# Patient Record
Sex: FEMALE | Race: WHITE | Employment: OTHER | URBAN - METROPOLITAN AREA
[De-identification: names, ages, dates, MRNs, and addresses within clinical notes are randomized per-mention and may not be internally consistent; named-entity substitution may affect disease eponyms.]

---

## 2017-01-13 ENCOUNTER — TELEPHONE (OUTPATIENT)
Dept: FAMILY MEDICINE CLINIC | Age: 82
End: 2017-01-13

## 2017-01-13 NOTE — TELEPHONE ENCOUNTER
Patient called with two requests. The first thing she wants is her test results from her bone density scan. She also wants to have a repeat CT of her abdomen at the same time she has her chest CT done in March (ordered by pulmonary). She said the last abdominal CT was abnormal and wants to know what's going on. Will forward message to Dr Juan Antonio Jones.

## 2017-03-15 ENCOUNTER — HOSPITAL ENCOUNTER (OUTPATIENT)
Dept: CT IMAGING | Age: 82
Discharge: HOME | End: 2017-03-15
Attending: INTERNAL MEDICINE
Payer: MEDICARE

## 2017-03-15 DIAGNOSIS — R93.89 ABNORMAL RADIOLOGICAL FINDINGS IN SKIN AND SUBCUTANEOUS TISSUE: ICD-10-CM

## 2017-03-15 PROCEDURE — 71250 CT THORAX DX C-: CPT

## 2017-03-16 ENCOUNTER — TELEPHONE (OUTPATIENT)
Dept: FAMILY MEDICINE CLINIC | Age: 82
End: 2017-03-16

## 2017-03-16 NOTE — TELEPHONE ENCOUNTER
Called. Reviewed abd CT from last year with the pt.  continue follow up of chest CT as per pulmonary.

## 2017-03-27 RX ORDER — GABAPENTIN 300 MG/1
CAPSULE ORAL
Qty: 360 CAP | Refills: 0 | Status: SHIPPED | OUTPATIENT
Start: 2017-03-27 | End: 2017-04-12 | Stop reason: SDUPTHER

## 2017-04-12 ENCOUNTER — OFFICE VISIT (OUTPATIENT)
Dept: FAMILY MEDICINE CLINIC | Age: 82
End: 2017-04-12

## 2017-04-12 ENCOUNTER — HOSPITAL ENCOUNTER (OUTPATIENT)
Dept: LAB | Age: 82
Discharge: HOME | End: 2017-04-12
Payer: MEDICARE

## 2017-04-12 VITALS
DIASTOLIC BLOOD PRESSURE: 59 MMHG | RESPIRATION RATE: 16 BRPM | BODY MASS INDEX: 32.79 KG/M2 | TEMPERATURE: 98.1 F | WEIGHT: 178.2 LBS | HEART RATE: 74 BPM | OXYGEN SATURATION: 96 % | HEIGHT: 62 IN | SYSTOLIC BLOOD PRESSURE: 123 MMHG

## 2017-04-12 DIAGNOSIS — E03.4 HYPOTHYROIDISM DUE TO ACQUIRED ATROPHY OF THYROID: ICD-10-CM

## 2017-04-12 DIAGNOSIS — R06.02 SOB (SHORTNESS OF BREATH) ON EXERTION: ICD-10-CM

## 2017-04-12 DIAGNOSIS — R68.89 GENERAL SYMPTOM: ICD-10-CM

## 2017-04-12 DIAGNOSIS — I10 ESSENTIAL HYPERTENSION: ICD-10-CM

## 2017-04-12 DIAGNOSIS — R53.83 FATIGUE, UNSPECIFIED TYPE: ICD-10-CM

## 2017-04-12 DIAGNOSIS — R19.5 HEME POSITIVE STOOL: ICD-10-CM

## 2017-04-12 DIAGNOSIS — G47.00 INSOMNIA, UNSPECIFIED TYPE: ICD-10-CM

## 2017-04-12 DIAGNOSIS — M79.7 FIBROMYALGIA: ICD-10-CM

## 2017-04-12 DIAGNOSIS — Z51.81 ENCOUNTER FOR MEDICATION MONITORING: ICD-10-CM

## 2017-04-12 DIAGNOSIS — K21.9 GASTROESOPHAGEAL REFLUX DISEASE WITHOUT ESOPHAGITIS: ICD-10-CM

## 2017-04-12 DIAGNOSIS — Z00.00 MEDICARE ANNUAL WELLNESS VISIT, SUBSEQUENT: Primary | ICD-10-CM

## 2017-04-12 DIAGNOSIS — D64.9 ANEMIA, UNSPECIFIED TYPE: ICD-10-CM

## 2017-04-12 LAB
BILIRUB UR QL STRIP: NORMAL
GLUCOSE UR-MCNC: NEGATIVE MG/DL
KETONES P FAST UR STRIP-MCNC: NORMAL MG/DL
PH UR STRIP: 5.5 [PH] (ref 4.6–8)
PROT UR QL STRIP: NORMAL MG/DL
SP GR UR STRIP: 1.02 (ref 1–1.03)
UA UROBILINOGEN AMB POC: NORMAL (ref 0.2–1)
URINALYSIS CLARITY POC: CLEAR
URINALYSIS COLOR POC: NORMAL
URINE BLOOD POC: NEGATIVE
URINE LEUKOCYTES POC: NORMAL
URINE NITRITES POC: NEGATIVE

## 2017-04-12 PROCEDURE — 83550 IRON BINDING TEST: CPT

## 2017-04-12 PROCEDURE — 80061 LIPID PANEL: CPT

## 2017-04-12 PROCEDURE — 85025 COMPLETE CBC W/AUTO DIFF WBC: CPT

## 2017-04-12 PROCEDURE — 82607 VITAMIN B-12: CPT

## 2017-04-12 PROCEDURE — 82728 ASSAY OF FERRITIN: CPT

## 2017-04-12 PROCEDURE — 80053 COMPREHEN METABOLIC PANEL: CPT

## 2017-04-12 PROCEDURE — 84443 ASSAY THYROID STIM HORMONE: CPT

## 2017-04-12 PROCEDURE — 36415 COLL VENOUS BLD VENIPUNCTURE: CPT

## 2017-04-12 RX ORDER — ALPRAZOLAM 0.5 MG/1
TABLET ORAL
Qty: 180 TAB | Refills: 0 | Status: SHIPPED | OUTPATIENT
Start: 2017-04-12 | End: 2017-12-01 | Stop reason: ALTCHOICE

## 2017-04-12 RX ORDER — GABAPENTIN 300 MG/1
CAPSULE ORAL
Qty: 360 CAP | Refills: 3 | Status: SHIPPED | OUTPATIENT
Start: 2017-04-12 | End: 2017-08-18

## 2017-04-12 RX ORDER — LEVOTHYROXINE SODIUM 50 UG/1
TABLET ORAL
Qty: 90 TAB | Refills: 3 | Status: SHIPPED | OUTPATIENT
Start: 2017-04-12 | End: 2018-04-13 | Stop reason: SDUPTHER

## 2017-04-12 RX ORDER — OSELTAMIVIR PHOSPHATE 75 MG/1
CAPSULE ORAL
Refills: 0 | COMMUNITY
Start: 2017-04-01 | End: 2017-04-12 | Stop reason: ALTCHOICE

## 2017-04-12 RX ORDER — HYDROCORTISONE 2.5% 25 MG/G
CREAM TOPICAL
Refills: 0 | COMMUNITY
Start: 2017-04-01 | End: 2017-07-12 | Stop reason: CLARIF

## 2017-04-12 NOTE — PROGRESS NOTES
Appointment at [facility] Cardiology on Wednesday, April 19, 2017 at 9:00 am for a stress echo.

## 2017-04-12 NOTE — PROGRESS NOTES
Chief Complaint   Patient presents with   Rochelle Arellano Annual Wellness Visit     Medicare       BMP 10/11/2016    TSH/T4  10/11/2016    Lipid 10/1/2016    Mammogram 10/31/2016    Colonoscopy 2/15/2015 by Dr. Marta Palacios. Pt states Dr. Marta Palacios stated she did not need anymore colonoscopies.     Verbal order received per Dr. Shah- obtain UA without micro- VORB

## 2017-04-12 NOTE — PROGRESS NOTES
This is a Subsequent Medicare Annual Wellness Visit providing Personalized Prevention Plan Services (PPPS) (Performed 12 months after initial AWV and PPPS )    I have reviewed the patient's medical history in detail and updated the computerized patient record. Cardiovascular Review:  The patient has hypertension, hyperlipidemia, and atrial tachycardia. Diet and Lifestyle: generally follows a low fat low cholesterol diet, generally follows a low sodium diet, exercises sporadically  Home BP Monitoring: is not measured at home. Pertinent ROS: taking medications as instructed, no medication side effects noted, no TIA's, no swelling of ankles, no palpitations. C/o SOB with exertion over the past several months but thinks that it is getting worse. Denies chest pain. No pressure or chest tightness. Last seen by cardiology on this past January but did not discussed at that time. She has been extremely more fatigue also. Thyroid Review:  Patient is seen for followup of hypothyroidism. Followed by endo. Thyroid ROS: denies weight changes, heat/cold intolerance, bowel/skin changes or CVS symptoms. Osteoarthritis and Chronic Pain:  Patient has osteoarthritis and fibromyalgia. Rheumatological ROS: Has burning and pain in the legs at feet that is worse at night. Aches all the time. Has not been exercising. Controlled by PRN meds. Response to treatment plan: stable.      History     Past Medical History:   Diagnosis Date    Fibromyalgia     GERD (gastroesophageal reflux disease)     Headache     migraine    Heart failure (HCC)     HTN (hypertension) 4/2/2010    Hypothyroidism 4/2/2010    OA (osteoarthritis) 4/2/2010    Tachycardia 2013    Thyroid disease       Past Surgical History:   Procedure Laterality Date    COLONOSCOPY,REMGISSELLE Vanegas  2/5/2015         ENDOSCOPY, COLON, DIAGNOSTIC  12/2010    Dr. Janessa Bauer- repeat 5 yrs    HX BREAST BIOPSY Right 1999    negative    HX CARPAL TUNNEL RELEASE  9/28/2012    HX CATARACT REMOVAL  9/2014    bilateral     HX HYSTERECTOMY  1977    HX ORTHOPAEDIC       Current Outpatient Prescriptions   Medication Sig Dispense Refill    PROCTOZONE-HC 2.5 % rectal cream Apply once or twice daily as needed  0    ALPRAZolam (XANAX) 0.5 mg tablet TAKE 1 TABLET BY MOUTH TWICE DAILY AS NEEDED 180 Tab 0    levothyroxine (SYNTHROID) 50 mcg tablet Take 1 pill every Monday through Thursday. 90 Tab 3    gabapentin (NEURONTIN) 300 mg capsule TAKE 1 CAPSULE BY MOUTH TWICE DAILY THEN TAKE 2 CAPSULES BY MOUTH EVERY NIGHT 360 Cap 3    temazepam (RESTORIL) 15 mg capsule TAKE 1 TO 2 CAPSULES BY MOUTH EVERY NIGHT AT BEDTIME 60 Cap 3    butalbital-acetaminophen-caffeine (FIORICET, ESGIC) -40 mg per tablet TAKE 1 TABLET BY MOUTH EVERY 6 HOURS AS NEEDED FOR HEADACHE. MAX DAILY AMT: 4 TABLETS 30 Tab 1    cyclobenzaprine (FLEXERIL) 10 mg tablet Take 1 Tab by mouth three (3) times daily as needed for Muscle Spasm(s). 30 Tab 1    ondansetron hcl (ZOFRAN, AS HYDROCHLORIDE,) 8 mg tablet Take 1 Tab by mouth every eight (8) hours as needed for Nausea. 15 Tab 0    calcium carbonate (TUMS) 200 mg calcium (500 mg) chew Take 1 Tab by mouth as needed.  loratadine (CLARITIN) 10 mg tablet Take 1 tab at 1am and take 1 tab at 1pm      nebivolol (BYSTOLIC) 5 mg tablet Take 1 Tab by mouth two (2) times a day.  180 Tab 3    furosemide (LASIX) 20 mg tablet TAKE 1 TABLET BY MOUTH EVERY DAY AS NEEDED FOR SWEELING 90 Tab 0     Allergies   Allergen Reactions    Epinephrine Other (comments)     BP drops and pass out    Iodinated Contrast Media - Oral And Iv Dye Shortness of Breath    Novocain [Procaine] Palpitations     BP drops,passes out    Codeine Itching     Pt does not recall    Cymbalta [Duloxetine] Other (comments)     Head Pain and nausea    Daypro [Oxaprozin] Unknown (comments)     Pt does not recall    Prednisone Shortness of Breath     agitation    Sulfa (Sulfonamide Antibiotics) Unknown (comments)     Pt does not recall    Zithromax [Azithromycin] Nausea Only     Diarrhea, headaches     Family History   Problem Relation Age of Onset    Heart Disease Paternal Aunt     Heart Disease Paternal Uncle     Colon Cancer Maternal Grandmother     No Known Problems Mother     No Known Problems Father     Other Daughter      fibromyalgia    Heart Disease Maternal Grandfather     Hypertension Sister      Social History   Substance Use Topics    Smoking status: Never Smoker    Smokeless tobacco: Never Used    Alcohol use No     Patient Active Problem List   Diagnosis Code    Hypothyroidism E03.9    HTN (hypertension) I10    OA (osteoarthritis) M19.90    GERD (gastroesophageal reflux disease) K21.9    Fibromyalgia M79.7    Insomnia G47.00    Headache R51    Iron deficiency anemia, unspecified D50.9    Heart palpitations R00.2    Paroxysmal Inappropriate sinus tachycardia vs possible atrial tachycardia R00.0    Edema leg R60.0    Reactive airway disease with wheezing J45.909    Incidental lung nodule, greater than or equal to 8mm R91.1    Encounter for medication monitoring Z51.81       Depression Risk Factor Screening:     PHQ 2 / 9, over the last two weeks 4/12/2017   Little interest or pleasure in doing things Not at all   Feeling down, depressed or hopeless Not at all   Total Score PHQ 2 0     Alcohol Risk Factor Screening: On any occasion during the past 3 months, have you had more than 3 drinks containing alcohol? No    Do you average more than 7 drinks per week? No      Functional Ability and Level of Safety:     Hearing Loss   none    Activities of Daily Living   Self-care. Requires assistance with: no ADLs    Fall Risk     Fall Risk Assessment, last 12 mths 4/12/2017   Able to walk? Yes   Fall in past 12 months? No   Functional Ability:   Does the patient exhibit a steady gait?   yes    How long did it take the patient to get up and walk from a sitting position? seconds    Is the patient self reliant? (ie can do own laundry, meals, household chores)  yes   Does the patient handle his/her own medications? yes   Does the patient handle his/her own money? yes   Is the patients home safe (ie good lighting, handrails on stairs and bath, etc.)? yes   Did you notice or did patient express any hearing difficulties? no   Did you notice or did patient express any vision difficulties?  no   Were distance and reading eye charts used? yes     Advance Care Planning:   Patient was offered the opportunity to discuss advance care planning:  yes    Does patient have an Advance Directive:  yes    If no, did you provide information on Caring Connections? yes          Abuse Screen   Patient is not abused    Review of Systems   Eyes: negative for irritation and redness  Ears, nose, mouth, throat, and face: negative for earaches, nasal congestion and hoarseness  Respiratory: negative for cough, sputum or dyspnea on exertion  Cardiovascular: negative for chest pain, chest pressure/discomfort, dyspnea, palpitations, irregular heart beats, fatigue, lower extremity edema  Gastrointestinal: negative for dysphagia, dyspepsia, reflux symptoms, nausea, vomiting, change in bowel habits, melena, constipation and abdominal pain. Has hemorrhoid but denies bleeding or pain.     Genitourinary:negative for frequency, dysuria, nocturia, urinary incontinence  Integument/breast: negative for rash and dryness  Hematologic/lymphatic: negative for easy bruising and lymphadenopathy  Neurological: negative for headaches, dizziness, tremor and weakness  Endocrine: negative for diabetic symptoms including polyuria and polydipsia    Physical Examination     Evaluation of Cognitive Function:  Mood/affect:  neutral  Appearance: age appropriate  Family member/caregiver input: NA    Visit Vitals    /59 (BP 1 Location: Right arm, BP Patient Position: Sitting)    Pulse 74    Temp 98.1 °F (36.7 °C) (Oral)    Resp 16    Ht 5' 1.5\" (1.562 m)    Wt 178 lb 3.2 oz (80.8 kg)    SpO2 96%    BMI 33.13 kg/m2     General:  Alert, cooperative, no distress, appears stated age. Head:  Normocephalic, without obvious abnormality, atraumatic. Eyes:  Conjunctivae/corneas clear. PERRL, EOMs intact. Fundi benign. Ears:  Normal TMs and external ear canals both ears. Nose: Nares normal. Septum midline. Mucosa normal. No drainage or sinus tenderness. Throat: Lips, mucosa, and tongue normal. Teeth and gums normal.   Neck: Supple, symmetrical, trachea midline, no adenopathy, thyroid: no enlargement/tenderness/nodules, no carotid bruit and no JVD. Back:   Symmetric, no curvature. ROM normal. No CVA tenderness. Lungs:   Clear to auscultation bilaterally. Chest wall:  No tenderness or deformity. Heart:  Regular rate and rhythm, S1, S2 normal, no murmur, click, rub or gallop. Breast Exam:  No tenderness, masses, or nipple abnormality. Abdomen:   Soft, non-tender. Bowel sounds normal. No masses,  No organomegaly. Rectal:  Normal tone,  no masses or tenderness. Has non tender external hemorrhoid. Guaiac positive stool. Extremities: Extremities normal, atraumatic, no cyanosis or edema. Pulses: 2+ and symmetric all extremities. Skin: Skin color, texture, turgor normal. No rashes or lesions. Lymph nodes: Cervical, supraclavicular, and axillary nodes normal.   Neurologic: CNII-XII intact. Normal strength, sensation and reflexes throughout. Patient Care Team:  Nydia George MD as PCP - [facility] Tesfaye Street, MD as Physician (Cardiology)  Nicole Zamora RN as Nurse Navigator    Advice/Referrals/Counseling   Education and counseling provided:  Are appropriate based on today's review and evaluation  End-of-Life planning (with patient's consent)      Assessment/Plan   Ishan Hernández was seen today for annual wellness visit.     Diagnoses and all orders for this visit:    Medicare annual wellness visit, subsequent  -     AMB POC URINALYSIS DIP STICK AUTO W/O MICRO    SOB (shortness of breath) on exertion  -     AMB POC EKG 24HR MONITORING  -     REFERRAL TO CARDIOLOGY  -     STRESS ECHO ICLUD PERFORM ECG TIP WITH     Fatigue, unspecified type  -     AMB POC COMPLETE CBC, AUTOMATED  -     CBC WITH AUTOMATED DIFF    Heme positive stool//  Anemia, unspecified type  Will defer referral to GI until after the results of the PET scan set up bu pulmonary for pulmonary nodule     -     IRON PROFILE  -     FERRITIN    Essential hypertension  -     LIPID PANEL    Hypothyroidism due to acquired atrophy of thyroid  -     levothyroxine (SYNTHROID) 50 mcg tablet; Take 1 pill every Monday through Thursday.  -     TSH 3RD GENERATION    Insomnia, unspecified type  -     ALPRAZolam (XANAX) 0.5 mg tablet; TAKE 1 TABLET BY MOUTH TWICE DAILY AS NEEDED    Fibromyalgia  -     gabapentin (NEURONTIN) 300 mg capsule; TAKE 1 CAPSULE BY MOUTH TWICE DAILY THEN TAKE 2 CAPSULES BY MOUTH EVERY NIGHT  Stable     Gastroesophageal reflux disease without esophagitis  Stable     General symptom  -     VITAMIN B12 & FOLATE    Encounter for medication monitoring  -     METABOLIC PANEL, COMPREHENSIVE    Follow-up Disposition:  Return in about 6 weeks (around 5/24/2017). reviewed diet, exercise and weight control  cardiovascular risk and specific lipid/LDL goals reviewed  reviewed medications and side effects in detail. I have discussed diagnosis listed in this note with pt and/or family. I have discussed treatment plans and options and the risk/benefit analysis of those options, including safe use of medications and possible medication side effects. Through the use of shared decision making we have agreed to the above plan. The patient has received an after-visit summary and questions were answered concerning future plans and follow up. Advise pt of any urgent changes then to proceed to the ER.

## 2017-04-12 NOTE — MR AVS SNAPSHOT
Visit Information Date & Time Provider Department Dept. Phone Encounter #  
 4/12/2017  2:15 PM Kraig Dash MD [facility] [phone] [id number] Follow-up Instructions Return in about 6 weeks (around 5/24/2017). Upcoming Health Maintenance Date Due  
 MEDICARE YEARLY EXAM 5/12/2017 GLAUCOMA SCREENING Q2Y 3/24/2018 COLONOSCOPY 5/11/2021 DTaP/Tdap/Td series (2 - Td) 4/15/2025 Allergies as of 4/12/2017  Review Complete On: 4/12/2017 By: Rabia Fay LPN Severity Noted Reaction Type Reactions Epinephrine High 04/02/2010    Other (comments) BP drops and pass out Iodinated Contrast Media - Oral And Iv Dye High 05/21/2010    Shortness of Breath Novocain [Procaine] High 04/02/2010    Palpitations BP drops,passes out Codeine  04/02/2010    Itching Pt does not recall Cymbalta [Duloxetine]  12/15/2014    Other (comments) Head Pain and nausea Daypro [Oxaprozin]  04/02/2010    Unknown (comments) Pt does not recall Prednisone  06/28/2013    Shortness of Breath  
 agitation Sulfa (Sulfonamide Antibiotics)  04/02/2010    Unknown (comments) Pt does not recall Zithromax [Azithromycin]  08/10/2015    Nausea Only Diarrhea, headaches Current Immunizations  Reviewed on 10/11/2016 Name Date Influenza High Dose Vaccine PF 10/11/2016, 9/28/2015, 10/27/2014 Influenza Vaccine 10/23/2013 Influenza Vaccine Split 10/9/2012, 10/12/2011, 10/11/2010 Pneumococcal Conjugate (PCV-13) 4/15/2015 Pneumococcal Vaccine (Pcv) 10/23/2007 Tdap 4/15/2015 Not reviewed this visit You Were Diagnosed With   
  
 Codes Comments Medicare annual wellness visit, subsequent    -  Primary ICD-10-CM: Z00.00 ICD-9-CM: V70.0 Insomnia, unspecified type     ICD-10-CM: G47.00 ICD-9-CM: 780.52 Hypothyroidism due to acquired atrophy of thyroid     ICD-10-CM: E03.4 ICD-9-CM: 244.8, 246.8 Essential hypertension     ICD-10-CM: I10 
ICD-9-CM: 401.9 Fibromyalgia     ICD-10-CM: M79.7 ICD-9-CM: 729.1 Gastroesophageal reflux disease without esophagitis     ICD-10-CM: K21.9 ICD-9-CM: 530.81 Encounter for medication monitoring     ICD-10-CM: Z51.81 
ICD-9-CM: V58.83   
 SOB (shortness of breath) on exertion     ICD-10-CM: R06.02 
ICD-9-CM: 786.05 Fatigue, unspecified type     ICD-10-CM: R53.83 ICD-9-CM: 780.79 Vitals BP Pulse Temp Resp Height(growth percentile) Weight(growth percentile) 123/59 (BP 1 Location: Right arm, BP Patient Position: Sitting) 74 98.1 °F (36.7 °C) (Oral) 16 5' 1.5\" (1.562 m) 178 lb 3.2 oz (80.8 kg) SpO2 BMI OB Status Smoking Status 96% 33.13 kg/m2 Hysterectomy Never Smoker Vitals History BMI and BSA Data Body Mass Index Body Surface Area  
 33.13 kg/m 2 1.87 m 2 Preferred Pharmacy Pharmacy Name Phone [facility] [address] [phone] Your Updated Medication List  
  
   
This list is accurate as of: 4/12/17  3:58 PM.  Always use your most recent med list.  
  
  
  
  
 ALPRAZolam 0.5 mg tablet Commonly known as:  XANAX  
TAKE 1 TABLET BY MOUTH TWICE DAILY AS NEEDED  
  
 butalbital-acetaminophen-caffeine -40 mg per tablet Commonly known as:  FIORICET, ESGIC  
TAKE 1 TABLET BY MOUTH EVERY 6 HOURS AS NEEDED FOR HEADACHE. MAX DAILY AMT: 4 TABLETS  
  
 calcium carbonate 200 mg calcium (500 mg) Chew Commonly known as:  TUMS Take 1 Tab by mouth as needed. cyclobenzaprine 10 mg tablet Commonly known as:  FLEXERIL Take 1 Tab by mouth three (3) times daily as needed for Muscle Spasm(s). furosemide 20 mg tablet Commonly known as:  LASIX TAKE 1 TABLET BY MOUTH EVERY DAY AS NEEDED FOR SWEELING  
  
 gabapentin 300 mg capsule Commonly known as:  NEURONTIN  
 TAKE 1 CAPSULE BY MOUTH TWICE DAILY THEN TAKE 2 CAPSULES BY MOUTH EVERY NIGHT  
  
 levothyroxine 50 mcg tablet Commonly known as:  synthroid Take 1 pill every Monday through Thursday. loratadine 10 mg tablet Commonly known as:  Sheng Igor Take 1 tab at 1am and take 1 tab at 1pm  
  
 nebivolol 5 mg tablet Commonly known as:  BYSTOLIC Take 1 Tab by mouth two (2) times a day. ondansetron hcl 8 mg tablet Commonly known as:  ZOFRAN (AS HYDROCHLORIDE) Take 1 Tab by mouth every eight (8) hours as needed for Nausea. PROCTOZONE-HC 2.5 % rectal cream  
Generic drug:  hydrocortisone Apply once or twice daily as needed  
  
 temazepam 15 mg capsule Commonly known as:  RESTORIL  
TAKE 1 TO 2 CAPSULES BY MOUTH EVERY NIGHT AT BEDTIME Prescriptions Printed Refills ALPRAZolam (XANAX) 0.5 mg tablet 0 Sig: TAKE 1 TABLET BY MOUTH TWICE DAILY AS NEEDED Class: Print Prescriptions Sent to Pharmacy Refills  
 levothyroxine (SYNTHROID) 50 mcg tablet 3 Sig: Take 1 pill every Monday through Thursday. Class: Normal  
 Pharmacy: [facility] [address] [place] [address] Ph #: [phone]  
 gabapentin (NEURONTIN) 300 mg capsule 3 Sig: TAKE 1 CAPSULE BY MOUTH TWICE DAILY THEN TAKE 2 CAPSULES BY MOUTH EVERY NIGHT Class: Normal  
 Pharmacy: [facility] [address], [address] [place] [address] Ph #: [phone] We Performed the Following AMB POC COMPLETE CBC, AUTOMATED [44248 CPT(R)] AMB POC EKG 24HR MONITORING [08530 CPT(R)] AMB POC URINALYSIS DIP STICK AUTO W/O MICRO [70584 CPT(R)] LIPID PANEL [40265 CPT(R)] METABOLIC PANEL, COMPREHENSIVE [01289 CPT(R)] REFERRAL TO CARDIOLOGY [HWQ30 Custom] Comments:  
 Stress echo TSH 3RD GENERATION [35942 CPT(R)] Follow-up Instructions Return in about 6 weeks (around 5/24/2017). To-Do List   
 04/14/2017  2:00 PM  
  Appointment with [facility] ER PET 1 at [facility] RAD PET ([phone]) 1. Patient should arrive 30 minutes early to register. Patient's entire visit to the hospital will last about 2 Hours. 2.  Eat a low carb/high protein diet the evening before your procedure. Stay away from food and drink that contains sugars the night before and ESPECIALLY the day of the test. 3.  Do Not eat 4 hours prior to your procedure. The patient may drink all the water they want, up until the time of their appointment. Encourage patient to be well hydrated. Coffee is ok, as long as an artificial sweetener is used instead of sugar. 4. Take meds with water or saltine crackers if food required. 5.  Do not exercise the morning of your procedure. 6.  If you take insulin, it must be taken at least 4 hours before your procedure. 7.  You should bring medications for pain, anxiety, or claustrophobia if you need them. If you take medications for anxiety or claustrophobia, a ride needs to come with you. 8.  Materials for this procedure are ordered in advance and are time-sensitive. If you need to cancel or reschedule, you must call [phone] at least 48 hours prior to your appointment time. 9.  Bring recent CT scan results from other facilities with you. Please have [facility]: ER Registration SFM: [facility], Radiation/Oncology Department (left of the fireplace) MRM: OP Registration MOB 1. Referral Information Referral ID Referred By Referred To  
  
 [id number] Savage Hilliard MD   
   [address] [place] [address], [address] Phone: [phone] Fax: [phone] Visits Status Start Date End Date 1 New Request 4/12/17 4/12/18 If your referral has a status of pending review or denied, additional information will be sent to support the outcome of this decision. Introducing [facility]! Dear Camilo Mcgarry: 
Thank you for requesting a [employer] account. Our records indicate that you already have an active [employer] account. You can access your account anytime at [email or web address] Did you know that you can access your hospital and ER discharge instructions at any time in [employer]? You can also review all of your test results from your hospital stay or ER visit. Additional Information If you have questions, please visit the Frequently Asked Questions section of the [employer] website at [email or web address] Remember, [employer] is NOT to be used for urgent needs. For medical emergencies, dial 911. Now available from your iPhone and Android! Please provide this summary of care documentation to your next provider. Your primary care clinician is listed as Gurvinder Zelaya. If you have any questions after today's visit, please call [phone].

## 2017-04-13 LAB
ALBUMIN SERPL-MCNC: 4.5 G/DL (ref 3.5–4.8)
ALBUMIN/GLOB SERPL: 1.7 {RATIO} (ref 1.2–2.2)
ALP SERPL-CCNC: 103 IU/L (ref 39–117)
ALT SERPL-CCNC: 17 IU/L (ref 0–32)
AST SERPL-CCNC: 24 IU/L (ref 0–40)
BASOPHILS # BLD AUTO: 0 X10E3/UL (ref 0–0.2)
BASOPHILS NFR BLD AUTO: 0 %
BILIRUB SERPL-MCNC: 0.3 MG/DL (ref 0–1.2)
BUN SERPL-MCNC: 16 MG/DL (ref 8–27)
BUN/CREAT SERPL: 18 (ref 12–28)
CALCIUM SERPL-MCNC: 10 MG/DL (ref 8.7–10.3)
CHLORIDE SERPL-SCNC: 95 MMOL/L (ref 96–106)
CHOLEST SERPL-MCNC: 203 MG/DL (ref 100–199)
CO2 SERPL-SCNC: 23 MMOL/L (ref 18–29)
CREAT SERPL-MCNC: 0.91 MG/DL (ref 0.57–1)
EOSINOPHIL # BLD AUTO: 0.1 X10E3/UL (ref 0–0.4)
EOSINOPHIL NFR BLD AUTO: 2 %
ERYTHROCYTE [DISTWIDTH] IN BLOOD BY AUTOMATED COUNT: 16.1 % (ref 12.3–15.4)
FERRITIN SERPL-MCNC: 9 NG/ML (ref 15–150)
FOLATE SERPL-MCNC: 8.3 NG/ML
GLOBULIN SER CALC-MCNC: 2.6 G/DL (ref 1.5–4.5)
GLUCOSE SERPL-MCNC: 99 MG/DL (ref 65–99)
HCT VFR BLD AUTO: 31.5 % (ref 34–46.6)
HDLC SERPL-MCNC: 60 MG/DL
HGB BLD-MCNC: 9.3 G/DL (ref 11.1–15.9)
IMM GRANULOCYTES # BLD: 0 X10E3/UL (ref 0–0.1)
IMM GRANULOCYTES NFR BLD: 0 %
INTERPRETATION, 910389: NORMAL
IRON SATN MFR SERPL: 5 % (ref 15–55)
IRON SERPL-MCNC: 23 UG/DL (ref 27–139)
LDLC SERPL CALC-MCNC: 101 MG/DL (ref 0–99)
LYMPHOCYTES # BLD AUTO: 1.4 X10E3/UL (ref 0.7–3.1)
LYMPHOCYTES NFR BLD AUTO: 21 %
MCH RBC QN AUTO: 20.4 PG (ref 26.6–33)
MCHC RBC AUTO-ENTMCNC: 29.5 G/DL (ref 31.5–35.7)
MCV RBC AUTO: 69 FL (ref 79–97)
MONOCYTES # BLD AUTO: 0.7 X10E3/UL (ref 0.1–0.9)
MONOCYTES NFR BLD AUTO: 10 %
NEUTROPHILS # BLD AUTO: 4.5 X10E3/UL (ref 1.4–7)
NEUTROPHILS NFR BLD AUTO: 67 %
PLATELET # BLD AUTO: 199 X10E3/UL (ref 150–379)
POTASSIUM SERPL-SCNC: 4.3 MMOL/L (ref 3.5–5.2)
PROT SERPL-MCNC: 7.1 G/DL (ref 6–8.5)
RBC # BLD AUTO: 4.57 X10E6/UL (ref 3.77–5.28)
SODIUM SERPL-SCNC: 136 MMOL/L (ref 134–144)
TIBC SERPL-MCNC: 506 UG/DL (ref 250–450)
TRIGL SERPL-MCNC: 212 MG/DL (ref 0–149)
TSH SERPL DL<=0.005 MIU/L-ACNC: 0.46 UIU/ML (ref 0.45–4.5)
UIBC SERPL-MCNC: 483 UG/DL (ref 118–369)
VIT B12 SERPL-MCNC: 531 PG/ML (ref 211–946)
VLDLC SERPL CALC-MCNC: 42 MG/DL (ref 5–40)
WBC # BLD AUTO: 6.8 X10E3/UL (ref 3.4–10.8)

## 2017-04-14 ENCOUNTER — HOSPITAL ENCOUNTER (OUTPATIENT)
Dept: PET IMAGING | Age: 82
Discharge: HOME | End: 2017-04-14
Attending: INTERNAL MEDICINE
Payer: MEDICARE

## 2017-04-14 VITALS — HEIGHT: 61 IN | BODY MASS INDEX: 33.61 KG/M2 | WEIGHT: 178 LBS

## 2017-04-14 DIAGNOSIS — R91.1 SOLITARY PULMONARY NODULE: ICD-10-CM

## 2017-04-14 PROCEDURE — A9552 F18 FDG: HCPCS

## 2017-04-14 RX ORDER — SODIUM CHLORIDE 0.9 % (FLUSH) 0.9 %
10 SYRINGE (ML) INJECTION
Status: COMPLETED | OUTPATIENT
Start: 2017-04-14 | End: 2017-04-14

## 2017-04-14 RX ADMIN — Medication 10 ML: at 13:52

## 2017-04-24 RX ORDER — NEBIVOLOL 5 MG/1
5 TABLET ORAL 2 TIMES DAILY
Qty: 60 TAB | Refills: 0 | Status: SHIPPED | OUTPATIENT
Start: 2017-04-24 | End: 2017-05-22 | Stop reason: SDUPTHER

## 2017-04-27 ENCOUNTER — HOSPITAL ENCOUNTER (OUTPATIENT)
Dept: CT IMAGING | Age: 82
Discharge: HOME | End: 2017-04-27
Attending: INTERNAL MEDICINE
Payer: MEDICARE

## 2017-04-27 VITALS
HEART RATE: 86 BPM | TEMPERATURE: 99.1 F | OXYGEN SATURATION: 96 % | RESPIRATION RATE: 16 BRPM | WEIGHT: 178 LBS | HEIGHT: 62 IN | DIASTOLIC BLOOD PRESSURE: 51 MMHG | BODY MASS INDEX: 32.76 KG/M2 | SYSTOLIC BLOOD PRESSURE: 123 MMHG

## 2017-04-27 DIAGNOSIS — R91.8 ABNORMAL CT SCAN OF LUNG: ICD-10-CM

## 2017-04-27 PROCEDURE — 77030003497 HC NDL BIOP TISS BARD -B

## 2017-04-27 PROCEDURE — 88341 IMHCHEM/IMCYTCHM EA ADD ANTB: CPT | Performed by: INTERNAL MEDICINE

## 2017-04-27 PROCEDURE — 88184 FLOWCYTOMETRY/ TC 1 MARKER: CPT | Performed by: INTERNAL MEDICINE

## 2017-04-27 PROCEDURE — 88360 TUMOR IMMUNOHISTOCHEM/MANUAL: CPT | Performed by: INTERNAL MEDICINE

## 2017-04-27 PROCEDURE — 88312 SPECIAL STAINS GROUP 1: CPT | Performed by: INTERNAL MEDICINE

## 2017-04-27 PROCEDURE — 74011250636 HC RX REV CODE- 250/636: Performed by: RADIOLOGY

## 2017-04-27 PROCEDURE — 81261 IGH GENE REARRANGE AMP METH: CPT | Performed by: INTERNAL MEDICINE

## 2017-04-27 PROCEDURE — 88173 CYTOPATH EVAL FNA REPORT: CPT | Performed by: INTERNAL MEDICINE

## 2017-04-27 PROCEDURE — 38505 NEEDLE BIOPSY LYMPH NODES: CPT

## 2017-04-27 PROCEDURE — 88342 IMHCHEM/IMCYTCHM 1ST ANTB: CPT | Performed by: INTERNAL MEDICINE

## 2017-04-27 PROCEDURE — 88185 FLOWCYTOMETRY/TC ADD-ON: CPT | Performed by: INTERNAL MEDICINE

## 2017-04-27 PROCEDURE — 77030018781

## 2017-04-27 PROCEDURE — 88307 TISSUE EXAM BY PATHOLOGIST: CPT | Performed by: INTERNAL MEDICINE

## 2017-04-27 PROCEDURE — 81264 IGK REARRANGEABN CLONAL POP: CPT | Performed by: INTERNAL MEDICINE

## 2017-04-27 RX ORDER — SODIUM CHLORIDE 9 MG/ML
25 INJECTION, SOLUTION INTRAVENOUS CONTINUOUS
Status: DISCONTINUED | OUTPATIENT
Start: 2017-04-27 | End: 2017-05-01 | Stop reason: HOSPADM

## 2017-04-27 RX ORDER — MIDAZOLAM HYDROCHLORIDE 1 MG/ML
5 INJECTION, SOLUTION INTRAMUSCULAR; INTRAVENOUS
Status: DISCONTINUED | OUTPATIENT
Start: 2017-04-27 | End: 2017-05-01 | Stop reason: HOSPADM

## 2017-04-27 RX ORDER — FENTANYL CITRATE 50 UG/ML
100 INJECTION, SOLUTION INTRAMUSCULAR; INTRAVENOUS
Status: DISCONTINUED | OUTPATIENT
Start: 2017-04-27 | End: 2017-05-01 | Stop reason: HOSPADM

## 2017-04-27 RX ADMIN — SODIUM CHLORIDE 25 ML/HR: 900 INJECTION, SOLUTION INTRAVENOUS at 11:00

## 2017-04-27 RX ADMIN — FENTANYL CITRATE 25 MCG: 50 INJECTION, SOLUTION INTRAMUSCULAR; INTRAVENOUS at 11:55

## 2017-04-27 RX ADMIN — FENTANYL CITRATE 25 MCG: 50 INJECTION, SOLUTION INTRAMUSCULAR; INTRAVENOUS at 11:42

## 2017-04-27 RX ADMIN — MIDAZOLAM HYDROCHLORIDE 1 MG: 1 INJECTION INTRAMUSCULAR; INTRAVENOUS at 11:55

## 2017-04-27 RX ADMIN — FENTANYL CITRATE 50 MCG: 50 INJECTION, SOLUTION INTRAMUSCULAR; INTRAVENOUS at 11:48

## 2017-04-27 RX ADMIN — MIDAZOLAM HYDROCHLORIDE 2 MG: 1 INJECTION INTRAMUSCULAR; INTRAVENOUS at 11:44

## 2017-04-27 RX ADMIN — MIDAZOLAM HYDROCHLORIDE 1 MG: 1 INJECTION INTRAMUSCULAR; INTRAVENOUS at 11:42

## 2017-04-27 NOTE — IP AVS SNAPSHOT
Summary of Care Report The Summary of Care report has been created to help improve care coordination. Users with access to [employer] or [employer] [facility] [facility] (Web-based application) may access additional patient information including the Discharge Summary. If you are not currently a [facility] user and need more information, please call the number listed below in the [place] [address] section and ask to be connected with Medical Records. Facility Information Name Address Phone [address] [address] [phone] Patient Information Patient Name Sex NABEEL Gonzalez ([id number]) Female 1935 Discharge Information Admitting Provider Service Area Unit  
 (none) [facility] / [phone] Discharge Provider Discharge Date/Time Discharge Disposition Destination (none) (none) (none) (none) Patient Language Language ENGLISH [13] Hospital Problems as of 2017  Reviewed: 2017  5:41 PM by Zeinab Matta MD  
 None Non-Hospital Problems as of 2017  Reviewed: 2017  5:41 PM by Zeinab Matta MD  
  
  
  
 Class Noted - Resolved Last Modified Active Problems Hypothyroidism  2010 - Present 2010 by Ta Awad Entered by Ta Awad HTN (hypertension)  2010 - Present 2010 by Ta Awad Entered by Ta Awad  
  OA (osteoarthritis)  2010 - Present 2010 by Ta Awad Entered by Ta Awad   GERD (gastroesophageal reflux disease)  Unknown - Present 2010 by Zeinab Matta MD  
  Entered by Zeinab Matta MD  
  Fibromyalgia  Unknown - Present 2010 by Zeinab Matta MD  
  Entered by Zeinab Matta MD  
 Insomnia  6/1/2010 - Present 6/1/2010 by Aleksandra Quinonez MD  
  Entered by Aleksandra Quinonez MD  
  Headache  Unknown - Present 10/12/2011 by Vipul Juárez Entered by Vipul Juárez Overview Signed 10/12/2011  4:08 PM by Vipul Juárez  
   migraine Iron deficiency anemia, unspecified  6/20/2013 - Present 6/22/2013 Entered by Francisco Bustamante Heart palpitations  7/25/2013 - Present 7/25/2013 by Haven Simmonds, MD  
  Entered by Haven Simmonds, MD  
  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  
  Entered by Haven Simmonds, MD  
  Edema leg  6/19/2015 - Present 6/19/2015 by Haven Simmonds, MD  
  Entered by Haven Simmonds, MD  
  Reactive airway disease with wheezing  9/1/2015 - Present 9/1/2015 by James Maxwell MD  
  Entered by James Maxwell MD  
  Incidental lung nodule, greater than or equal to 8mm  5/11/2016 - Present 5/11/2016 by Aleksandra Quinonez MD  
  Entered by Aleksandra Quinonez MD  
  Encounter for medication monitoring  10/11/2016 - Present 10/11/2016 by Aleksandra Quionnez MD  
  Entered by Aleksandra Quinonez MD  
  
You are allergic to the following Allergen Reactions Epinephrine Other (comments) BP drops and pass out Iodinated Contrast Media - Oral And Iv Dye Shortness of Breath Novocain (Procaine) Palpitations BP drops,passes out Codeine Itching Pt does not recall Cymbalta (Duloxetine) Other (comments) Head Pain and nausea Daypro (Oxaprozin) Unknown (comments) Pt does not recall Prednisone Shortness of Breath  
 agitation Sulfa (Sulfonamide Antibiotics) Unknown (comments) Pt does not recall Zithromax (Azithromycin) Nausea Only Diarrhea, headaches Current Discharge Medication List  
  
ASK your doctor about these medications Dose & Instructions Dispensing Information Comments ALPRAZolam 0.5 mg tablet Commonly known as:  XANAX  
 TAKE 1 TABLET BY MOUTH TWICE DAILY AS NEEDED Quantity:  180 Tab Refills:  0  
   
 butalbital-acetaminophen-caffeine -40 mg per tablet Commonly known as:  FIORICET, ESGIC  
 TAKE 1 TABLET BY MOUTH EVERY 6 HOURS AS NEEDED FOR HEADACHE. MAX DAILY AMT: 4 TABLETS Quantity:  30 Tab Refills:  1  
   
 calcium carbonate 200 mg calcium (500 mg) Chew Commonly known as:  TUMS Dose:  1 Tab Take 1 Tab by mouth as needed. Refills:  0  
   
 cyclobenzaprine 10 mg tablet Commonly known as:  FLEXERIL Dose:  10 mg Take 1 Tab by mouth three (3) times daily as needed for Muscle Spasm(s). Quantity:  30 Tab Refills:  1  
   
 furosemide 20 mg tablet Commonly known as:  LASIX TAKE 1 TABLET BY MOUTH EVERY DAY AS NEEDED FOR SWEELING Quantity:  90 Tab Refills:  0  
   
 gabapentin 300 mg capsule Commonly known as:  NEURONTIN  
 TAKE 1 CAPSULE BY MOUTH TWICE DAILY THEN TAKE 2 CAPSULES BY MOUTH EVERY NIGHT Quantity:  360 Cap Refills:  3  
   
 levothyroxine 50 mcg tablet Commonly known as:  synthroid Take 1 pill every Monday through Thursday. Quantity:  90 Tab Refills:  3  
   
 loratadine 10 mg tablet Commonly known as:  Mac Kaylee Take 1 tab at 1am and take 1 tab at 1pm  
 Refills:  0  
   
 nebivolol 5 mg tablet Commonly known as:  BYSTOLIC Dose:  5 mg Take 1 Tab by mouth two (2) times a day. Quantity:  60 Tab Refills:  0 Hold dose if BP < 100, or pulse < 50. Overdue for appt, must be seen prior to further refills. ondansetron hcl 8 mg tablet Commonly known as:  ZOFRAN (AS HYDROCHLORIDE) Dose:  8 mg Take 1 Tab by mouth every eight (8) hours as needed for Nausea. Quantity:  15 Tab Refills:  0 PROCTOZONE-HC 2.5 % rectal cream  
Generic drug:  hydrocortisone Apply once or twice daily as needed Refills:  0 temazepam 15 mg capsule Commonly known as:  RESTORIL  
 TAKE 1 TO 2 CAPSULES BY MOUTH EVERY NIGHT AT BEDTIME Quantity:  60 Cap Refills:  3 Current Immunizations Name Date Influenza High Dose Vaccine PF 10/11/2016, 9/28/2015, 10/27/2014 Influenza Vaccine 10/23/2013 Influenza Vaccine Split 10/9/2012, 10/12/2011, 10/11/2010 Pneumococcal Conjugate (PCV-13) 4/15/2015 Pneumococcal Vaccine (Pcv) 10/23/2007 Tdap 4/15/2015 Follow-up Information None Discharge Instructions Jessica Whitaker [facility] Special Procedures/Radiology Department Radiologist:    Jennifer Myles Date:    April 27, 2017 Biopsy Discharge Instructions You may have an aching pain in the biopsy site tonight. You may Tylenol, as directed on the label, for pain or discomfort. Avoid ibuprofen (Advil, Motrin) and aspirin for the next 48 hours as these drugs may cause you to bleed. Watch for bleeding from the biopsy site. Hold pressure to the area for at least 15 minutes if bleeding does occur. If you have severe pain or swelling at the site, go directly to the nearest Emergency Room. Watch for signs of infection:  redness, pain, pus, drainage, fever or chills. If this occurs, call your doctor. Resume your previous diet and follow the medication reconciliation form. Rest the remainder of today. Do not lift anything heavier than a small grocery bag (10 pounds) for the next 5 days. It may take up to 3 days for your test results to become available to your physician. Call your physician if you have not heard anything after 3 business day. If you have any questions or concerns, please call [phone] and ask to speak to the nurse on-call. Chart Review Routing History Recipient Method Report Sent By Gracy Jeff MD  
Phone: [phone] In Basket IP Auto Routed Notes MD Raven Oliveira 9/2/2015  1:03 AM 09/02/2015 Ivy Hummel MD  
Phone: [phone] In Basket IP Auto Routed Notes Francisco Bustamante [[id number]] 9/3/2015  9:29 AM 09/03/2015

## 2017-04-27 NOTE — DISCHARGE INSTRUCTIONS
[address]  Special Procedures/Radiology Department    Radiologist:    Marwaldo Bonilla    Date:    April 27, 2017    Biopsy Discharge Instructions    You may have an aching pain in the biopsy site tonight. You may Tylenol, as directed on the label, for pain or discomfort. Avoid ibuprofen (Advil, Motrin) and aspirin for the next 48 hours as these drugs may cause you to bleed. Watch for bleeding from the biopsy site. Hold pressure to the area for at least 15 minutes if bleeding does occur. If you have severe pain or swelling at the site, go directly to the nearest Emergency Room. Watch for signs of infection:  redness, pain, pus, drainage, fever or chills. If this occurs, call your doctor. Resume your previous diet and follow the medication reconciliation form. Rest the remainder of today. Do not lift anything heavier than a small grocery bag (10 pounds) for the next 5 days. It may take up to 3 days for your test results to become available to your physician. Call your physician if you have not heard anything after 3 business day. If you have any questions or concerns, please call [phone] and ask to speak to the nurse on-call.

## 2017-05-11 ENCOUNTER — OFFICE VISIT (OUTPATIENT)
Dept: SURGERY | Age: 82
End: 2017-05-11

## 2017-05-11 VITALS
HEIGHT: 62 IN | DIASTOLIC BLOOD PRESSURE: 76 MMHG | RESPIRATION RATE: 20 BRPM | BODY MASS INDEX: 32.76 KG/M2 | HEART RATE: 74 BPM | OXYGEN SATURATION: 99 % | SYSTOLIC BLOOD PRESSURE: 121 MMHG | WEIGHT: 178 LBS

## 2017-05-11 DIAGNOSIS — R59.0 PELVIC LYMPHADENOPATHY: Primary | ICD-10-CM

## 2017-05-11 NOTE — MR AVS SNAPSHOT
Visit Information Date & Time Provider Department Dept. Phone Encounter #  
 5/11/2017 11:40 AM Vanesa Peck MD [facility] [id number] Your Appointments 5/23/2017 11:00 AM  
ROUTINE CARE with Shireen Jones MD  
[facility] [address]) Appt Note: ok per [address] [place] [address],[address] [facility] [id number] [phone] [address] [facility] [address] Upcoming Health Maintenance Date Due INFLUENZA AGE 9 TO ADULT 8/1/2017 GLAUCOMA SCREENING Q2Y 3/24/2018 MEDICARE YEARLY EXAM 4/13/2018 COLONOSCOPY 5/11/2021 DTaP/Tdap/Td series (2 - Td) 4/15/2025 Allergies as of 5/11/2017  Review Complete On: 5/11/2017 By: Serg Leary Severity Noted Reaction Type Reactions Epinephrine High 04/02/2010    Other (comments) BP drops and pass out Iodinated Contrast Media - Oral And Iv Dye High 05/21/2010    Shortness of Breath Novocain [Procaine] High 04/02/2010    Palpitations BP drops,passes out Codeine  04/02/2010    Itching Pt does not recall Cymbalta [Duloxetine]  12/15/2014    Other (comments) Head Pain and nausea Daypro [Oxaprozin]  04/02/2010    Unknown (comments) Pt does not recall Prednisone  06/28/2013    Shortness of Breath  
 agitation Sulfa (Sulfonamide Antibiotics)  04/02/2010    Unknown (comments) Pt does not recall Zithromax [Azithromycin]  08/10/2015    Nausea Only Diarrhea, headaches Current Immunizations  Reviewed on 4/12/2017 Name Date Influenza High Dose Vaccine PF 10/11/2016, 9/28/2015, 10/27/2014 Influenza Vaccine 10/23/2013 Influenza Vaccine Split 10/9/2012, 10/12/2011, 10/11/2010 Pneumococcal Conjugate (PCV-13) 4/15/2015 Pneumococcal Vaccine (Pcv) 10/23/2007 Tdap 4/15/2015 Not reviewed this visit Vitals BP Pulse Resp Height(growth percentile) Weight(growth percentile) SpO2  
 121/76 74 20 5' 2\" (1.575 m) 178 lb (80.7 kg) 99% BMI OB Status Smoking Status 32.56 kg/m2 Hysterectomy Never Smoker BMI and BSA Data Body Mass Index Body Surface Area 32.56 kg/m 2 1.88 m 2 Preferred Pharmacy Pharmacy Name Phone [facility] [address] [phone] Your Updated Medication List  
  
   
This list is accurate as of: 5/11/17  4:50 PM.  Always use your most recent med list.  
  
  
  
  
 ALPRAZolam 0.5 mg tablet Commonly known as:  XANAX  
TAKE 1 TABLET BY MOUTH TWICE DAILY AS NEEDED  
  
 butalbital-acetaminophen-caffeine -40 mg per tablet Commonly known as:  FIORICET, ESGIC  
TAKE 1 TABLET BY MOUTH EVERY 6 HOURS AS NEEDED FOR HEADACHE. MAX DAILY AMT: 4 TABLETS  
  
 calcium carbonate 200 mg calcium (500 mg) Chew Commonly known as:  TUMS Take 1 Tab by mouth as needed. cyclobenzaprine 10 mg tablet Commonly known as:  FLEXERIL Take 1 Tab by mouth three (3) times daily as needed for Muscle Spasm(s). furosemide 20 mg tablet Commonly known as:  LASIX TAKE 1 TABLET BY MOUTH EVERY DAY AS NEEDED FOR SWEELING  
  
 gabapentin 300 mg capsule Commonly known as:  NEURONTIN  
TAKE 1 CAPSULE BY MOUTH TWICE DAILY THEN TAKE 2 CAPSULES BY MOUTH EVERY NIGHT  
  
 levothyroxine 50 mcg tablet Commonly known as:  synthroid Take 1 pill every Monday through Thursday. loratadine 10 mg tablet Commonly known as:  Ashleigh Livers Take 1 tab at 1am and take 1 tab at 1pm  
  
 nebivolol 5 mg tablet Commonly known as:  BYSTOLIC Take 1 Tab by mouth two (2) times a day. ondansetron hcl 8 mg tablet Commonly known as:  ZOFRAN (AS HYDROCHLORIDE) Take 1 Tab by mouth every eight (8) hours as needed for Nausea.   
  
 PROCTOZONE-HC 2.5 % rectal cream  
 Generic drug:  hydrocortisone Apply once or twice daily as needed  
  
 temazepam 15 mg capsule Commonly known as:  RESTORIL  
TAKE 1 TO 2 CAPSULES BY MOUTH EVERY NIGHT AT BEDTIME Introducing [facility]! Dear Duncan Parsons: 
Thank you for requesting a [employer] account. Our records indicate that you already have an active [employer] account. You can access your account anytime at [email or web address] Did you know that you can access your hospital and ER discharge instructions at any time in [employer]? You can also review all of your test results from your hospital stay or ER visit. Additional Information If you have questions, please visit the Frequently Asked Questions section of the [employer] website at [email or web address] Remember, [employer] is NOT to be used for urgent needs. For medical emergencies, dial 911. Now available from your iPhone and Android! Please provide this summary of care documentation to your next provider. Your primary care clinician is listed as Louise Werner. If you have any questions after today's visit, please call [phone].

## 2017-05-11 NOTE — PROGRESS NOTES
To: Dr. Vincente Phoenix  CC: Nydia George MD    From: Stefanie Loyd MD    Thank you for sending Emmy Abernathy to see us. Encounter Date: 5/11/2017  History and Physical    Assessment:   Right pelvic lymphadenopathy. Body mass index is 32.56 kg/(m^2). Plan:   I recommended diagnostic laparoscopy with excisional biopsy of the nodes. Given the proximity to the right ureter, may stent immediately prior. Risks including bleeding and infection were explained to the patient. The patient expressed understanding of the risks, and all questions were answered to the patient's satisfaction. HPI:   Nayan Lamar is a 80 y.o. female who is seen in consultation at the request of Perry Sutton for evaluation of pelvic lymphadenopathy. She had had pulmonary nodule and underwent PET. The nodule had resolved but she does have PET avid LAD in right pelvis. 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 She says she feels good. Chronic fatigue, but denies night sweats, anorexia, easy bleeding/bruising. Has hiatal hernia on PET CT but denies dysphagia and regurgitation. Used to have reflux, but no longer. PET also noted focal activity near the anus. She says she had a hemorrhoid that has since resolved. She had colonoscopy by Dr. Concha Wood 2-3 years ago. Remote h/o total hysterectomy with appendectomy -- 1977.       Past Medical History:   Diagnosis Date    Fibromyalgia     GERD (gastroesophageal reflux disease)     Headache     migraine    Heart failure (HCC)     HTN (hypertension) 4/2/2010    Hypothyroidism 4/2/2010    OA (osteoarthritis) 4/2/2010    Tachycardia 2013    Thyroid disease      Past Surgical History:   Procedure Laterality Date    COLONOSCOPY,REMV [place] Mars  2/5/2015         ENDOSCOPY, COLON, DIAGNOSTIC  12/2010    Dr. Fiorella Dubon- repeat 5 yrs    HX BREAST BIOPSY Right 1999    negative    HX CARPAL TUNNEL RELEASE  9/28/2012    HX CATARACT REMOVAL 9/2014    bilateral     HX HYSTERECTOMY  1977    HX ORTHOPAEDIC        Family History   Problem Relation Age of Onset    Heart Disease Paternal Aunt     Heart Disease Paternal Uncle     Colon Cancer Maternal Grandmother     No Known Problems Mother     No Known Problems Father     Other Daughter      fibromyalgia    Heart Disease Maternal Grandfather     Hypertension Sister      Social History   Substance Use Topics    Smoking status: Never Smoker    Smokeless tobacco: Never Used    Alcohol use No      Current Outpatient Prescriptions   Medication Sig    nebivolol (BYSTOLIC) 5 mg tablet Take 1 Tab by mouth two (2) times a day.  PROCTOZONE-HC 2.5 % rectal cream Apply once or twice daily as needed    ALPRAZolam (XANAX) 0.5 mg tablet TAKE 1 TABLET BY MOUTH TWICE DAILY AS NEEDED    levothyroxine (SYNTHROID) 50 mcg tablet Take 1 pill every Monday through Thursday.  gabapentin (NEURONTIN) 300 mg capsule TAKE 1 CAPSULE BY MOUTH TWICE DAILY THEN TAKE 2 CAPSULES BY MOUTH EVERY NIGHT    temazepam (RESTORIL) 15 mg capsule TAKE 1 TO 2 CAPSULES BY MOUTH EVERY NIGHT AT BEDTIME    butalbital-acetaminophen-caffeine (FIORICET, ESGIC) -40 mg per tablet TAKE 1 TABLET BY MOUTH EVERY 6 HOURS AS NEEDED FOR HEADACHE. MAX DAILY AMT: 4 TABLETS    cyclobenzaprine (FLEXERIL) 10 mg tablet Take 1 Tab by mouth three (3) times daily as needed for Muscle Spasm(s).  ondansetron hcl (ZOFRAN, AS HYDROCHLORIDE,) 8 mg tablet Take 1 Tab by mouth every eight (8) hours as needed for Nausea.  calcium carbonate (TUMS) 200 mg calcium (500 mg) chew Take 1 Tab by mouth as needed.  loratadine (CLARITIN) 10 mg tablet Take 1 tab at 1am and take 1 tab at 1pm    furosemide (LASIX) 20 mg tablet TAKE 1 TABLET BY MOUTH EVERY DAY AS NEEDED FOR SWEELING     No current facility-administered medications for this visit. Allergies:   Allergies   Allergen Reactions    Epinephrine Other (comments)     BP drops and pass out    Iodinated Contrast Media - Oral And Iv Dye Shortness of Breath    Novocain [Procaine] Palpitations     BP drops,passes out    Codeine Itching     Pt does not recall    Cymbalta [Duloxetine] Other (comments)     Head Pain and nausea    Daypro [Oxaprozin] Unknown (comments)     Pt does not recall    Prednisone Shortness of Breath     agitation    Sulfa (Sulfonamide Antibiotics) Unknown (comments)     Pt does not recall    Zithromax [Azithromycin] Nausea Only     Diarrhea, headaches       Review of Systems:  10 systems reviewed. See scanned sheet in \"Media\" section. See HPI for pertinent positives and negatives. Objective:     Visit Vitals    /76    Pulse 74    Resp 20    Ht 5' 2\" (1.575 m)    Wt 80.7 kg (178 lb)    SpO2 99%    BMI 32.56 kg/m2       Physical Exam:  General appearance  Alert, cooperative, no distress, appears stated age   [de-identified] Anicteric           Lungs   Clear to auscultation bilaterally   Heart  Regular rate and rhythm. No murmur, rub or gallop   Abdomen   Obese, soft, non-tender. Bowel sounds normal.  SC lipomatous mass LLQ. Extremities no cyanosis or edema   Pulses 2+ right radial       Lymph nodes No palpable neck, supraclavicular, axillary or inguinal LAD.    Neurologic Without overt sensory or motor deficit         Signed By: Sebastien Oseguera MD     May 11, 2017

## 2017-05-22 ENCOUNTER — TELEPHONE (OUTPATIENT)
Dept: CARDIOLOGY CLINIC | Age: 82
End: 2017-05-22

## 2017-05-22 RX ORDER — NEBIVOLOL 5 MG/1
5 TABLET ORAL 2 TIMES DAILY
Qty: 60 TAB | Refills: 0 | Status: SHIPPED | OUTPATIENT
Start: 2017-05-22 | End: 2017-06-28 | Stop reason: SDUPTHER

## 2017-05-22 NOTE — TELEPHONE ENCOUNTER
Spoke with patient  Verified patient with 2 patient identifier    Informed received request to refill Bystolic. Patient is overdue for 6 month appointment and need to reschedule Stress echo test. Patient says will callback to schedule appointment but was informed at last visit need f/u in 1 year. Says had another procedure and testing to do and that's why stressecho cancelled.

## 2017-05-31 DIAGNOSIS — F51.01 PRIMARY INSOMNIA: ICD-10-CM

## 2017-05-31 RX ORDER — TEMAZEPAM 15 MG/1
CAPSULE ORAL
Qty: 60 CAP | Refills: 0 | OUTPATIENT
Start: 2017-05-31 | End: 2017-07-03 | Stop reason: SDUPTHER

## 2017-06-07 ENCOUNTER — OFFICE VISIT (OUTPATIENT)
Dept: CARDIOLOGY CLINIC | Age: 82
End: 2017-06-07

## 2017-06-07 VITALS
OXYGEN SATURATION: 98 % | SYSTOLIC BLOOD PRESSURE: 144 MMHG | WEIGHT: 178.4 LBS | DIASTOLIC BLOOD PRESSURE: 92 MMHG | BODY MASS INDEX: 32.83 KG/M2 | RESPIRATION RATE: 18 BRPM | HEIGHT: 62 IN | HEART RATE: 88 BPM

## 2017-06-07 DIAGNOSIS — R00.0 INAPPROPRIATE SINUS TACHYCARDIA: ICD-10-CM

## 2017-06-07 DIAGNOSIS — R00.2 HEART PALPITATIONS: ICD-10-CM

## 2017-06-07 DIAGNOSIS — J45.909 REACTIVE AIRWAY DISEASE WITHOUT COMPLICATION: ICD-10-CM

## 2017-06-07 DIAGNOSIS — I10 ESSENTIAL HYPERTENSION: ICD-10-CM

## 2017-06-07 DIAGNOSIS — R06.09 DOE (DYSPNEA ON EXERTION): ICD-10-CM

## 2017-06-07 DIAGNOSIS — Z01.810 PREOP CARDIOVASCULAR EXAM: Primary | ICD-10-CM

## 2017-06-07 DIAGNOSIS — K21.00 GASTROESOPHAGEAL REFLUX DISEASE WITH ESOPHAGITIS: ICD-10-CM

## 2017-06-07 DIAGNOSIS — R91.1 INCIDENTAL LUNG NODULE, GREATER THAN OR EQUAL TO 8MM: ICD-10-CM

## 2017-06-07 NOTE — PROGRESS NOTES
[address] [place] [address]        [phone]                             NEW PATIENT HPI/FOLLOW-UP    NAME:  Sydney Romero   :   1935   MRN:   [id number]   PCP:  Shandra Chaudhary MD           Subjective: The patient is a [de-identified]y.o. year old female  who returns for cardiac clearance prior to intra-abdominal procedure/surgery to further clarify suspicious incidental lymphadenopathy not thoroughly revealed by microscopic interventional radiologic procedure recently. Since the last visit, patient reports slightly more CARVAJAL which needs further clarification prior to surgery. Has had several stress tests scheduled over last 4 yrs but all cancelled by patient. . Denies chest pain, edema, medication intolerance, palpitations, PND/orthopnea wheezing, sputum, syncope, dizziness or light headedness. Review of Systems  General: Pt denies excessive weight gain or loss. Pt is able to conduct ADL's. Respiratory: +CARVAJAL, -wheezing or stridor.   Cardiovascular: Denies precordial pain, palpitations, edema or PND  Gastrointestinal: Denies poor appetite, +indigestion, -abdominal pain or blood in stool  Peripheral vascular: Denies claudication, leg cramps  Neuropsychiatric: Denies paresthesias,tingling,numbness,anxiety,depression,fatigue  Musculoskeletal: Denies pain,tenderness, soreness,swelling      Past Medical History:   Diagnosis Date    Fibromyalgia     GERD (gastroesophageal reflux disease)     Headache     migraine    Heart failure ([facility] [address]     HTN (hypertension) 2010    Hypothyroidism 2010    OA (osteoarthritis) 2010    Tachycardia     Thyroid disease      Patient Active Problem List    Diagnosis Date Noted    Encounter for medication monitoring 10/11/2016    Incidental lung nodule, greater than or equal to 8mm 2016    Reactive airway disease with wheezing 2015    Edema leg 2015    Heart palpitations 2013    Paroxysmal Inappropriate sinus tachycardia vs possible atrial tachycardia 07/25/2013    Iron deficiency anemia, unspecified 06/20/2013    Headache     Insomnia 06/01/2010    GERD (gastroesophageal reflux disease)     Fibromyalgia     Hypothyroidism 04/02/2010    HTN (hypertension) 04/02/2010    OA (osteoarthritis) 04/02/2010      Past Surgical History:   Procedure Laterality Date    COLONOSCOPY,REMGISSELLE Huffman  2/5/2015         ENDOSCOPY, COLON, DIAGNOSTIC  12/2010    Dr. Angela Ferguson- repeat 5 yrs    HX BREAST BIOPSY Right 1999    negative    HX CARPAL TUNNEL RELEASE  9/28/2012    HX CATARACT REMOVAL  9/2014    bilateral     HX HYSTERECTOMY  1977    HX ORTHOPAEDIC       Allergies   Allergen Reactions    Epinephrine Other (comments)     BP drops and pass out    Iodinated Contrast Media - Oral And Iv Dye Shortness of Breath    Novocain [Procaine] Palpitations     BP drops,passes out    Codeine Itching     Pt does not recall    Cymbalta [Duloxetine] Other (comments)     Head Pain and nausea    Daypro [Oxaprozin] Unknown (comments)     Pt does not recall    Prednisone Shortness of Breath     agitation    Sulfa (Sulfonamide Antibiotics) Unknown (comments)     Pt does not recall    Zithromax [Azithromycin] Nausea Only     Diarrhea, headaches      Family History   Problem Relation Age of Onset    Heart Disease Paternal Aunt     Heart Disease Paternal Uncle     Colon Cancer Maternal Grandmother     No Known Problems Mother     No Known Problems Father     Other Daughter      fibromyalgia    Heart Disease Maternal Grandfather     Hypertension Sister       Social History     Social History    Marital status: SINGLE     Spouse name: N/A    Number of children: N/A    Years of education: N/A     Occupational History    Not on file.      Social History Main Topics    Smoking status: Never Smoker    Smokeless tobacco: Never Used    Alcohol use No    Drug use: No    Sexual activity: Not Currently Other Topics Concern    Not on file     Social History Narrative      Current Outpatient Prescriptions   Medication Sig    temazepam (RESTORIL) 15 mg capsule TAKE 1 TO 2 CAPSULES BY MOUTH EVERY NIGHT AT BEDTIME    nebivolol (BYSTOLIC) 5 mg tablet Take 1 Tab by mouth two (2) times a day.  PROCTOZONE-HC 2.5 % rectal cream Apply once or twice daily as needed    ALPRAZolam (XANAX) 0.5 mg tablet TAKE 1 TABLET BY MOUTH TWICE DAILY AS NEEDED    levothyroxine (SYNTHROID) 50 mcg tablet Take 1 pill every Monday through Thursday.  gabapentin (NEURONTIN) 300 mg capsule TAKE 1 CAPSULE BY MOUTH TWICE DAILY THEN TAKE 2 CAPSULES BY MOUTH EVERY NIGHT    butalbital-acetaminophen-caffeine (FIORICET, ESGIC) -40 mg per tablet TAKE 1 TABLET BY MOUTH EVERY 6 HOURS AS NEEDED FOR HEADACHE. MAX DAILY AMT: 4 TABLETS    cyclobenzaprine (FLEXERIL) 10 mg tablet Take 1 Tab by mouth three (3) times daily as needed for Muscle Spasm(s).  ondansetron hcl (ZOFRAN, AS HYDROCHLORIDE,) 8 mg tablet Take 1 Tab by mouth every eight (8) hours as needed for Nausea.  calcium carbonate (TUMS) 200 mg calcium (500 mg) chew Take 1 Tab by mouth as needed.  loratadine (CLARITIN) 10 mg tablet Take 1 tab at 1am and take 1 tab at 1pm    furosemide (LASIX) 20 mg tablet TAKE 1 TABLET BY MOUTH EVERY DAY AS NEEDED FOR SWEELING     No current facility-administered medications for this visit. I have reviewed the nurses notes, vitals, problem list, allergy list, medical history, family medical, social history and medications. Objective:     Physical Exam:     Vitals:    06/07/17 1353 06/07/17 1403   BP: (!) 154/98 (!) 144/92   Pulse: 88    Resp: 18    SpO2: 98%    Weight: 178 lb 6.4 oz (80.9 kg)    Height: 5' 2\" (1.575 m)     Body mass index is 32.63 kg/(m^2). General: Well developed, in no acute distress. HEENT: No carotid bruits, no JVD, trach is midline. Heart:  Normal S1/S2 negative S3 or S4.  Regular, Gr 9-5/5 systolic murmur, no gallop or rub.   Respiratory: Clear bilaterally, no wheezing or rales  Abdomen:   Soft, non-tender, bowel sounds are active.   Extremities:  No edema, normal cap refill, no cyanosis. Neuro: A&Ox3, speech clear, gait stable. Skin: Skin color is normal. No rashes or lesions. No diaphoresis. Vascular: 2+ pulses symmetric in all extremities        Data Review:       Cardiographics:    EKG: NSR,1st degree AV HB,low limb lead voltage, clockwise rotation    Cardiology Labs:    Results for orders placed or performed during the hospital encounter of 08/29/15   EKG, 12 LEAD, INITIAL   Result Value Ref Range    Ventricular Rate 99 BPM    Atrial Rate 99 BPM    P-R Interval 188 ms    QRS Duration 86 ms    Q-T Interval 340 ms    QTC Calculation (Bezet) 436 ms    Calculated P Axis 35 degrees    Calculated R Axis -15 degrees    Calculated T Axis 30 degrees    Diagnosis       Normal sinus rhythm  Normal ECG  When compared with ECG of 08-JUL-2013 19:21,  No significant change was found  Confirmed by Gerardo Sevilla ([id number]) on 8/30/2015 1:55:05 PM     Results for orders placed or performed in visit on 07/16/13   HOLTER MONITOR, 24 HOURS    Narrative    ECG Monitor/24 hours, Complete    Reason for Holter Monitor   tachycardia    Heartbeat    Slowest 60  Average 83  Fastest  144      Results:   Underlying Rhythm: Normal sinus rhythm      Atrial Arrhythmias: paroxysmal supraventricular tachycardia             AV Conduction: normal    Ventricular Arrhythmias: premature ventricular contractions;  occasional     ST Segment Analysis:normal     Symptom Correlation:  None reported    Comment:   Sinus rhythm with paroxysmal atrial tachycardia upto 130 bpm  (asymptomatic).      Rajani Rojas MD, [facility] Results   Component Value Date/Time    Cholesterol, total 203 04/12/2017 03:59 PM    HDL Cholesterol 60 04/12/2017 03:59 PM    LDL, calculated 101 04/12/2017 03:59 PM    Triglyceride 212 04/12/2017 03:59 PM CHOL/HDL Ratio 3.7 01/27/2010 05:43 PM       Lab Results   Component Value Date/Time    Sodium 136 04/12/2017 03:59 PM    Potassium 4.3 04/12/2017 03:59 PM    Chloride 95 04/12/2017 03:59 PM    CO2 23 04/12/2017 03:59 PM    Anion gap 12 05/03/2016 01:05 PM    Glucose 99 04/12/2017 03:59 PM    BUN 16 04/12/2017 03:59 PM    Creatinine 0.91 04/12/2017 03:59 PM    BUN/Creatinine ratio 18 04/12/2017 03:59 PM    GFR est AA 69 04/12/2017 03:59 PM    GFR est non-AA 60 04/12/2017 03:59 PM    Calcium 10.0 04/12/2017 03:59 PM    Bilirubin, total 0.3 04/12/2017 03:59 PM    AST (SGOT) 24 04/12/2017 03:59 PM    Alk. phosphatase 103 04/12/2017 03:59 PM    Protein, total 7.1 04/12/2017 03:59 PM    Albumin 4.5 04/12/2017 03:59 PM    Globulin 3.2 05/03/2016 01:05 PM    A-G Ratio 1.7 04/12/2017 03:59 PM    ALT (SGPT) 17 04/12/2017 03:59 PM          Assessment:       ICD-10-CM ICD-9-CM    1. Preop cardiovascular exam Z01.810 V72.81 NUCLEAR STRESS TEST      NM CARDIAC SPECT W STRS/REST MULT      2D ECHO COMPLETE ADULT (TTE) W OR WO CONTR   2. CARVAJAL (dyspnea on exertion) R06.09 786.09 NUCLEAR STRESS TEST      NM CARDIAC SPECT W STRS/REST MULT      2D ECHO COMPLETE ADULT (TTE) W OR WO CONTR   3. Reactive airway disease without complication N15.843 549.43 2D ECHO COMPLETE ADULT (TTE) W OR WO CONTR   4. Heart palpitations R00.2 785.1 AMB POC EKG ROUTINE W/ 12 LEADS, INTER & REP      NUCLEAR STRESS TEST      NM CARDIAC SPECT W STRS/REST MULT      2D ECHO COMPLETE ADULT (TTE) W OR WO CONTR   5. Essential hypertension I10 401.9 AMB POC EKG ROUTINE W/ 12 LEADS, INTER & REP      NUCLEAR STRESS TEST      NM CARDIAC SPECT W STRS/REST MULT      2D ECHO COMPLETE ADULT (TTE) W OR WO CONTR   6. Paroxysmal Inappropriate sinus tachycardia vs possible atrial tachycardia R00.0 427.89 NUCLEAR STRESS TEST      NM CARDIAC SPECT W STRS/REST MULT      2D ECHO COMPLETE ADULT (TTE) W OR WO CONTR   7.  Gastroesophageal reflux disease with esophagitis K21.0 530.11 NUCLEAR STRESS TEST      NM CARDIAC SPECT W STRS/REST MULT      2D ECHO COMPLETE ADULT (TTE) W OR WO CONTR   8. Incidental lung nodule, greater than or equal to 8mm R91.1 793.11 2D ECHO COMPLETE ADULT (TTE) W OR WO CONTR         Discussion: Patient presents at this time for cardiac preop clearance prior to intra-abdominal surgery suspicious lymphadenopathy with Urology on standby. Given hx of progressive CARVAJAL, will exclude anginal equivalent with Lexiscan stress test and structural heart disease with echo--hx of heart murmurs as well. Hesitant but willing to proceed. Plan: 1. Continue same meds. Lipid profile and labs followed by PCP. 2.Encouraged to exercise to tolerance, lose weight and follow low fat, low cholesterol, low sodium predominantly Plant-based (consider Mediterranean) diet. Call with questions or concerns. Will follow up any test results by phone and/or f/u here in office if needed. Elvis Ron 3.Follow up: 6 MONTHS    I have discussed the diagnosis with the patient and the intended plan as seen in the above orders. The patient has received an after-visit summary and questions were answered concerning future plans. I have discussed any concerning medication side effects and warnings with the patient as well.     Esther Henry MD  6/7/2017

## 2017-06-13 ENCOUNTER — CLINICAL SUPPORT (OUTPATIENT)
Dept: CARDIOLOGY CLINIC | Age: 82
End: 2017-06-13

## 2017-06-13 DIAGNOSIS — R06.09 DOE (DYSPNEA ON EXERTION): Primary | ICD-10-CM

## 2017-06-14 ENCOUNTER — CLINICAL SUPPORT (OUTPATIENT)
Dept: CARDIOLOGY CLINIC | Age: 82
End: 2017-06-14

## 2017-06-14 DIAGNOSIS — R06.02 SHORTNESS OF BREATH: Primary | ICD-10-CM

## 2017-06-19 ENCOUNTER — TELEPHONE (OUTPATIENT)
Dept: CARDIOLOGY CLINIC | Age: 82
End: 2017-06-19

## 2017-06-19 NOTE — TELEPHONE ENCOUNTER
Spoke with patient. Verified patient with two patient identifiers. Advised Lexiscan EST normal.  Patient verbalized understanding.

## 2017-06-28 ENCOUNTER — TELEPHONE (OUTPATIENT)
Dept: SURGERY | Age: 82
End: 2017-06-28

## 2017-06-28 RX ORDER — NEBIVOLOL 5 MG/1
5 TABLET ORAL 2 TIMES DAILY
Qty: 60 TAB | Refills: 5 | Status: SHIPPED | OUTPATIENT
Start: 2017-06-28 | End: 2017-12-07 | Stop reason: SDUPTHER

## 2017-06-28 NOTE — TELEPHONE ENCOUNTER
Called spoke to Mrs. Jose Viera regarding surgery 7/14. Also spoke to [facility]. [facility]. Patient does not need pre-op. Dr. Nereyda Carlisle looked @ labs. Patient instructed to stop all blood thinners. IE advil. Etc.

## 2017-07-03 DIAGNOSIS — F51.01 PRIMARY INSOMNIA: ICD-10-CM

## 2017-07-05 RX ORDER — TEMAZEPAM 15 MG/1
CAPSULE ORAL
Qty: 60 CAP | Refills: 0 | OUTPATIENT
Start: 2017-07-05 | End: 2017-08-18 | Stop reason: SDUPTHER

## 2017-07-06 NOTE — TELEPHONE ENCOUNTER
----- Message from Leann Hoskins sent at 7/6/2017  9:32 AM EDT -----  Regarding: Dr. Maddie Barahonas with [facility] pharmacy is requesting a refill of Temazepam. Pt is completely out. Best contact number [phone].

## 2017-07-10 ENCOUNTER — TELEPHONE (OUTPATIENT)
Dept: SURGERY | Age: 82
End: 2017-07-10

## 2017-07-10 NOTE — TELEPHONE ENCOUNTER
Patients insurance needs code to the procedure so they can let her know what's covered. Try both phone numbers.

## 2017-07-11 NOTE — TELEPHONE ENCOUNTER
Spoke to patient. Reassured her that this is a covered service. She is feeling better. And she appreciates  Everything we have done for her.

## 2017-07-12 NOTE — PERIOP NOTES
[facility]  Preoperative Instructions        Surgery Date 7/14/17          Time of Arrival 0545    1. On the day of your surgery, please report to the Surgical Services Registration Desk and sign in at your designated time. The Surgery Center is located to the right of the Emergency Room. 2. You must have someone with you to drive you home. You should not drive a car for 24 hours following surgery. Please make arrangements for a friend or family member to stay with you for the first 24 hours after your surgery. 3. Do not have anything to eat or drink (including water, gum, mints, coffee, juice) after midnight 7/13/17.? Reji Mejia ? This may not apply to medications prescribed by your physician. ?(Please note below the special instructions with medications to take the morning of your procedure.)    4. We recommend you do not drink any alcoholic beverages for 24 hours before and after your surgery. 5. Stop all Aspirin, non-steroidal anti-inflammatory drugs (i.e. Advil, Aleve), vitamins, and supplements?as directed by your surgeon's office. ? **If you are currently taking Plavix, Coumadin, or other blood-thinning agents, contact your surgeon for instructions. **    6. Wear comfortable clothes. Wear glasses instead of contacts. Do not bring any money or jewelry. Please bring picture ID, insurance card, and any prearranged co-payment or hospital payment. Do not wear make-up, particularly mascara the morning of your surgery. Do not wear nail polish, particularly if you are having foot /hand surgery. Wear your hair loose or down, no ponytails, buns, jackie pins or clips. All body piercings must be removed. Please shower with antibacterial soap for three consecutive days before and on the morning of surgery, but do not apply any lotions, powders or deodorants after the shower on the day of surgery. Please use a fresh towels after each shower.  Please sleep in clean clothes and change bed linens the night before surgery. Please do not shave for 48 hours prior to surgery. Shaving of the face is acceptable. 7. You should understand that if you do not follow these instructions your surgery may be cancelled. If your physical condition changes (I.e. fever, cold or flu) please contact your surgeon as soon as possible. 8. It is important that you be on time. If a situation occurs where you may be late, please call [phone] (OR Holding Area). 9. If you have any questions and or problems, please call [phone] (Pre-admission Testing). 10. Your surgery time may be subject to change. You will receive a phone call the evening prior if your time changes. 11.  If having outpatient surgery, you must have someone to drive you here, stay with you during the duration of your stay, and to drive you home at time of discharge. Special Instructions:none    MEDICATIONS TO TAKE THE MORNING OF SURGERY WITH A SIP OF WATER:Bystolic,synthroid,gabapentin. Xanax, Fiorecet if needed. I understand a pre-operative phone call will be made to verify my surgery time. In the event that I am not available, I give permission for a message to be left on my answering service and/or with another person?   {yes @ [phone]         ___________________      __________   _________    (Signature of Patient)             (Witness)                (Date and Time)

## 2017-07-13 RX ORDER — CEFAZOLIN SODIUM 1 G/3ML
1 INJECTION, POWDER, FOR SOLUTION INTRAMUSCULAR; INTRAVENOUS ONCE
Status: CANCELLED | OUTPATIENT
Start: 2017-07-14 | End: 2017-07-14

## 2017-07-14 ENCOUNTER — HOSPITAL ENCOUNTER (OUTPATIENT)
Age: 82
Setting detail: OUTPATIENT SURGERY
Discharge: HOME | End: 2017-07-14
Attending: SURGERY | Admitting: SURGERY
Payer: MEDICARE

## 2017-07-14 ENCOUNTER — ANESTHESIA (OUTPATIENT)
Dept: SURGERY | Age: 82
End: 2017-07-14
Payer: MEDICARE

## 2017-07-14 ENCOUNTER — ANESTHESIA EVENT (OUTPATIENT)
Dept: SURGERY | Age: 82
End: 2017-07-14
Payer: MEDICARE

## 2017-07-14 VITALS
DIASTOLIC BLOOD PRESSURE: 54 MMHG | BODY MASS INDEX: 33.79 KG/M2 | RESPIRATION RATE: 16 BRPM | HEIGHT: 62 IN | HEART RATE: 71 BPM | WEIGHT: 183.64 LBS | OXYGEN SATURATION: 96 % | SYSTOLIC BLOOD PRESSURE: 123 MMHG | TEMPERATURE: 97.8 F

## 2017-07-14 PROBLEM — R59.0 PELVIC LYMPHADENOPATHY: Status: ACTIVE | Noted: 2017-07-14

## 2017-07-14 LAB
ANION GAP BLD CALC-SCNC: 18 MMOL/L (ref 5–15)
BUN BLD-MCNC: 16 MG/DL (ref 9–20)
CA-I BLD-MCNC: 1.28 MMOL/L (ref 1.12–1.32)
CHLORIDE BLD-SCNC: 99 MMOL/L (ref 98–107)
CO2 BLD-SCNC: 26 MMOL/L (ref 21–32)
CREAT BLD-MCNC: 0.9 MG/DL (ref 0.6–1.3)
GLUCOSE BLD-MCNC: 86 MG/DL (ref 65–100)
HCT VFR BLD CALC: 34 % (ref 35–47)
HGB BLD-MCNC: 11.6 GM/DL (ref 11.5–16)
POTASSIUM BLD-SCNC: 4 MMOL/L (ref 3.5–5.1)
SERVICE CMNT-IMP: ABNORMAL
SODIUM BLD-SCNC: 139 MMOL/L (ref 136–145)

## 2017-07-14 PROCEDURE — 77030020263 HC SOL INJ SOD CL0.9% LFCR 1000ML: Performed by: SURGERY

## 2017-07-14 PROCEDURE — 77030008756 HC TU IRR SUC STRY -B: Performed by: SURGERY

## 2017-07-14 PROCEDURE — 77030026438 HC STYL ET INTUB CARD -A: Performed by: ANESTHESIOLOGY

## 2017-07-14 PROCEDURE — C1769 GUIDE WIRE: HCPCS | Performed by: SURGERY

## 2017-07-14 PROCEDURE — 74011250636 HC RX REV CODE- 250/636

## 2017-07-14 PROCEDURE — 74011250636 HC RX REV CODE- 250/636: Performed by: ANESTHESIOLOGY

## 2017-07-14 PROCEDURE — 77030035277 HC OBTRTR BLDELSS DISP INTU -B: Performed by: SURGERY

## 2017-07-14 PROCEDURE — 88341 IMHCHEM/IMCYTCHM EA ADD ANTB: CPT | Performed by: SURGERY

## 2017-07-14 PROCEDURE — 77030012961 HC IRR KT CYSTO/TUR ICUM -A: Performed by: SURGERY

## 2017-07-14 PROCEDURE — 77030035051: Performed by: SURGERY

## 2017-07-14 PROCEDURE — 76060000036 HC ANESTHESIA 2.5 TO 3 HR: Performed by: SURGERY

## 2017-07-14 PROCEDURE — 77030010507 HC ADH SKN DERMBND J&J -B: Performed by: SURGERY

## 2017-07-14 PROCEDURE — 88185 FLOWCYTOMETRY/TC ADD-ON: CPT | Performed by: SURGERY

## 2017-07-14 PROCEDURE — 76010000877 HC OR TIME 2.5 TO 3HR INTENSV - TIER 2: Performed by: SURGERY

## 2017-07-14 PROCEDURE — 74011250637 HC RX REV CODE- 250/637: Performed by: SURGERY

## 2017-07-14 PROCEDURE — 77030018390 HC SPNG HEMSTAT2 J&J -B: Performed by: SURGERY

## 2017-07-14 PROCEDURE — 77030020782 HC GWN BAIR PAWS FLX 3M -B

## 2017-07-14 PROCEDURE — 77030002933 HC SUT MCRYL J&J -A: Performed by: SURGERY

## 2017-07-14 PROCEDURE — 77030011640 HC PAD GRND REM COVD -A: Performed by: SURGERY

## 2017-07-14 PROCEDURE — 74011000250 HC RX REV CODE- 250

## 2017-07-14 PROCEDURE — P9045 ALBUMIN (HUMAN), 5%, 250 ML: HCPCS

## 2017-07-14 PROCEDURE — C1758 CATHETER, URETERAL: HCPCS | Performed by: SURGERY

## 2017-07-14 PROCEDURE — 76210000021 HC REC RM PH II 0.5 TO 1 HR: Performed by: SURGERY

## 2017-07-14 PROCEDURE — 77030018846 HC SOL IRR STRL H20 ICUM -A: Performed by: SURGERY

## 2017-07-14 PROCEDURE — 88312 SPECIAL STAINS GROUP 1: CPT | Performed by: SURGERY

## 2017-07-14 PROCEDURE — 77030035048 HC TRCR ENDOSC OPTCL COVD -B: Performed by: SURGERY

## 2017-07-14 PROCEDURE — 77030007955 HC PCH ENDOSC SPEC J&J -B: Performed by: SURGERY

## 2017-07-14 PROCEDURE — 88307 TISSUE EXAM BY PATHOLOGIST: CPT | Performed by: SURGERY

## 2017-07-14 PROCEDURE — 77030008684 HC TU ET CUF COVD -B: Performed by: ANESTHESIOLOGY

## 2017-07-14 PROCEDURE — 88184 FLOWCYTOMETRY/ TC 1 MARKER: CPT | Performed by: SURGERY

## 2017-07-14 PROCEDURE — 77030018832 HC SOL IRR H20 ICUM -A: Performed by: SURGERY

## 2017-07-14 PROCEDURE — 88360 TUMOR IMMUNOHISTOCHEM/MANUAL: CPT | Performed by: SURGERY

## 2017-07-14 PROCEDURE — 77030008603 HC TRCR ENDOSC EPATH J&J -C: Performed by: SURGERY

## 2017-07-14 PROCEDURE — 76210000006 HC OR PH I REC 0.5 TO 1 HR: Performed by: SURGERY

## 2017-07-14 PROCEDURE — 74011250636 HC RX REV CODE- 250/636: Performed by: SURGERY

## 2017-07-14 PROCEDURE — 88342 IMHCHEM/IMCYTCHM 1ST ANTB: CPT | Performed by: SURGERY

## 2017-07-14 PROCEDURE — 88365 INSITU HYBRIDIZATION (FISH): CPT | Performed by: SURGERY

## 2017-07-14 PROCEDURE — 77030032490 HC SLV COMPR SCD KNE COVD -B: Performed by: SURGERY

## 2017-07-14 PROCEDURE — 74011250637 HC RX REV CODE- 250/637

## 2017-07-14 PROCEDURE — 80047 BASIC METABLC PNL IONIZED CA: CPT

## 2017-07-14 PROCEDURE — 77030020703 HC SEAL CANN DISP INTU -B: Performed by: SURGERY

## 2017-07-14 RX ORDER — LIDOCAINE HYDROCHLORIDE 10 MG/ML
0.1 INJECTION, SOLUTION EPIDURAL; INFILTRATION; INTRACAUDAL; PERINEURAL AS NEEDED
Status: DISCONTINUED | OUTPATIENT
Start: 2017-07-14 | End: 2017-07-14 | Stop reason: HOSPADM

## 2017-07-14 RX ORDER — MORPHINE SULFATE 10 MG/ML
2 INJECTION, SOLUTION INTRAMUSCULAR; INTRAVENOUS
Status: DISCONTINUED | OUTPATIENT
Start: 2017-07-14 | End: 2017-07-14 | Stop reason: HOSPADM

## 2017-07-14 RX ORDER — ROCURONIUM BROMIDE 10 MG/ML
INJECTION, SOLUTION INTRAVENOUS AS NEEDED
Status: DISCONTINUED | OUTPATIENT
Start: 2017-07-14 | End: 2017-07-14 | Stop reason: HOSPADM

## 2017-07-14 RX ORDER — SODIUM CHLORIDE, SODIUM LACTATE, POTASSIUM CHLORIDE, CALCIUM CHLORIDE 600; 310; 30; 20 MG/100ML; MG/100ML; MG/100ML; MG/100ML
25 INJECTION, SOLUTION INTRAVENOUS CONTINUOUS
Status: DISCONTINUED | OUTPATIENT
Start: 2017-07-14 | End: 2017-07-14 | Stop reason: HOSPADM

## 2017-07-14 RX ORDER — SODIUM CHLORIDE 0.9 % (FLUSH) 0.9 %
5-10 SYRINGE (ML) INJECTION EVERY 8 HOURS
Status: DISCONTINUED | OUTPATIENT
Start: 2017-07-14 | End: 2017-07-14 | Stop reason: HOSPADM

## 2017-07-14 RX ORDER — ACETAMINOPHEN 10 MG/ML
INJECTION, SOLUTION INTRAVENOUS AS NEEDED
Status: DISCONTINUED | OUTPATIENT
Start: 2017-07-14 | End: 2017-07-14 | Stop reason: HOSPADM

## 2017-07-14 RX ORDER — HYDROCODONE BITARTRATE AND ACETAMINOPHEN 5; 325 MG/1; MG/1
1-2 TABLET ORAL
Qty: 30 TAB | Refills: 0 | OUTPATIENT
Start: 2017-07-14 | End: 2017-07-14

## 2017-07-14 RX ORDER — HYDROMORPHONE HYDROCHLORIDE 1 MG/ML
.2-.5 INJECTION, SOLUTION INTRAMUSCULAR; INTRAVENOUS; SUBCUTANEOUS
Status: DISCONTINUED | OUTPATIENT
Start: 2017-07-14 | End: 2017-07-14 | Stop reason: HOSPADM

## 2017-07-14 RX ORDER — PROPOFOL 10 MG/ML
INJECTION, EMULSION INTRAVENOUS AS NEEDED
Status: DISCONTINUED | OUTPATIENT
Start: 2017-07-14 | End: 2017-07-14 | Stop reason: HOSPADM

## 2017-07-14 RX ORDER — HYDROCODONE BITARTRATE AND ACETAMINOPHEN 5; 325 MG/1; MG/1
1-2 TABLET ORAL
Qty: 30 TAB | Refills: 0 | Status: SHIPPED | OUTPATIENT
Start: 2017-07-14 | End: 2017-07-14

## 2017-07-14 RX ORDER — HYDROMORPHONE HYDROCHLORIDE 2 MG/1
2 TABLET ORAL
Status: COMPLETED | OUTPATIENT
Start: 2017-07-14 | End: 2017-07-14

## 2017-07-14 RX ORDER — SUCCINYLCHOLINE CHLORIDE 20 MG/ML
INJECTION INTRAMUSCULAR; INTRAVENOUS AS NEEDED
Status: DISCONTINUED | OUTPATIENT
Start: 2017-07-14 | End: 2017-07-14 | Stop reason: HOSPADM

## 2017-07-14 RX ORDER — FENTANYL CITRATE 50 UG/ML
INJECTION, SOLUTION INTRAMUSCULAR; INTRAVENOUS AS NEEDED
Status: DISCONTINUED | OUTPATIENT
Start: 2017-07-14 | End: 2017-07-14 | Stop reason: HOSPADM

## 2017-07-14 RX ORDER — CEFAZOLIN SODIUM 1 G/3ML
2 INJECTION, POWDER, FOR SOLUTION INTRAMUSCULAR; INTRAVENOUS ONCE
Status: DISCONTINUED | OUTPATIENT
Start: 2017-07-14 | End: 2017-07-14

## 2017-07-14 RX ORDER — DEXAMETHASONE SODIUM PHOSPHATE 4 MG/ML
INJECTION, SOLUTION INTRA-ARTICULAR; INTRALESIONAL; INTRAMUSCULAR; INTRAVENOUS; SOFT TISSUE AS NEEDED
Status: DISCONTINUED | OUTPATIENT
Start: 2017-07-14 | End: 2017-07-14 | Stop reason: HOSPADM

## 2017-07-14 RX ORDER — FENTANYL CITRATE 50 UG/ML
25 INJECTION, SOLUTION INTRAMUSCULAR; INTRAVENOUS
Status: DISCONTINUED | OUTPATIENT
Start: 2017-07-14 | End: 2017-07-14 | Stop reason: HOSPADM

## 2017-07-14 RX ORDER — CEFAZOLIN SODIUM IN 0.9 % NACL 2 G/100 ML
2 PLASTIC BAG, INJECTION (ML) INTRAVENOUS ONCE
Status: COMPLETED | OUTPATIENT
Start: 2017-07-14 | End: 2017-07-14

## 2017-07-14 RX ORDER — SODIUM CHLORIDE 0.9 % (FLUSH) 0.9 %
5-10 SYRINGE (ML) INJECTION AS NEEDED
Status: DISCONTINUED | OUTPATIENT
Start: 2017-07-14 | End: 2017-07-14 | Stop reason: HOSPADM

## 2017-07-14 RX ORDER — ONDANSETRON 2 MG/ML
INJECTION INTRAMUSCULAR; INTRAVENOUS AS NEEDED
Status: DISCONTINUED | OUTPATIENT
Start: 2017-07-14 | End: 2017-07-14 | Stop reason: HOSPADM

## 2017-07-14 RX ORDER — ALBUMIN HUMAN 50 G/1000ML
SOLUTION INTRAVENOUS AS NEEDED
Status: DISCONTINUED | OUTPATIENT
Start: 2017-07-14 | End: 2017-07-14 | Stop reason: HOSPADM

## 2017-07-14 RX ORDER — ALBUTEROL SULFATE 90 UG/1
AEROSOL, METERED RESPIRATORY (INHALATION) AS NEEDED
Status: DISCONTINUED | OUTPATIENT
Start: 2017-07-14 | End: 2017-07-14 | Stop reason: HOSPADM

## 2017-07-14 RX ORDER — DOCUSATE SODIUM 100 MG/1
100 CAPSULE, LIQUID FILLED ORAL 2 TIMES DAILY
Qty: 60 CAP | Refills: 0 | Status: SHIPPED | OUTPATIENT
Start: 2017-07-14 | End: 2017-08-18

## 2017-07-14 RX ORDER — DIPHENHYDRAMINE HYDROCHLORIDE 50 MG/ML
12.5 INJECTION, SOLUTION INTRAMUSCULAR; INTRAVENOUS
Status: DISCONTINUED | OUTPATIENT
Start: 2017-07-14 | End: 2017-07-14 | Stop reason: HOSPADM

## 2017-07-14 RX ORDER — HYDROMORPHONE HYDROCHLORIDE 2 MG/1
2 TABLET ORAL
Qty: 30 TAB | Refills: 0 | Status: SHIPPED | OUTPATIENT
Start: 2017-07-14 | End: 2017-08-18

## 2017-07-14 RX ORDER — LIDOCAINE HYDROCHLORIDE 20 MG/ML
INJECTION, SOLUTION EPIDURAL; INFILTRATION; INTRACAUDAL; PERINEURAL AS NEEDED
Status: DISCONTINUED | OUTPATIENT
Start: 2017-07-14 | End: 2017-07-14 | Stop reason: HOSPADM

## 2017-07-14 RX ADMIN — ACETAMINOPHEN 1000 MG: 10 INJECTION, SOLUTION INTRAVENOUS at 09:57

## 2017-07-14 RX ADMIN — ALBUTEROL SULFATE 4 PUFF: 90 AEROSOL, METERED RESPIRATORY (INHALATION) at 09:22

## 2017-07-14 RX ADMIN — FENTANYL CITRATE 25 MCG: 50 INJECTION, SOLUTION INTRAMUSCULAR; INTRAVENOUS at 08:32

## 2017-07-14 RX ADMIN — ROCURONIUM BROMIDE 30 MG: 10 INJECTION, SOLUTION INTRAVENOUS at 08:10

## 2017-07-14 RX ADMIN — LIDOCAINE HYDROCHLORIDE 100 MG: 20 INJECTION, SOLUTION EPIDURAL; INFILTRATION; INTRACAUDAL; PERINEURAL at 07:48

## 2017-07-14 RX ADMIN — DEXAMETHASONE SODIUM PHOSPHATE 8 MG: 4 INJECTION, SOLUTION INTRA-ARTICULAR; INTRALESIONAL; INTRAMUSCULAR; INTRAVENOUS; SOFT TISSUE at 08:56

## 2017-07-14 RX ADMIN — SODIUM CHLORIDE, SODIUM LACTATE, POTASSIUM CHLORIDE, AND CALCIUM CHLORIDE: 600; 310; 30; 20 INJECTION, SOLUTION INTRAVENOUS at 10:16

## 2017-07-14 RX ADMIN — CEFAZOLIN 2 G: 10 INJECTION, POWDER, FOR SOLUTION INTRAVENOUS; PARENTERAL at 07:55

## 2017-07-14 RX ADMIN — HYDROMORPHONE HYDROCHLORIDE 2 MG: 2 TABLET ORAL at 11:20

## 2017-07-14 RX ADMIN — PROPOFOL 20 MG: 10 INJECTION, EMULSION INTRAVENOUS at 08:10

## 2017-07-14 RX ADMIN — SUCCINYLCHOLINE CHLORIDE 120 MG: 20 INJECTION INTRAMUSCULAR; INTRAVENOUS at 07:48

## 2017-07-14 RX ADMIN — SODIUM CHLORIDE, SODIUM LACTATE, POTASSIUM CHLORIDE, AND CALCIUM CHLORIDE 25 ML/HR: 600; 310; 30; 20 INJECTION, SOLUTION INTRAVENOUS at 07:11

## 2017-07-14 RX ADMIN — FENTANYL CITRATE 50 MCG: 50 INJECTION, SOLUTION INTRAMUSCULAR; INTRAVENOUS at 07:48

## 2017-07-14 RX ADMIN — ROCURONIUM BROMIDE 20 MG: 10 INJECTION, SOLUTION INTRAVENOUS at 08:16

## 2017-07-14 RX ADMIN — FENTANYL CITRATE 25 MCG: 50 INJECTION, SOLUTION INTRAMUSCULAR; INTRAVENOUS at 09:18

## 2017-07-14 RX ADMIN — FENTANYL CITRATE 50 MCG: 50 INJECTION, SOLUTION INTRAMUSCULAR; INTRAVENOUS at 08:49

## 2017-07-14 RX ADMIN — ROCURONIUM BROMIDE 10 MG: 10 INJECTION, SOLUTION INTRAVENOUS at 09:18

## 2017-07-14 RX ADMIN — ONDANSETRON 4 MG: 2 INJECTION INTRAMUSCULAR; INTRAVENOUS at 09:58

## 2017-07-14 RX ADMIN — FENTANYL CITRATE 50 MCG: 50 INJECTION, SOLUTION INTRAMUSCULAR; INTRAVENOUS at 09:47

## 2017-07-14 RX ADMIN — ALBUMIN HUMAN 250 ML: 50 SOLUTION INTRAVENOUS at 08:35

## 2017-07-14 NOTE — IP AVS SNAPSHOT
[address] [facility] 
[phone] Patient: Arash Arcos MRN: [id number] BMT:2/9/5710 You are allergic to the following Allergen Reactions Epinephrine Other (comments) BP drops and pass out Iodinated Contrast- Oral And Iv Dye Shortness of Breath Novocain (Procaine) Palpitations BP drops,passes out Codeine Itching Pt does not recall Cymbalta (Duloxetine) Other (comments) Head Pain and nausea Daypro (Oxaprozin) Unknown (comments) Pt does not recall Prednisone Shortness of Breath  
 agitation Sulfa (Sulfonamide Antibiotics) Unknown (comments) Pt does not recall Zithromax (Azithromycin) Nausea Only Diarrhea, headaches Recent Documentation Height Weight BMI OB Status Smoking Status 1.575 m 83.3 kg 33.59 kg/m2 Hysterectomy Never Smoker Emergency Contacts Name Discharge Info Relation Home Work Mobile [address] CAREGIVER [3] Other Relative [6] [phone] Silvia Wray  Daughter [21] [phone] About your hospitalization You were admitted on:  July 14, 2017 You last received care in the:  [facility] PACU You were discharged on:  July 14, 2017 Unit phone number:  [phone] Why you were hospitalized Your primary diagnosis was:  Pelvic Lymphadenopathy Providers Seen During Your Hospitalizations Provider Role Specialty Primary office phone Mikel Pleitez MD Attending Provider General Surgery [phone] Your Primary Care Physician (PCP) Primary Care Physician Office Phone Office Fax Gomez Tucker [phone] Follow-up Information Follow up With Details Comments Contact Info Germaine Urbano MD   [facility] [address] [facility] [address] [address] [phone] Your Appointments Monday July 31, 2017  2:00 PM EDT ROUTINE CARE with Siri Pierre MD  
[facility] [address] [facility] [place] [phone] [id number] [phone] Current Discharge Medication List  
  
START taking these medications Dose & Instructions Dispensing Information Comments Morning Noon Evening Bedtime  
 docusate sodium 100 mg capsule Commonly known as:  Genice Flax Your last dose was: Your next dose is:    
   
   
 Dose:  100 mg Take 1 Cap by mouth two (2) times a day. May use OTC instead. Prevents constipation from narcotics. Quantity:  60 Cap Refills:  0 HYDROcodone-acetaminophen 5-325 mg per tablet Commonly known as:  Bharti Prabhakar Your last dose was: Your next dose is:    
   
   
 Dose:  1-2 Tab Take 1-2 Tabs by mouth every four (4) hours as needed for Pain. Max Daily Amount: 12 Tabs. Quantity:  30 Tab Refills:  0 CONTINUE these medications which have NOT CHANGED Dose & Instructions Dispensing Information Comments Morning Noon Evening Bedtime ALPRAZolam 0.5 mg tablet Commonly known as:  Phoebe Dereck Your last dose was: Your next dose is: TAKE 1 TABLET BY MOUTH TWICE DAILY AS NEEDED Quantity:  180 Tab Refills:  0  
     
   
   
   
  
 butalbital-acetaminophen-caffeine -40 mg per tablet Commonly known as:  Zeeher Ragland Your last dose was: Your next dose is: TAKE 1 TABLET BY MOUTH EVERY 6 HOURS AS NEEDED FOR HEADACHE. MAX DAILY AMT: 4 TABLETS Quantity:  30 Tab Refills:  1  
     
   
   
   
  
 calcium carbonate 200 mg calcium (500 mg) Chew Commonly known as:  TUMS Your last dose was: Your next dose is:    
   
   
 Dose:  1 Tab Take 1 Tab by mouth as needed. Refills:  0 furosemide 20 mg tablet Commonly known as:  LASIX Your last dose was: Your next dose is: TAKE 1 TABLET BY MOUTH EVERY DAY AS NEEDED FOR SWEELING Quantity:  90 Tab Refills:  0  
     
   
   
   
  
 gabapentin 300 mg capsule Commonly known as:  NEURONTIN Your last dose was: Your next dose is: TAKE 1 CAPSULE BY MOUTH TWICE DAILY THEN TAKE 2 CAPSULES BY MOUTH EVERY NIGHT Quantity:  360 Cap Refills:  3  
     
   
   
   
  
 levothyroxine 50 mcg tablet Commonly known as:  synthroid Your last dose was: Your next dose is: Take 1 pill every Monday through Thursday. Quantity:  90 Tab Refills:  3  
     
   
   
   
  
 loratadine 10 mg tablet Commonly known as:  Beather Genera Your last dose was: Your next dose is: Take 1 tab at 1am and take 1 tab at 1pm  
 Refills:  0  
     
   
   
   
  
 nebivolol 5 mg tablet Commonly known as:  BYSTOLIC Your last dose was: Your next dose is:    
   
   
 Dose:  5 mg Take 1 Tab by mouth two (2) times a day. Quantity:  60 Tab Refills:  5 Hold dose if Systolic BP < 616, or pulse < 50.  
    
   
   
   
  
 ondansetron hcl 8 mg tablet Commonly known as:  ZOFRAN (AS HYDROCHLORIDE) Your last dose was: Your next dose is:    
   
   
 Dose:  8 mg Take 1 Tab by mouth every eight (8) hours as needed for Nausea. Quantity:  15 Tab Refills:  0  
     
   
   
   
  
 temazepam 15 mg capsule Commonly known as:  RESTORIL Your last dose was: Your next dose is: TAKE 1 TO 2 CAPSULES BY MOUTH EVERY NIGHT AT BEDTIME Quantity:  60 Cap Refills:  0 Where to Get Your Medications These medications were sent to [facility], [address]  [address] [address] Hours:  24-hours Phone:  [phone]  
  docusate sodium 100 mg capsule Information on where to get these meds will be given to you by the nurse or doctor. ! Ask your nurse or doctor about these medications HYDROcodone-acetaminophen 5-325 mg per tablet Discharge Instructions Discharge Instructions:  Dr. Rajwinder Ramirez Call on next business day to arrange appointment for follow up in 3 week(s) -- [phone]. You may notice a little blood in your urine the next day or 2 due to the stent. Activity: 
Walk regularly starting immediately. No lifting more than 10 -15 pounds for 4 week(s). You may resume driving in 4-5 days unless still requiring narcotics for pain. Diet: 
You may resume normal diet. Wound Care: 
Dermabond glue dressing will fall off on its own. If you experience a lot of drainage, develop redness around the wound, or a fever over 101 F occurs please call the office. You may shower. No baths or swimming for 2 weeks. Medications: 
Resume home medications as indicated on the Medical Reconciliation form. Do not use blood thinners (such as Aspirin, Coumadin, or Plavix) until 3 days after surgery. PUT ICE ON YOUR INCISIONS 20 MINUTES EVERY HOUR WHILE THEY REMAIN SORE. PLACE A CLOTH BETWEEN YOUR SKIN THE THE ICE BAG. Colace or Miralax should be used twice daily to prevent constipation while on narcotics. If you are still having trouble having a BM after 1-2 days, try milk of magnesia. If this does not work within 24 hours, try a bottle of magnesium citrate. Narcotics and anesthesia sometimes cause nausea and vomiting. If persistent please call the office. Do not hesitate to call with questions or concerns. Harry Ring MD 
Tel [phone] Fax [phone] How to Care for Your Wound After Its Treated With DERMABOND* Topical Skin Adhesive DERMABOND* Topical Skin Adhesive (2-octyl cyanoacrylate) is a sterile, liquid skin adhesive 
that holds wound edges together. The film will usually remain in place for 5 to 10 days, then 
naturally fall off your skin. The following will answer some of your questions and provide instructions for proper care for your 
wound while it is healing: CHECK WOUND APPEARANCE 
 Some swelling, redness, and pain are common with all wounds and normally will go away as the 
wound heals. If swelling, redness, or pain increases or if the wound feels warm to the touch, 
contact a doctor. Also contact a doctor if the wound edges reopen or separate. REPLACE BANDAGES 
 If your wound is bandaged, keep the bandage dry.  Replace the dressing daily until the adhesive film has fallen off or if the 
bandage should become wet, unless otherwise instructed by your 
physician.  When changing the dressing, do not place tape directly over the DERMABOND adhesive film, because removing the tape later may also 
remove the film. AVOID TOPICAL MEDICATIONS  Do not apply liquid or ointment medications or any other product to your wound while the DERMABOND adhesive film is in place. These may loosen the film before your wound is healed. KEEP WOUND DRY AND PROTECTED  You may occasionally and briefly wet your wound in the shower or bath. Do not soak or scrub 
your wound, do not swim, and avoid periods of heavy perspiration until the DERMABOND 
adhesive has naturally fallen off. After showering or bathing, gently blot your wound dry with a 
soft towel. If a protective dressing is being used, apply a fresh, dry bandage, being sure to keep 
the tape off the DERMABOND adhesive film.  Apply a clean, dry bandage over the wound if necessary to protect it.  Protect your wound from injury until the skin has had sufficient time to heal. 
 Do not scratch, rub, or pick at the DERMABOND adhesive film. This may loosen the film before your wound is healed.  Protect the wound from prolonged exposure to sunlight or tanning lamps while the film is in 
place. If you have any questions or concerns about this product, please consult your doctor. *Trademark ©[employer], inc. Yoli Bernardo common side effect of anesthesia following surgery is nausea and/or vomiting. In order to decrease symptoms, it is wise to avoid foods that are high in fat, greasy foods, milk products, and spicy foods for the first 24 hours. Acceptable foods for the first 24 hours following surgery include but are not limited to: 
 
? soup 
? broth 
?  toast  
? crackers ? applesauce 
? bananas  
? mashed potatoes, 
? soft or scrambled eggs 
? oatmeal 
?  jello It is important to eat when taking your pain medication. This will help to prevent nausea. If possible, please try to time your meals with your medications. It is very important to stay hydrated following surgery. Sip fluids frequently while awake. Avoid acidic drinks such as citrus juices and soda for 24 hours. Carbonated beverages may cause bloating and gas. Acceptable fluids include: 
 
? water (flavor packets may add variety) ? coffee or tea (in moderation) ? Gatorade ? Robertha Coventry ? apple juice 
? cranberry juice You are encouraged to cough and deep breathe every hour when awake. This will help to prevent respiratory complications following anesthesia. You may want to hug a pillow when coughing and sneezing to add additional support to the surgical area and to decrease discomfort if you had abdominal or chest surgery. If you are discharged home with support stockings, you may remove them after 24 hours. Support stockings are used to help prevent blood clots in the legs following surgery. Please take time to review all of your Home Care Instructions and Medication Information sheets provided in your discharge packet. If you have any questions, please contact your surgeons office. Thank you. Narcotic-Analgesic/Acetaminophen (Percocet, Norco, Lorcet HD, Lortab 10/325) - (By mouth) Why this medicine is used:  
Relieves pain. Contact a nurse or doctor right away if you have: 
· Extreme weakness, shallow breathing, slow heartbeat · Severe confusion, lightheadedness, dizziness, fainting · Yellow skin or eyes, dark urine or pale stools · Severe constipation, severe stomach pain, nausea, vomiting, loss of appetite · Sweating or cold, clammy skin Common side effects: · Mild constipation, nausea, vomiting · Sleepiness, tiredness · Itching, rash © 2017 [address] Information is for End User's use only and may not be sold, redistributed or otherwise used for commercial purposes. DISCHARGE SUMMARY from Nurse The following personal items are in your possession at time of discharge: 
 
Dental Appliances: None Visual Aid: Glasses, At home Hearing Aids/Status: Does not own Home Medications: None Jewelry: None Clothing: Undergarments, With patient (home clothing) Other Valuables: None Personal Items Sent to Safe: No valuables to send to security PATIENT INSTRUCTIONS: 
 
 
F-face looks uneven A-arms unable to move or move unevenly S-speech slurred or non-existent T-time-call 911 as soon as signs and symptoms begin-DO NOT go Back to bed or wait to see if you get better-TIME IS BRAIN. Warning Signs of HEART ATTACK Call 911 if you have these symptoms: 
? Chest discomfort. Most heart attacks involve discomfort in the center of the chest that lasts more than a few minutes, or that goes away and comes back. It can feel like uncomfortable pressure, squeezing, fullness, or pain. ? Discomfort in other areas of the upper body. Symptoms can include pain or discomfort in one or both arms, the back, neck, jaw, or stomach. ? Shortness of breath with or without chest discomfort. ? Other signs may include breaking out in a cold sweat, nausea, or lightheadedness. Don't wait more than five minutes to call [address]! Fast action can save your life.  Calling 911 is almost always the fastest way to get lifesaving treatment. Emergency Medical Services staff can begin treatment when they arrive  up to an hour sooner than if someone gets to the hospital by car. The discharge information has been reviewed with the patient and caregiver. The patient and caregiver verbalized understanding. Discharge medications reviewed with the patient and caregiver and appropriate educational materials and side effects teaching were provided. Discharge Orders None ACO Transitions of Care Introducing [employer] [address] offers a voluntary care coordination program to provide high quality service and care to [facility] fee-for-service beneficiaries. Yony Russell was designed to help you enhance your health and well-being through the following services: ? Transitions of Care  support for individuals who are transitioning from one care setting to another (example: Hospital to home). ? Chronic and Complex Care Coordination  support for individuals and caregivers of those with serious or chronic illnesses or with more than one chronic (ongoing) condition and those who take a number of different medications. If you meet specific medical criteria, a [address] [place] [address] Rd may call you directly to coordinate your care with your primary care physician and your other care providers. For questions about the [facility] programs, please, contact your physicians office. For general questions or additional information about Accountable Care Organizations: 
Please visit www.medicare.gov/acos. html or call 1-800-MEDICARE (8-713.773.5586) TTY users should call 6-710.344.5416. Introducing [facility] & HEALTH SERVICES! Dear Christiano Marquez: 
Thank you for requesting a Ifbyphone account. Our records indicate that you already have an active Ifbyphone account. You can access your account anytime at [email or web address] The[employer]st Did you know that you can access your hospital and ER discharge instructions at any time in [email or web address]? You can also review all of your test results from your hospital stay or ER visit. Additional Information If you have questions, please visit the Frequently Asked Questions section of the [email or web address] website at [email or web address]/. Remember, Altobridge[employer] is NOT to be used for urgent needs. For medical emergencies, dial 911. Now available from your iPhone and Android! General Information Please provide this summary of care documentation to your next provider. Patient Signature:  ____________________________________________________________ Date:  ____________________________________________________________  
  
Tom Damon Provider Signature:  ____________________________________________________________ Date:  ____________________________________________________________

## 2017-07-14 NOTE — PERIOP NOTES
Handoff Report from Operating Room to PACU    Report received from [address]  and Patricia 1 regarding Omi Thurman. Surgeon(s):  MD Luisa Jones MD  And Procedure(s) (LRB):  DAVINCI RIGHT PELVIC LYMPHNODE BIOPSIES WITH FIRE FLY, (Right)  CYSTOSCOPY INSERTION URETERAL CATHETERS (Right)  confirmed   with allergies and dressings discussed. Anesthesia type, drugs, patient history, complications, estimated blood loss, vital signs, intake and output, and reversal medications were reviewed.

## 2017-07-14 NOTE — OP NOTES
UROLOGY OPERATIVE NOTE    Patient: Hadley Nunez MRN: [id number]  SSN: [id number]    YOB: 1935  Age: [de-identified] y.o. Sex: female          Pre-operative Diagnosis: LYMPHADENOPATHY  Post-operative Diagnosis: LYMPHADENOPATHY  Procedure: Cystoscopy and Right ureteral stent insertion  Surgeon: Hosea Infante MD  Anesthesia:  General    Procedure Details: The patient was seen in the pre-operative area. Informed consent was then obtained. Upon arrival to the operative suite, the patient, procedure, and side were confirmed via a pre-operative \"time-out\". All were in agreement. The patient was carefully positioned and anethesia was undertaken. Sterile prep and drape was accomplished. Cystoscopic placement of a right 5 Gabonese open ended catheter performed in the usual fashion without difficulty. The bladder was normal. The open ended catheter was advanced up to 24 cm's and left indwelling. It was secured to a 14 Gab[address]. Case turned over to Dr. Mere Najera.      Findings: as above  Estimated Blood Loss:  None      Drains: Right ureteral stent  Specimens: none  Implants: * No implants in log *           Hosea Infante MD

## 2017-07-14 NOTE — H&P
H&P    Assessment:    LYMPHADENOPATHY    Body mass index is 33.59 kg/(m^2). Plan:   DAVINCI RIGHT PELVIC LYMPHNODE BIOPSIES WITH FIRE FLY, CYSTOSCOPY RIGHT URETERAL CATHETER PLACEMENT (Right )  CYSTOSCOPY INSERTION URETERAL CATHETERS Discussed the risk of surgery including bleeding, infection, injury to adjacent structures, and the risks of general anesthetic. The patient understands the risks; any and all questions were answered to the patient's satisfaction. Subjective:      Sonia Mandel is a [de-identified] y.o. female who presents for above procedure. See prior consult or office visit for details. Objective:   Blood pressure 157/74, pulse 85, temperature 97.8 °F (36.6 °C), resp. rate 18, height 5' 2\" (1.575 m), weight 83.3 kg (183 lb 10.3 oz), SpO2 100 %.   Temp (24hrs), Av.8 °F (36.6 °C), Min:97.8 °F (36.6 °C), Max:97.8 °F (36.6 °C)      Physical Exam:  PHYSICAL EXAM:    Chest:   [x]   CTA bialterally, no wheezing/rhonchi/rales/crackles    []   wheezing     []   rhonchi     []   crackles     []   use of accessory muscles    Heart:  [x]   regular rate and rhythm     [x]   No murmurs/rubs/gallops    []   irregular rhythm     []   Murmur     []   Rubs     []   Gallops         Past Medical History:   Diagnosis Date    Arrhythmia     tachycardia; Dr. Juan Carlos Patel Autoimmune disease ([facility] [address]     fibromyalgia    Fibromyalgia     GERD (gastroesophageal reflux disease)     Headache     migraine    Heart failure ([address]     HTN (hypertension) 2010    Hypothyroidism 2010    OA (osteoarthritis) 2010    Seizures ([address]     56 years ago with childbirth    Tachycardia     Thromboembolus ([address]     blood clot on brain 56 years ago w childbirth    Thyroid disease      Past Surgical History:   Procedure Laterality Date    Emmy Pisano  2015         ENDOSCOPY, COLON, DIAGNOSTIC  2010    Dr. Sheeba Johnson- repeat 5 yrs    HX BREAST BIOPSY Right     negative    HX CARPAL TUNNEL RELEASE  9/28/2012    HX CATARACT REMOVAL  9/2014    bilateral     HX HYSTERECTOMY  1977      Family History   Problem Relation Age of Onset    Heart Disease Paternal Aunt     Heart Disease Paternal Uncle     Colon Cancer Maternal Grandmother     No Known Problems Mother     No Known Problems Father     Other Daughter      fibromyalgia    Heart Disease Maternal Grandfather     Hypertension Sister      Social History     Social History    Marital status:      Spouse name: N/A    Number of children: N/A    Years of education: N/A     Social History Main Topics    Smoking status: Never Smoker    Smokeless tobacco: Never Used    Alcohol use No    Drug use: No    Sexual activity: Not Currently     Other Topics Concern    None     Social History Narrative      Prior to Admission medications    Medication Sig Start Date End Date Taking? Authorizing Provider   temazepam (RESTORIL) 15 mg capsule TAKE 1 TO 2 CAPSULES BY MOUTH EVERY NIGHT AT BEDTIME 7/5/17  Yes Deon Dan MD   nebivolol (BYSTOLIC) 5 mg tablet Take 1 Tab by mouth two (2) times a day. 6/28/17  Yes Kimmy Valencia MD   ALPRAZolam [facility]) 0.5 mg tablet TAKE 1 TABLET BY MOUTH TWICE DAILY AS NEEDED 4/12/17  Yes Reny Lew MD   levothyroxine (SYNTHROID) 50 mcg tablet Take 1 pill every Monday through Thursday. 4/12/17  Yes Reny Lew MD   gabapentin (NEURONTIN) 300 mg capsule TAKE 1 CAPSULE BY MOUTH TWICE DAILY THEN TAKE 2 CAPSULES BY MOUTH EVERY NIGHT 4/12/17  Yes Reny Lew MD   butalbital-acetaminophen-caffeine (FIORICET, ESGIC) -40 mg per tablet TAKE 1 TABLET BY MOUTH EVERY 6 HOURS AS NEEDED FOR HEADACHE. MAX DAILY AMT: 4 TABLETS 10/11/16  Yes Reny Lew MD   calcium carbonate (TUMS) 200 mg calcium (500 mg) chew Take 1 Tab by mouth as needed.    Yes Historical Provider   loratadine (CLARITIN) 10 mg tablet Take 1 tab at 1am and take 1 tab at 1pm   Yes Historical Provider   ondansetron hcl (ZOFRAN, AS HYDROCHLORIDE,) 8 mg tablet Take 1 Tab by mouth every eight (8) hours as needed for Nausea. 5/3/16   Adriana Frias MD   furosemide (LASIX) 20 mg tablet TAKE 1 TABLET BY MOUTH EVERY DAY AS NEEDED FOR SWEELING 10/15/15   Gina Castro MD     ALLERGIES:    Allergies   Allergen Reactions    Epinephrine Other (comments)     BP drops and pass out    Iodinated Contrast- Oral And Iv Dye Shortness of Breath    Novocain [Procaine] Palpitations     BP drops,passes out    Codeine Itching     Pt does not recall    Cymbalta [Duloxetine] Other (comments)     Head Pain and nausea    Daypro [Oxaprozin] Unknown (comments)     Pt does not recall    Prednisone Shortness of Breath     agitation    Sulfa (Sulfonamide Antibiotics) Unknown (comments)     Pt does not recall    Zithromax [Azithromycin] Nausea Only     Diarrhea, headaches       Review of Systems:    See prior consult, office note or scanned list in media section. 10 systems negative except as specified.                 Signed By: Juanita Min MD     July 14, 2017

## 2017-07-14 NOTE — BRIEF OP NOTE
BRIEF OPERATIVE NOTE    Date of Procedure: 7/14/2017   Preoperative Diagnosis: LYMPHADENOPATHY  Postoperative Diagnosis: LYMPHADENOPATHY    Procedure(s):  DAVINCI RIGHT PELVIC LYMPHNODE BIOPSIES WITH FIRE FLY,  CYSTOSCOPY INSERTION URETERAL CATHETERS  Surgeon(s) and Role:  Panel 1:     * Jam Vázquez MD - Primary    Panel 2:     * María Platt MD - Primary         Assistant Staff:       Surgical Staff:  Circ-1: Mukesh Gottlieb RN  Scrub Tech-1: Storm Dove  Scrub Tech-2: Carlos Carey  Surg Asst-1: Adry Whitmore  Event Time In   Incision Start 1479   Incision Close 1013     Anesthesia: General   Estimated Blood Loss: min  Specimens:   ID Type Source Tests Collected by Time Destination   1 : Rightn Pelvic Lymph Node Fresh Lymph Node  Jam Vázquez MD 7/14/2017 1930 Pathology      Findings: right pelvic LAD   Complications: none imediate  Implants: * No implants in log *        [id number]

## 2017-07-14 NOTE — DISCHARGE INSTRUCTIONS
Discharge Instructions:  Dr. Martina Ye    Call on next business day to arrange appointment for follow up in 3 week(s) -- [phone]. You may notice a little blood in your urine the next day or 2 due to the stent. Activity:  Walk regularly starting immediately. No lifting more than 10 -15 pounds for 4 week(s). You may resume driving in 4-5 days unless still requiring narcotics for pain. Diet:  You may resume normal diet. Wound Care:  Dermabond glue dressing will fall off on its own. If you experience a lot of drainage, develop redness around the wound, or a fever over 101 F occurs please call the office. You may shower. No baths or swimming for 2 weeks. Medications:  Resume home medications as indicated on the Medical Reconciliation form. Do not use blood thinners (such as Aspirin, Coumadin, or Plavix) until 3 days after surgery. PUT ICE ON YOUR INCISIONS 20 MINUTES EVERY HOUR WHILE THEY REMAIN SORE. PLACE A CLOTH BETWEEN YOUR SKIN THE THE ICE BAG. Colace or Miralax should be used twice daily to prevent constipation while on narcotics. If you are still having trouble having a BM after 1-2 days, try milk of magnesia. If this does not work within 24 hours, try a bottle of magnesium citrate. Narcotics and anesthesia sometimes cause nausea and vomiting. If persistent please call the office. Do not hesitate to call with questions or concerns. Maira Reaves MD  Tel [phone]  Fax [phone]          How to Care for Your Wound After Its Treated With  DERMABOND* Topical Skin Adhesive  DERMABOND* Topical Skin Adhesive (2-octyl cyanoacrylate) is a sterile, liquid skin adhesive  that holds wound edges together. The film will usually remain in place for 5 to 10 days, then  naturally fall off your skin.   The following will answer some of your questions and provide instructions for proper care for your  wound while it is healing:    CHECK WOUND APPEARANCE   Some swelling, redness, and pain are common with all wounds and normally will go away as the  wound heals. If swelling, redness, or pain increases or if the wound feels warm to the touch,  contact a doctor. Also contact a doctor if the wound edges reopen or separate. REPLACE BANDAGES   If your wound is bandaged, keep the bandage dry.  Replace the dressing daily until the adhesive film has fallen off or if the  bandage should become wet, unless otherwise instructed by your  physician.  When changing the dressing, do not place tape directly over the  DERMABOND adhesive film, because removing the tape later may also  remove the film. AVOID TOPICAL MEDICATIONS   Do not apply liquid or ointment medications or any other product to your wound while the  DERMABOND adhesive film is in place. These may loosen the film before your wound is healed. KEEP WOUND DRY AND PROTECTED   You may occasionally and briefly wet your wound in the shower or bath. Do not soak or scrub  your wound, do not swim, and avoid periods of heavy perspiration until the DERMABOND  adhesive has naturally fallen off. After showering or bathing, gently blot your wound dry with a  soft towel. If a protective dressing is being used, apply a fresh, dry bandage, being sure to keep  the tape off the DERMABOND adhesive film.  Apply a clean, dry bandage over the wound if necessary to protect it.  Protect your wound from injury until the skin has had sufficient time to heal.   Do not scratch, rub, or pick at the DERMABOND adhesive film. This may loosen the film before  your wound is healed.  Protect the wound from prolonged exposure to sunlight or tanning lamps while the film is in  place. If you have any questions or concerns about this product, please consult your doctor. *Trademark ©[employer] Hammad Dear common side effect of anesthesia following surgery is nausea and/or vomiting.  In order to decrease symptoms, it is wise to avoid foods that are high in fat, greasy foods, milk products, and spicy foods for the first 24 hours. Acceptable foods for the first 24 hours following surgery include but are not limited to:     soup   broth    toast    crackers    applesauce    bananas    mashed potatoes,   soft or scrambled eggs   oatmeal    jello    It is important to eat when taking your pain medication. This will help to prevent nausea. If possible, please try to time your meals with your medications. It is very important to stay hydrated following surgery. Sip fluids frequently while awake. Avoid acidic drinks such as citrus juices and soda for 24 hours. Carbonated beverages may cause bloating and gas. Acceptable fluids include:    - water (flavor packets may add variety)  - coffee or tea (in moderation)  - Gatorade  - [place]-aid  - apple juice  - cranberry juice    You are encouraged to cough and deep breathe every hour when awake. This will help to prevent respiratory complications following anesthesia. You may want to hug a pillow when coughing and sneezing to add additional support to the surgical area and to decrease discomfort if you had abdominal or chest surgery. If you are discharged home with support stockings, you may remove them after 24 hours. Support stockings are used to help prevent blood clots in the legs following surgery. Please take time to review all of your Home Care Instructions and Medication Information sheets provided in your discharge packet. If you have any questions, please contact your surgeons office. Thank you. Narcotic-Analgesic/Acetaminophen (Percocet, Norco, Lorcet HD, Lortab 10/325) - (By mouth)   Why this medicine is used:   Relieves pain.   Contact a nurse or doctor right away if you have:  · Extreme weakness, shallow breathing, slow heartbeat  · Severe confusion, lightheadedness, dizziness, fainting  · Yellow skin or eyes, dark urine or pale stools  · Severe constipation, severe stomach pain, nausea, vomiting, loss of appetite  · Sweating or cold, clammy skin     Common side effects:  · Mild constipation, nausea, vomiting  · Sleepiness, tiredness  · Itching, rash  © 2017 [facility] Information is for End User's use only and may not be sold, redistributed or otherwise used for commercial purposes. DISCHARGE SUMMARY from Nurse    The following personal items are in your possession at time of discharge:    Dental Appliances: None  Visual Aid: Glasses, At home  Hearing Aids/Status: Does not own  Home Medications: None  Jewelry: None  Clothing: Undergarments, With patient (home clothing)  Other Valuables: None  Personal Items Sent to Safe: No valuables to send to security          PATIENT INSTRUCTIONS:    After general anesthesia or intravenous sedation, for 24 hours or while taking prescription Narcotics:  · Limit your activities  · Do not drive and operate hazardous machinery  · Do not make important personal or business decisions  · Do  not drink alcoholic beverages  · If you have not urinated within 8 hours after discharge, please contact your surgeon on call. Report the following to your surgeon:  · Excessive pain, swelling, redness or odor of or around the surgical area  · Temperature over 100.5  · Nausea and vomiting lasting longer than 4 hours or if unable to take medications  · Any signs of decreased circulation or nerve impairment to extremity: change in color, persistent  numbness, tingling, coldness or increase pain  · Any questions        What to do at Home:   Please carry medication information at all times in case of emergency situations. These are general instructions for a healthy lifestyle:    No smoking/ No tobacco products/ Avoid exposure to second hand smoke    Surgeon General's Warning:  Quitting smoking now greatly reduces serious risk to your health.     Obesity, smoking, and sedentary lifestyle greatly increases your risk for illness    A healthy diet, regular physical exercise & weight monitoring are important for maintaining a healthy lifestyle    You may be retaining fluid if you have a history of heart failure or if you experience any of the following symptoms:  Weight gain of 3 pounds or more overnight or 5 pounds in a week, increased swelling in our hands or feet or shortness of breath while lying flat in bed. Please call your doctor as soon as you notice any of these symptoms; do not wait until your next office visit. Recognize signs and symptoms of STROKE:    F-face looks uneven    A-arms unable to move or move unevenly    S-speech slurred or non-existent    T-time-call 911 as soon as signs and symptoms begin-DO NOT go       Back to bed or wait to see if you get better-TIME IS BRAIN. Warning Signs of HEART ATTACK     Call 911 if you have these symptoms:   Chest discomfort. Most heart attacks involve discomfort in the center of the chest that lasts more than a few minutes, or that goes away and comes back. It can feel like uncomfortable pressure, squeezing, fullness, or pain.  Discomfort in other areas of the upper body. Symptoms can include pain or discomfort in one or both arms, the back, neck, jaw, or stomach.  Shortness of breath with or without chest discomfort.  Other signs may include breaking out in a cold sweat, nausea, or lightheadedness. Don't wait more than five minutes to call 911 - MINUTES MATTER! Fast action can save your life. Calling 911 is almost always the fastest way to get lifesaving treatment. Emergency Medical Services staff can begin treatment when they arrive -- up to an hour sooner than if someone gets to the hospital by car. The discharge information has been reviewed with the patient and caregiver. The patient and caregiver verbalized understanding. Discharge medications reviewed with the patient and caregiver and appropriate educational materials and side effects teaching were provided.

## 2017-07-14 NOTE — ANESTHESIA PREPROCEDURE EVALUATION
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   GI/Hepatic/Renal     GERD           Endo/Other      Hypothyroidism: well controlled  Obesity and arthritis     Other Findings   Comments: Fibromyalgia  Thromboembolus         Physical Exam    Airway  Mallampati: III  TM Distance: 4 - 6 cm  Neck ROM: decreased range of motion        Cardiovascular    Rhythm: regular  Rate: normal         Dental    Dentition: Caps/crowns     Pulmonary  Breath sounds clear to auscultation               Abdominal  GI exam deferred       Other Findings            Anesthetic Plan    ASA: 3  Anesthesia type: general    Monitoring Plan: BIS      Induction: Intravenous  Anesthetic plan and risks discussed with: Patient

## 2017-07-14 NOTE — CONSULTS
Urology Consult    Patient: Taya Jacome MRN: [id number]  SSN: [id number]    YOB: 1935  Age: [de-identified] y.o. Sex: female          Date of Consultation:  July 14, 2017  Requesting Physician: Nain Hung MD  Reason for Consultation:    Pre-existing [address] [place] [address] Urology Patient:            Assessment/Plan:  Impression: Right sided lymphadenopathy for lymphadenectomy. Plan: Cysto and Right ureteral stent. Thank you,          Louis Lorenzo M.D.  [phone]     CC: \"Here for enlarged lymph nodes\"    History of Present Illness:  Patient is a [de-identified] y.o. female admitted 7/14/2017 to the hospital for Right sided pelvic LYMPHADENOPATHY for lymphadenectomy. Right stent placement requested. Pt. Denies  problems or hx of stones or infections. S/p hysterectomy. Past Medical History: Allergies   Allergen Reactions    Epinephrine Other (comments)     BP drops and pass out    Iodinated Contrast- Oral And Iv Dye Shortness of Breath    Novocain [Procaine] Palpitations     BP drops,passes out    Codeine Itching     Pt does not recall    Cymbalta [Duloxetine] Other (comments)     Head Pain and nausea    Daypro [Oxaprozin] Unknown (comments)     Pt does not recall    Prednisone Shortness of Breath     agitation    Sulfa (Sulfonamide Antibiotics) Unknown (comments)     Pt does not recall    Zithromax [Azithromycin] Nausea Only     Diarrhea, headaches      Prior to Admission medications    Medication Sig Start Date End Date Taking? Authorizing Provider   temazepam (RESTORIL) 15 mg capsule TAKE 1 TO 2 CAPSULES BY MOUTH EVERY NIGHT AT BEDTIME 7/5/17  Yes Sam Epps MD   nebivolol (BYSTOLIC) 5 mg tablet Take 1 Tab by mouth two (2) times a day.  6/28/17  Yes Mariana Nelson MD   ALPRAZolam Kim Corrales) 0.5 mg tablet TAKE 1 TABLET BY MOUTH TWICE DAILY AS NEEDED 4/12/17  Yes Paul Swanson MD   levothyroxine (SYNTHROID) 50 mcg tablet Take 1 pill every Monday through Thursday. 17  Yes Lolly Goldberg MD   gabapentin (NEURONTIN) 300 mg capsule TAKE 1 CAPSULE BY MOUTH TWICE DAILY THEN TAKE 2 CAPSULES BY MOUTH EVERY NIGHT 17  Yes Lolly Goldberg MD   butalbital-acetaminophen-caffeine (FIORICET, ESGIC) -40 mg per tablet TAKE 1 TABLET BY MOUTH EVERY 6 HOURS AS NEEDED FOR HEADACHE. MAX DAILY AMT: 4 TABLETS 10/11/16  Yes Lolly Goldberg MD   calcium carbonate (TUMS) 200 mg calcium (500 mg) chew Take 1 Tab by mouth as needed. Yes Historical Provider   loratadine (CLARITIN) 10 mg tablet Take 1 tab at 1am and take 1 tab at 1pm   Yes Historical Provider   ondansetron hcl (ZOFRAN, AS HYDROCHLORIDE,) 8 mg tablet Take 1 Tab by mouth every eight (8) hours as needed for Nausea. 5/3/16   Scotty Vasquez MD   furosemide (LASIX) 20 mg tablet TAKE 1 TABLET BY MOUTH EVERY DAY AS NEEDED FOR SWEELING 10/15/15   Lolly Goldberg MD      PMHx:  has a past medical history of Arrhythmia; Autoimmune disease ([address] Fibromyalgia; GERD (gastroesophageal reflux disease); Headache; Heart failure ([address] HTN (hypertension) (2010); Hypothyroidism (2010); OA (osteoarthritis) (2010); Seizures ([address] Tachycardia (); Thromboembolus ([address] and Thyroid disease. PSurgHx:  has a past surgical history that includes hysterectomy (); endoscopy, colon, diagnostic (2010); carpal tunnel release (2012); cataract removal (2014); colonoscopy,remv lesn,snare (2015); and breast biopsy (Right, ). PSocHx:  reports that she has never smoked. She has never used smokeless tobacco. She reports that she does not drink alcohol or use illicit drugs. ROS:  Admission ROS by Jayla Elizabeth MD from 2017 were reviewed with the patient and changes (other than per HPI) include: none.     Physical Exam:            Vitals[de-identified]    Temp (24hrs), Av.8 °F (36.6 °C), Min:97.8 °F (36.6 °C), Max:97.8 °F (36.6 °C)   Blood pressure 157/74, pulse 85, temperature 97.8 °F (36.6 °C), resp. rate 18, height 5' 2\" (1.575 m), weight 83.3 kg (183 lb 10.3 oz), SpO2 100 %.              I&O's:                  General:    appears nontoxic                     Skin:  no clubbing, cyanosis, edema                HEENT:  NCAT, O/P Clear        Throat/Neck:  supple, no LAD                 Chest[de-identified]  Clear      Heart[de-identified]  Regular rate and rhythm        Abdomen/Flank:  no CVAT, non-tender abdomen without masses             Bladder[de-identified]     Genitalia:    Lymph nodes:  no cervical, supraclavicular, or groin adenopathy     Lab Results   Component Value Date/Time    WBC 6.8 04/12/2017 03:59 PM    HCT 31.5 04/12/2017 03:59 PM    PLATELET 944 11/02/5195 03:59 PM    Sodium 136 04/12/2017 03:59 PM    Potassium 4.3 04/12/2017 03:59 PM    Chloride 95 04/12/2017 03:59 PM    CO2 23 04/12/2017 03:59 PM    BUN 16 04/12/2017 03:59 PM    Creatinine 0.91 04/12/2017 03:59 PM    Glucose 99 04/12/2017 03:59 PM    Calcium 10.0 04/12/2017 03:59 PM    Magnesium 1.8 07/08/2013 07:58 PM    INR 1.0 07/08/2013 06:55 PM       UA:   Lab Results   Component Value Date/Time    Color YELLOW/STRAW 05/03/2016 01:58 PM    Appearance CLEAR 05/03/2016 01:58 PM    Specific gravity 1.023 05/03/2016 01:58 PM    pH (UA) 6.0 05/03/2016 01:58 PM    Protein TRACE 05/03/2016 01:58 PM    Glucose NEGATIVE  05/03/2016 01:58 PM    Ketone NEGATIVE  05/03/2016 01:58 PM    Bilirubin NEGATIVE  05/03/2016 01:58 PM    Urobilinogen 0.2 05/03/2016 01:58 PM    Nitrites NEGATIVE  05/03/2016 01:58 PM    Leukocyte Esterase NEGATIVE  05/03/2016 01:58 PM    Epithelial cells FEW 05/03/2016 01:58 PM    Bacteria NEGATIVE  05/03/2016 01:58 PM    WBC 0-4 05/03/2016 01:58 PM    RBC 0-5 05/03/2016 01:58 PM       Cultures: Reviewed  Xrays: Reviewed    Signed By: Stanislaw Carrizales MD  - July 14, 2017

## 2017-07-14 NOTE — ANESTHESIA POSTPROCEDURE EVALUATION
Post-Anesthesia Evaluation and Assessment    Patient: Jm Dutta MRN: [id number]  SSN: [id number]    YOB: 1935  Age: [de-identified] y.o. Sex: female       Cardiovascular Function/Vital Signs  Visit Vitals    /66    Pulse 77    Temp 36.5 °C (97.7 °F)    Resp 14    Ht 5' 2\" (1.575 m)    Wt 83.3 kg (183 lb 10.3 oz)    SpO2 96%    BMI 33.59 kg/m2       Patient is status post general anesthesia for Procedure(s):  DAVINCI RIGHT PELVIC LYMPHNODE BIOPSIES WITH FIRE FLY,  CYSTOSCOPY INSERTION URETERAL CATHETERS. Nausea/Vomiting: None    Postoperative hydration reviewed and adequate. Pain:  Pain Scale 1: Numeric (0 - 10) (07/14/17 1052)  Pain Intensity 1: 3 (07/14/17 1052)   Managed    Neurological Status:   Neuro (WDL): Within Defined Limits (07/14/17 0714)  Neuro  Neurologic State: Alert (07/14/17 1110)  Orientation Level: Oriented to person;Oriented to place;Oriented to situation (07/14/17 1110)  Cognition: Follows commands (07/14/17 1110)  Speech: Clear (07/14/17 1110)  LUE Motor Response: Purposeful;Spontaneous  (07/14/17 1110)  LLE Motor Response: Purposeful;Spontaneous  (07/14/17 1110)  RUE Motor Response: Purposeful;Spontaneous  (07/14/17 1110)  RLE Motor Response: Spontaneous ; Purposeful (07/14/17 1110)   At baseline    Mental Status and Level of Consciousness: Arousable    Pulmonary Status:   O2 Device: Room air (07/14/17 1052)   Adequate oxygenation and airway patent    Complications related to anesthesia: None    Post-anesthesia assessment completed.  No concerns    Signed By: Mary Beth Grey MD     July 14, 2017

## 2017-07-27 NOTE — OP NOTES
Juan Par[facility]   [place], [address]   OP NOTE       Name:  Ana Daley   MR#:  [id number]   :  1935   Account #:  [de-identified]    Surgery Date:  2017   Date of Adm:  2017       PREOPERATIVE DIAGNOSIS: Pelvic lymphadenopathy. POSTOPERATIVE DIAGNOSIS: Pelvic lymphadenopathy. PROCEDURES PERFORMED: Robot-assisted laparoscopic   excisional biopsy of right iliac lymph nodes. ANESTHESIA: General endotracheal.    ESTIMATED BLOOD LOSS: Minimal.    SPECIMENS REMOVED: Right iliac lymph node. INDICATIONS: The patient is an [de-identified] female who was found on   abdominal CAT scan to have enlarged lymph nodes in her right pelvis. These had previously been percutaneously biopsied with a needle, but   no clear diagnosis was made. She was referred for excisional biopsy. FINDINGS: There were enlarged lymph nodes along the right pelvic   sidewall. DESCRIPTION OF PROCEDURE: After obtaining informed consent,   the patient was taken to the operating room and placed supine on the   operating table. An operative timeout was performed and general   endotracheal anesthesia was induced. Dr. Sharlene Councilman then performed cystoscopy with placement of a right   ureteral stent. Catheter with Firefly was attached to this. The abdomen   was then prepped and draped in the usual sterile fashion. The   abdomen was then entered in the left upper abdomen using an 8 mm   optical trocar. The abdomen was insufflated without incident and was   visually explored. Two additional ports were placed under direct vision   and the robot was docked. Using bernie and an atraumatic   grasper, the right pelvic sidewall was dissected. The ureter was   identified using Firefly and also anatomically. The enlarged lymph   nodes were directly adjacent to this. They were skeletonized using   sharp dissection and minimal electrocautery.  The lymph node was   excised and was removed using an Endo Catch bag. The abdomen   was inspected for hemostasis and excellent hemostasis was noted. The robot was then undocked and the ports were removed. The   incisions were closed with 4-0 Monocryl and dressed with Dermabond. The patient was recovered from general anesthesia, and the [place]o and   ureteral stent were removed. The patient was taken to the recovery room in satisfactory condition. All instrument, sponge, and needle counts were reported as correct.         Augustin Everett MD DD / Suyapa Leach   D:  07/26/2017   18:25   T:  07/27/2017   10:41   Job #:  [id number]

## 2017-07-28 ENCOUNTER — TELEPHONE (OUTPATIENT)
Dept: SURGERY | Age: 82
End: 2017-07-28

## 2017-07-28 NOTE — TELEPHONE ENCOUNTER
Called her to tell her the path on the pelvic lymph nodes showed no cancer. There are granulomas which are non-specific. She was relieved. She's doing well post-op.

## 2017-08-18 ENCOUNTER — OFFICE VISIT (OUTPATIENT)
Dept: FAMILY MEDICINE CLINIC | Age: 82
End: 2017-08-18

## 2017-08-18 VITALS
OXYGEN SATURATION: 95 % | BODY MASS INDEX: 33.9 KG/M2 | SYSTOLIC BLOOD PRESSURE: 140 MMHG | HEART RATE: 81 BPM | TEMPERATURE: 97 F | RESPIRATION RATE: 18 BRPM | WEIGHT: 184.2 LBS | HEIGHT: 62 IN | DIASTOLIC BLOOD PRESSURE: 73 MMHG

## 2017-08-18 DIAGNOSIS — Z12.31 ENCOUNTER FOR SCREENING MAMMOGRAM FOR BREAST CANCER: ICD-10-CM

## 2017-08-18 DIAGNOSIS — I10 ESSENTIAL HYPERTENSION: Primary | ICD-10-CM

## 2017-08-18 DIAGNOSIS — Z51.81 ENCOUNTER FOR MEDICATION MONITORING: ICD-10-CM

## 2017-08-18 DIAGNOSIS — M15.9 PRIMARY OSTEOARTHRITIS INVOLVING MULTIPLE JOINTS: ICD-10-CM

## 2017-08-18 DIAGNOSIS — Z86.69 HISTORY OF MIGRAINE: ICD-10-CM

## 2017-08-18 DIAGNOSIS — E03.4 HYPOTHYROIDISM DUE TO ACQUIRED ATROPHY OF THYROID: ICD-10-CM

## 2017-08-18 DIAGNOSIS — M79.7 FIBROMYALGIA: ICD-10-CM

## 2017-08-18 DIAGNOSIS — F51.01 PRIMARY INSOMNIA: ICD-10-CM

## 2017-08-18 RX ORDER — DESONIDE 0.5 MG/ML
LOTION TOPICAL
Qty: 59 ML | Refills: 0 | Status: SHIPPED | OUTPATIENT
Start: 2017-08-18 | End: 2017-10-17

## 2017-08-18 RX ORDER — TEMAZEPAM 15 MG/1
CAPSULE ORAL
Qty: 60 CAP | Refills: 0 | Status: SHIPPED | OUTPATIENT
Start: 2017-08-18 | End: 2017-10-02 | Stop reason: SDUPTHER

## 2017-08-18 RX ORDER — BUTALBITAL, ACETAMINOPHEN AND CAFFEINE 50; 325; 40 MG/1; MG/1; MG/1
TABLET ORAL
Qty: 30 TAB | Refills: 1 | Status: SHIPPED | OUTPATIENT
Start: 2017-08-18 | End: 2018-04-08 | Stop reason: SDUPTHER

## 2017-08-18 NOTE — PROGRESS NOTES
HISTORY OF PRESENT ILLNESS  Jm Dutta is a [de-identified] y.o. female. HPI   Follow up on chronic medical problems. Overall doing well. Cardiovascular Review:  The patient has hypertension, hyperlipidemia, and atrial tachycardia. Diet and Lifestyle: generally follows a low fat low cholesterol diet, generally follows a low sodium diet, exercises sporadically  Home BP Monitoring: is not measured at home. Pertinent ROS: taking medications as instructed, no medication side effects noted, no TIA's, no chest pain on exertion, no dyspnea on exertion, no swelling of ankles, no palpitations. Thyroid Review:  Patient is seen for followup of hypothyroidism. Followed by endo. Thyroid ROS: denies fatigue, weight changes, heat/cold intolerance, bowel/skin changes or CVS symptoms. Osteoarthritis and Chronic Pain:  Patient has osteoarthritis and fibromyalgia. Rheumatological ROS: Has burning and pain in the legs at feet that is worse at night. Aches all the time. Has not been exercising. Controlled by PRN meds. Response to treatment plan: stable.    Patient Active Problem List   Diagnosis Code    Hypothyroidism E03.9    HTN (hypertension) I10    OA (osteoarthritis) M19.90    GERD (gastroesophageal reflux disease) K21.9    Fibromyalgia M79.7    Insomnia G47.00    Headache R51    Iron deficiency anemia, unspecified D50.9    CARVAJAL (dyspnea on exertion) R06.09    Heart palpitations R00.2    Paroxysmal Inappropriate sinus tachycardia vs possible atrial tachycardia R00.0    Edema leg R60.0    Reactive airway disease without complication K21.143    Incidental lung nodule, greater than or equal to 8mm R91.1    Encounter for medication monitoring Z51.81    Preop cardiovascular exam Z01.810    Pelvic lymphadenopathy R59.0       Current Outpatient Prescriptions   Medication Sig Dispense Refill    butalbital-acetaminophen-caffeine (FIORICET, ESGIC) -40 mg per tablet TAKE 1 TABLET BY MOUTH EVERY 6 HOURS AS NEEDED FOR HEADACHE. MAX DAILY AMT: 4 TABLETS 30 Tab 1    temazepam (RESTORIL) 15 mg capsule TAKE 1 TO 2 CAPSULES BY MOUTH EVERY NIGHT AT BEDTIME 60 Cap 0    desonide (TRIDESILON) 0.05 % topical lotion Apply  to affected area two (2) times daily as needed for Skin Irritation. 59 mL 0    nebivolol (BYSTOLIC) 5 mg tablet Take 1 Tab by mouth two (2) times a day. 60 Tab 5    ALPRAZolam (XANAX) 0.5 mg tablet TAKE 1 TABLET BY MOUTH TWICE DAILY AS NEEDED 180 Tab 0    levothyroxine (SYNTHROID) 50 mcg tablet Take 1 pill every Monday through Thursday. 90 Tab 3    calcium carbonate (TUMS) 200 mg calcium (500 mg) chew Take 1 Tab by mouth as needed.       loratadine (CLARITIN) 10 mg tablet Take 1 tab at 1am and take 1 tab at 1pm      furosemide (LASIX) 20 mg tablet TAKE 1 TABLET BY MOUTH EVERY DAY AS NEEDED FOR SWEELING 90 Tab 0       Allergies   Allergen Reactions    Epinephrine Other (comments)     BP drops and pass out    Iodinated Contrast- Oral And Iv Dye Shortness of Breath    Novocain [Procaine] Palpitations     BP drops,passes out    Codeine Itching     Pt does not recall    Cymbalta [Duloxetine] Other (comments)     Head Pain and nausea    Daypro [Oxaprozin] Unknown (comments)     Pt does not recall    Prednisone Shortness of Breath     agitation    Sulfa (Sulfonamide Antibiotics) Unknown (comments)     Pt does not recall    Zithromax [Azithromycin] Nausea Only     Diarrhea, headaches         Past Medical History:   Diagnosis Date    Arrhythmia     tachycardia; Dr. Jeannine Dsouza Autoimmune disease ([facility] [address]     fibromyalgia    Fibromyalgia     GERD (gastroesophageal reflux disease)     Headache     migraine    Heart failure (HCC)     HTN (hypertension) 4/2/2010    Hypothyroidism 4/2/2010    OA (osteoarthritis) 4/2/2010    Seizures ([address]     56 years ago with childbirth    Tachycardia 2013    Thromboembolus ([address]     blood clot on brain 56 years ago w childbirth    Thyroid disease Past Surgical History:   Procedure Laterality Date    Loanne Gitelman  2/5/2015         ENDOSCOPY, COLON, DIAGNOSTIC  12/2010    Dr. Flores Laser- repeat 5 yrs    HX BREAST BIOPSY Right 1999    negative    HX CARPAL TUNNEL RELEASE  9/28/2012    HX CATARACT REMOVAL  9/2014    bilateral     HX HYSTERECTOMY  1977         Family History   Problem Relation Age of Onset    Heart Disease Paternal Aunt     Heart Disease Paternal Uncle     Colon Cancer Maternal Grandmother     No Known Problems Mother     No Known Problems Father     Other Daughter      fibromyalgia    Heart Disease Maternal Grandfather     Hypertension Sister        Social History   Substance Use Topics    Smoking status: Never Smoker    Smokeless tobacco: Never Used    Alcohol use No          Lab Results   Component Value Date/Time    WBC 6.8 04/12/2017 03:59 PM    Hemoglobin (POC) 11.6 07/14/2017 07:13 AM    HGB 9.3 04/12/2017 03:59 PM    Hematocrit (POC) 34 07/14/2017 07:13 AM    HCT 31.5 04/12/2017 03:59 PM    PLATELET 776 22/26/4621 03:59 PM    MCV 69 04/12/2017 03:59 PM       Lab Results   Component Value Date/Time    Cholesterol, total 203 04/12/2017 03:59 PM    HDL Cholesterol 60 04/12/2017 03:59 PM    LDL, calculated 101 04/12/2017 03:59 PM    Triglyceride 212 04/12/2017 03:59 PM    CHOL/HDL Ratio 3.7 01/27/2010 05:43 PM       Lab Results   Component Value Date/Time    TSH 0.459 04/12/2017 03:59 PM    T4, Free 1.18 10/11/2016 03:41 PM      Lab Results   Component Value Date/Time    Sodium 136 04/12/2017 03:59 PM    Potassium 4.3 04/12/2017 03:59 PM    Chloride 95 04/12/2017 03:59 PM    CO2 23 04/12/2017 03:59 PM    Anion gap 12 05/03/2016 01:05 PM    Glucose 99 04/12/2017 03:59 PM    BUN 16 04/12/2017 03:59 PM    Creatinine 0.91 04/12/2017 03:59 PM    BUN/Creatinine ratio 18 04/12/2017 03:59 PM    GFR est AA 69 04/12/2017 03:59 PM    GFR est non-AA 60 04/12/2017 03:59 PM    Calcium 10.0 04/12/2017 03:59 PM Bilirubin, total 0.3 04/12/2017 03:59 PM    ALT (SGPT) 17 04/12/2017 03:59 PM    AST (SGOT) 24 04/12/2017 03:59 PM    Alk. phosphatase 103 04/12/2017 03:59 PM    Protein, total 7.1 04/12/2017 03:59 PM    Albumin 4.5 04/12/2017 03:59 PM    Globulin 3.2 05/03/2016 01:05 PM    A-G Ratio 1.7 04/12/2017 03:59 PM      Lab Results   Component Value Date/Time    Hemoglobin A1c 5.9 08/28/2012 04:22 PM    Hemoglobin A1c (POC) 5.7 11/10/2015 03:16 PM    Hemoglobin A1c, External 11.1 07/22/2015         Review of Systems   Constitutional: Negative for malaise/fatigue. HENT: Negative for congestion. Eyes: Negative for blurred vision. Respiratory: Negative for cough and shortness of breath. Cardiovascular: Negative for chest pain, palpitations and leg swelling. Gastrointestinal: Negative for abdominal pain, constipation and heartburn. Genitourinary: Negative for dysuria, frequency and urgency. Musculoskeletal: Positive for back pain and joint pain. Neurological: Negative for dizziness, tingling and headaches. Endo/Heme/Allergies: Negative for environmental allergies. Psychiatric/Behavioral: Negative for depression. The patient does not have insomnia. Physical Exam   Constitutional: She appears well-developed and well-nourished. /73 (BP 1 Location: Right arm, BP Patient Position: Sitting)  Pulse 81  Temp 97 °F (36.1 °C) (Oral)   Resp 18  Ht 5' 2\" (1.575 m)  Wt 184 lb 3.2 oz (83.6 kg)  SpO2 95%  BMI 33.69 kg/m2     HENT:   Right Ear: Tympanic membrane and ear canal normal.   Left Ear: Tympanic membrane and ear canal normal.   Nose: No mucosal edema or rhinorrhea. Mouth/Throat: Oropharynx is clear and moist and mucous membranes are normal.   Neck: Normal range of motion. Neck supple. No thyromegaly present. Cardiovascular: Normal rate and regular rhythm. No murmur heard. Pulmonary/Chest: Effort normal and breath sounds normal.   Abdominal: Soft.  Bowel sounds are normal. There is no tenderness. Musculoskeletal: Normal range of motion. She exhibits no edema. Lymphadenopathy:     She has no cervical adenopathy. Neurological: She has normal strength. No sensory deficit. Coordination and gait normal.   Skin: Skin is warm and dry. Rash on the left side on the face    Psychiatric: She has a normal mood and affect. Nursing note and vitals reviewed. ASSESSMENT and PLAN  Diagnoses and all orders for this visit:    1. Essential hypertension  Discussed sodium restriction, high k rich diet, maintaining ideal body weight and regular exercise program such as daily walking 30 min perday 4-5 times per week, as physiologic means to achieve blood pressure control.  Medication compliance advised. 2. Hypothyroidism due to acquired atrophy of thyroid  Stable     3. History of migraine  -    Refill butalbital-acetaminophen-caffeine (FIORICET, ESGIC) -40 mg per tablet; TAKE 1 TABLET BY MOUTH EVERY 6 HOURS AS NEEDED FOR HEADACHE. MAX DAILY AMT: 4 TABLETS    4. Primary insomnia  -     Refill temazepam (RESTORIL) 15 mg capsule; TAKE 1 TO 2 CAPSULES BY MOUTH EVERY NIGHT AT BEDTIME    5. Encounter for screening mammogram for breast cancer  -     [facility] MAMMO BI SCREENING INCL CAD; Future    6. Primary osteoarthritis involving multiple joints  Stable     8. Fibromyalgia  Stable     10. Encounter for medication monitoring    11. Skin Rash  -     desonide (TRIDESILON) 0.05 % topical lotion; Apply  to affected area two (2) times daily as needed for Skin Irritation. Follow-up Disposition:  Return in about 2 months (around 10/30/2017). reviewed diet, exercise and weight control  cardiovascular risk and specific lipid/LDL goals reviewed  reviewed medications and side effects in detail    I have discussed diagnosis listed in this note with pt and/or family.  I have discussed treatment plans and options and the risk/benefit analysis of those options, including safe use of medications and possible medication side effects. Through the use of shared decision making we have agreed to the above plan. The patient has received an after-visit summary and questions were answered concerning future plans and follow up. Advise pt of any urgent changes then to proceed to the ER.

## 2017-08-29 ENCOUNTER — HOSPITAL ENCOUNTER (OUTPATIENT)
Dept: LAB | Age: 82
Discharge: HOME | End: 2017-08-29
Payer: MEDICARE

## 2017-08-29 ENCOUNTER — OFFICE VISIT (OUTPATIENT)
Dept: FAMILY MEDICINE CLINIC | Age: 82
End: 2017-08-29

## 2017-08-29 VITALS
TEMPERATURE: 98.2 F | HEART RATE: 79 BPM | WEIGHT: 182 LBS | HEIGHT: 62 IN | BODY MASS INDEX: 33.49 KG/M2 | RESPIRATION RATE: 15 BRPM | SYSTOLIC BLOOD PRESSURE: 131 MMHG | OXYGEN SATURATION: 95 % | DIASTOLIC BLOOD PRESSURE: 69 MMHG

## 2017-08-29 DIAGNOSIS — D50.9 IRON DEFICIENCY ANEMIA, UNSPECIFIED IRON DEFICIENCY ANEMIA TYPE: Primary | ICD-10-CM

## 2017-08-29 DIAGNOSIS — R68.89 GENERAL SYMPTOM: ICD-10-CM

## 2017-08-29 LAB
HEMOCCULT STL QL: NEGATIVE
VALID INTERNAL CONTROL?: YES

## 2017-08-29 PROCEDURE — 82728 ASSAY OF FERRITIN: CPT

## 2017-08-29 PROCEDURE — 36415 COLL VENOUS BLD VENIPUNCTURE: CPT

## 2017-08-29 PROCEDURE — 82607 VITAMIN B-12: CPT

## 2017-08-29 PROCEDURE — 83550 IRON BINDING TEST: CPT

## 2017-08-29 RX ORDER — HYDROMORPHONE HYDROCHLORIDE 2 MG/1
TABLET ORAL
Refills: 0 | COMMUNITY
Start: 2017-07-14 | End: 2017-08-29

## 2017-08-29 RX ORDER — GABAPENTIN 300 MG/1
CAPSULE ORAL
Refills: 3 | COMMUNITY
Start: 2017-07-16 | End: 2018-04-13

## 2017-08-29 RX ORDER — CYCLOBENZAPRINE HCL 10 MG
TABLET ORAL
COMMUNITY
Start: 2017-08-28 | End: 2017-10-24

## 2017-08-29 NOTE — PROGRESS NOTES
Chief Complaint   Patient presents with    Follow-up     1. Have you been to the ER, urgent care clinic since your last visit? Hospitalized since your last visit? No    2. Have you seen or consulted any other health care providers outside of the [address] [place] [address] since your last visit? Include any pap smears or colon screening.  Yes When: Dr. Shweta Rodriguez, Endocrinologist, 8/23/2017

## 2017-08-29 NOTE — PROGRESS NOTES
HISTORY OF PRESENT ILLNESS  Arash Arcos is a [de-identified] y.o. female. HPI   Seen by her endo and had labs done with Hg found to be 8.3%. No indication of acute bleeding. Denies ongoing abd pain. No blood in the stool. No melena. Denies fatigue no more than her \"usual\". No chest pain and no SOB. Blood count one month ago was in normal range. Last colonoscopy was 2015 and WNL. Had PET scan done in April d/t ling nodule, ordered by pulmonary. Subsequently underwent bx with benign finding. My reviewed of this PET scan makes mention of increased activity in the anus. This was not mention to the patient in the past.      Patient Active Problem List   Diagnosis Code    Hypothyroidism E03.9    HTN (hypertension) I10    OA (osteoarthritis) M19.90    GERD (gastroesophageal reflux disease) K21.9    Fibromyalgia M79.7    Insomnia G47.00    Headache R51    Iron deficiency anemia, unspecified D50.9    CARVAJAL (dyspnea on exertion) R06.09    Heart palpitations R00.2    Paroxysmal Inappropriate sinus tachycardia vs possible atrial tachycardia R00.0    Edema leg R60.0    Reactive airway disease without complication T80.837    Incidental lung nodule, greater than or equal to 8mm R91.1    Encounter for medication monitoring Z51.81    Preop cardiovascular exam Z01.810    Pelvic lymphadenopathy R59.0       Current Outpatient Prescriptions   Medication Sig Dispense Refill    cyclobenzaprine (FLEXERIL) 10 mg tablet       gabapentin (NEURONTIN) 300 mg capsule TK 1 C PO BID THEN TK 2 CS PO Q NIGHT  3    butalbital-acetaminophen-caffeine (FIORICET, ESGIC) -40 mg per tablet TAKE 1 TABLET BY MOUTH EVERY 6 HOURS AS NEEDED FOR HEADACHE. MAX DAILY AMT: 4 TABLETS 30 Tab 1    temazepam (RESTORIL) 15 mg capsule TAKE 1 TO 2 CAPSULES BY MOUTH EVERY NIGHT AT BEDTIME 60 Cap 0    desonide (TRIDESILON) 0.05 % topical lotion Apply  to affected area two (2) times daily as needed for Skin Irritation.  59 mL 0    nebivolol (BYSTOLIC) 5 mg tablet Take 1 Tab by mouth two (2) times a day. 60 Tab 5    ALPRAZolam (XANAX) 0.5 mg tablet TAKE 1 TABLET BY MOUTH TWICE DAILY AS NEEDED 180 Tab 0    levothyroxine (SYNTHROID) 50 mcg tablet Take 1 pill every Monday through Thursday. 90 Tab 3    calcium carbonate (TUMS) 200 mg calcium (500 mg) chew Take 1 Tab by mouth as needed.       loratadine (CLARITIN) 10 mg tablet Take 1 tab at 1am and take 1 tab at 1pm      furosemide (LASIX) 20 mg tablet TAKE 1 TABLET BY MOUTH EVERY DAY AS NEEDED FOR SWEELING 90 Tab 0       Allergies   Allergen Reactions    Epinephrine Other (comments)     BP drops and pass out    Iodinated Contrast- Oral And Iv Dye Shortness of Breath    Novocain [Procaine] Palpitations     BP drops,passes out    Codeine Itching     Pt does not recall    Cymbalta [Duloxetine] Other (comments)     Head Pain and nausea    Daypro [Oxaprozin] Unknown (comments)     Pt does not recall    Prednisone Shortness of Breath     agitation    Sulfa (Sulfonamide Antibiotics) Unknown (comments)     Pt does not recall    Zithromax [Azithromycin] Nausea Only     Diarrhea, headaches       Past Medical History:   Diagnosis Date    Arrhythmia     tachycardia; Dr. Kristine Barajas Autoimmune disease ([facility] [address]     fibromyalgia    Fibromyalgia     GERD (gastroesophageal reflux disease)     Headache     migraine    Heart failure (HCC)     HTN (hypertension) 4/2/2010    Hypothyroidism 4/2/2010    OA (osteoarthritis) 4/2/2010    Seizures ([address]     56 years ago with childbirth    Tachycardia 2013    Thromboembolus ([facility] [address]     blood clot on brain 56 years ago w childbirth    Thyroid disease        Past Surgical History:   Procedure Laterality Date    Sherri Le  2/5/2015         ENDOSCOPY, COLON, DIAGNOSTIC  12/2010    Dr. Roderick Restrepo- repeat 5 yrs    HX BREAST BIOPSY Right 1999    negative    HX CARPAL TUNNEL RELEASE  9/28/2012    HX CATARACT REMOVAL  9/2014    bilateral     HX HYSTERECTOMY  1977       Family History   Problem Relation Age of Onset    Heart Disease Paternal Aunt     Heart Disease Paternal Uncle     Colon Cancer Maternal Grandmother     No Known Problems Mother     No Known Problems Father     Other Daughter      fibromyalgia    Heart Disease Maternal Grandfather     Hypertension Sister        Social History   Substance Use Topics    Smoking status: Never Smoker    Smokeless tobacco: Never Used    Alcohol use No        Lab Results  Component Value Date/Time   WBC 6.8 04/12/2017 03:59 PM   HGB 9.3 04/12/2017 03:59 PM   Hemoglobin (POC) 11.6 07/14/2017 07:13 AM   HCT 31.5 04/12/2017 03:59 PM   Hematocrit (POC) 34 07/14/2017 07:13 AM   PLATELET 433 37/19/6720 03:59 PM   MCV 69 04/12/2017 03:59 PM     Lab Results  Component Value Date/Time   Cholesterol, total 203 04/12/2017 03:59 PM   HDL Cholesterol 60 04/12/2017 03:59 PM   LDL, calculated 101 04/12/2017 03:59 PM   Triglyceride 212 04/12/2017 03:59 PM   CHOL/HDL Ratio 3.7 01/27/2010 05:43 PM     Lab Results  Component Value Date/Time   ALT (SGPT) 17 04/12/2017 03:59 PM   AST (SGOT) 24 04/12/2017 03:59 PM   Alk.  phosphatase 103 04/12/2017 03:59 PM   Bilirubin, total 0.3 04/12/2017 03:59 PM   Albumin 4.5 04/12/2017 03:59 PM   Protein, total 7.1 04/12/2017 03:59 PM   INR 1.0 07/08/2013 06:55 PM   Prothrombin time 10.0 07/08/2013 06:55 PM   PLATELET 860 71/66/6814 03:59 PM       Lab Results  Component Value Date/Time   TSH 0.459 04/12/2017 03:59 PM   T4, Free 1.18 10/11/2016 03:41 PM      Lab Results   Component Value Date/Time    Sodium 136 04/12/2017 03:59 PM    Potassium 4.3 04/12/2017 03:59 PM    Chloride 95 04/12/2017 03:59 PM    CO2 23 04/12/2017 03:59 PM    Anion gap 12 05/03/2016 01:05 PM    Glucose 99 04/12/2017 03:59 PM    BUN 16 04/12/2017 03:59 PM    Creatinine 0.91 04/12/2017 03:59 PM    BUN/Creatinine ratio 18 04/12/2017 03:59 PM    GFR est AA 69 04/12/2017 03:59 PM    GFR est non-AA 60 04/12/2017 03:59 PM    Calcium 10.0 04/12/2017 03:59 PM    Bilirubin, total 0.3 04/12/2017 03:59 PM    ALT (SGPT) 17 04/12/2017 03:59 PM    AST (SGOT) 24 04/12/2017 03:59 PM    Alk. phosphatase 103 04/12/2017 03:59 PM    Protein, total 7.1 04/12/2017 03:59 PM    Albumin 4.5 04/12/2017 03:59 PM    Globulin 3.2 05/03/2016 01:05 PM    A-G Ratio 1.7 04/12/2017 03:59 PM      Lab Results   Component Value Date/Time    Hemoglobin A1c 5.9 08/28/2012 04:22 PM    Hemoglobin A1c (POC) 5.7 11/10/2015 03:16 PM    Hemoglobin A1c, External 11.1 07/22/2015      Review of Systems   Constitutional: Negative for malaise/fatigue. HENT: Negative for congestion. Eyes: Negative for blurred vision. Respiratory: Negative for cough and shortness of breath. Cardiovascular: Negative for chest pain, palpitations and leg swelling. Gastrointestinal: Negative for abdominal pain, constipation and heartburn. Genitourinary: Negative for dysuria, frequency and urgency. Musculoskeletal: Negative for back pain and joint pain. Neurological: Negative for dizziness, tingling and headaches. Endo/Heme/Allergies: Negative for environmental allergies. Psychiatric/Behavioral: Negative for depression. The patient does not have insomnia. Physical Exam   Constitutional: She appears well-developed and well-nourished. /69 (BP 1 Location: Right arm, BP Patient Position: Sitting)  Pulse 79  Temp 98.2 °F (36.8 °C) (Oral)   Resp 15  Ht 5' 2\" (1.575 m)  Wt 182 lb (82.6 kg)  SpO2 95%  BMI 33.29 kg/m2     HENT:   Right Ear: Tympanic membrane and ear canal normal.   Left Ear: Tympanic membrane and ear canal normal.   Nose: No mucosal edema or rhinorrhea. Mouth/Throat: Oropharynx is clear and moist and mucous membranes are normal.   Neck: Normal range of motion. Neck supple. No thyromegaly present. Cardiovascular: Normal rate and regular rhythm. No murmur heard.   Pulmonary/Chest: Effort normal and breath sounds normal.   Abdominal: Soft. Bowel sounds are normal. There is no tenderness. Genitourinary: Rectal exam shows guaiac negative stool. Musculoskeletal: Normal range of motion. She exhibits no edema. Lymphadenopathy:     She has no cervical adenopathy. Skin: Skin is warm and dry. Psychiatric: She has a normal mood and affect. Nursing note and vitals reviewed. ASSESSMENT and PLAN  Diagnoses and all orders for this visit:    1. Iron deficiency anemia, unspecified iron deficiency anemia type  -     AMB POC FECAL BLOOD, OCCULT, QL 1 CARD  -     AMB POC COMPLETE CBC, AUTOMATED  -     REFERRAL TO GASTROENTEROLOGY  -     IRON PROFILE  -     FERRITIN  -     VITAMIN B12 & FOLATE  She is taking Vitron C and should continue until seen by GI. Advised of any charges with abd pain or blood in the stool or melena to proceed to the ER. 2. General symptom  -     VITAMIN B12 & FOLATE      Follow-up Disposition: 3 weeks. lab results and schedule of future lab studies reviewed with patient  reviewed diet, exercise and weight control  reviewed medications and side effects in detail    I have discussed diagnosis listed in this note with pt and/or family. I have discussed treatment plans and options and the risk/benefit analysis of those options, including safe use of medications and possible medication side effects. Through the use of shared decision making we have agreed to the above plan. The patient has received an after-visit summary and questions were answered concerning future plans and follow up. Advise pt of any urgent changes then to proceed to the ER.

## 2017-08-30 LAB
FERRITIN SERPL-MCNC: 13 NG/ML (ref 15–150)
FOLATE SERPL-MCNC: 10.1 NG/ML
IRON SATN MFR SERPL: 5 % (ref 15–55)
IRON SERPL-MCNC: 22 UG/DL (ref 27–139)
TIBC SERPL-MCNC: 478 UG/DL (ref 250–450)
UIBC SERPL-MCNC: 456 UG/DL (ref 118–369)
VIT B12 SERPL-MCNC: 380 PG/ML (ref 211–946)

## 2017-09-26 ENCOUNTER — TELEPHONE (OUTPATIENT)
Dept: CARDIOLOGY CLINIC | Age: 82
End: 2017-09-26

## 2017-09-26 NOTE — TELEPHONE ENCOUNTER
Spoke with patient. Verified patient with two patient identifiers. States since Sunday 9-24-17, her BP is running in the 150-162/  range, pulse in the 76 range. C/O feeling hot. Is taking her BP multiple times a day. Is on Bystolic 5 mg po BID. Does not take her Lasix. Please advise.

## 2017-09-27 NOTE — TELEPHONE ENCOUNTER
Spoke with patient. Verified patient with two patient identifiers. Per Dr. Taniya Romero, call PCP for BP med adjustment, they can follow BP. Patient verbalized understanding. BP  Received:  Today       MD Isra Mercer LPN       Caller: Unspecified (Yesterday,  1:55 PM)                     SEE PCP FIRST TO ADJUST     THX     B

## 2017-10-02 DIAGNOSIS — F51.01 PRIMARY INSOMNIA: ICD-10-CM

## 2017-10-03 RX ORDER — TEMAZEPAM 15 MG/1
CAPSULE ORAL
Qty: 60 CAP | Refills: 1 | OUTPATIENT
Start: 2017-10-03 | End: 2017-12-01 | Stop reason: SDUPTHER

## 2017-10-16 NOTE — PROGRESS NOTES
HISTORY OF PRESENT ILLNESS  Angela Jo is a [de-identified] y.o. female. HPI   Follow up on blood count. Seen by her endo and had labs done with Hg found to be 8.3% at the end of August.  No indication of acute bleeding. Denies ongoing abd pain. No blood in the stool. No melena. Denies fatigue no more than her \"usual\". No chest pain and no SOB. Blood count one month ago was within a normal range. Last colonoscopy was 2015 and WNL. She cancelled the appt with GI we had made for September d/t \"tired of doctors\". She is taking the iron. Had PET scan done in April d/t lung nodule, ordered by pulmonary. Subsequently underwent bx with benign finding. My reviewed of this PET scan makes mention of increased activity in the anus. This was not mention to the patient in the past.  She has rescheduled her appt with GI. Cardiovascular Review:  The patient has hypertension, hyperlipidemia, and atrial tachycardia. Diet and Lifestyle: generally follows a low fat low cholesterol diet, generally follows a low sodium diet, exercises sporadically  Home BP Monitoring: is not measured at home. Pertinent ROS: taking medications as instructed, no medication side effects noted, no TIA's, no chest pain on exertion, no dyspnea on exertion, no swelling of ankles, no palpitations. Thyroid Review:  Patient is seen for followup of hypothyroidism. Followed by endo. Thyroid ROS: denies fatigue, weight changes, heat/cold intolerance, bowel/skin changes or CVS symptoms. Osteoarthritis and Chronic Pain:  Patient has osteoarthritis and fibromyalgia. Rheumatological ROS: Has burning and pain in the legs at feet that is worse at night. Aches all the time. Has not been exercising. Response to treatment plan: stable.      Patient Active Problem List   Diagnosis Code    Hypothyroidism E03.9    HTN (hypertension) I10    OA (osteoarthritis) M19.90    GERD (gastroesophageal reflux disease) K21.9    Fibromyalgia M79.7    Insomnia G47.00    Headache(784.0) R51    Iron deficiency anemia, unspecified D50.9    CARVAJAL (dyspnea on exertion) R06.09    Heart palpitations R00.2    Paroxysmal Inappropriate sinus tachycardia vs possible atrial tachycardia R00.0    Edema leg R60.0    Reactive airway disease without complication F33.967    Incidental lung nodule, greater than or equal to 8mm R91.1    Encounter for medication monitoring Z51.81    Preop cardiovascular exam Z01.810    Pelvic lymphadenopathy R59.0       Current Outpatient Prescriptions   Medication Sig Dispense Refill    temazepam (RESTORIL) 15 mg capsule TAKE 1 TO 2 CAPSULES BY MOUTH EVERY NIGHT AT BEDTIME 60 Cap 1    cyclobenzaprine (FLEXERIL) 10 mg tablet       gabapentin (NEURONTIN) 300 mg capsule TK 1 C PO BID THEN TK 2 CS PO Q NIGHT  3    butalbital-acetaminophen-caffeine (FIORICET, ESGIC) -40 mg per tablet TAKE 1 TABLET BY MOUTH EVERY 6 HOURS AS NEEDED FOR HEADACHE. MAX DAILY AMT: 4 TABLETS 30 Tab 1    desonide (TRIDESILON) 0.05 % topical lotion Apply  to affected area two (2) times daily as needed for Skin Irritation. 59 mL 0    nebivolol (BYSTOLIC) 5 mg tablet Take 1 Tab by mouth two (2) times a day. 60 Tab 5    ALPRAZolam (XANAX) 0.5 mg tablet TAKE 1 TABLET BY MOUTH TWICE DAILY AS NEEDED 180 Tab 0    levothyroxine (SYNTHROID) 50 mcg tablet Take 1 pill every Monday through Thursday. 90 Tab 3    calcium carbonate (TUMS) 200 mg calcium (500 mg) chew Take 1 Tab by mouth as needed.       loratadine (CLARITIN) 10 mg tablet Take 1 tab at 1am and take 1 tab at 1pm      furosemide (LASIX) 20 mg tablet TAKE 1 TABLET BY MOUTH EVERY DAY AS NEEDED FOR SWEELING 90 Tab 0       Allergies   Allergen Reactions    Epinephrine Other (comments)     BP drops and pass out    Iodinated Contrast- Oral And Iv Dye Shortness of Breath    Novocain [Procaine] Palpitations     BP drops,passes out    Codeine Itching     Pt does not recall    Cymbalta [Duloxetine] Other (comments)     Head Pain and nausea    Daypro [Oxaprozin] Unknown (comments)     Pt does not recall    Prednisone Shortness of Breath     agitation    Sulfa (Sulfonamide Antibiotics) Unknown (comments)     Pt does not recall    Zithromax [Azithromycin] Nausea Only     Diarrhea, headaches         Past Medical History:   Diagnosis Date    Arrhythmia     tachycardia; Dr. Khoi Henry Autoimmune disease ([facility] [address]     fibromyalgia    Fibromyalgia     GERD (gastroesophageal reflux disease)     Headache     migraine    Heart failure ([facility] [address]     HTN (hypertension) 4/2/2010    Hypothyroidism 4/2/2010    OA (osteoarthritis) 4/2/2010    Seizures ([facility] [address]     56 years ago with childbirth    Tachycardia 2013    Thromboembolus ([facility] [address]     blood clot on brain 56 years ago w childbirth    Thyroid disease          Past Surgical History:   Procedure Laterality Date    Sher Thurman  2/5/2015         ENDOSCOPY, COLON, DIAGNOSTIC  12/2010    Dr. Callum Melendez- repeat 5 yrs    HX BREAST BIOPSY Right 1999    negative    HX CARPAL TUNNEL RELEASE  9/28/2012    HX CATARACT REMOVAL  9/2014    bilateral     HX HYSTERECTOMY  1977         Family History   Problem Relation Age of Onset    Heart Disease Paternal Aunt     Heart Disease Paternal Uncle     Colon Cancer Maternal Grandmother     No Known Problems Mother     No Known Problems Father     Other Daughter      fibromyalgia    Heart Disease Maternal Grandfather     Hypertension Sister        Social History   Substance Use Topics    Smoking status: Never Smoker    Smokeless tobacco: Never Used    Alcohol use No        Lab Results   Component Value Date/Time    WBC 6.8 04/12/2017 03:59 PM    HGB 9.3 04/12/2017 03:59 PM    Hemoglobin (POC) 11.6 07/14/2017 07:13 AM    HCT 31.5 04/12/2017 03:59 PM    Hematocrit (POC) 34 07/14/2017 07:13 AM    PLATELET 441 82/75/6038 03:59 PM    MCV 69 04/12/2017 03:59 PM     Lab Results   Component Value Date/Time Cholesterol, total 203 04/12/2017 03:59 PM    HDL Cholesterol 60 04/12/2017 03:59 PM    LDL, calculated 101 04/12/2017 03:59 PM    Triglyceride 212 04/12/2017 03:59 PM    CHOL/HDL Ratio 3.7 01/27/2010 05:43 PM     Lab Results   Component Value Date/Time    ALT (SGPT) 17 04/12/2017 03:59 PM    AST (SGOT) 24 04/12/2017 03:59 PM    Alk. phosphatase 103 04/12/2017 03:59 PM    Bilirubin, total 0.3 04/12/2017 03:59 PM    Albumin 4.5 04/12/2017 03:59 PM    Protein, total 7.1 04/12/2017 03:59 PM    INR 1.0 07/08/2013 06:55 PM    Prothrombin time 10.0 07/08/2013 06:55 PM    PLATELET 744 75/47/5986 03:59 PM       Lab Results   Component Value Date/Time    TSH 0.459 04/12/2017 03:59 PM    T4, Free 1.18 10/11/2016 03:41 PM      Lab Results   Component Value Date/Time    Sodium 136 04/12/2017 03:59 PM    Potassium 4.3 04/12/2017 03:59 PM    Chloride 95 04/12/2017 03:59 PM    CO2 23 04/12/2017 03:59 PM    Anion gap 12 05/03/2016 01:05 PM    Glucose 99 04/12/2017 03:59 PM    BUN 16 04/12/2017 03:59 PM    Creatinine 0.91 04/12/2017 03:59 PM    BUN/Creatinine ratio 18 04/12/2017 03:59 PM    GFR est AA 69 04/12/2017 03:59 PM    GFR est non-AA 60 04/12/2017 03:59 PM    Calcium 10.0 04/12/2017 03:59 PM    Bilirubin, total 0.3 04/12/2017 03:59 PM    ALT (SGPT) 17 04/12/2017 03:59 PM    AST (SGOT) 24 04/12/2017 03:59 PM    Alk. phosphatase 103 04/12/2017 03:59 PM    Protein, total 7.1 04/12/2017 03:59 PM    Albumin 4.5 04/12/2017 03:59 PM    Globulin 3.2 05/03/2016 01:05 PM    A-G Ratio 1.7 04/12/2017 03:59 PM      Lab Results   Component Value Date/Time    Hemoglobin A1c 5.9 08/28/2012 04:22 PM    Hemoglobin A1c (POC) 5.7 11/10/2015 03:16 PM    Hemoglobin A1c, External 11.1 07/22/2015      Review of Systems   Constitutional: Negative for malaise/fatigue. HENT: Negative for congestion. Eyes: Negative for blurred vision. Respiratory: Negative for cough and shortness of breath.     Cardiovascular: Negative for chest pain, palpitations and leg swelling. Gastrointestinal: Negative for abdominal pain, constipation and heartburn. Genitourinary: Negative for dysuria, frequency and urgency. Neurological: Negative for dizziness, tingling and headaches. Endo/Heme/Allergies: Negative for environmental allergies. Psychiatric/Behavioral: Negative for depression. The patient does not have insomnia. Started on doxycycline for ance rosea by her dermatologist about 3 weeks ago. Face has cleared up some. Physical Exam   Constitutional: She appears well-developed and well-nourished. /70  Pulse 78  Temp 96.6 °F (35.9 °C) (Oral)   Resp 20  Ht 5' 2\" (1.575 m)  Wt 184 lb (83.5 kg)  SpO2 96%  BMI 33.65 kg/m2     HENT:   Right Ear: Tympanic membrane and ear canal normal.   Left Ear: Tympanic membrane and ear canal normal.   Nose: No mucosal edema or rhinorrhea. Mouth/Throat: Oropharynx is clear and moist and mucous membranes are normal.   Neck: Normal range of motion. Neck supple. No thyromegaly present. Cardiovascular: Normal rate and regular rhythm. No murmur heard. Pulmonary/Chest: Effort normal and breath sounds normal.   Abdominal: Soft. Bowel sounds are normal. There is no tenderness. Genitourinary: Rectal exam shows guaiac negative stool. Musculoskeletal: Normal range of motion. She exhibits no edema. Lymphadenopathy:     She has no cervical adenopathy. Skin: Skin is warm and dry. Psychiatric: She has a normal mood and affect. Nursing note and vitals reviewed. ASSESSMENT and PLAN  Diagnoses and all orders for this visit:    1. Essential hypertension  Stable     2. Mild intermittent reactive airway disease without complication  Stable     3. Hypothyroidism due to acquired atrophy of thyroid  As per endo    4. Fibromyalgia//  5. Primary osteoarthritis involving multiple joints  Not on any NSAIDS. Stable with neurotin    6. Encounter for medication monitoring    7.  Iron deficiency anemia, unspecified iron deficiency anemia type  Will have GI follow up. .  Discussed importance of keeping appt with the pt. Hg is back up to normal range. -     IRON PROFILE  -     FERRITIN  -     AMB POC COMPLETE CBC, AUTOMATED    8. Thrombocytopenia (HCC)  Platelet count down to 48K. Was 61 23 68 on last blood count. May be d/t to the doxycyline which we will hold and repeat in 1 week. -     REFERRAL TO HEMATOLOGY    9. Encounter for immunization  -     Influenza virus vaccine ([address]) 72 years and older ([address]38)    8. Acne rosacea  Hold doxycyline. Will follow up with derm. Follow-up Disposition:  Return in about 1 week (around 10/24/2017). reviewed diet, exercise and weight control  cardiovascular risk and specific lipid/LDL goals reviewed  reviewed medications and side effects in detail    I have discussed diagnosis listed in this note with pt and/or family. I have discussed treatment plans and options and the risk/benefit analysis of those options, including safe use of medications and possible medication side effects. Through the use of shared decision making we have agreed to the above plan. The patient has received an after-visit summary and questions were answered concerning future plans and follow up. Advise pt of any urgent changes then to proceed to the ER.

## 2017-10-17 ENCOUNTER — OFFICE VISIT (OUTPATIENT)
Dept: FAMILY MEDICINE CLINIC | Age: 82
End: 2017-10-17

## 2017-10-17 ENCOUNTER — HOSPITAL ENCOUNTER (OUTPATIENT)
Dept: LAB | Age: 82
Discharge: HOME | End: 2017-10-17
Payer: MEDICARE

## 2017-10-17 VITALS
DIASTOLIC BLOOD PRESSURE: 70 MMHG | WEIGHT: 184 LBS | TEMPERATURE: 96.6 F | HEIGHT: 62 IN | BODY MASS INDEX: 33.86 KG/M2 | HEART RATE: 78 BPM | RESPIRATION RATE: 20 BRPM | OXYGEN SATURATION: 96 % | SYSTOLIC BLOOD PRESSURE: 124 MMHG

## 2017-10-17 DIAGNOSIS — L71.9 ACNE ROSACEA: ICD-10-CM

## 2017-10-17 DIAGNOSIS — E03.4 HYPOTHYROIDISM DUE TO ACQUIRED ATROPHY OF THYROID: ICD-10-CM

## 2017-10-17 DIAGNOSIS — M15.9 PRIMARY OSTEOARTHRITIS INVOLVING MULTIPLE JOINTS: ICD-10-CM

## 2017-10-17 DIAGNOSIS — M79.7 FIBROMYALGIA: ICD-10-CM

## 2017-10-17 DIAGNOSIS — D69.6 THROMBOCYTOPENIA (HCC): ICD-10-CM

## 2017-10-17 DIAGNOSIS — I10 ESSENTIAL HYPERTENSION: Primary | ICD-10-CM

## 2017-10-17 DIAGNOSIS — D50.9 IRON DEFICIENCY ANEMIA, UNSPECIFIED IRON DEFICIENCY ANEMIA TYPE: ICD-10-CM

## 2017-10-17 DIAGNOSIS — Z51.81 ENCOUNTER FOR MEDICATION MONITORING: ICD-10-CM

## 2017-10-17 DIAGNOSIS — Z23 ENCOUNTER FOR IMMUNIZATION: ICD-10-CM

## 2017-10-17 DIAGNOSIS — J45.20 MILD INTERMITTENT REACTIVE AIRWAY DISEASE WITHOUT COMPLICATION: ICD-10-CM

## 2017-10-17 PROCEDURE — 83550 IRON BINDING TEST: CPT

## 2017-10-17 PROCEDURE — 82728 ASSAY OF FERRITIN: CPT

## 2017-10-17 PROCEDURE — 36415 COLL VENOUS BLD VENIPUNCTURE: CPT

## 2017-10-17 NOTE — PROGRESS NOTES
Chief Complaint   Patient presents with    Hypertension       CMP 4/12/17  TSH 8/23/17  Lipid 4/12/17  Mammogram 10/31/16. Pt states she is sched for Nov 1, 2017  Bone density 12/13/16  Colonoscopy 2/15/15  Eye exam 3/24/16    Flu shot given per verbal order Dr. Gregory Calzada is a [de-identified] y.o. female who presents for routine immunizations. She denies any symptoms , reactions or allergies that would exclude them from being immunized today. Risks and adverse reactions were discussed and the VIS was given to them. All questions were addressed. She was observed for10 min post injection. There were no reactions observed.     Mireille Vincent LPN

## 2017-10-17 NOTE — MR AVS SNAPSHOT
Visit Information Date & Time Provider Department Dept. Phone Encounter #  
 10/17/2017  2:00 PM Sergio Mendoza MD [facility] [phone] [id number] Follow-up Instructions Return in about 1 week (around 10/24/2017). Upcoming Health Maintenance Date Due  
 GLAUCOMA SCREENING Q2Y 3/24/2018 MEDICARE YEARLY EXAM 4/13/2018 COLONOSCOPY 5/11/2021 DTaP/Tdap/Td series (2 - Td) 4/15/2025 Allergies as of 10/17/2017  Review Complete On: 10/17/2017 By: Sergio Mendoza MD  
  
 Severity Noted Reaction Type Reactions Epinephrine High 04/02/2010    Other (comments) BP drops and pass out Iodinated Contrast- Oral And Iv Dye High 05/21/2010    Shortness of Breath Novocain [Procaine] High 04/02/2010    Palpitations BP drops,passes out Codeine  04/02/2010    Itching Pt does not recall Cymbalta [Duloxetine]  12/15/2014    Other (comments) Head Pain and nausea Daypro [Oxaprozin]  04/02/2010    Unknown (comments) Pt does not recall Prednisone  06/28/2013    Shortness of Breath  
 agitation Sulfa (Sulfonamide Antibiotics)  04/02/2010    Unknown (comments) Pt does not recall Zithromax [Azithromycin]  08/10/2015    Nausea Only Diarrhea, headaches Current Immunizations  Reviewed on 8/29/2017 Name Date Influenza High Dose Vaccine PF 10/17/2017, 10/11/2016, 9/28/2015, 10/27/2014 Influenza Vaccine 10/23/2013 Influenza Vaccine Split 10/9/2012, 10/12/2011, 10/11/2010 Pneumococcal Conjugate (PCV-13) 4/15/2015 Pneumococcal Vaccine (Pcv) 10/23/2007 Tdap 4/15/2015 Not reviewed this visit You Were Diagnosed With   
  
 Codes Comments Essential hypertension    -  Primary ICD-10-CM: I10 
ICD-9-CM: 401.9 Mild intermittent reactive airway disease without complication     SOR-23-DP: J45.20 ICD-9-CM: 493.90 Hypothyroidism due to acquired atrophy of thyroid     ICD-10-CM: E03.4 ICD-9-CM: 244.8, 246.8 Fibromyalgia     ICD-10-CM: M79.7 ICD-9-CM: 729.1 Primary osteoarthritis involving multiple joints     ICD-10-CM: M15.0 ICD-9-CM: 715.09 Encounter for medication monitoring     ICD-10-CM: Z51.81 
ICD-9-CM: V58.83 Iron deficiency anemia, unspecified iron deficiency anemia type     ICD-10-CM: D50.9 ICD-9-CM: 280.9 Encounter for immunization     ICD-10-CM: B14 ICD-9-CM: V03.89 Thrombocytopenia ([facility] [address]     ICD-10-CM: D69.6 ICD-9-CM: 287.5 Vitals BP Pulse Temp Resp Height(growth percentile) Weight(growth percentile) 124/70 78 96.6 °F (35.9 °C) (Oral) 20 5' 2\" (1.575 m) 184 lb (83.5 kg) SpO2 BMI OB Status Smoking Status 96% 33.65 kg/m2 Hysterectomy Never Smoker Vitals History BMI and BSA Data Body Mass Index Body Surface Area  
 33.65 kg/m 2 1.91 m 2 Preferred Pharmacy Pharmacy Name Phone [facility] [address] [phone] Your Updated Medication List  
  
   
This list is accurate as of: 10/17/17  3:42 PM.  Always use your most recent med list.  
  
  
  
  
 ALPRAZolam 0.5 mg tablet Commonly known as:  XANAX  
TAKE 1 TABLET BY MOUTH TWICE DAILY AS NEEDED  
  
 butalbital-acetaminophen-caffeine -40 mg per tablet Commonly known as:  FIORICET, ESGIC  
TAKE 1 TABLET BY MOUTH EVERY 6 HOURS AS NEEDED FOR HEADACHE. MAX DAILY AMT: 4 TABLETS  
  
 calcium carbonate 200 mg calcium (500 mg) Chew Commonly known as:  TUMS Take 1 Tab by mouth as needed. cyclobenzaprine 10 mg tablet Commonly known as:  FLEXERIL  
  
 DOXYCYCLINE HYCLATE PO Take  by mouth. furosemide 20 mg tablet Commonly known as:  LASIX TAKE 1 TABLET BY MOUTH EVERY DAY AS NEEDED FOR SWEELING  
  
 gabapentin 300 mg capsule Commonly known as:  NEURONTIN  
take one tablet in the morning and 2 tablet at night  
  
 levothyroxine 50 mcg tablet Commonly known as:  synthroid Take 1 pill every Monday through Thursday. loratadine 10 mg tablet Commonly known as:  Garlan Baas Take 1 tab at 1am and take 1 tab at 1pm  
  
 nebivolol 5 mg tablet Commonly known as:  BYSTOLIC Take 1 Tab by mouth two (2) times a day. temazepam 15 mg capsule Commonly known as:  RESTORIL  
TAKE 1 TO 2 CAPSULES BY MOUTH EVERY NIGHT AT BEDTIME We Performed the Following AMB POC COMPLETE CBC, AUTOMATED [82684 CPT(R)] FERRITIN [90927 CPT(R)] INFLUENZA VIRUS VACCINE, HIGH DOSE SEASONAL, PRESERVATIVE FREE [63232 CPT(R)] IRON PROFILE [id number] CPT(R)] REFERRAL TO HEMATOLOGY [XFG84 Custom] Follow-up Instructions Return in about 1 week (around 10/24/2017). To-Do List   
 11/01/2017 2:20 PM  
  Appointment with [facility] 1 at [facility] ([phone]) Shower or bathe using soap and water. Do not use deodorant, powder, perfumes, or lotion the day of your exam.  If your prior mammograms were not performed at [facility] 6 please bring films with you or forward prior images 2 days before your procedure. Check in at registration 15min before your appointment time unless you were instructed to do otherwise. A script is not necessary, but if you have one, please bring it on the day of the mammogram or have it faxed to the department. [facility] [phone] [facility] [place]  [phone] [address]  [phone] [facility] [phone] [facility] [address] [facility] Edytaaldina Epley [phone] Referral Information Referral ID Referred By Referred To  
  
 [id number] Maddie Cerrato MD   
   [address] [place], [address] Phone: [phone] Fax: [phone] Visits Status Start Date End Date 1 New Request 10/17/17 10/17/18  If your referral has a status of pending review or denied, additional information will be sent to support the outcome of this decision. Introducing [facility] [place] [facility]! Dear Sonja Campoverde: 
Thank you for requesting a [employer] account. Our records indicate that you already have an active [employer] account. You can access your account anytime at [email or web address]. [employer]/[facility] Did you know that you can access your hospital and ER discharge instructions at any time in [employer]? You can also review all of your test results from your hospital stay or ER visit. Additional Information If you have questions, please visit the Frequently Asked Questions section of the [employer] website at [email or web address] Remember, [employer] is NOT to be used for urgent needs. For medical emergencies, dial 911. Now available from your iPhone and Android! Please provide this summary of care documentation to your next provider. Your primary care clinician is listed as Taina Mercedes. If you have any questions after today's visit, please call [phone].

## 2017-10-18 LAB
FERRITIN SERPL-MCNC: 19 NG/ML (ref 15–150)
IRON SATN MFR SERPL: 9 % (ref 15–55)
IRON SERPL-MCNC: 33 UG/DL (ref 27–139)
TIBC SERPL-MCNC: 366 UG/DL (ref 250–450)
UIBC SERPL-MCNC: 333 UG/DL (ref 118–369)

## 2017-10-24 ENCOUNTER — OFFICE VISIT (OUTPATIENT)
Dept: FAMILY MEDICINE CLINIC | Age: 82
End: 2017-10-24

## 2017-10-24 VITALS
HEIGHT: 63 IN | WEIGHT: 188 LBS | OXYGEN SATURATION: 95 % | DIASTOLIC BLOOD PRESSURE: 77 MMHG | HEART RATE: 76 BPM | RESPIRATION RATE: 22 BRPM | SYSTOLIC BLOOD PRESSURE: 140 MMHG | BODY MASS INDEX: 33.31 KG/M2 | TEMPERATURE: 95.6 F

## 2017-10-24 DIAGNOSIS — D69.6 THROMBOCYTOPENIA (HCC): Primary | ICD-10-CM

## 2017-10-24 NOTE — PROGRESS NOTES
1 week follow up on low platelet count of 17D on last week. Repeat count today is at 23K. Pt will be seen by hematology in the office this afternoon.

## 2017-10-24 NOTE — PROGRESS NOTES
HISTORY OF PRESENT ILLNESS  Liane Zacarias is a [de-identified] y.o. female. HPI   1 week follow up on low platelet count.     {Choose one or more HPI Chronic Disease Notes, press DELETE if none desired:30443}  {Choose one or more SmartLinks; press DELETE if none desired:[id number]}   {Choose one or more Last Lab values; press DELETE if none desired:[id number]}     ROS    Physical Exam    ASSESSMENT and PLAN  {ASSESSMENT/PLAN:39375}

## 2017-11-01 ENCOUNTER — HOSPITAL ENCOUNTER (OUTPATIENT)
Dept: MAMMOGRAPHY | Age: 82
Discharge: HOME | End: 2017-11-01
Attending: FAMILY MEDICINE
Payer: MEDICARE

## 2017-11-01 DIAGNOSIS — Z12.31 VISIT FOR SCREENING MAMMOGRAM: ICD-10-CM

## 2017-11-01 PROCEDURE — 77067 SCR MAMMO BI INCL CAD: CPT

## 2017-11-15 ENCOUNTER — HOSPITAL ENCOUNTER (OUTPATIENT)
Dept: ULTRASOUND IMAGING | Age: 82
Discharge: HOME | End: 2017-11-15
Attending: INTERNAL MEDICINE
Payer: MEDICARE

## 2017-11-15 ENCOUNTER — HOSPITAL ENCOUNTER (OUTPATIENT)
Dept: CT IMAGING | Age: 82
Discharge: HOME | End: 2017-11-15
Attending: INTERNAL MEDICINE
Payer: MEDICARE

## 2017-11-15 VITALS
BODY MASS INDEX: 34.6 KG/M2 | RESPIRATION RATE: 18 BRPM | OXYGEN SATURATION: 98 % | DIASTOLIC BLOOD PRESSURE: 69 MMHG | WEIGHT: 188 LBS | HEIGHT: 62 IN | HEART RATE: 81 BPM | TEMPERATURE: 97.9 F | SYSTOLIC BLOOD PRESSURE: 125 MMHG

## 2017-11-15 DIAGNOSIS — R16.1 SPLENOMEGALY: ICD-10-CM

## 2017-11-15 DIAGNOSIS — D69.6 ACQUIRED THROMBOCYTOPENIA (HCC): ICD-10-CM

## 2017-11-15 DIAGNOSIS — D72.829 LEUCOCYTOSIS: ICD-10-CM

## 2017-11-15 LAB
BASOPHILS # BLD: 0 K/UL (ref 0–0.1)
BASOPHILS NFR BLD: 0 % (ref 0–1)
DIFFERENTIAL METHOD BLD: ABNORMAL
EOSINOPHIL # BLD: 0.1 K/UL (ref 0–0.4)
EOSINOPHIL NFR BLD: 3 % (ref 0–7)
ERYTHROCYTE [DISTWIDTH] IN BLOOD BY AUTOMATED COUNT: 17.8 % (ref 11.5–14.5)
HCT VFR BLD AUTO: 34 % (ref 35–47)
HGB BLD-MCNC: 10.7 G/DL (ref 11.5–16)
LYMPHOCYTES # BLD: 1.1 K/UL (ref 0.8–3.5)
LYMPHOCYTES NFR BLD: 29 % (ref 12–49)
MCH RBC QN AUTO: 25.9 PG (ref 26–34)
MCHC RBC AUTO-ENTMCNC: 31.5 G/DL (ref 30–36.5)
MCV RBC AUTO: 82.3 FL (ref 80–99)
MONOCYTES # BLD: 0.4 K/UL (ref 0–1)
MONOCYTES NFR BLD: 11 % (ref 5–13)
NEUTS SEG # BLD: 2.3 K/UL (ref 1.8–8)
NEUTS SEG NFR BLD: 57 % (ref 32–75)
PLATELET # BLD AUTO: 46 K/UL (ref 150–400)
RBC # BLD AUTO: 4.13 M/UL (ref 3.8–5.2)
RBC MORPH BLD: ABNORMAL
RBC MORPH BLD: ABNORMAL
WBC # BLD AUTO: 3.9 K/UL (ref 3.6–11)

## 2017-11-15 PROCEDURE — 76700 US EXAM ABDOM COMPLETE: CPT

## 2017-11-15 PROCEDURE — 36415 COLL VENOUS BLD VENIPUNCTURE: CPT | Performed by: INTERNAL MEDICINE

## 2017-11-15 PROCEDURE — 88313 SPECIAL STAINS GROUP 2: CPT | Performed by: INTERNAL MEDICINE

## 2017-11-15 PROCEDURE — 88305 TISSUE EXAM BY PATHOLOGIST: CPT | Performed by: INTERNAL MEDICINE

## 2017-11-15 PROCEDURE — 74011250636 HC RX REV CODE- 250/636: Performed by: RADIOLOGY

## 2017-11-15 PROCEDURE — 88237 TISSUE CULTURE BONE MARROW: CPT | Performed by: INTERNAL MEDICINE

## 2017-11-15 PROCEDURE — 85025 COMPLETE CBC W/AUTO DIFF WBC: CPT | Performed by: INTERNAL MEDICINE

## 2017-11-15 PROCEDURE — 88264 CHROMOSOME ANALYSIS 20-25: CPT | Performed by: INTERNAL MEDICINE

## 2017-11-15 PROCEDURE — 88184 FLOWCYTOMETRY/ TC 1 MARKER: CPT | Performed by: INTERNAL MEDICINE

## 2017-11-15 PROCEDURE — 38221 DX BONE MARROW BIOPSIES: CPT

## 2017-11-15 PROCEDURE — 74011000250 HC RX REV CODE- 250: Performed by: RADIOLOGY

## 2017-11-15 PROCEDURE — 88360 TUMOR IMMUNOHISTOCHEM/MANUAL: CPT | Performed by: INTERNAL MEDICINE

## 2017-11-15 PROCEDURE — 88185 FLOWCYTOMETRY/TC ADD-ON: CPT | Performed by: INTERNAL MEDICINE

## 2017-11-15 PROCEDURE — 88311 DECALCIFY TISSUE: CPT | Performed by: INTERNAL MEDICINE

## 2017-11-15 RX ORDER — SODIUM CHLORIDE 9 MG/ML
25 INJECTION, SOLUTION INTRAVENOUS CONTINUOUS
Status: DISCONTINUED | OUTPATIENT
Start: 2017-11-15 | End: 2017-11-15

## 2017-11-15 RX ORDER — LIDOCAINE HYDROCHLORIDE 10 MG/ML
10 INJECTION, SOLUTION EPIDURAL; INFILTRATION; INTRACAUDAL; PERINEURAL
Status: COMPLETED | OUTPATIENT
Start: 2017-11-15 | End: 2017-11-15

## 2017-11-15 RX ORDER — MIDAZOLAM HYDROCHLORIDE 1 MG/ML
5 INJECTION, SOLUTION INTRAMUSCULAR; INTRAVENOUS
Status: DISCONTINUED | OUTPATIENT
Start: 2017-11-15 | End: 2017-11-15

## 2017-11-15 RX ORDER — FENTANYL CITRATE 50 UG/ML
100 INJECTION, SOLUTION INTRAMUSCULAR; INTRAVENOUS
Status: DISCONTINUED | OUTPATIENT
Start: 2017-11-15 | End: 2017-11-15

## 2017-11-15 RX ADMIN — FENTANYL CITRATE 50 MCG: 50 INJECTION, SOLUTION INTRAMUSCULAR; INTRAVENOUS at 11:14

## 2017-11-15 RX ADMIN — MIDAZOLAM HYDROCHLORIDE 2 MG: 1 INJECTION, SOLUTION INTRAMUSCULAR; INTRAVENOUS at 11:11

## 2017-11-15 RX ADMIN — MIDAZOLAM HYDROCHLORIDE 1 MG: 1 INJECTION, SOLUTION INTRAMUSCULAR; INTRAVENOUS at 11:19

## 2017-11-15 RX ADMIN — MIDAZOLAM HYDROCHLORIDE 1 MG: 1 INJECTION, SOLUTION INTRAMUSCULAR; INTRAVENOUS at 11:17

## 2017-11-15 RX ADMIN — MIDAZOLAM HYDROCHLORIDE 1 MG: 1 INJECTION, SOLUTION INTRAMUSCULAR; INTRAVENOUS at 11:14

## 2017-11-15 RX ADMIN — SODIUM CHLORIDE 25 ML/HR: 900 INJECTION, SOLUTION INTRAVENOUS at 11:00

## 2017-11-15 RX ADMIN — FENTANYL CITRATE 50 MCG: 50 INJECTION, SOLUTION INTRAMUSCULAR; INTRAVENOUS at 11:11

## 2017-11-15 RX ADMIN — LIDOCAINE HYDROCHLORIDE 10 ML: 10 INJECTION, SOLUTION EPIDURAL; INFILTRATION; INTRACAUDAL; PERINEURAL at 11:00

## 2017-11-15 NOTE — IP AVS SNAPSHOT
[address] [facility] 
[phone] Patient: Ariela Linares MRN: [id number] JTY:[birth year] My Medications ASK your physician about these medications Instructions Each Dose to Equal  
 Morning Noon Evening Bedtime ALPRAZolam 0.5 mg tablet Commonly known as:  Echo Hidalgo Your last dose was: Your next dose is: TAKE 1 TABLET BY MOUTH TWICE DAILY AS NEEDED  
     
   
   
   
  
 butalbital-acetaminophen-caffeine -40 mg per tablet Commonly known as:  Paulo Chang Your last dose was: Your next dose is: TAKE 1 TABLET BY MOUTH EVERY 6 HOURS AS NEEDED FOR HEADACHE. MAX DAILY AMT: 4 TABLETS  
     
   
   
   
  
 calcium carbonate 200 mg calcium (500 mg) Chew Commonly known as:  TUMS Your last dose was: Your next dose is: Take 1 Tab by mouth as needed. 1 Tab  
    
   
   
   
  
 furosemide 20 mg tablet Commonly known as:  LASIX Your last dose was: Your next dose is: TAKE 1 TABLET BY MOUTH EVERY DAY AS NEEDED FOR SWEELING  
     
   
   
   
  
 gabapentin 300 mg capsule Commonly known as:  NEURONTIN Your last dose was: Your next dose is:    
   
   
 take one tablet in the morning and 2 tablet at night  
     
   
   
   
  
 levothyroxine 50 mcg tablet Commonly known as:  synthroid Your last dose was: Your next dose is: Take 1 pill every Monday through Thursday. loratadine 10 mg tablet Commonly known as:  Leticia Cooper Your last dose was: Your next dose is: Take 1 tab at 1am and take 1 tab at 1pm  
     
   
   
   
  
 nebivolol 5 mg tablet Commonly known as:  BYSTOLIC Your last dose was: Your next dose is: Take 1 Tab by mouth two (2) times a day. 5 mg temazepam 15 mg capsule Commonly known as:  RESTORIL Your last dose was: Your next dose is:  TAKE 1 TO 2 CAPSULES BY MOUTH EVERY NIGHT AT BEDTIME

## 2017-11-15 NOTE — IP AVS SNAPSHOT
[address] [facility] 
[phone] Patient: Brittany Giang MRN: [id number] FFZ:[id number] About your hospitalization You were admitted on:  November 15, 2017 You last received care in the:  [facility] RAD CT You were discharged on:  November 15, 2017 Why you were hospitalized Your primary diagnosis was:  Not on File Discharge Orders None A check navneet indicates which time of day the medication should be taken. My Medications ASK your physician about these medications Instructions Each Dose to Equal  
 Morning Noon Evening Bedtime ALPRAZolam 0.5 mg tablet Commonly known as:  Alan Zeng Your last dose was: Your next dose is: TAKE 1 TABLET BY MOUTH TWICE DAILY AS NEEDED  
     
   
   
   
  
 butalbital-acetaminophen-caffeine -40 mg per tablet Commonly known as:  Sharon Cintron Your last dose was: Your next dose is: TAKE 1 TABLET BY MOUTH EVERY 6 HOURS AS NEEDED FOR HEADACHE. MAX DAILY AMT: 4 TABLETS  
     
   
   
   
  
 calcium carbonate 200 mg calcium (500 mg) Chew Commonly known as:  TUMS Your last dose was: Your next dose is: Take 1 Tab by mouth as needed. 1 Tab  
    
   
   
   
  
 furosemide 20 mg tablet Commonly known as:  LASIX Your last dose was: Your next dose is: TAKE 1 TABLET BY MOUTH EVERY DAY AS NEEDED FOR SWEELING  
     
   
   
   
  
 gabapentin 300 mg capsule Commonly known as:  NEURONTIN Your last dose was: Your next dose is:    
   
   
 take one tablet in the morning and 2 tablet at night  
     
   
   
   
  
 levothyroxine 50 mcg tablet Commonly known as:  synthroid Your last dose was: Your next dose is: Take 1 pill every Monday through Thursday. loratadine 10 mg tablet Commonly known as:  Carissa Draft Your last dose was: Your next dose is: Take 1 tab at 1am and take 1 tab at 1pm  
     
   
   
   
  
 nebivolol 5 mg tablet Commonly known as:  BYSTOLIC Your last dose was: Your next dose is: Take 1 Tab by mouth two (2) times a day. 5 mg  
    
   
   
   
  
 temazepam 15 mg capsule Commonly known as:  RESTORIL Your last dose was: Your next dose is: TAKE 1 TO 2 CAPSULES BY MOUTH EVERY NIGHT AT BEDTIME Discharge Instructions Radhamikhail Guallpa [facility] Special Procedures/Radiology Department Radiologist:  Dr. Brayan Zuniga Date: 11/15/2017 Bone Marrow Biopsy You may have an aching pain in the biopsy site tonight. Take Tylenol, as directed on the label, for pain, or take your prescribed pain medication. Avoid ibuprofen and aspirin for the next 48 hours as these drugs may cause you to bruise or bleed. Watch for signs of infection:  Redness, pain, drainage, fever and chills. If this occurs, call your doctor. Resume your previous diet and follow medication reconciliation form. Rest today. Because you received sedation, do not drive or sign any documents until tomorrow morning. It may take up to 3 days for your test results to become available to your physician. Call your physician if you have not heard anything after 3 business days. If you have any questions or concerns, please call [phone] and ask to speak to the nurse on-call. ACO Transitions of Care Introducing [employer] [address] offers a voluntary care coordination program to provide high quality service and care to [facility] fee-for-service beneficiaries. Jagdish Yates was designed to help you enhance your health and well-being through the following services: ? Transitions of Care  support for individuals who are transitioning from one care setting to another (example: Hospital to home). ? Chronic and Complex Care Coordination  support for individuals and caregivers of those with serious or chronic illnesses or with more than one chronic (ongoing) condition and those who take a number of different medications. If you meet specific medical criteria, a [address] may call you directly to coordinate your care with your primary care physician and your other care providers. For questions about the [facility] programs, please, contact your physicians office. For general questions or additional information about Accountable Care Organizations: 
Please visit www.medicare.gov/acos. html or call 1-800-MEDICARE (3-209.504.8109) TTY users should call 7-367.737.7133. Introducing [facility] & HEALTH SERVICES! Dear Trell James: 
Thank you for requesting a [employer] account. Our records indicate that you already have an active [employer] account. You can access your account anytime at [email or web address] Did you know that you can access your hospital and ER discharge instructions at any time in [employer]? You can also review all of your test results from your hospital stay or ER visit. Additional Information If you have questions, please visit the Frequently Asked Questions section of the [employer] website at [email or web address] Remember, [employer] is NOT to be used for urgent needs. For medical emergencies, dial 911. Now available from your iPhone and Android! Providers Seen During Your Hospitalization Provider Specialty Primary office phone Vandana Gunderson MD Hematology and Oncology [phone] Your Primary Care Physician (PCP) Primary Care Physician Office Phone Office Fax Domenic Holter [phone] You are allergic to the following Allergen Reactions Epinephrine Other (comments) BP drops and pass out Iodinated Contrast- Oral And Iv Dye Shortness of Breath Novocain (Procaine) Palpitations BP drops,passes out Codeine Itching Pt does not recall Cymbalta (Duloxetine) Other (comments) Head Pain and nausea Daypro (Oxaprozin) Unknown (comments) Pt does not recall Prednisone Shortness of Breath  
 agitation Sulfa (Sulfonamide Antibiotics) Unknown (comments) Pt does not recall Tetracycline Other (comments) Low platelet count Zithromax (Azithromycin) Nausea Only Diarrhea, headaches Recent Documentation Height Weight Breastfeeding? BMI OB Status Smoking Status 1.575 m 85.3 kg No 34.39 kg/m2 Hysterectomy Never Smoker Emergency Contacts Name Discharge Info Relation Home Work Mobile [address] CAREGIVER [3] Other Relative [6] [phone] [facility] - [facility]NAL[facility] DISCHARGE CAREGIVER [3] Daughter [21] [phone] Patient Belongings The following personal items are in your possession at time of discharge: 
     Visual Aid: Glasses, With patient Please provide this summary of care documentation to your next provider. Signatures-by signing, you are acknowledging that this After Visit Summary has been reviewed with you and you have received a copy. Patient Signature:  ____________________________________________________________ Date:  ____________________________________________________________  
  
Abi Diana Provider Signature:  ____________________________________________________________ Date:  ____________________________________________________________

## 2017-11-15 NOTE — DISCHARGE INSTRUCTIONS
[facility].  Special Procedures/Radiology Department      Radiologist:  Dr. Troy Israel    Date: 11/15/2017        Bone Marrow Biopsy    You may have an aching pain in the biopsy site tonight. Take Tylenol, as directed on the label, for pain, or take your prescribed pain medication. Avoid ibuprofen and aspirin for the next 48 hours as these drugs may cause you to bruise or bleed. Watch for signs of infection:  Redness, pain, drainage, fever and chills. If this occurs, call your doctor. Resume your previous diet and follow medication reconciliation form. Rest today. Because you received sedation, do not drive or sign any documents until tomorrow morning. It may take up to 3 days for your test results to become available to your physician. Call your physician if you have not heard anything after 3 business days. If you have any questions or concerns, please call [phone] and ask to speak to the nurse on-call.

## 2017-11-30 DIAGNOSIS — G47.00 INSOMNIA, UNSPECIFIED TYPE: ICD-10-CM

## 2017-11-30 DIAGNOSIS — F51.01 PRIMARY INSOMNIA: ICD-10-CM

## 2017-12-01 RX ORDER — TEMAZEPAM 15 MG/1
CAPSULE ORAL
Qty: 60 CAP | Refills: 0 | OUTPATIENT
Start: 2017-12-01

## 2017-12-01 RX ORDER — ALPRAZOLAM 0.5 MG/1
TABLET ORAL
Qty: 180 TAB | Refills: 0 | OUTPATIENT
Start: 2017-12-01

## 2017-12-01 RX ORDER — TEMAZEPAM 15 MG/1
CAPSULE ORAL
Qty: 60 CAP | Refills: 1 | OUTPATIENT
Start: 2017-12-01 | End: 2018-02-06 | Stop reason: SDUPTHER

## 2017-12-01 NOTE — TELEPHONE ENCOUNTER
Spoke with patient and informed that per Dr. Shah and [facility] [place] law she could no longer give her prescriptions for Xanax and Restoril because they are in the same family. Patient stated she would like the refill on Restoril.

## 2017-12-13 ENCOUNTER — TELEPHONE (OUTPATIENT)
Dept: FAMILY MEDICINE CLINIC | Age: 82
End: 2017-12-13

## 2017-12-13 NOTE — TELEPHONE ENCOUNTER
Spoke with patient and states she was told that Dr. Shah would not prescribe Xanax and Restoril and she had to chose which medication she rather have. Patient stated she had chosen the sleeping pill but now would rather get the Xanax because she has been feeling anxious with her blood counts being down and her platelets. Informed patient will check with Dr. Shah,. Patient verbalized understanding.

## 2018-02-06 DIAGNOSIS — F51.01 PRIMARY INSOMNIA: ICD-10-CM

## 2018-02-06 RX ORDER — TEMAZEPAM 15 MG/1
CAPSULE ORAL
Qty: 60 CAP | Refills: 1 | OUTPATIENT
Start: 2018-02-06 | End: 2018-04-05 | Stop reason: SDUPTHER

## 2018-04-05 ENCOUNTER — TELEPHONE (OUTPATIENT)
Dept: FAMILY MEDICINE CLINIC | Age: 83
End: 2018-04-05

## 2018-04-05 DIAGNOSIS — F51.01 PRIMARY INSOMNIA: ICD-10-CM

## 2018-04-05 RX ORDER — TEMAZEPAM 15 MG/1
CAPSULE ORAL
Qty: 60 CAP | Refills: 1 | Status: SHIPPED | OUTPATIENT
Start: 2018-04-05 | End: 2018-06-06 | Stop reason: SDUPTHER

## 2018-04-08 DIAGNOSIS — Z86.69 HISTORY OF MIGRAINE: ICD-10-CM

## 2018-04-09 ENCOUNTER — TELEPHONE (OUTPATIENT)
Dept: CARDIOLOGY CLINIC | Age: 83
End: 2018-04-09

## 2018-04-09 RX ORDER — BUTALBITAL, ACETAMINOPHEN AND CAFFEINE 50; 325; 40 MG/1; MG/1; MG/1
TABLET ORAL
Qty: 30 TAB | Refills: 0 | Status: SHIPPED | OUTPATIENT
Start: 2018-04-09 | End: 2018-06-06 | Stop reason: SDUPTHER

## 2018-04-09 NOTE — TELEPHONE ENCOUNTER
Pt. Called. Verified patient with two patient identifiers. States her BP has been elevated for a week, and has had headaches for a week. Was advised 9-26-17 per Dr. Jatin Guallpa, call PCP who follows BP. Advised again today to call PCP for advice and treatment. Patient verbalized understanding.

## 2018-04-13 ENCOUNTER — OFFICE VISIT (OUTPATIENT)
Dept: FAMILY MEDICINE CLINIC | Age: 83
End: 2018-04-13

## 2018-04-13 VITALS
TEMPERATURE: 97.3 F | OXYGEN SATURATION: 99 % | WEIGHT: 176.6 LBS | SYSTOLIC BLOOD PRESSURE: 142 MMHG | RESPIRATION RATE: 20 BRPM | DIASTOLIC BLOOD PRESSURE: 79 MMHG | BODY MASS INDEX: 32.5 KG/M2 | HEART RATE: 78 BPM | HEIGHT: 62 IN

## 2018-04-13 DIAGNOSIS — G47.00 INSOMNIA, UNSPECIFIED TYPE: ICD-10-CM

## 2018-04-13 DIAGNOSIS — E03.4 HYPOTHYROIDISM DUE TO ACQUIRED ATROPHY OF THYROID: ICD-10-CM

## 2018-04-13 DIAGNOSIS — L81.9 DISCOLORATION OF SKIN OF FACE: ICD-10-CM

## 2018-04-13 DIAGNOSIS — I10 ESSENTIAL HYPERTENSION: Primary | ICD-10-CM

## 2018-04-13 DIAGNOSIS — F41.9 ANXIETY: ICD-10-CM

## 2018-04-13 DIAGNOSIS — Z51.81 ENCOUNTER FOR MEDICATION MONITORING: ICD-10-CM

## 2018-04-13 RX ORDER — LEVOTHYROXINE SODIUM 50 UG/1
TABLET ORAL
COMMUNITY
End: 2018-04-13

## 2018-04-13 RX ORDER — ALPRAZOLAM 0.5 MG/1
0.5 TABLET ORAL DAILY
Qty: 30 TAB | Refills: 0 | Status: SHIPPED | OUTPATIENT
Start: 2018-04-13 | End: 2018-08-15 | Stop reason: SDUPTHER

## 2018-04-13 RX ORDER — LEVOTHYROXINE SODIUM 50 UG/1
TABLET ORAL
COMMUNITY
End: 2019-08-20

## 2018-04-13 RX ORDER — GABAPENTIN 300 MG/1
CAPSULE ORAL
Qty: 360 CAP | Refills: 0 | Status: SHIPPED | OUTPATIENT
Start: 2018-04-13 | End: 2018-04-13

## 2018-04-13 RX ORDER — NEBIVOLOL 5 MG/1
10 TABLET ORAL DAILY
Qty: 60 TAB | Refills: 6
Start: 2018-04-13 | End: 2020-11-18 | Stop reason: SDUPTHER

## 2018-04-13 RX ORDER — FUROSEMIDE 20 MG/1
20 TABLET ORAL DAILY
Qty: 30 TAB | Refills: 6 | Status: SHIPPED | OUTPATIENT
Start: 2018-04-13 | End: 2018-04-13 | Stop reason: SDUPTHER

## 2018-04-13 RX ORDER — LEVOTHYROXINE SODIUM 50 UG/1
TABLET ORAL
Qty: 90 TAB | Refills: 0 | Status: SHIPPED | OUTPATIENT
Start: 2018-04-13 | End: 2018-04-13

## 2018-04-13 NOTE — MR AVS SNAPSHOT
[address] [facility] [address] 
 
 
 [address] [facility] [address] [address] 
[phone] Patient: Taya Jacome MRN: [id number] ZEQ:4/5/[birth year]4 Visit Information Date & Time Provider Department Dept. Phone Encounter #  
 4/13/2018 11:45 AM Paul Swanson MD [facility] [phone] [id number] Follow-up Instructions Return in about 2 weeks (around 4/27/2018). Follow-up and Disposition History Your Appointments 4/27/2018  3:30 PM  
ROUTINE CARE with Paul Swanson MD  
[facility]) Appt Note: f/u  
 [address] [facility] [address] [address]  
[phone] [address] [facility] [address] Upcoming Health Maintenance Date Due Pneumococcal 65+ High/Highest Risk (2 of 2 - PPSV23) 6/10/2015 GLAUCOMA SCREENING Q2Y 3/24/2018 MEDICARE YEARLY EXAM 4/13/2018 COLONOSCOPY 5/11/2021 DTaP/Tdap/Td series (2 - Td) 4/15/2025 Allergies as of 4/13/2018  Review Complete On: 4/13/2018 By: Paul Swanson MD  
  
 Severity Noted Reaction Type Reactions Epinephrine High 04/02/2010    Other (comments) BP drops and pass out Iodinated Contrast- Oral And Iv Dye High 05/21/2010    Shortness of Breath Novocain [Procaine] High 04/02/2010    Palpitations BP drops,passes out Codeine  04/02/2010    Itching Pt does not recall Cymbalta [Duloxetine]  12/15/2014    Other (comments) Head Pain and nausea Daypro [Oxaprozin]  04/02/2010    Unknown (comments) Pt does not recall Prednisone  06/28/2013    Shortness of Breath  
 agitation Sulfa (Sulfonamide Antibiotics)  04/02/2010    Unknown (comments) Pt does not recall Tetracycline  10/17/2017    Other (comments) Low platelet count Zithromax [Azithromycin]  08/10/2015    Nausea Only Diarrhea, headaches Current Immunizations  Reviewed on 4/13/2018 Name Date Influenza High Dose Vaccine PF 10/17/2017, 10/11/2016, 9/28/2015, 10/27/2014 Influenza Vaccine 10/23/2013 Influenza Vaccine Split 10/9/2012, 10/12/2011, 10/11/2010 Pneumococcal Conjugate (PCV-13) 4/15/2015 Pneumococcal Vaccine (Pcv) 10/23/2007 Tdap 4/15/2015 Reviewed by William Corrales MD on 4/13/2018 at  1:01 PM  
You Were Diagnosed With   
  
 Codes Comments Essential hypertension    -  Primary ICD-10-CM: I10 
ICD-9-CM: 401.9 Hypothyroidism due to acquired atrophy of thyroid     ICD-10-CM: E03.4 ICD-9-CM: 244.8, 246.8 Insomnia, unspecified type     ICD-10-CM: G47.00 ICD-9-CM: 780.52 Anxiety     ICD-10-CM: F41.9 ICD-9-CM: 300.00 Encounter for medication monitoring     ICD-10-CM: Z51.81 
ICD-9-CM: V58.83 Discoloration of skin of face     ICD-10-CM: L81.9 ICD-9-CM: 709.00 Vitals BP Pulse Temp Resp Height(growth percentile) Weight(growth percentile) 142/79 (BP 1 Location: Left arm, BP Patient Position: Sitting) 78 97.3 °F (36.3 °C) (Oral) 20 5' 2\" (1.575 m) 176 lb 9.6 oz (80.1 kg) SpO2 BMI OB Status Smoking Status 99% 32.3 kg/m2 Hysterectomy Never Smoker BMI and BSA Data Body Mass Index Body Surface Area  
 32.3 kg/m 2 1.87 m 2 Preferred Pharmacy Pharmacy Name Phone [facility] [address] [place] [address], [facility] [address] [phone] Your Updated Medication List  
  
   
This list is accurate as of 4/13/18  1:53 PM.  Always use your most recent med list.  
  
  
  
  
 ALPRAZolam 0.5 mg tablet Commonly known as:  Rosalene Abraham Take 1 Tab by mouth daily. Max Daily Amount: 0.5 mg.  
  
 butalbital-acetaminophen-caffeine -40 mg per tablet Commonly known as:  FIORICET, ESGIC  
TAKE 1 TABLET BY MOUTH EVERY 6 HOURS AS NEEDED FOR HEADACHE. NO MORE THAN 4 TABLETS PER DAY  
  
 CORICIDIN HBP COUGH AND COLD 4-30 mg Tab Generic drug:  chlorpheniramine-dm Take  by mouth. Take 1 tablet 2 times a day  
  
 furosemide 20 mg tablet Commonly known as:  LASIX Take 1 Tab by mouth daily. levothyroxine 50 mcg tablet Commonly known as:  SYNTHROID Take 1 tablet Monday, Tuesday and Wednesday  
  
 nebivolol 5 mg tablet Commonly known as:  BYSTOLIC Take 2 Tabs by mouth daily. temazepam 15 mg capsule Commonly known as:  RESTORIL  
TAKE 1 TO 2 CAPSULES BY MOUTH AT BEDTIME Prescriptions Printed Refills ALPRAZolam (XANAX) 0.5 mg tablet 0 Sig: Take 1 Tab by mouth daily. Max Daily Amount: 0.5 mg.  
 Class: Print Route: Oral  
  
Prescriptions Sent to Pharmacy Refills  
 furosemide (LASIX) 20 mg tablet 6 Sig: Take 1 Tab by mouth daily. Class: Normal  
 Pharmacy: [facility] [address] [place] [address] #: [phone] Route: Oral  
  
We Performed the Following REFERRAL TO DERMATOLOGY [REF19 Custom] Follow-up Instructions Return in about 2 weeks (around 4/27/2018). Referral Information Referral ID Referred By Referred To  
  
 [id number] BIN QUILES MD   
   [place] [address] [address] [place] [address] Phone: [phone] Fax: [phone] Visits Status Start Date End Date 1 New Request 4/13/18 4/13/19 If your referral has a status of pending review or denied, additional information will be sent to support the outcome of this decision. Introducing [facility] & HEALTH SERVICES! Dear Duc Oconnor: 
Thank you for requesting a Fire[employer] account. Our records indicate that you already have an active [employer] account. You can access your account anytime at [email or web address] Did you know that you can access your hospital and ER discharge instructions at any time in Fire[employer]?   You can also review all of your test results from your hospital stay or ER visit. Additional Information If you have questions, please visit the Frequently Asked Questions section of the [employer] website at [email or web address] Remember, [employer] is NOT to be used for urgent needs. For medical emergencies, dial 911. Now available from your iPhone and Android! Please provide this summary of care documentation to your next provider. Your primary care clinician is listed as Radha Rose. If you have any questions after today's visit, please call [phone].

## 2018-04-13 NOTE — PROGRESS NOTES
Chief Complaint   Patient presents with    Hypertension     follow up     Mammogram 1/01/2017     Bone Density 12/13/2016    Coloscopy 05/1/2016 Dr. Martinez Stain repeat in 5 years    Patient stated next eye exam appointment May. 1. Have you been to the ER, urgent care clinic since your last visit? NO   Hospitalized since your last visit? No  2. Have you seen or consulted any other health care providers outside of the [address] [place] [address] since your last visit? NO    Patient denies pain at this time.

## 2018-04-13 NOTE — PROGRESS NOTES
HISTORY OF PRESENT ILLNESS  Chick Bishnu is a [de-identified] y.o. female. HPI   Follow up on BP. 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 BPs have been elevated when going for for her week blood check. C/o increase anxiety that comes and goes. Feels nervous about her blood counts going up and own  Needs something to take for when her anxiety level is high. Also c/o discoloration of the forehead over the past few month. Looks like a bruise but she has not hit her head. Hematologist did not think it was from her low Plt     Cardiovascular Review:  The patient has hypertension, hyperlipidemia, and atrial tachycardia. Has been monitoring BPs which has been elevated over the past several weeks. Diet and Lifestyle: generally follows a low fat low cholesterol diet, generally follows a low sodium diet, exercises sporadically  Home BP Monitoring: 160s/80-90s  Pertinent ROS: taking medications as instructed, no medication side effects noted, no TIA's, no chest pain on exertion, no dyspnea on exertion, no swelling of ankles, no palpitations. Thyroid Review:  Patient is seen for followup of hypothyroidism. Followed by endo. Had recent labs on last month and no changes were made to her medication. Thyroid ROS: denies fatigue, weight changes, heat/cold intolerance, bowel/skin changes or CVS symptoms.     Patient Active Problem List   Diagnosis Code    Hypothyroidism E03.9    HTN (hypertension) I10    OA (osteoarthritis) M19.90    GERD (gastroesophageal reflux disease) K21.9    Fibromyalgia M79.7    Insomnia G47.00    Headache(784.0) R51    Iron deficiency anemia, unspecified D50.9    CARVAJAL (dyspnea on exertion) R06.09    Heart palpitations R00.2    Paroxysmal Inappropriate sinus tachycardia vs possible atrial tachycardia R00.0    Edema leg R60.0    Reactive airway disease without complication T01.926    Incidental lung nodule, greater than or equal to 8mm R91.1    Encounter for medication monitoring Z51.81  Preop cardiovascular exam Z01.810    Pelvic lymphadenopathy R59.0       Current Outpatient Prescriptions   Medication Sig Dispense Refill    butalbital-acetaminophen-caffeine (FIORICET, ESGIC) -40 mg per tablet TAKE 1 TABLET BY MOUTH EVERY 6 HOURS AS NEEDED FOR HEADACHE. NO MORE THAN 4 TABLETS PER DAY 30 Tab 0    temazepam (RESTORIL) 15 mg capsule TAKE 1 TO 2 CAPSULES BY MOUTH AT BEDTIME 60 Cap 1    BYSTOLIC 5 mg tablet TAKE 1 TABLET BY MOUTH TWICE DAILY. HOLD DOSE IF SYSTOLIC BLOOD PRESSURE LESS THAN 100 OR PULSE LESS THAN 50. 60 Tab 6    gabapentin (NEURONTIN) 300 mg capsule take one tablet in the morning and 2 tablet at night  3    levothyroxine (SYNTHROID) 50 mcg tablet Take 1 pill every Monday through Thursday. 90 Tab 3    calcium carbonate (TUMS) 200 mg calcium (500 mg) chew Take 1 Tab by mouth as needed.       loratadine (CLARITIN) 10 mg tablet Take 1 tab at 1am and take 1 tab at 1pm      furosemide (LASIX) 20 mg tablet TAKE 1 TABLET BY MOUTH EVERY DAY AS NEEDED FOR SWEELING 90 Tab 0       Allergies   Allergen Reactions    Epinephrine Other (comments)     BP drops and pass out    Iodinated Contrast- Oral And Iv Dye Shortness of Breath    Novocain [Procaine] Palpitations     BP drops,passes out    Codeine Itching     Pt does not recall    Cymbalta [Duloxetine] Other (comments)     Head Pain and nausea    Daypro [Oxaprozin] Unknown (comments)     Pt does not recall    Prednisone Shortness of Breath     agitation    Sulfa (Sulfonamide Antibiotics) Unknown (comments)     Pt does not recall    Tetracycline Other (comments)     Low platelet count    Zithromax [Azithromycin] Nausea Only     Diarrhea, headaches         Past Medical History:   Diagnosis Date    Arrhythmia     tachycardia; Dr. Rob Zaidi Autoimmune disease ([facility] [address]     fibromyalgia    Fibromyalgia     GERD (gastroesophageal reflux disease)     Headache(784.0)     migraine    Heart failure (HCC)     HTN (hypertension) 4/2/2010    Hypothyroidism 4/2/2010    OA (osteoarthritis) 4/2/2010    Seizures ([address]     56 years ago with childbirth    Tachycardia 2013    Thromboembolus ([address]     blood clot on brain 56 years ago w childbirth    Thyroid disease          Past Surgical History:   Procedure Laterality Date    [facility]  2/5/2015         ENDOSCOPY, COLON, DIAGNOSTIC  12/2010    Dr. Evagnelina Villarreal- repeat 5 yrs    HX BREAST BIOPSY Right 1999    more than one yrs ago all benign    HX CARPAL TUNNEL RELEASE  9/28/2012    HX CATARACT REMOVAL  9/2014    bilateral     HX HYSTERECTOMY  1977         Family History   Problem Relation Age of Onset    Heart Disease Paternal Aunt     Heart Disease Paternal Uncle     Colon Cancer Maternal Grandmother     No Known Problems Mother     No Known Problems Father     Other Daughter      fibromyalgia    Heart Disease Maternal Grandfather     Hypertension Sister        Social History   Substance Use Topics    Smoking status: Never Smoker    Smokeless tobacco: Never Used    Alcohol use No          Lab Results   Component Value Date/Time    WBC 3.9 11/15/2017 10:15 AM    Hemoglobin (POC) 11.6 07/14/2017 07:13 AM    HGB 10.7 (L) 11/15/2017 10:15 AM    Hematocrit (POC) 34 (L) 07/14/2017 07:13 AM    HCT 34.0 (L) 11/15/2017 10:15 AM    PLATELET 46 (LL) 03/60/2609 10:15 AM    MCV 82.3 11/15/2017 10:15 AM       Lab Results   Component Value Date/Time    Cholesterol, total 203 (H) 04/12/2017 03:59 PM    HDL Cholesterol 60 04/12/2017 03:59 PM    LDL, calculated 101 (H) 04/12/2017 03:59 PM    Triglyceride 212 (H) 04/12/2017 03:59 PM    CHOL/HDL Ratio 3.7 01/27/2010 05:43 PM       Lab Results   Component Value Date/Time    TSH 0.459 04/12/2017 03:59 PM    T4, Free 1.18 10/11/2016 03:41 PM      Lab Results   Component Value Date/Time    Sodium 136 04/12/2017 03:59 PM    Potassium 4.3 04/12/2017 03:59 PM    Chloride 95 (L) 04/12/2017 03:59 PM    CO2 23 04/12/2017 03:59 PM Anion gap 12 05/03/2016 01:05 PM    Glucose 99 04/12/2017 03:59 PM    BUN 16 04/12/2017 03:59 PM    Creatinine 0.91 04/12/2017 03:59 PM    BUN/Creatinine ratio 18 04/12/2017 03:59 PM    GFR est AA 69 04/12/2017 03:59 PM    GFR est non-AA 60 04/12/2017 03:59 PM    Calcium 10.0 04/12/2017 03:59 PM    Bilirubin, total 0.3 04/12/2017 03:59 PM    ALT (SGPT) 17 04/12/2017 03:59 PM    AST (SGOT) 24 04/12/2017 03:59 PM    Alk. phosphatase 103 04/12/2017 03:59 PM    Protein, total 7.1 04/12/2017 03:59 PM    Albumin 4.5 04/12/2017 03:59 PM    Globulin 3.2 05/03/2016 01:05 PM    A-G Ratio 1.7 04/12/2017 03:59 PM      Lab Results   Component Value Date/Time    Hemoglobin A1c 5.9 (H) 08/28/2012 04:22 PM    Hemoglobin A1c (POC) 5.7 11/10/2015 03:16 PM    Hemoglobin A1c, External 11.1 07/22/2015         Review of Systems   Constitutional: Negative for malaise/fatigue. HENT: Negative for congestion. Eyes: Negative for blurred vision. Respiratory: Negative for cough and shortness of breath. Cardiovascular: Negative for chest pain, palpitations and leg swelling. Gastrointestinal: Negative for abdominal pain, constipation and heartburn. Genitourinary: Negative for dysuria, frequency and urgency. Neurological: Negative for dizziness, tingling and headaches. Endo/Heme/Allergies: Negative for environmental allergies. Psychiatric/Behavioral: Negative for depression. The patient does not have insomnia. Physical Exam   Constitutional: She appears well-developed and well-nourished. /79 (BP 1 Location: Left arm, BP Patient Position: Sitting)  Pulse 78  Temp 97.3 °F (36.3 °C) (Oral)   Resp 20  Ht 5' 2\" (1.575 m)  Wt 176 lb 9.6 oz (80.1 kg)  SpO2 99%  BMI 32.3 kg/m2     HENT:   Right Ear: Tympanic membrane and ear canal normal.   Left Ear: Tympanic membrane and ear canal normal.   Nose: No mucosal edema or rhinorrhea.    Mouth/Throat: Oropharynx is clear and moist and mucous membranes are normal.   Neck: Normal range of motion. Neck supple. No thyromegaly present. Cardiovascular: Normal rate and regular rhythm. No murmur heard. Pulmonary/Chest: Effort normal and breath sounds normal.   Abdominal: Soft. Bowel sounds are normal. There is no tenderness. Musculoskeletal: Normal range of motion. She exhibits no edema. Lymphadenopathy:     She has no cervical adenopathy. Neurological: She has normal strength. No sensory deficit. Coordination and gait normal.   Skin: Skin is warm and dry. Psychiatric: She has a normal mood and affect. Nursing note and vitals reviewed. ASSESSMENT and PLAN  Diagnoses and all orders for this visit:    1. Essential hypertension  -    Add back  furosemide (LASIX) 20 mg tablet; Take 1 Tab by mouth daily. -     Change nebivolol (BYSTOLIC) 5 mg tablet; Take 2 Tabs by mouth daily. Discussed sodium restriction, high k rich diet, maintaining ideal body weight and regular exercise program such as daily walking 30 min perday 4-5 times per week, as physiologic means to achieve blood pressure control.  Medication compliance advised. 2. Hypothyroidism due to acquired atrophy of thyroid  Stable    as per endo    3. Anxiety  -     ALPRAZolam (XANAX) 0.5 mg tablet; Take 1 Tab by mouth daily. Max Daily Amount: 0.5 mg.    4. ITP  As per specialist    5. Encounter for medication monitoring  Check blood work next visit with starting on fluid pill. 6. Discoloration of Skin  Refer to derm. Follow-up Disposition:  Return in about 2 weeks (around 4/27/2018). reviewed diet, exercise and weight control  cardiovascular risk and specific lipid/LDL goals reviewed  reviewed medications and side effects in detail    I have discussed diagnosis listed in this note with pt and/or family. I have discussed treatment plans and options and the risk/benefit analysis of those options, including safe use of medications and possible medication side effects.    Through the use of shared decision making we have agreed to the above plan. The patient has received an after-visit summary and questions were answered concerning future plans and follow up. Advise pt of any urgent changes then to proceed to the ER.

## 2018-04-30 ENCOUNTER — OFFICE VISIT (OUTPATIENT)
Dept: FAMILY MEDICINE CLINIC | Age: 83
End: 2018-04-30

## 2018-04-30 VITALS
HEART RATE: 89 BPM | DIASTOLIC BLOOD PRESSURE: 73 MMHG | WEIGHT: 175.2 LBS | BODY MASS INDEX: 32.24 KG/M2 | SYSTOLIC BLOOD PRESSURE: 137 MMHG | OXYGEN SATURATION: 96 % | HEIGHT: 62 IN | RESPIRATION RATE: 16 BRPM | TEMPERATURE: 96.4 F

## 2018-04-30 DIAGNOSIS — I10 ESSENTIAL HYPERTENSION: Primary | ICD-10-CM

## 2018-04-30 DIAGNOSIS — Z51.81 ENCOUNTER FOR MEDICATION MONITORING: ICD-10-CM

## 2018-04-30 RX ORDER — BENZONATATE 200 MG/1
200 CAPSULE ORAL
Qty: 30 CAP | Refills: 1 | Status: SHIPPED | OUTPATIENT
Start: 2018-04-30 | End: 2019-06-16 | Stop reason: SDUPTHER

## 2018-04-30 RX ORDER — ALPRAZOLAM 0.5 MG/1
0.5 TABLET ORAL
COMMUNITY
End: 2018-08-15 | Stop reason: SDUPTHER

## 2018-04-30 NOTE — MR AVS SNAPSHOT
[address] [facility] [address] 
 
 
 [address] [facility] [place] [address] [address] 
[phone] Patient: Brando Kline MRN: [id number] PJQ:[id number] Visit Information Date & Time Provider Department Dept. Phone Encounter #  
 4/30/2018  3:30 PM Derrick Adames MD [facility] [phone] [id number] Follow-up Instructions Return in about 2 months (around 6/30/2018). Your Appointments 8/15/2018  2:15 PM  
Complete Physical with Derrick Adames MD  
[facility] [address] [facility]) Appt Note: CPE  
 [address] [facility] [place] [address] [address]  
[phone] [address] [facility] [address] Upcoming Health Maintenance Date Due  
 GLAUCOMA SCREENING Q2Y 3/24/2018 MEDICARE YEARLY EXAM 4/13/2018 Pneumococcal 65+ High/Highest Risk (2 of 2 - PPSV23) 5/30/2018* Influenza Age 5 to Adult 8/1/2018 COLONOSCOPY 5/11/2021 DTaP/Tdap/Td series (2 - Td) 4/15/2025 *Topic was postponed. The date shown is not the original due date. Allergies as of 4/30/2018  Review Complete On: 4/30/2018 By: Rashard Blanchard LPN Severity Noted Reaction Type Reactions Epinephrine High 04/02/2010    Other (comments) BP drops and pass out Iodinated Contrast- Oral And Iv Dye High 05/21/2010    Shortness of Breath Novocain [Procaine] High 04/02/2010    Palpitations BP drops,passes out Codeine  04/02/2010    Itching Pt does not recall Cymbalta [Duloxetine]  12/15/2014    Other (comments) Head Pain and nausea Daypro [Oxaprozin]  04/02/2010    Unknown (comments) Pt does not recall Prednisone  06/28/2013    Shortness of Breath  
 agitation Sulfa (Sulfonamide Antibiotics)  04/02/2010    Unknown (comments) Pt does not recall Tetracycline  10/17/2017    Other (comments) Low platelet count Zithromax [Azithromycin]  08/10/2015    Nausea Only Diarrhea, headaches Current Immunizations  Reviewed on 4/13/2018 Name Date Influenza High Dose Vaccine PF 10/17/2017, 10/11/2016, 9/28/2015, 10/27/2014 Influenza Vaccine 10/23/2013 Influenza Vaccine Split 10/9/2012, 10/12/2011, 10/11/2010 Pneumococcal Conjugate (PCV-13) 4/15/2015 Pneumococcal Vaccine (Pcv) 10/23/2007 Tdap 4/15/2015 Not reviewed this visit Vitals BP Pulse Temp Resp Height(growth percentile) Weight(growth percentile)  
 137/73 (BP 1 Location: Right arm, BP Patient Position: Sitting) 89 96.4 °F (35.8 °C) (Oral) 16 5' 2\" (1.575 m) 175 lb 3.2 oz (79.5 kg) SpO2 BMI OB Status Smoking Status 96% 32.04 kg/m2 Hysterectomy Never Smoker BMI and BSA Data Body Mass Index Body Surface Area 32.04 kg/m 2 1.86 m 2 Preferred Pharmacy Pharmacy Name Phone [facility] [address] [phone] Your Updated Medication List  
  
   
This list is accurate as of 4/30/18  5:44 PM.  Always use your most recent med list.  
  
  
  
  
 * XANAX 0.5 mg tablet Generic drug:  ALPRAZolam  
Take 0.5 mg by mouth daily as needed. * ALPRAZolam 0.5 mg tablet Commonly known as:  Peola Tito Take 1 Tab by mouth daily. Max Daily Amount: 0.5 mg.  
  
 benzonatate 200 mg capsule Commonly known as:  TESSALON Take 1 Cap by mouth three (3) times daily as needed for Cough. butalbital-acetaminophen-caffeine -40 mg per tablet Commonly known as:  FIORICET, ESGIC  
TAKE 1 TABLET BY MOUTH EVERY 6 HOURS AS NEEDED FOR HEADACHE. NO MORE THAN 4 TABLETS PER DAY  
  
 CORICIDIN HBP COUGH AND COLD 4-30 mg Tab Generic drug:  chlorpheniramine-dm Take 1 tablet 2 times a day  
  
 furosemide 20 mg tablet Commonly known as:  LASIX TAKE 1 TABLET BY MOUTH DAILY  
  
 levothyroxine 50 mcg tablet Commonly known as:  SYNTHROID  
 Take 1 tablet Monday, Tuesday and Wednesday  
  
 nebivolol 5 mg tablet Commonly known as:  BYSTOLIC Take 2 Tabs by mouth daily. temazepam 15 mg capsule Commonly known as:  RESTORIL  
TAKE 1 TO 2 CAPSULES BY MOUTH AT BEDTIME  
  
 * Notice: This list has 2 medication(s) that are the same as other medications prescribed for you. Read the directions carefully, and ask your doctor or other care provider to review them with you. Prescriptions Sent to Pharmacy Refills  
 benzonatate (TESSALON) 200 mg capsule 1 Sig: Take 1 Cap by mouth three (3) times daily as needed for Cough. Class: Normal  
 Pharmacy: [facility] [address] #: [phone] Route: Oral  
  
Follow-up Instructions Return in about 2 months (around 6/30/2018). Introducing [facility]! Dear Shreyas James: 
Thank you for requesting a Physician Referral Network (PRN) account. Our records indicate that you already have an active Physician Referral Network (PRN) account. You can access your account anytime at [email or web address] Did you know that you can access your hospital and ER discharge instructions at any time in Physician Referral Network (PRN)? You can also review all of your test results from your hospital stay or ER visit. Additional Information If you have questions, please visit the Frequently Asked Questions section of the Physician Referral Network (PRN) website at [email or web address] Remember, Physician Referral Network (PRN) is NOT to be used for urgent needs. For medical emergencies, dial 911. Now available from your iPhone and Android! Please provide this summary of care documentation to your next provider. Your primary care clinician is listed as Radha Rose. If you have any questions after today's visit, please call [phone].

## 2018-04-30 NOTE — PROGRESS NOTES
HISTORY OF PRESENT ILLNESS  Royal Wise is a [de-identified] y.o. female. HPI   1 month follow up on BP. Has been monitoring BP at home and has been in the 120s/70-80. Cardiovascular Review:  The patient has hypertension, hyperlipidemia, and atrial tachycardia. Has been monitoring BPs which has been elevated over the past several weeks. Diet and Lifestyle: generally follows a low fat low cholesterol diet, generally follows a low sodium diet, exercises sporadically  Pertinent ROS: taking medications as instructed, no medication side effects noted, no TIA's, no chest pain on exertion, no dyspnea on exertion, no swelling of ankles, no palpitations. Patient Active Problem List   Diagnosis Code    Hypothyroidism E03.9    HTN (hypertension) I10    OA (osteoarthritis) M19.90    GERD (gastroesophageal reflux disease) K21.9    Fibromyalgia M79.7    Insomnia G47.00    Headache(784.0) R51    Iron deficiency anemia, unspecified D50.9    CARVAJAL (dyspnea on exertion) R06.09    Heart palpitations R00.2    Paroxysmal Inappropriate sinus tachycardia vs possible atrial tachycardia R00.0    Edema leg R60.0    Reactive airway disease without complication F94.347    Incidental lung nodule, greater than or equal to 8mm R91.1    Encounter for medication monitoring Z51.81    Preop cardiovascular exam Z01.810    Pelvic lymphadenopathy R59.0       Current Outpatient Prescriptions   Medication Sig Dispense Refill    ALPRAZolam (XANAX) 0.5 mg tablet Take 0.5 mg by mouth daily as needed.  benzonatate (TESSALON) 200 mg capsule Take 1 Cap by mouth three (3) times daily as needed for Cough. 30 Cap 1    levothyroxine (SYNTHROID) 50 mcg tablet Take 1 tablet Monday, Tuesday and Wednesday       chlorpheniramine-dm (CORICIDIN HBP COUGH AND COLD) 4-30 mg tab Take 1 tablet 2 times a day       nebivolol (BYSTOLIC) 5 mg tablet Take 2 Tabs by mouth daily.  60 Tab 6    furosemide (LASIX) 20 mg tablet TAKE 1 TABLET BY MOUTH DAILY 90 Tab 1    butalbital-acetaminophen-caffeine (FIORICET, ESGIC) -40 mg per tablet TAKE 1 TABLET BY MOUTH EVERY 6 HOURS AS NEEDED FOR HEADACHE. NO MORE THAN 4 TABLETS PER DAY 30 Tab 0    temazepam (RESTORIL) 15 mg capsule TAKE 1 TO 2 CAPSULES BY MOUTH AT BEDTIME 60 Cap 1    ALPRAZolam (XANAX) 0.5 mg tablet Take 1 Tab by mouth daily.  Max Daily Amount: 0.5 mg. 30 Tab 0       Allergies   Allergen Reactions    Epinephrine Other (comments)     BP drops and pass out    Iodinated Contrast- Oral And Iv Dye Shortness of Breath    Novocain [Procaine] Palpitations     BP drops,passes out    Codeine Itching     Pt does not recall    Cymbalta [Duloxetine] Other (comments)     Head Pain and nausea    Daypro [Oxaprozin] Unknown (comments)     Pt does not recall    Prednisone Shortness of Breath     agitation    Sulfa (Sulfonamide Antibiotics) Unknown (comments)     Pt does not recall    Tetracycline Other (comments)     Low platelet count    Zithromax [Azithromycin] Nausea Only     Diarrhea, headaches       Past Medical History:   Diagnosis Date    Arrhythmia     tachycardia; Dr. Yanely Stewart Autoimmune disease ([facility] [address]     fibromyalgia    Fibromyalgia     GERD (gastroesophageal reflux disease)     Headache(784.0)     migraine    Heart failure ([address]     HTN (hypertension) 4/2/2010    Hypothyroidism 4/2/2010    OA (osteoarthritis) 4/2/2010    Seizures ([address]     56 years ago with childbirth    Tachycardia 2013    Thromboembolus ([facility] [address]     blood clot on brain 56 years ago w childbirth    Thyroid disease        Past Surgical History:   Procedure Laterality Date    Mónica Oakley  2/5/2015         ENDOSCOPY, COLON, DIAGNOSTIC  12/2010    Dr. Kolby Sanders- repeat 5 yrs    HX BREAST BIOPSY Right 1999    more than one yrs ago all benign    HX CARPAL TUNNEL RELEASE  9/28/2012    HX CATARACT REMOVAL  9/2014    bilateral     HX HYSTERECTOMY  1977       Family History   Problem Relation Age of Onset    Heart Disease Paternal Aunt     Heart Disease Paternal Uncle     Colon Cancer Maternal Grandmother     No Known Problems Mother     No Known Problems Father     Other Daughter      fibromyalgia    Heart Disease Maternal Grandfather     Hypertension Sister        Social History   Substance Use Topics    Smoking status: Never Smoker    Smokeless tobacco: Never Used    Alcohol use No        Lab Results  Component Value Date/Time   WBC 3.9 11/15/2017 10:15 AM   HGB 10.7 (L) 11/15/2017 10:15 AM   Hemoglobin (POC) 11.6 07/14/2017 07:13 AM   HCT 34.0 (L) 11/15/2017 10:15 AM   Hematocrit (POC) 34 (L) 07/14/2017 07:13 AM   PLATELET 46 (LL) 33/07/9655 10:15 AM   MCV 82.3 11/15/2017 10:15 AM     Lab Results  Component Value Date/Time   Cholesterol, total 203 (H) 04/12/2017 03:59 PM   HDL Cholesterol 60 04/12/2017 03:59 PM   LDL, calculated 101 (H) 04/12/2017 03:59 PM   Triglyceride 212 (H) 04/12/2017 03:59 PM   CHOL/HDL Ratio 3.7 01/27/2010 05:43 PM     Lab Results  Component Value Date/Time   TSH 0.459 04/12/2017 03:59 PM   T4, Free 1.18 10/11/2016 03:41 PM      Lab Results   Component Value Date/Time    Sodium 136 04/12/2017 03:59 PM    Potassium 4.3 04/12/2017 03:59 PM    Chloride 95 (L) 04/12/2017 03:59 PM    CO2 23 04/12/2017 03:59 PM    Anion gap 12 05/03/2016 01:05 PM    Glucose 99 04/12/2017 03:59 PM    BUN 16 04/12/2017 03:59 PM    Creatinine 0.91 04/12/2017 03:59 PM    BUN/Creatinine ratio 18 04/12/2017 03:59 PM    GFR est AA 69 04/12/2017 03:59 PM    GFR est non-AA 60 04/12/2017 03:59 PM    Calcium 10.0 04/12/2017 03:59 PM    Bilirubin, total 0.3 04/12/2017 03:59 PM    ALT (SGPT) 17 04/12/2017 03:59 PM    AST (SGOT) 24 04/12/2017 03:59 PM    Alk.  phosphatase 103 04/12/2017 03:59 PM    Protein, total 7.1 04/12/2017 03:59 PM    Albumin 4.5 04/12/2017 03:59 PM    Globulin 3.2 05/03/2016 01:05 PM    A-G Ratio 1.7 04/12/2017 03:59 PM      Review of Systems   Constitutional: Negative for malaise/fatigue. HENT: Negative for congestion. Eyes: Negative for blurred vision. Respiratory: Negative for cough and shortness of breath. Cardiovascular: Negative for chest pain, palpitations and leg swelling. Gastrointestinal: Negative for abdominal pain, constipation and heartburn. Genitourinary: Negative for dysuria, frequency and urgency. Musculoskeletal: Negative for back pain and joint pain. Neurological: Negative for dizziness, tingling and headaches. Endo/Heme/Allergies: Negative for environmental allergies. Psychiatric/Behavioral: Negative for depression. The patient does not have insomnia. Physical Exam   Constitutional: She appears well-developed and well-nourished. /73 (BP 1 Location: Right arm, BP Patient Position: Sitting)  Pulse 89  Temp 96.4 °F (35.8 °C) (Oral)   Resp 16  Ht 5' 2\" (1.575 m)  Wt 175 lb 3.2 oz (79.5 kg)  SpO2 96%  BMI 32.04 kg/m2   HENT:   Right Ear: Tympanic membrane and ear canal normal.   Left Ear: Tympanic membrane and ear canal normal.   Nose: No mucosal edema or rhinorrhea. Mouth/Throat: Oropharynx is clear and moist and mucous membranes are normal.   Neck: Normal range of motion. Neck supple. No thyromegaly present. Cardiovascular: Normal rate and regular rhythm. No murmur heard. Pulmonary/Chest: Effort normal and breath sounds normal.   Abdominal: Soft. Bowel sounds are normal. There is no tenderness. Musculoskeletal: Normal range of motion. She exhibits no edema. Lymphadenopathy:     She has no cervical adenopathy. Skin: Skin is warm and dry. Psychiatric: She has a normal mood and affect. Nursing note and vitals reviewed. ASSESSMENT and PLAN  Diagnoses and all orders for this visit:    1. Essential hypertension  Improved BP with adding lasix to the bystolic.     Discussed sodium restriction, high k rich diet, maintaining ideal body weight and regular exercise program such as daily walking 30 min perday 4-5 times per week, as physiologic means to achieve blood pressure control.  Medication compliance advised. 2. Encounter for medication monitoring        Follow-up Disposition:  Return in about 2 months (around 6/30/2018). reviewed diet, exercise and weight control  cardiovascular risk and specific lipid/LDL goals reviewed  reviewed medications and side effects in detail    I have discussed diagnosis listed in this note with pt and/or family. I have discussed treatment plans and options and the risk/benefit analysis of those options, including safe use of medications and possible medication side effects. Through the use of shared decision making we have agreed to the above plan. The patient has received an after-visit summary and questions were answered concerning future plans and follow up. Advise pt of any urgent changes then to proceed to the ER.

## 2018-04-30 NOTE — PROGRESS NOTES
Chief Complaint   Patient presents with    Hypertension     follow up     Mammogram 1/01/2017     Bone Density 12/13/2016    Colonoscopy 05/1/2016 Dr. Juany Mae repeat in 5 years    Patient states she has an eye appt in 6/2018 with Dr. Rajendra Humphrey        1. Have you been to the ER, urgent care clinic since your last visit? NO   Hospitalized since your last visit? No  2. Have you seen or consulted any other health care providers outside of the [address] [place] [address] since your last visit?   NO

## 2018-06-06 DIAGNOSIS — F51.01 PRIMARY INSOMNIA: ICD-10-CM

## 2018-06-06 DIAGNOSIS — Z86.69 HISTORY OF MIGRAINE: ICD-10-CM

## 2018-06-06 RX ORDER — BUTALBITAL, ACETAMINOPHEN AND CAFFEINE 50; 325; 40 MG/1; MG/1; MG/1
TABLET ORAL
Qty: 30 TAB | Refills: 0 | OUTPATIENT
Start: 2018-06-06 | End: 2018-08-15 | Stop reason: SDUPTHER

## 2018-06-06 RX ORDER — TEMAZEPAM 15 MG/1
CAPSULE ORAL
Qty: 60 CAP | Refills: 1 | OUTPATIENT
Start: 2018-06-06 | End: 2018-08-08 | Stop reason: SDUPTHER

## 2018-08-08 DIAGNOSIS — F51.01 PRIMARY INSOMNIA: ICD-10-CM

## 2018-08-08 RX ORDER — TEMAZEPAM 15 MG/1
CAPSULE ORAL
Qty: 60 CAP | Refills: 1 | OUTPATIENT
Start: 2018-08-08 | End: 2018-08-15 | Stop reason: SDUPTHER

## 2018-08-15 ENCOUNTER — OFFICE VISIT (OUTPATIENT)
Dept: FAMILY MEDICINE CLINIC | Age: 83
End: 2018-08-15

## 2018-08-15 VITALS
HEART RATE: 78 BPM | WEIGHT: 172 LBS | BODY MASS INDEX: 31.65 KG/M2 | HEIGHT: 62 IN | OXYGEN SATURATION: 97 % | RESPIRATION RATE: 18 BRPM | TEMPERATURE: 96.9 F | SYSTOLIC BLOOD PRESSURE: 137 MMHG | DIASTOLIC BLOOD PRESSURE: 67 MMHG

## 2018-08-15 DIAGNOSIS — Z00.00 MEDICARE ANNUAL WELLNESS VISIT, SUBSEQUENT: Primary | ICD-10-CM

## 2018-08-15 DIAGNOSIS — I10 ESSENTIAL HYPERTENSION: ICD-10-CM

## 2018-08-15 DIAGNOSIS — Z51.81 ENCOUNTER FOR MEDICATION MONITORING: ICD-10-CM

## 2018-08-15 DIAGNOSIS — D69.3 CHRONIC ITP (IDIOPATHIC THROMBOCYTOPENIA) (HCC): ICD-10-CM

## 2018-08-15 DIAGNOSIS — Z12.11 ENCOUNTER FOR SCREENING FECAL OCCULT BLOOD TESTING: ICD-10-CM

## 2018-08-15 DIAGNOSIS — Z86.69 HISTORY OF MIGRAINE: ICD-10-CM

## 2018-08-15 DIAGNOSIS — F51.01 PRIMARY INSOMNIA: ICD-10-CM

## 2018-08-15 DIAGNOSIS — E03.4 HYPOTHYROIDISM DUE TO ACQUIRED ATROPHY OF THYROID: ICD-10-CM

## 2018-08-15 DIAGNOSIS — F41.9 ANXIETY: ICD-10-CM

## 2018-08-15 LAB
BILIRUB UR QL STRIP: NORMAL
GLUCOSE UR-MCNC: NEGATIVE MG/DL
KETONES P FAST UR STRIP-MCNC: NEGATIVE MG/DL
PH UR STRIP: 5 [PH] (ref 4.6–8)
PROT UR QL STRIP: NORMAL
SP GR UR STRIP: 1.02 (ref 1–1.03)
UA UROBILINOGEN AMB POC: NORMAL (ref 0.2–1)
URINALYSIS CLARITY POC: CLEAR
URINALYSIS COLOR POC: YELLOW
URINE BLOOD POC: NEGATIVE
URINE LEUKOCYTES POC: NEGATIVE
URINE NITRITES POC: NEGATIVE

## 2018-08-15 RX ORDER — BUTALBITAL, ACETAMINOPHEN AND CAFFEINE 50; 325; 40 MG/1; MG/1; MG/1
TABLET ORAL
Qty: 30 TAB | Refills: 0 | Status: SHIPPED | OUTPATIENT
Start: 2018-08-15 | End: 2018-10-13 | Stop reason: SDUPTHER

## 2018-08-15 RX ORDER — ONDANSETRON 4 MG/1
TABLET, ORALLY DISINTEGRATING ORAL
Refills: 0 | COMMUNITY
Start: 2018-06-07 | End: 2020-04-14

## 2018-08-15 RX ORDER — PREDNISOLONE ACETATE 10 MG/ML
SUSPENSION/ DROPS OPHTHALMIC
Refills: 0 | COMMUNITY
Start: 2018-07-03 | End: 2018-08-15

## 2018-08-15 RX ORDER — ALPRAZOLAM 0.5 MG/1
0.5 TABLET ORAL DAILY
Qty: 30 TAB | Refills: 3 | Status: SHIPPED | OUTPATIENT
Start: 2018-08-15 | End: 2018-12-17 | Stop reason: SDUPTHER

## 2018-08-15 RX ORDER — CETIRIZINE HCL 10 MG
10 TABLET ORAL
COMMUNITY
End: 2021-09-21

## 2018-08-15 RX ORDER — DEXTROMETHORPHAN HYDROBROMIDE, GUAIFENESIN 5; 100 MG/5ML; MG/5ML
650 LIQUID ORAL
COMMUNITY

## 2018-08-15 RX ORDER — TEMAZEPAM 15 MG/1
CAPSULE ORAL
Qty: 60 CAP | Refills: 1 | Status: SHIPPED | OUTPATIENT
Start: 2018-08-15 | End: 2018-12-13 | Stop reason: SDUPTHER

## 2018-08-15 NOTE — PROGRESS NOTES
Chief Complaint   Patient presents with    Annual Wellness Visit     Medicare Wellness     Mammogram 11/1/2017    Colonoscopy 8/1/2016 by Dr. Saleem Ruiz- repeat in 5 years    1. Have you been to the ER, urgent care clinic since your last visit? Hospitalized since your last visit? No    2. Have you seen or consulted any other health care providers outside of the [facility] since your last visit? Include any pap smears or colon screening.  No

## 2018-08-15 NOTE — ACP (ADVANCE CARE PLANNING)
Advance Care Plan was discussed but the patient did not wish or was not able to name a surrogate decision maker or provide an [address]

## 2018-08-15 NOTE — PROGRESS NOTES
This is a Subsequent Medicare Annual Wellness Visit providing Personalized Prevention Plan Services (PPPS) (Performed 12 months after initial AWV and PPPS )    I have reviewed the patient's medical history in detail and updated the computerized patient record. Cardiovascular Review:  The patient has hypertension, hyperlipidemia, and atrial tachycardia. Diet and Lifestyle: generally follows a low fat low cholesterol diet, generally follows a low sodium diet, exercises sporadically  Home BP Monitoring: is not measured at home. Pertinent ROS: taking medications as instructed, no medication side effects noted, no TIA's, no swelling of ankles, no palpitations. Thyroid Review:  Patient is seen for followup of hypothyroidism. Followed by prateek. Thyroid ROS: denies weight changes, heat/cold intolerance, bowel/skin changes or CVS symptoms. Osteoarthritis and Chronic Pain:  Patient has osteoarthritis and fibromyalgia. Rheumatological ROS: Has burning and pain in the legs at feet that is worse at night. Aches all the time. Has not been exercising. Controlled by PRN meds. Response to treatment plan: stable. Still seeing hematology ITP. Still going weekly to monitor platelet count. Follow up on chronic anxiety. Using xanax as needed. Her \"nerves are shot\" after going to get her ptl count done and her infusion\". Migraines comes and goes. Has HA about 1 to 2 times in a month. Stress seems to trigger headaches.       History     Past Medical History:   Diagnosis Date    Arrhythmia     tachycardia; Dr. Matta Phlegm Autoimmune disease [facility])     fibromyalgia    Chronic ITP (idiopathic thrombocytopenia) (HCC)     Fibromyalgia     GERD (gastroesophageal reflux disease)     Headache(784.0)     migraine    Heart failure ([facility] [address]     HTN (hypertension) 4/2/2010    Hypothyroidism 4/2/2010    OA (osteoarthritis) 4/2/2010    Seizures ([facility] [address]     56 years ago with childbirth    Tachycardia 2013  Thromboembolus ([address]     blood clot on brain 56 years ago w childbirth    Thyroid disease       Past Surgical History:   Procedure Laterality Date    Ardith Blunt  2/5/2015         ENDOSCOPY, COLON, DIAGNOSTIC  12/2010    Dr. Trenton Bloom- repeat 5 yrs    HX BREAST BIOPSY Right 1999    more than one yrs ago all benign    HX CARPAL TUNNEL RELEASE  9/28/2012    HX CATARACT REMOVAL  9/2014    bilateral     HX HYSTERECTOMY  1977    HX REFRACTIVE SURGERY  07/2018     Current Outpatient Prescriptions   Medication Sig Dispense Refill    ondansetron (ZOFRAN ODT) 4 mg disintegrating tablet Dissolve 1 tab under tongue every 8 hours as needed for nausea  0    acetaminophen (ARTHRITIS PAIN RELIEF) 650 mg TbER Take 650 mg by mouth every eight (8) hours.  cetirizine (ZYRTEC) 10 mg tablet Take 10 mg by mouth daily as needed for Allergies.  temazepam (RESTORIL) 15 mg capsule TAKE 1 TO 2 CAPSULES BY MOUTH EVERY NIGHT AT BEDTIME 60 Cap 1    BYSTOLIC 5 mg tablet TAKE 1 TABLET BY MOUTH TWICE DAILY. HOLD DOSE IF SYSTOLIC PRESSURE IS LESS THAN 100 OR PULSE IS LESS THAN 50 60 Tab 11    butalbital-acetaminophen-caffeine (FIORICET, ESGIC) -40 mg per tablet TAKE 1 TABLET BY MOUTH EVERY 6 HOURS AS NEEDED FOR HEADACHE. NO MORE THAN 4 TABLETS PER DAY 30 Tab 0    benzonatate (TESSALON) 200 mg capsule Take 1 Cap by mouth three (3) times daily as needed for Cough. 30 Cap 1    levothyroxine (SYNTHROID) 50 mcg tablet Take 1 tablet Monday, Tuesday and Wednesday       chlorpheniramine-dm (CORICIDIN HBP COUGH AND COLD) 4-30 mg tab Take 1 tablet 2 times a day as needed      nebivolol (BYSTOLIC) 5 mg tablet Take 2 Tabs by mouth daily. 60 Tab 6    ALPRAZolam (XANAX) 0.5 mg tablet Take 1 Tab by mouth daily.  Max Daily Amount: 0.5 mg. 30 Tab 0    furosemide (LASIX) 20 mg tablet TAKE 1 TABLET BY MOUTH DAILY 90 Tab 1     Allergies   Allergen Reactions    Epinephrine Other (comments)     BP drops and pass out    Iodinated Contrast- Oral And Iv Dye Shortness of Breath    Novocain [Procaine] Palpitations     BP drops,passes out    Codeine Itching     Pt does not recall    Cymbalta [Duloxetine] Other (comments)     Head Pain and nausea    Daypro [Oxaprozin] Unknown (comments)     Pt does not recall    Prednisone Shortness of Breath     agitation    Sulfa (Sulfonamide Antibiotics) Unknown (comments)     Pt does not recall    Tetracycline Other (comments)     Low platelet count    Zithromax [Azithromycin] Nausea Only     Diarrhea, headaches     Family History   Problem Relation Age of Onset    Heart Disease Paternal Aunt     Heart Disease Paternal Uncle     Colon Cancer Maternal Grandmother     No Known Problems Mother     No Known Problems Father     Other Daughter      fibromyalgia    Heart Disease Maternal Grandfather     Hypertension Sister      Social History   Substance Use Topics    Smoking status: Never Smoker    Smokeless tobacco: Never Used    Alcohol use No     Patient Active Problem List   Diagnosis Code    Hypothyroidism E03.9    HTN (hypertension) I10    OA (osteoarthritis) M19.90    GERD (gastroesophageal reflux disease) K21.9    Fibromyalgia M79.7    Insomnia G47.00    Headache(784.0) R51    Iron deficiency anemia, unspecified D50.9    CARVAJAL (dyspnea on exertion) R06.09    Heart palpitations R00.2    Paroxysmal Inappropriate sinus tachycardia vs possible atrial tachycardia R00.0    Edema leg R60.0    Reactive airway disease without complication H82.224    Incidental lung nodule, greater than or equal to 8mm R91.1    Encounter for medication monitoring Z51.81    Preop cardiovascular exam Z01.810    Pelvic lymphadenopathy R59.0    Chronic ITP (idiopathic thrombocytopenia) (HCC) D69.3     Lab Results  Component Value Date/Time   WBC 3.9 11/15/2017 10:15 AM   HGB 10.7 (L) 11/15/2017 10:15 AM   Hemoglobin (POC) 11.6 07/14/2017 07:13 AM   HCT 34.0 (L) 11/15/2017 10:15 AM Hematocrit (POC) 34 (L) 07/14/2017 07:13 AM   PLATELET 46 (LL) 13/39/6548 10:15 AM   MCV 82.3 11/15/2017 10:15 AM     Lab Results  Component Value Date/Time   Cholesterol, total 203 (H) 04/12/2017 03:59 PM   HDL Cholesterol 60 04/12/2017 03:59 PM   LDL, calculated 101 (H) 04/12/2017 03:59 PM   Triglyceride 212 (H) 04/12/2017 03:59 PM   CHOL/HDL Ratio 3.7 01/27/2010 05:43 PM     Lab Results  Component Value Date/Time   TSH 0.459 04/12/2017 03:59 PM   T4, Free 1.18 10/11/2016 03:41 PM      Lab Results   Component Value Date/Time    Sodium 136 04/12/2017 03:59 PM    Potassium 4.3 04/12/2017 03:59 PM    Chloride 95 (L) 04/12/2017 03:59 PM    CO2 23 04/12/2017 03:59 PM    Anion gap 12 05/03/2016 01:05 PM    Glucose 99 04/12/2017 03:59 PM    BUN 16 04/12/2017 03:59 PM    Creatinine 0.91 04/12/2017 03:59 PM    BUN/Creatinine ratio 18 04/12/2017 03:59 PM    GFR est AA 69 04/12/2017 03:59 PM    GFR est non-AA 60 04/12/2017 03:59 PM    Calcium 10.0 04/12/2017 03:59 PM    Bilirubin, total 0.3 04/12/2017 03:59 PM    ALT (SGPT) 17 04/12/2017 03:59 PM    AST (SGOT) 24 04/12/2017 03:59 PM    Alk. phosphatase 103 04/12/2017 03:59 PM    Protein, total 7.1 04/12/2017 03:59 PM    Albumin 4.5 04/12/2017 03:59 PM    Globulin 3.2 05/03/2016 01:05 PM    A-G Ratio 1.7 04/12/2017 03:59 PM      Lab Results   Component Value Date/Time    Hemoglobin A1c 5.9 (H) 08/28/2012 04:22 PM    Hemoglobin A1c (POC) 5.7 11/10/2015 03:16 PM               Depression Risk Factor Screening:     PHQ over the last two weeks 8/15/2018   Little interest or pleasure in doing things Not at all   Feeling down, depressed, irritable, or hopeless Not at all   Total Score PHQ 2 0     Alcohol Risk Factor Screening: On any occasion during the past 3 months, have you had more than 3 drinks containing alcohol? No    Do you average more than 7 drinks per week?   No      Functional Ability and Level of Safety:     Hearing Loss   none    Activities of Daily Living Self-care. Requires assistance with: no ADLs    Fall Risk     Fall Risk Assessment, last 12 mths 8/15/2018   Able to walk? Yes   Fall in past 12 months? No   Functional Ability:   Does the patient exhibit a steady gait? yes    How long did it take the patient to get up and walk from a sitting position? seconds    Is the patient self reliant? (ie can do own laundry, meals, household chores)  yes   Does the patient handle his/her own medications? yes   Does the patient handle his/her own money? yes   Is the patients home safe (ie good lighting, handrails on stairs and bath, etc.)? yes   Did you notice or did patient express any hearing difficulties? no   Did you notice or did patient express any vision difficulties?  no   Were distance and reading eye charts used? no     Advance Care Planning:   Patient was offered the opportunity to discuss advance care planning:  yes    Does patient have an Advance Directive:  yes    If no, did you provide information on Caring Connections? yes          Abuse Screen   Patient is not abused    Review of Systems   Eyes: negative for irritation and redness  Ears, nose, mouth, throat, and face: negative for earaches, nasal congestion and hoarseness  Respiratory: negative for cough, sputum or dyspnea on exertion  Cardiovascular: negative for chest pain, chest pressure/discomfort, dyspnea, palpitations, irregular heart beats, fatigue, lower extremity edema  Gastrointestinal: negative for dysphagia, dyspepsia, reflux symptoms, nausea, vomiting, change in bowel habits, melena, constipation and abdominal pain. Has hemorrhoid but denies bleeding or pain.     Genitourinary:negative for frequency, dysuria, nocturia, urinary incontinence  Integument/breast: negative for rash and dryness  Hematologic/lymphatic: negative for easy bruising and lymphadenopathy  Neurological: negative for headaches, dizziness, tremor and weakness  Endocrine: negative for diabetic symptoms including polyuria and polydipsia    Physical Examination     Evaluation of Cognitive Function:  Mood/affect:  neutral  Appearance: age appropriate  Family member/caregiver input: NA    Visit Vitals    /67 (BP 1 Location: Left arm, BP Patient Position: Sitting)    Pulse 78    Temp 96.9 °F (36.1 °C) (Oral)    Resp 18    Ht 5' 2\" (1.575 m)    Wt 172 lb (78 kg)    SpO2 97%    BMI 31.46 kg/m2     General:  Alert, cooperative, no distress, appears stated age. Head:  Normocephalic, without obvious abnormality, atraumatic. Ears:  Normal TMs and external ear canals both ears. Nose: Nares normal. Septum midline. Mucosa normal. No drainage or sinus tenderness. Throat: Lips, mucosa, and tongue normal. Teeth and gums normal.   Neck: Supple, symmetrical, trachea midline, no adenopathy, thyroid: no enlargement/tenderness/nodules, no carotid bruit and no JVD. Back:   Symmetric, no curvature. ROM normal. No CVA tenderness. Lungs:   Clear to auscultation bilaterally. Chest wall:  No tenderness or deformity. Heart:  Regular rate and rhythm, S1, S2 normal, no murmur, click, rub or gallop. Breast Exam:  No tenderness, masses, or nipple abnormality. Abdomen:   Soft, non-tender. Bowel sounds normal. No masses,  No organomegaly. Rectal:  Normal tone,  no masses or tenderness. Has non tender external hemorrhoid. Guaiac positive stool. Extremities: Extremities normal, atraumatic, no cyanosis or edema. Pulses: 2+ and symmetric all extremities. Skin: Skin color, texture, turgor normal. No rashes or lesions. Lymph nodes: Cervical, supraclavicular, and axillary nodes normal.   Neurologic: CNII-XII intact. Normal strength, sensation and reflexes throughout.        Patient Care Team:  Apoorva Salinas MD as PCP - [address], MD as Physician (Cardiology)  Leighann Hauser RN as Nurse Navigator    Advice/Referrals/Counseling   Education and counseling provided:  Are appropriate based on today's review and evaluation  End-of-Life planning (with patient's consent)      Assessment/Plan   Charu Elizabeth was seen today for annual wellness visit. ASSESSMENT and PLAN  Diagnoses and all orders for this visit:    1. Medicare annual wellness visit, subsequent  -     AMB POC URINALYSIS DIP STICK AUTO W/ MICRO  -     AMB POC FECAL BLOOD, OCCULT, QL 1 CARD    2. Encounter for screening fecal occult blood testing  -     AMB POC FECAL BLOOD, OCCULT, QL 1 CARD    3. Essential hypertension  -     LIPID PANEL  -     REFERRAL TO CARDIOLOGY    4. Hypothyroidism due to acquired atrophy of thyroid  Followed by endo. 5. Chronic ITP (idiopathic thrombocytopenia) (HCC)  As per hematology    6. Anxiety  -     Refill ALPRAZolam (XANAX) 0.5 mg tablet; Take 1 Tab by mouth daily. Max Daily Amount: 0.5 mg.    7. History of migraine  -     Refill butalbital-acetaminophen-caffeine (FIORICET, ESGIC) -40 mg per tablet; TAKE 1 TABLET BY MOUTH EVERY 6 HOURS AS NEEDED FOR HEADACHE. NO MORE THAN 4 TABLETS PER DAY    8. Primary insomnia  -    Refill temazepam (RESTORIL) 15 mg capsule; TAKE 1 TO 2 CAPSULES BY MOUTH EVERY NIGHT AT BEDTIME    9. Encounter for medication monitoring  -     METABOLIC PANEL, COMPREHENSIVE      Follow-up Disposition:  Return in about 5 months (around 1/15/2019). reviewed diet, exercise and weight control  cardiovascular risk and specific lipid/LDL goals reviewed  reviewed medications and side effects in detail    I have discussed diagnosis listed in this note with pt and/or family. I have discussed treatment plans and options and the risk/benefit analysis of those options, including safe use of medications and possible medication side effects. Through the use of shared decision making we have agreed to the above plan. The patient has received an after-visit summary and questions were answered concerning future plans and follow up. Advise pt of any urgent changes then to proceed to the ER.

## 2018-08-15 NOTE — MR AVS SNAPSHOT
[address] [facility] 
 
 
 [address] [facility] [place] [address] [address] 
[phone] Patient: Antonio Osorio MRN: [id number] FXO:0/6/2030 Visit Information Date & Time Provider Department Dept. Phone Encounter #  
 8/15/2018  2:15 PM Jose Armando Monte Andre [phone] [id number] Follow-up Instructions Return in about 5 months (around 1/15/2019). Upcoming Health Maintenance Date Due  
 GLAUCOMA SCREENING Q2Y 3/24/2018 MEDICARE YEARLY EXAM 4/13/2018 Influenza Age 5 to Adult 8/1/2018 Pneumococcal 65+ High/Highest Risk (2 of 2 - PPSV23) 8/15/2028* COLONOSCOPY 5/11/2021 DTaP/Tdap/Td series (2 - Td) 4/15/2025 *Topic was postponed. The date shown is not the original due date. Allergies as of 8/15/2018  Review Complete On: 8/15/2018 By: Ena Hoffmann MD  
  
 Severity Noted Reaction Type Reactions Epinephrine High 04/02/2010    Other (comments) BP drops and pass out Iodinated Contrast- Oral And Iv Dye High 05/21/2010    Shortness of Breath Novocain [Procaine] High 04/02/2010    Palpitations BP drops,passes out Codeine  04/02/2010    Itching Pt does not recall Cymbalta [Duloxetine]  12/15/2014    Other (comments) Head Pain and nausea Daypro [Oxaprozin]  04/02/2010    Unknown (comments) Pt does not recall Prednisone  06/28/2013    Shortness of Breath  
 agitation Sulfa (Sulfonamide Antibiotics)  04/02/2010    Unknown (comments) Pt does not recall Tetracycline  10/17/2017    Other (comments) Low platelet count Zithromax [Azithromycin]  08/10/2015    Nausea Only Diarrhea, headaches Current Immunizations  Reviewed on 8/15/2018 Name Date Influenza High Dose Vaccine PF 10/17/2017, 10/11/2016, 9/28/2015, 10/27/2014 Influenza Vaccine 10/23/2013 Influenza Vaccine Split 10/9/2012, 10/12/2011, 10/11/2010 Pneumococcal Conjugate (PCV-13) 4/15/2015 Pneumococcal Vaccine (Pcv) 10/23/2007 Tdap 4/15/2015 Reviewed by Catie Aldrich MD on 8/15/2018 at  2:43 PM  
You Were Diagnosed With   
  
 Codes Comments Medicare annual wellness visit, subsequent    -  Primary ICD-10-CM: Z00.00 ICD-9-CM: V70.0 Encounter for screening fecal occult blood testing     ICD-10-CM: Z12.11 ICD-9-CM: V76.51 Essential hypertension     ICD-10-CM: I10 
ICD-9-CM: 401.9 Hypothyroidism due to acquired atrophy of thyroid     ICD-10-CM: E03.4 ICD-9-CM: 244.8, 246.8 Chronic ITP (idiopathic thrombocytopenia) (HCC)     ICD-10-CM: D69.3 ICD-9-CM: 287.31 Anxiety     ICD-10-CM: F41.9 ICD-9-CM: 300.00 History of migraine     ICD-10-CM: Z86.69 
ICD-9-CM: V12.49 Primary insomnia     ICD-10-CM: F51.01 
ICD-9-CM: 307.42 Encounter for medication monitoring     ICD-10-CM: Z51.81 
ICD-9-CM: V58.83 Inappropriate sinus tachycardia     ICD-10-CM: R00.0 ICD-9-CM: 427.89 Mild intermittent reactive airway disease without complication     JOAN-73-IN: J45.20 ICD-9-CM: 493.90 Vitals BP Pulse Temp Resp Height(growth percentile) Weight(growth percentile)  
 137/67 (BP 1 Location: Left arm, BP Patient Position: Sitting) 78 96.9 °F (36.1 °C) (Oral) 18 5' 2\" (1.575 m) 172 lb (78 kg) SpO2 BMI OB Status Smoking Status 97% 31.46 kg/m2 Hysterectomy Never Smoker Vitals History BMI and BSA Data Body Mass Index Body Surface Area  
 31.46 kg/m 2 1.85 m 2 Preferred Pharmacy Pharmacy Name Phone [facility] [address], [address] [place] [address] [phone] Your Updated Medication List  
  
   
This list is accurate as of 8/15/18  3:38 PM.  Always use your most recent med list.  
  
  
  
  
 ALPRAZolam 0.5 mg tablet Commonly known as:  Pixie Oz Take 1 Tab by mouth daily.  Max Daily Amount: 0.5 mg.  
  
 Arthritis Pain Relief 650 mg Thomas Polanco Generic drug:  acetaminophen Take 650 mg by mouth every eight (8) hours. benzonatate 200 mg capsule Commonly known as:  TESSALON Take 1 Cap by mouth three (3) times daily as needed for Cough. butalbital-acetaminophen-caffeine -40 mg per tablet Commonly known as:  FIORICET, ESGIC  
TAKE 1 TABLET BY MOUTH EVERY 6 HOURS AS NEEDED FOR HEADACHE. NO MORE THAN 4 TABLETS PER DAY  
  
 cetirizine 10 mg tablet Commonly known as:  ZYRTEC Take 10 mg by mouth daily as needed for Allergies. CORICIDIN HBP COUGH AND COLD 4-30 mg Tab Generic drug:  chlorpheniramine-dm Take 1 tablet 2 times a day as needed  
  
 furosemide 20 mg tablet Commonly known as:  LASIX TAKE 1 TABLET BY MOUTH DAILY  
  
 levothyroxine 50 mcg tablet Commonly known as:  SYNTHROID Take 1 tablet Monday, Tuesday and Wednesday * nebivolol 5 mg tablet Commonly known as:  BYSTOLIC Take 2 Tabs by mouth daily. * BYSTOLIC 5 mg tablet Generic drug:  nebivolol TAKE 1 TABLET BY MOUTH TWICE DAILY. HOLD DOSE IF SYSTOLIC PRESSURE IS LESS THAN 100 OR PULSE IS LESS THAN 50  
  
 ondansetron 4 mg disintegrating tablet Commonly known as:  ZOFRAN ODT Dissolve 1 tab under tongue every 8 hours as needed for nausea  
  
 temazepam 15 mg capsule Commonly known as:  RESTORIL  
TAKE 1 TO 2 CAPSULES BY MOUTH EVERY NIGHT AT BEDTIME  
  
 * Notice: This list has 2 medication(s) that are the same as other medications prescribed for you. Read the directions carefully, and ask your doctor or other care provider to review them with you. Prescriptions Printed Refills ALPRAZolam (XANAX) 0.5 mg tablet 3 Sig: Take 1 Tab by mouth daily. Max Daily Amount: 0.5 mg.  
 Class: Print Route: Oral  
 butalbital-acetaminophen-caffeine (FIORICET, ESGIC) -40 mg per tablet 0 Sig: TAKE 1 TABLET BY MOUTH EVERY 6 HOURS AS NEEDED FOR HEADACHE.  NO MORE THAN 4 TABLETS PER DAY Class: Print  
 temazepam (RESTORIL) 15 mg capsule 1 Sig: TAKE 1 TO 2 CAPSULES BY MOUTH EVERY NIGHT AT BEDTIME Class: Print We Performed the Following AMB POC FECAL BLOOD, OCCULT, QL 1 CARD [52322 CPT(R)] AMB POC URINALYSIS DIP STICK AUTO W/ MICRO [94436 CPT(R)] LIPID PANEL [01496 CPT(R)] METABOLIC PANEL, COMPREHENSIVE [96898 CPT(R)] REFERRAL TO CARDIOLOGY [JRE56 Custom] Follow-up Instructions Return in about 5 months (around 1/15/2019). Referral Information Referral ID Referred By Referred To  
  
 [id number] Juan QUILES MD   
   [address] [place] [address], [address] [facility] [address] Phone: [phone] Fax: [phone] Visits Status Start Date End Date 1 New Request 8/15/18 8/15/19 If your referral has a status of pending review or denied, additional information will be sent to support the outcome of this decision. Introducing [facility] & HEALTH SERVICES! Dear Manolo Mondragon: 
Thank you for requesting a [employer] account. Our records indicate that you already have an active [employer] account. You can access your account anytime at [email or web address]. [email or web address] Did you know that you can access your hospital and ER discharge instructions at any time in [employer]? You can also review all of your test results from your hospital stay or ER visit. Additional Information If you have questions, please visit the Frequently Asked Questions section of the [employer] website at [email or web address] Remember, [employer]Z is NOT to be used for urgent needs. For medical emergencies, dial 911. Now available from your iPhone and Android! Please provide this summary of care documentation to your next provider. Your primary care clinician is listed as Yuliana Lao. If you have any questions after today's visit, please call [phone].

## 2018-08-16 LAB
ALBUMIN SERPL-MCNC: 4.8 G/DL (ref 3.5–4.7)
ALBUMIN/GLOB SERPL: 2.7 {RATIO} (ref 1.2–2.2)
ALP SERPL-CCNC: 87 IU/L (ref 39–117)
ALT SERPL-CCNC: 14 IU/L (ref 0–32)
AST SERPL-CCNC: 20 IU/L (ref 0–40)
BILIRUB SERPL-MCNC: 0.3 MG/DL (ref 0–1.2)
BUN SERPL-MCNC: 11 MG/DL (ref 8–27)
BUN/CREAT SERPL: 13 (ref 12–28)
CALCIUM SERPL-MCNC: 9.6 MG/DL (ref 8.7–10.3)
CHLORIDE SERPL-SCNC: 102 MMOL/L (ref 96–106)
CHOLEST SERPL-MCNC: 204 MG/DL (ref 100–199)
CO2 SERPL-SCNC: 23 MMOL/L (ref 20–29)
CREAT SERPL-MCNC: 0.85 MG/DL (ref 0.57–1)
GLOBULIN SER CALC-MCNC: 1.8 G/DL (ref 1.5–4.5)
GLUCOSE SERPL-MCNC: 103 MG/DL (ref 65–99)
HDLC SERPL-MCNC: 52 MG/DL
INTERPRETATION, 910389: NORMAL
LDLC SERPL CALC-MCNC: 103 MG/DL (ref 0–99)
POTASSIUM SERPL-SCNC: 4.5 MMOL/L (ref 3.5–5.2)
PROT SERPL-MCNC: 6.6 G/DL (ref 6–8.5)
SODIUM SERPL-SCNC: 140 MMOL/L (ref 134–144)
TRIGL SERPL-MCNC: 247 MG/DL (ref 0–149)
VLDLC SERPL CALC-MCNC: 49 MG/DL (ref 5–40)

## 2018-09-26 ENCOUNTER — OFFICE VISIT (OUTPATIENT)
Dept: CARDIOLOGY CLINIC | Age: 83
End: 2018-09-26

## 2018-09-26 VITALS
HEIGHT: 62 IN | HEART RATE: 76 BPM | BODY MASS INDEX: 31.1 KG/M2 | SYSTOLIC BLOOD PRESSURE: 144 MMHG | OXYGEN SATURATION: 98 % | DIASTOLIC BLOOD PRESSURE: 80 MMHG | RESPIRATION RATE: 18 BRPM | WEIGHT: 169 LBS

## 2018-09-26 DIAGNOSIS — M79.7 FIBROMYALGIA: ICD-10-CM

## 2018-09-26 DIAGNOSIS — R00.0 INAPPROPRIATE SINUS TACHYCARDIA: ICD-10-CM

## 2018-09-26 DIAGNOSIS — R06.09 DOE (DYSPNEA ON EXERTION): ICD-10-CM

## 2018-09-26 DIAGNOSIS — K21.00 GASTROESOPHAGEAL REFLUX DISEASE WITH ESOPHAGITIS: ICD-10-CM

## 2018-09-26 DIAGNOSIS — D69.3 CHRONIC ITP (IDIOPATHIC THROMBOCYTOPENIA) (HCC): ICD-10-CM

## 2018-09-26 DIAGNOSIS — D50.9 IRON DEFICIENCY ANEMIA, UNSPECIFIED IRON DEFICIENCY ANEMIA TYPE: ICD-10-CM

## 2018-09-26 DIAGNOSIS — M15.9 PRIMARY OSTEOARTHRITIS INVOLVING MULTIPLE JOINTS: ICD-10-CM

## 2018-09-26 DIAGNOSIS — I10 ESSENTIAL HYPERTENSION: Primary | ICD-10-CM

## 2018-09-26 DIAGNOSIS — J45.20 MILD INTERMITTENT REACTIVE AIRWAY DISEASE WITHOUT COMPLICATION: ICD-10-CM

## 2018-09-26 NOTE — PROGRESS NOTES
[address], [address] [facility] Landen Powers        [phone]                             NEW PATIENT HPI/FOLLOW-UP    NAME:  Winston Dalal   :   1935   MRN:   [id number]   PCP:  Leo Mccarthy MD           Subjective: The patient is a 82y.o. year old female  who returns for a routine follow-up. Since the last visit, patient reports no new cardiac symptoms. CC concerning \"low platelets\" under Rx by [facility]. Denies change in exercise tolerance, chest pain, edema, medication intolerance, palpitations, shortness of breath, PND/orthopnea wheezing, sputum, syncope, dizziness or light headedness. Doing satisfactorily from cardiac standpoint. Review of Systems  General: Pt denies excessive weight gain or loss. Pt is able to conduct ADL's. Respiratory: Denies shortness of breath, CARVAJAL, wheezing or stridor.   Cardiovascular: Denies precordial pain, palpitations, edema or PND  Gastrointestinal: Denies poor appetite, indigestion, abdominal pain or blood in stool  Peripheral vascular: Denies claudication, leg cramps  Neurological: Denies paresthesias, tingling.numbness  Psychiatric: Denies anxiety,depression,fatigue  Musculoskeletal: Denies pain,tenderness, soreness,swelling      Past Medical History:   Diagnosis Date    Arrhythmia     tachycardia; Dr. Mitchel Mcclure Autoimmune disease ([facility] [address]     fibromyalgia    Chronic ITP (idiopathic thrombocytopenia) (HCC)     Fibromyalgia     GERD (gastroesophageal reflux disease)     Headache(784.0)     migraine    Heart failure (HCC)     HTN (hypertension) 2010    Hypothyroidism 2010    OA (osteoarthritis) 2010    Seizures ([address]     56 years ago with childbirth    Tachycardia 2013    Thromboembolus ([address]     blood clot on brain 56 years ago w childbirth    Thyroid disease      Patient Active Problem List    Diagnosis Date Noted    Chronic ITP (idiopathic thrombocytopenia) (HCC)     Pelvic lymphadenopathy 2017  Preop cardiovascular exam 06/07/2017    Encounter for medication monitoring 10/11/2016    Incidental lung nodule, greater than or equal to 8mm 05/11/2016    Reactive airway disease without complication 04/04/5279    Edema leg 06/19/2015    Heart palpitations 07/25/2013    Paroxysmal Inappropriate sinus tachycardia vs possible atrial tachycardia 07/25/2013    CARVAJAL (dyspnea on exertion) 06/28/2013    Iron deficiency anemia, unspecified 06/20/2013    Headache(784.0)     Insomnia 06/01/2010    GERD (gastroesophageal reflux disease)     Fibromyalgia     Hypothyroidism 04/02/2010    HTN (hypertension) 04/02/2010    OA (osteoarthritis) 04/02/2010      Past Surgical History:   Procedure Laterality Date    COLONOSCOPY,REMV Tea [place]  2/5/2015         ENDOSCOPY, COLON, DIAGNOSTIC  12/2010    Dr. Antonio Seal- repeat 5 yrs    HX BREAST BIOPSY Right 1999    more than one yrs ago all benign    HX CARPAL TUNNEL RELEASE  9/28/2012    HX CATARACT REMOVAL  9/2014    bilateral     HX HYSTERECTOMY  1977    HX REFRACTIVE SURGERY  07/2018     Allergies   Allergen Reactions    Epinephrine Other (comments)     BP drops and pass out    Iodinated Contrast- Oral And Iv Dye Shortness of Breath    Novocain [Procaine] Palpitations     BP drops,passes out    Codeine Itching     Pt does not recall    Cymbalta [Duloxetine] Other (comments)     Head Pain and nausea    Daypro [Oxaprozin] Unknown (comments)     Pt does not recall    Prednisone Shortness of Breath     agitation    Sulfa (Sulfonamide Antibiotics) Unknown (comments)     Pt does not recall    Tetracycline Other (comments)     Low platelet count    Zithromax [Azithromycin] Nausea Only     Diarrhea, headaches      Family History   Problem Relation Age of Onset    Heart Disease Paternal Aunt     Heart Disease Paternal Uncle     Colon Cancer Maternal Grandmother     No Known Problems Mother     No Known Problems Father     Other Daughter fibromyalgia    Heart Disease Maternal Grandfather     Hypertension Sister       Social History     Social History    Marital status:      Spouse name: N/A    Number of children: N/A    Years of education: N/A     Occupational History    Not on file. Social History Main Topics    Smoking status: Never Smoker    Smokeless tobacco: Never Used    Alcohol use No    Drug use: No    Sexual activity: Not Currently     Other Topics Concern    Not on file     Social History Narrative      Current Outpatient Prescriptions   Medication Sig    romiplostim (NPLATE SC) by SubCUTAneous route every seven (7) days.  acetaminophen (ARTHRITIS PAIN RELIEF) 650 mg TbER Take 650 mg by mouth every eight (8) hours.  cetirizine (ZYRTEC) 10 mg tablet Take 10 mg by mouth daily as needed for Allergies.  ALPRAZolam (XANAX) 0.5 mg tablet Take 1 Tab by mouth daily. Max Daily Amount: 0.5 mg.    butalbital-acetaminophen-caffeine (FIORICET, ESGIC) -40 mg per tablet TAKE 1 TABLET BY MOUTH EVERY 6 HOURS AS NEEDED FOR HEADACHE. NO MORE THAN 4 TABLETS PER DAY    temazepam (RESTORIL) 15 mg capsule TAKE 1 TO 2 CAPSULES BY MOUTH EVERY NIGHT AT BEDTIME    BYSTOLIC 5 mg tablet TAKE 1 TABLET BY MOUTH TWICE DAILY. HOLD DOSE IF SYSTOLIC PRESSURE IS LESS THAN 100 OR PULSE IS LESS THAN 50    benzonatate (TESSALON) 200 mg capsule Take 1 Cap by mouth three (3) times daily as needed for Cough.  levothyroxine (SYNTHROID) 50 mcg tablet Take 1 tablet Monday, Tuesday and Wednesday     chlorpheniramine-dm (CORICIDIN HBP COUGH AND COLD) 4-30 mg tab Take 1 tablet 2 times a day as needed    nebivolol (BYSTOLIC) 5 mg tablet Take 2 Tabs by mouth daily.  ondansetron (ZOFRAN ODT) 4 mg disintegrating tablet Dissolve 1 tab under tongue every 8 hours as needed for nausea    furosemide (LASIX) 20 mg tablet TAKE 1 TABLET BY MOUTH DAILY     No current facility-administered medications for this visit.          I have reviewed the nurses notes, vitals, problem list, allergy list, medical history, family medical, social history and medications. Objective:     Physical Exam:     Vitals:    09/26/18 1419 09/26/18 1425   BP: 140/80 144/80   Pulse: 76    Resp: 18    SpO2: 98%    Weight: 169 lb (76.7 kg)    Height: 5' 2\" (1.575 m)     Body mass index is 30.91 kg/(m^2). General: Well developed, in no acute distress. HEENT: No carotid bruits, no JVD, trach is midline. Heart:  Normal S1/S2 negative S3 or S4. Regular, no murmur, gallop or rub.   Respiratory: Clear bilaterally, no wheezing or rales  Abdomen:   Soft, non-tender, bowel sounds are active.   Extremities:  No edema, normal cap refill, no cyanosis. Neuro: A&Ox3, speech clear, gait stable. Skin: Skin color is normal. No rashes or lesions. No diaphoresis.   Vascular: 2+ pulses symmetric in all extremities        Data Review:       Cardiographics:    EKG: NSR, clockwise rotation    Cardiology Labs:    Results for orders placed or performed during the hospital encounter of 08/29/15   EKG, 12 LEAD, INITIAL   Result Value Ref Range    Ventricular Rate 99 BPM    Atrial Rate 99 BPM    P-R Interval 188 ms    QRS Duration 86 ms    Q-T Interval 340 ms    QTC Calculation (Bezet) 436 ms    Calculated P Axis 35 degrees    Calculated R Axis -15 degrees    Calculated T Axis 30 degrees    Diagnosis       Normal sinus rhythm  Normal ECG  When compared with ECG of 08-JUL-2013 19:21,  No significant change was found  Confirmed by Gerardo Chavez ([id number]) on 8/30/2015 1:55:05 PM     Results for orders placed or performed in visit on 07/16/13   HOLTER MONITOR, 24 HOURS    Narrative    ECG Monitor/24 hours, Complete    Reason for Holter Monitor   tachycardia    Heartbeat    Slowest 60  Average 83  Fastest  144      Results:   Underlying Rhythm: Normal sinus rhythm      Atrial Arrhythmias: paroxysmal supraventricular tachycardia             AV Conduction: normal    Ventricular Arrhythmias: premature ventricular contractions;  occasional     ST Segment Analysis:normal     Symptom Correlation:  None reported    Comment:   Sinus rhythm with paroxysmal atrial tachycardia upto 130 bpm  (asymptomatic). Nathalia Farias [place], [facility] [place]   Component Value Date/Time    Cholesterol, total 204 (H) 08/15/2018 03:51 PM    HDL Cholesterol 52 08/15/2018 03:51 PM    LDL, calculated 103 (H) 08/15/2018 03:51 PM    Triglyceride 247 (H) 08/15/2018 03:51 PM    CHOL/HDL Ratio 3.7 01/27/2010 05:43 PM       Lab Results   Component Value Date/Time    Sodium 140 08/15/2018 03:51 PM    Potassium 4.5 08/15/2018 03:51 PM    Chloride 102 08/15/2018 03:51 PM    CO2 23 08/15/2018 03:51 PM    Anion gap 12 05/03/2016 01:05 PM    Glucose 103 (H) 08/15/2018 03:51 PM    BUN 11 08/15/2018 03:51 PM    Creatinine 0.85 08/15/2018 03:51 PM    BUN/Creatinine ratio 13 08/15/2018 03:51 PM    GFR est AA 74 08/15/2018 03:51 PM    GFR est non-AA 64 08/15/2018 03:51 PM    Calcium 9.6 08/15/2018 03:51 PM    Bilirubin, total 0.3 08/15/2018 03:51 PM    AST (SGOT) 20 08/15/2018 03:51 PM    Alk. phosphatase 87 08/15/2018 03:51 PM    Protein, total 6.6 08/15/2018 03:51 PM    Albumin 4.8 (H) 08/15/2018 03:51 PM    Globulin 3.2 05/03/2016 01:05 PM    A-G Ratio 2.7 (H) 08/15/2018 03:51 PM    ALT (SGPT) 14 08/15/2018 03:51 PM          Assessment:       ICD-10-CM ICD-9-CM    1. Essential hypertension I10 401.9 AMB POC EKG ROUTINE W/ 12 LEADS, INTER & REP   2. Paroxysmal Inappropriate sinus tachycardia vs possible atrial tachycardia R00.0 427.89    3. Chronic ITP (idiopathic thrombocytopenia) (HCC) D69.3 287.31    4. Mild intermittent reactive airway disease without complication R85.24 468.54    5. CARVAJAL (dyspnea on exertion) R06.09 786.09    6. Iron deficiency anemia, unspecified iron deficiency anemia type D50.9 280.9    7. Gastroesophageal reflux disease with esophagitis K21.0 530.11    8. Fibromyalgia M79.7 729.1    9.  Primary osteoarthritis involving multiple joints M15.0 715.09          Discussion: Patient presents at this time stable from a cardiac perspective. Pleased with present cardiac status. Plan: 1. Continue same meds. Lipid profile and labs followed by PCP. Not at goal.    2.Encouraged to exercise to tolerance, lose weight and follow low fat, low cholesterol, low sodium predominantly Plant-based (consider Mediterranean) diet. Call with questions or concerns. Will follow up any test results by phone and/or f/u here in office if needed. Rajwinder Hurtado 3.Follow up: 6 MONTHS    I have discussed the diagnosis with the patient and the intended plan as seen in the above orders. The patient has received an after-visit summary and questions were answered concerning future plans. I have discussed any concerning medication side effects and warnings with the patient as well.     Gisel Weaver MD  9/26/2018

## 2018-09-26 NOTE — MR AVS SNAPSHOT
[facility] [address] [facility] [address] 
[phone] Patient: Christiana Cartagena MRN: [id number] YGL:2/1/2003 Visit Information Date & Time Provider Department Dept. Phone Encounter #  
 9/26/2018  2:30 PM Scott Derecknilson, [address] [facility] Cardiology Associate Idania Howell [phone] [id number] Your Appointments 1/15/2019  2:45 PM  
ROUTINE CARE with Deyvi Michael MD  
[facility]) Appt Note: routine follow up  
 [address] [facility] [address] [address] [id number] [phone] [address] [facility] [address] Upcoming Health Maintenance Date Due Shingrix Vaccine Age 50> (1 of 2) 6/3/1987 Influenza Age 5 to Adult 8/1/2018 Pneumococcal 65+ High/Highest Risk (2 of 2 - PPSV23) 8/15/2028* MEDICARE YEARLY EXAM 8/16/2019 GLAUCOMA SCREENING Q2Y 8/13/2020 COLONOSCOPY 5/11/2021 DTaP/Tdap/Td series (2 - Td) 4/15/2025 *Topic was postponed. The date shown is not the original due date. Allergies as of 9/26/2018  Review Complete On: 8/15/2018 By: Deyvi Michael MD  
  
 Severity Noted Reaction Type Reactions Epinephrine High 04/02/2010    Other (comments) BP drops and pass out Iodinated Contrast- Oral And Iv Dye High 05/21/2010    Shortness of Breath Novocain [Procaine] High 04/02/2010    Palpitations BP drops,passes out Codeine  04/02/2010    Itching Pt does not recall Cymbalta [Duloxetine]  12/15/2014    Other (comments) Head Pain and nausea Daypro [Oxaprozin]  04/02/2010    Unknown (comments) Pt does not recall Prednisone  06/28/2013    Shortness of Breath  
 agitation Sulfa (Sulfonamide Antibiotics)  04/02/2010    Unknown (comments) Pt does not recall Tetracycline  10/17/2017    Other (comments) Low platelet count Zithromax [Azithromycin]  08/10/2015    Nausea Only Diarrhea, headaches Current Immunizations  Reviewed on 8/15/2018 Name Date Influenza High Dose Vaccine PF 10/17/2017, 10/11/2016, 9/28/2015, 10/27/2014 Influenza Vaccine 10/23/2013 Influenza Vaccine Split 10/9/2012, 10/12/2011, 10/11/2010 Pneumococcal Conjugate (PCV-13) 4/15/2015 Pneumococcal Vaccine (Pcv) 10/23/2007 Tdap 4/15/2015 Not reviewed this visit You Were Diagnosed With   
  
 Codes Comments Essential hypertension    -  Primary ICD-10-CM: I10 
ICD-9-CM: 401.9 Vitals BP Pulse Resp Height(growth percentile) Weight(growth percentile) SpO2  
 144/80 (BP 1 Location: Right arm, BP Patient Position: Sitting) 76 18 5' 2\" (1.575 m) 169 lb (76.7 kg) 98% BMI OB Status Smoking Status 30.91 kg/m2 Hysterectomy Never Smoker Vitals History BMI and BSA Data Body Mass Index Body Surface Area 30.91 kg/m 2 1.83 m 2 Preferred Pharmacy Pharmacy Name Phone [facility] [address] [phone] Your Updated Medication List  
  
   
This list is accurate as of 9/26/18  3:09 PM.  Always use your most recent med list.  
  
  
  
  
 ALPRAZolam 0.5 mg tablet Commonly known as:  [place] Elva Take 1 Tab by mouth daily. Max Daily Amount: 0.5 mg. Arthritis Pain Relief 650 mg Rhina  Generic drug:  acetaminophen Take 650 mg by mouth every eight (8) hours. benzonatate 200 mg capsule Commonly known as:  TESSALON Take 1 Cap by mouth three (3) times daily as needed for Cough. butalbital-acetaminophen-caffeine -40 mg per tablet Commonly known as:  FIORICET, ESGIC  
TAKE 1 TABLET BY MOUTH EVERY 6 HOURS AS NEEDED FOR HEADACHE. NO MORE THAN 4 TABLETS PER DAY  
  
 cetirizine 10 mg tablet Commonly known as:  ZYRTEC Take 10 mg by mouth daily as needed for Allergies. CORICIDIN HBP COUGH AND COLD 4-30 mg Tab Generic drug:  chlorpheniramine-dm Take 1 tablet 2 times a day as needed  
  
 furosemide 20 mg tablet Commonly known as:  LASIX TAKE 1 TABLET BY MOUTH DAILY  
  
 levothyroxine 50 mcg tablet Commonly known as:  SYNTHROID Take 1 tablet Monday, Tuesday and Wednesday * nebivolol 5 mg tablet Commonly known as:  BYSTOLIC Take 2 Tabs by mouth daily. * BYSTOLIC 5 mg tablet Generic drug:  nebivolol TAKE 1 TABLET BY MOUTH TWICE DAILY. HOLD DOSE IF SYSTOLIC PRESSURE IS LESS THAN 100 OR PULSE IS LESS THAN 50 NPLATE SC  
by SubCUTAneous route every seven (7) days. ondansetron 4 mg disintegrating tablet Commonly known as:  ZOFRAN ODT Dissolve 1 tab under tongue every 8 hours as needed for nausea  
  
 temazepam 15 mg capsule Commonly known as:  RESTORIL  
TAKE 1 TO 2 CAPSULES BY MOUTH EVERY NIGHT AT BEDTIME  
  
 * Notice: This list has 2 medication(s) that are the same as other medications prescribed for you. Read the directions carefully, and ask your doctor or other care provider to review them with you. We Performed the Following AMB POC EKG ROUTINE W/ 12 LEADS, INTER & REP [98346 CPT(R)] Introducing [facility] SERVICES! Dear Apurva Tom: 
Thank you for requesting a [employer] account. Our records indicate that you already have an active [employer] account. You can access your account anytime at [email or web address]. [employer] Did you know that you can access your hospital and ER discharge instructions at any time in [employer]? You can also review all of your test results from your hospital stay or ER visit. Additional Information If you have questions, please visit the Frequently Asked Questions section of the [employer] website at [email or web address] [employer]/. Remember, [employer] is NOT to be used for urgent needs. For medical emergencies, dial 911. Now available from your iPhone and Android! Please provide this summary of care documentation to your next provider. Your primary care clinician is listed as Tiny Caceres. If you have any questions after today's visit, please call [phone].

## 2018-09-26 NOTE — PROGRESS NOTES
1. Have you been to the ER, urgent care clinic since your last visit? Hospitalized since your last visit? No    2. Have you seen or consulted any other health care providers outside of the [address] [place] [address] since your last visit? Include any pap smears or colon screening. No    Chief Complaint   Patient presents with    Hypertension     Yearly appt. Denied cardiac symptoms.

## 2018-10-13 DIAGNOSIS — Z86.69 HISTORY OF MIGRAINE: ICD-10-CM

## 2018-10-15 RX ORDER — BUTALBITAL, ACETAMINOPHEN AND CAFFEINE 50; 325; 40 MG/1; MG/1; MG/1
TABLET ORAL
Qty: 30 TAB | Refills: 0 | Status: SHIPPED | OUTPATIENT
Start: 2018-10-15 | End: 2018-12-02 | Stop reason: SDUPTHER

## 2018-11-20 ENCOUNTER — HOSPITAL ENCOUNTER (OUTPATIENT)
Dept: MAMMOGRAPHY | Age: 83
Discharge: HOME | End: 2018-11-20
Attending: FAMILY MEDICINE
Payer: MEDICARE

## 2018-11-20 DIAGNOSIS — Z12.39 SCREENING BREAST EXAMINATION: ICD-10-CM

## 2018-11-20 PROCEDURE — 77067 SCR MAMMO BI INCL CAD: CPT

## 2018-12-02 DIAGNOSIS — Z86.69 HISTORY OF MIGRAINE: ICD-10-CM

## 2018-12-03 RX ORDER — BUTALBITAL, ACETAMINOPHEN AND CAFFEINE 50; 325; 40 MG/1; MG/1; MG/1
TABLET ORAL
Qty: 30 TAB | Refills: 0 | Status: SHIPPED | OUTPATIENT
Start: 2018-12-03 | End: 2019-01-12 | Stop reason: SDUPTHER

## 2018-12-13 DIAGNOSIS — F51.01 PRIMARY INSOMNIA: ICD-10-CM

## 2018-12-14 RX ORDER — TEMAZEPAM 15 MG/1
CAPSULE ORAL
Qty: 60 CAP | Refills: 1 | OUTPATIENT
Start: 2018-12-14 | End: 2019-02-13 | Stop reason: SDUPTHER

## 2018-12-17 DIAGNOSIS — F41.9 ANXIETY: ICD-10-CM

## 2018-12-17 RX ORDER — ALPRAZOLAM 0.5 MG/1
0.5 TABLET ORAL DAILY
Qty: 30 TAB | Refills: 3 | OUTPATIENT
Start: 2018-12-17 | End: 2019-02-17 | Stop reason: SDUPTHER

## 2019-01-12 DIAGNOSIS — Z86.69 HISTORY OF MIGRAINE: ICD-10-CM

## 2019-01-14 RX ORDER — CYCLOBENZAPRINE HCL 10 MG
TABLET ORAL
Qty: 30 TAB | Refills: 0 | Status: SHIPPED | OUTPATIENT
Start: 2019-01-14 | End: 2019-02-18 | Stop reason: SDUPTHER

## 2019-01-14 RX ORDER — BUTALBITAL, ACETAMINOPHEN AND CAFFEINE 50; 325; 40 MG/1; MG/1; MG/1
TABLET ORAL
Qty: 30 TAB | Refills: 0 | Status: SHIPPED | OUTPATIENT
Start: 2019-01-14 | End: 2019-01-27 | Stop reason: SDUPTHER

## 2019-01-15 ENCOUNTER — OFFICE VISIT (OUTPATIENT)
Dept: FAMILY MEDICINE CLINIC | Age: 84
End: 2019-01-15

## 2019-01-15 NOTE — PROGRESS NOTES
HISTORY OF PRESENT ILLNESS Elias Anglin is a 82 y.o. female. HPI  
1 month follow up on BP. Has been monitoring BP at home and has been in the 120s/70-80. Cardiovascular Review: 
The patient has hypertension, hyperlipidemia, and atrial tachycardia. Has been monitoring BPs which has been elevated over the past several weeks. Diet and Lifestyle: generally follows a low fat low cholesterol diet, generally follows a low sodium diet, exercises sporadically Pertinent ROS: taking medications as instructed, no medication side effects noted, no TIA's, no chest pain on exertion, no dyspnea on exertion, no swelling of ankles, no palpitations. Patient Active Problem List  
Diagnosis Code  Hypothyroidism E03.9  
 HTN (hypertension) I10  
 OA (osteoarthritis) M19.90  GERD (gastroesophageal reflux disease) K21.9  Fibromyalgia M79.7  Insomnia G47.00  
 Headache(784.0) R51  Iron deficiency anemia, unspecified D50.9  CARVAJAL (dyspnea on exertion) R06.09  
 Heart palpitations R00.2  Paroxysmal Inappropriate sinus tachycardia vs possible atrial tachycardia R00.0  Edema leg R60.0  Reactive airway disease without complication K02.349  Incidental lung nodule, greater than or equal to 8mm R91.1  Encounter for medication monitoring Z51.81  
 Preop cardiovascular exam Z01.810  
 Pelvic lymphadenopathy R59.0  Chronic ITP (idiopathic thrombocytopenia) (HCC) D69.3 Current Outpatient Medications Medication Sig Dispense Refill  cyclobenzaprine (FLEXERIL) 10 mg tablet TAKE 1 TABLET BY MOUTH THREE TIMES DAILY AS NEEDED FOR MUSCLE SPASMS 30 Tab 0  
 butalbital-acetaminophen-caffeine (FIORICET, ESGIC) -40 mg per tablet TAKE 1 TABLET BY MOUTH EVERY 6 HOURS AS NEEDED FOR HEADACHE MAXIMUM DAILY DOSE IS: 4 TABLETS 30 Tab 0  ALPRAZolam (XANAX) 0.5 mg tablet Take 1 Tab by mouth daily.  Max Daily Amount: 0.5 mg. 30 Tab 3  
  temazepam (RESTORIL) 15 mg capsule TAKE 1 TO 2 CAPSULES BY MOUTH EVERY NIGHT AT BEDTIME 60 Cap 1  romiplostim (NPLATE SC) by SubCUTAneous route every seven (7) days.  ondansetron (ZOFRAN ODT) 4 mg disintegrating tablet Dissolve 1 tab under tongue every 8 hours as needed for nausea  0  
 acetaminophen (ARTHRITIS PAIN RELIEF) 650 mg TbER Take 650 mg by mouth every eight (8) hours.  cetirizine (ZYRTEC) 10 mg tablet Take 10 mg by mouth daily as needed for Allergies.  BYSTOLIC 5 mg tablet TAKE 1 TABLET BY MOUTH TWICE DAILY. HOLD DOSE IF SYSTOLIC PRESSURE IS LESS THAN 100 OR PULSE IS LESS THAN 50 60 Tab 11  
 benzonatate (TESSALON) 200 mg capsule Take 1 Cap by mouth three (3) times daily as needed for Cough. 30 Cap 1  
 levothyroxine (SYNTHROID) 50 mcg tablet Take 1 tablet Monday, Tuesday and Wednesday  chlorpheniramine-dm (CORICIDIN HBP COUGH AND COLD) 4-30 mg tab Take 1 tablet 2 times a day as needed  nebivolol (BYSTOLIC) 5 mg tablet Take 2 Tabs by mouth daily. 60 Tab 6  furosemide (LASIX) 20 mg tablet TAKE 1 TABLET BY MOUTH DAILY 90 Tab 1 Allergies Allergen Reactions  Epinephrine Other (comments) BP drops and pass out  Iodinated Contrast- Oral And Iv Dye Shortness of Breath  Novocain [Procaine] Palpitations BP drops,passes out  Codeine Itching Pt does not recall  Cymbalta [Duloxetine] Other (comments) Head Pain and nausea  Daypro [Oxaprozin] Unknown (comments) Pt does not recall  Prednisone Shortness of Breath  
  agitation  Sulfa (Sulfonamide Antibiotics) Unknown (comments) Pt does not recall  Tetracycline Other (comments) Low platelet count  Zithromax [Azithromycin] Nausea Only Diarrhea, headaches Past Medical History:  
Diagnosis Date  Arrhythmia   
 tachycardia; Dr. Jatin Guallpa  Autoimmune disease ([facility]   
 fibromyalgia  Chronic ITP (idiopathic thrombocytopenia) (HCC)  Fibromyalgia  GERD (gastroesophageal reflux disease)  Headache(784.0)   
 migraine  Heart failure ([address]  HTN (hypertension) 4/2/2010  Hypothyroidism 4/2/2010  OA (osteoarthritis) 4/2/2010  Seizures ([address] 56 years ago with childbirth  Tachycardia 2013  Thromboembolus ([facility] blood clot on brain 56 years ago w childbirth  Thyroid disease Past Surgical History:  
Procedure Laterality Date  COLONOSCOPY,REMV LESN,SNARE  2/5/2015  ENDOSCOPY, COLON, DIAGNOSTIC  12/2010 Dr. Sheeba Johnson- repeat 5 yrs  HX BREAST BIOPSY Right 1999  
 more than one yrs ago all benign  HX CARPAL TUNNEL RELEASE  9/28/2012  HX CATARACT REMOVAL  9/2014  
 bilateral   
 HX HYSTERECTOMY  1977  HX REFRACTIVE SURGERY  07/2018 Family History Problem Relation Age of Onset  Heart Disease Paternal Aunt  Heart Disease Paternal Uncle  Colon Cancer Maternal Grandmother  No Known Problems Mother  No Known Problems Father  Other Daughter   
     fibromyalgia  Heart Disease Maternal Grandfather  Hypertension Sister Social History Tobacco Use  Smoking status: Never Smoker  Smokeless tobacco: Never Used Substance Use Topics  Alcohol use: No  
  Alcohol/week: 0.0 oz Lab Results Component Value Date/Time WBC 3.9 11/15/2017 10:15 AM  
 HGB 10.7 (L) 11/15/2017 10:15 AM  
 Hemoglobin (POC) 11.6 07/14/2017 07:13 AM  
 HCT 34.0 (L) 11/15/2017 10:15 AM  
 Hematocrit (POC) 34 (L) 07/14/2017 07:13 AM  
 PLATELET 46 (LL) 66/06/9872 10:15 AM  
 MCV 82.3 11/15/2017 10:15 AM  
 
Lab Results Component Value Date/Time Cholesterol, total 204 (H) 08/15/2018 03:51 PM  
 HDL Cholesterol 52 08/15/2018 03:51 PM  
 LDL, calculated 103 (H) 08/15/2018 03:51 PM  
 Triglyceride 247 (H) 08/15/2018 03:51 PM  
 CHOL/HDL Ratio 3.7 01/27/2010 05:43 PM  
 
Lab Results Component Value Date/Time  TSH 0.459 04/12/2017 03:59 PM  
 T4, Free 1.18 10/11/2016 03:41 PM  
  
Lab Results Component Value Date/Time Sodium 140 08/15/2018 03:51 PM  
 Potassium 4.5 08/15/2018 03:51 PM  
 Chloride 102 08/15/2018 03:51 PM  
 CO2 23 08/15/2018 03:51 PM  
 Anion gap 12 05/03/2016 01:05 PM  
 Glucose 103 (H) 08/15/2018 03:51 PM  
 BUN 11 08/15/2018 03:51 PM  
 Creatinine 0.85 08/15/2018 03:51 PM  
 BUN/Creatinine ratio 13 08/15/2018 03:51 PM  
 GFR est AA 74 08/15/2018 03:51 PM  
 GFR est non-AA 64 08/15/2018 03:51 PM  
 Calcium 9.6 08/15/2018 03:51 PM  
 Bilirubin, total 0.3 08/15/2018 03:51 PM  
 ALT (SGPT) 14 08/15/2018 03:51 PM  
 AST (SGOT) 20 08/15/2018 03:51 PM  
 Alk. phosphatase 87 08/15/2018 03:51 PM  
 Protein, total 6.6 08/15/2018 03:51 PM  
 Albumin 4.8 (H) 08/15/2018 03:51 PM  
 Globulin 3.2 05/03/2016 01:05 PM  
 A-G Ratio 2.7 (H) 08/15/2018 03:51 PM  
  
Review of Systems Constitutional: Negative for malaise/fatigue. HENT: Negative for congestion. Eyes: Negative for blurred vision. Respiratory: Negative for cough and shortness of breath. Cardiovascular: Negative for chest pain, palpitations and leg swelling. Gastrointestinal: Negative for abdominal pain, constipation and heartburn. Genitourinary: Negative for dysuria, frequency and urgency. Musculoskeletal: Negative for back pain and joint pain. Neurological: Negative for dizziness, tingling and headaches. Endo/Heme/Allergies: Negative for environmental allergies. Psychiatric/Behavioral: Negative for depression. The patient does not have insomnia. Physical Exam  
Constitutional: She appears well-developed and well-nourished. /73 (BP 1 Location: Right arm, BP Patient Position: Sitting)  Pulse 89  Temp 96.4 °F (35.8 °C) (Oral)   Resp 16  Ht 5' 2\" (1.575 m)  Wt 175 lb 3.2 oz (79.5 kg)  SpO2 96%  BMI 32.04 kg/m2 HENT:  
Right Ear: Tympanic membrane and ear canal normal.  
Left Ear: Tympanic membrane and ear canal normal.  
 Nose: No mucosal edema or rhinorrhea. Mouth/Throat: Oropharynx is clear and moist and mucous membranes are normal.  
Neck: Normal range of motion. Neck supple. No thyromegaly present. Cardiovascular: Normal rate and regular rhythm. No murmur heard. Pulmonary/Chest: Effort normal and breath sounds normal.  
Abdominal: Soft. Bowel sounds are normal. There is no tenderness. Musculoskeletal: Normal range of motion. She exhibits no edema. Lymphadenopathy:  
  She has no cervical adenopathy. Skin: Skin is warm and dry. Psychiatric: She has a normal mood and affect. Nursing note and vitals reviewed. ASSESSMENT and PLAN 
{ASSESSMENT/PLAN:[id number]}

## 2019-01-27 DIAGNOSIS — Z86.69 HISTORY OF MIGRAINE: ICD-10-CM

## 2019-01-27 RX ORDER — BUTALBITAL, ACETAMINOPHEN AND CAFFEINE 50; 325; 40 MG/1; MG/1; MG/1
TABLET ORAL
Qty: 30 TAB | Refills: 0 | Status: SHIPPED | OUTPATIENT
Start: 2019-01-27 | End: 2019-03-20 | Stop reason: SDUPTHER

## 2019-02-13 DIAGNOSIS — F51.01 PRIMARY INSOMNIA: ICD-10-CM

## 2019-02-13 RX ORDER — TEMAZEPAM 15 MG/1
CAPSULE ORAL
Qty: 60 CAP | Refills: 1 | OUTPATIENT
Start: 2019-02-13 | End: 2019-04-16 | Stop reason: SDUPTHER

## 2019-02-17 DIAGNOSIS — F41.9 ANXIETY: ICD-10-CM

## 2019-02-17 RX ORDER — ALPRAZOLAM 0.5 MG/1
TABLET ORAL
Qty: 30 TAB | Refills: 1 | Status: SHIPPED | OUTPATIENT
Start: 2019-02-17 | End: 2019-08-20

## 2019-02-18 ENCOUNTER — OFFICE VISIT (OUTPATIENT)
Dept: FAMILY MEDICINE CLINIC | Age: 84
End: 2019-02-18

## 2019-02-18 ENCOUNTER — HOSPITAL ENCOUNTER (OUTPATIENT)
Dept: LAB | Age: 84
Discharge: HOME | End: 2019-02-18
Payer: MEDICARE

## 2019-02-18 VITALS
TEMPERATURE: 99 F | OXYGEN SATURATION: 96 % | HEART RATE: 83 BPM | WEIGHT: 170.4 LBS | SYSTOLIC BLOOD PRESSURE: 135 MMHG | BODY MASS INDEX: 31.36 KG/M2 | RESPIRATION RATE: 16 BRPM | HEIGHT: 62 IN | DIASTOLIC BLOOD PRESSURE: 65 MMHG

## 2019-02-18 DIAGNOSIS — Z86.69 HISTORY OF MIGRAINE: ICD-10-CM

## 2019-02-18 DIAGNOSIS — Z51.81 ENCOUNTER FOR MEDICATION MONITORING: ICD-10-CM

## 2019-02-18 DIAGNOSIS — M79.7 FIBROMYALGIA: ICD-10-CM

## 2019-02-18 DIAGNOSIS — F51.01 PRIMARY INSOMNIA: ICD-10-CM

## 2019-02-18 DIAGNOSIS — I10 ESSENTIAL HYPERTENSION: Primary | ICD-10-CM

## 2019-02-18 DIAGNOSIS — D69.3 CHRONIC ITP (IDIOPATHIC THROMBOCYTOPENIA) (HCC): ICD-10-CM

## 2019-02-18 DIAGNOSIS — E03.4 HYPOTHYROIDISM DUE TO ACQUIRED ATROPHY OF THYROID: ICD-10-CM

## 2019-02-18 DIAGNOSIS — F41.9 ANXIETY: ICD-10-CM

## 2019-02-18 PROCEDURE — 36415 COLL VENOUS BLD VENIPUNCTURE: CPT

## 2019-02-18 PROCEDURE — 80061 LIPID PANEL: CPT

## 2019-02-18 PROCEDURE — 80053 COMPREHEN METABOLIC PANEL: CPT

## 2019-02-18 RX ORDER — CYCLOBENZAPRINE HCL 10 MG
TABLET ORAL
Qty: 30 TAB | Refills: 0 | Status: SHIPPED | OUTPATIENT
Start: 2019-02-18 | End: 2019-08-20 | Stop reason: SDUPTHER

## 2019-02-18 NOTE — PROGRESS NOTES
Chief Complaint Patient presents with  Hypertension  
  follow up  Thyroid Problem  
  follow up Mammogram 11/20/2018 Colonoscopy 8/1/2016 by Dr. Sherice Tam- repeat in 5 years Eye exam 8/13/2018 1. Have you been to the ER, urgent care clinic since your last visit? Hospitalized since your last visit? No 
 
2. Have you seen or consulted any other health care providers outside of the [address] [place] [address] since your last visit? Include any pap smears or colon screening.  No

## 2019-02-18 NOTE — PROGRESS NOTES
HISTORY OF PRESENT ILLNESS Chick Bishnu is a 82 y.o. female. HPI Follow up on BP. 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 Overall her plt count has been stable with treatment. She is getting an injection weekly. Cardiovascular Review: 
The patient has hypertension, hyperlipidemia, and atrial tachycardia. Has been monitoring BPs which has been in stable range. Diet and Lifestyle: generally follows a low fat low cholesterol diet, generally follows a low sodium diet, exercises sporadically Home BP Monitorin/80s. Pertinent ROS: taking medications as instructed, no medication side effects noted, no TIA's, no chest pain on exertion, no dyspnea on exertion, no swelling of ankles, no palpitations. Thyroid Review: 
Patient is seen for followup of hypothyroidism. Followed by endo. Has appt on tomorrow. Thyroid ROS: denies fatigue, weight changes, heat/cold intolerance, bowel/skin changes or CVS symptoms. HM: 
Mammogram 2018 Colonoscopy 2016 by Dr. Choco Calderón- repeat in 5 years Eye exam 2018 Patient Active Problem List  
Diagnosis Code  Hypothyroidism E03.9  
 HTN (hypertension) I10  
 OA (osteoarthritis) M19.90  GERD (gastroesophageal reflux disease) K21.9  Fibromyalgia M79.7  Insomnia G47.00  
 Headache(784.0) R51  Iron deficiency anemia, unspecified D50.9  CARVAJAL (dyspnea on exertion) R06.09  
 Heart palpitations R00.2  Paroxysmal Inappropriate sinus tachycardia vs possible atrial tachycardia R00.0  Edema leg R60.0  Reactive airway disease without complication H67.607  Incidental lung nodule, greater than or equal to 8mm R91.1  Encounter for medication monitoring Z51.81  
 Preop cardiovascular exam Z01.810  
 Pelvic lymphadenopathy R59.0  Chronic ITP (idiopathic thrombocytopenia) (HCC) D69.3 Current Outpatient Medications Medication Sig Dispense Refill  cyclobenzaprine (FLEXERIL) 10 mg tablet TAKE 1 TABLET BY MOUTH THREE TIMES DAILY AS NEEDED FOR MUSCLE SPASMS 30 Tab 0  ALPRAZolam (XANAX) 0.5 mg tablet TAKE 1 TABLET BY MOUTH DAILY MAXIMUM DAILY DOSE IS: 1 TABLET 30 Tab 1  
 temazepam (RESTORIL) 15 mg capsule TAKE 1 TO 2 CAPSULES BY MOUTH EVERY NIGHT AT BEDTIME 60 Cap 1  
 butalbital-acetaminophen-caffeine (FIORICET, ESGIC) -40 mg per tablet TAKE 1 TABLET BY MOUTH EVERY 6 HOURS AS NEEDED FOR HEADACHE MAXIMUM DAILY DOSE IS: 4 TABLETS 30 Tab 0  
 romiplostim (NPLATE SC) by SubCUTAneous route every seven (7) days.  ondansetron (ZOFRAN ODT) 4 mg disintegrating tablet Dissolve 1 tab under tongue every 8 hours as needed for nausea  0  
 acetaminophen (ARTHRITIS PAIN RELIEF) 650 mg TbER Take 650 mg by mouth every eight (8) hours as needed.  cetirizine (ZYRTEC) 10 mg tablet Take 10 mg by mouth daily as needed for Allergies.  benzonatate (TESSALON) 200 mg capsule Take 1 Cap by mouth three (3) times daily as needed for Cough. 30 Cap 1  
 levothyroxine (SYNTHROID) 50 mcg tablet Take 1 tablet Monday, Tuesday and Wednesday  chlorpheniramine-dm (CORICIDIN HBP COUGH AND COLD) 4-30 mg tab Take 1 tablet 2 times a day as needed  nebivolol (BYSTOLIC) 5 mg tablet Take 2 Tabs by mouth daily. 60 Tab 6 Allergies Allergen Reactions  Epinephrine Other (comments) BP drops and pass out  Iodinated Contrast- Oral And Iv Dye Shortness of Breath  Novocain [Procaine] Palpitations BP drops,passes out  Codeine Itching Pt does not recall  Cymbalta [Duloxetine] Other (comments) Head Pain and nausea  Daypro [Oxaprozin] Unknown (comments) Pt does not recall  Prednisone Shortness of Breath  
  agitation  Sulfa (Sulfonamide Antibiotics) Unknown (comments) Pt does not recall  Tetracycline Other (comments) Low platelet count  Zithromax [Azithromycin] Nausea Only Diarrhea, headaches Past Medical History:  
Diagnosis Date  Arrhythmia tachycardia; Dr. Gema Warner  Autoimmune disease ([facility] [address]   
 fibromyalgia  Chronic ITP (idiopathic thrombocytopenia) (HCC)  Fibromyalgia  GERD (gastroesophageal reflux disease)  Headache(784.0)   
 migraine  Heart failure ([facility] [address]  HTN (hypertension) 4/2/2010  Hypothyroidism 4/2/2010  OA (osteoarthritis) 4/2/2010  Seizures ([facility] [address] 56 years ago with childbirth  Tachycardia 2013  Thromboembolus ([facility] [address] blood clot on brain 56 years ago w childbirth  Thyroid disease Past Surgical History:  
Procedure Laterality Date  COLONOSCOPY,REMV LESN,SNARE  2/5/2015  ENDOSCOPY, COLON, DIAGNOSTIC  12/2010 Dr. Parish Moran- repeat 5 yrs  HX BREAST BIOPSY Right 1999  
 more than one yrs ago all benign  HX CARPAL TUNNEL RELEASE  9/28/2012  HX CATARACT REMOVAL  9/2014  
 bilateral   
 HX HYSTERECTOMY  1977  HX REFRACTIVE SURGERY  07/2018 Family History Problem Relation Age of Onset  Heart Disease Paternal Aunt  Heart Disease Paternal Uncle  Colon Cancer Maternal Grandmother  No Known Problems Mother  No Known Problems Father  Other Daughter   
     fibromyalgia  Heart Disease Maternal Grandfather  Other Sister Raynauds Social History Tobacco Use  Smoking status: Never Smoker  Smokeless tobacco: Never Used Substance Use Topics  Alcohol use: No  
  Alcohol/week: 0.0 oz Lab Results Component Value Date/Time WBC 3.9 11/15/2017 10:15 AM  
 Hemoglobin (POC) 11.6 07/14/2017 07:13 AM  
 HGB 10.7 (L) 11/15/2017 10:15 AM  
 Hematocrit (POC) 34 (L) 07/14/2017 07:13 AM  
 HCT 34.0 (L) 11/15/2017 10:15 AM  
 PLATELET 46 (LL) 87/14/4894 10:15 AM  
 MCV 82.3 11/15/2017 10:15 AM  
 
 
Lab Results Component Value Date/Time  Cholesterol, total 204 (H) 08/15/2018 03:51 PM  
 HDL Cholesterol 52 08/15/2018 03:51 PM  
 LDL, calculated 103 (H) 08/15/2018 03:51 PM  
 Triglyceride 247 (H) 08/15/2018 03:51 PM  
 CHOL/HDL Ratio 3.7 01/27/2010 05:43 PM  
 
 
Lab Results Component Value Date/Time TSH 0.459 04/12/2017 03:59 PM  
 T4, Free 1.18 10/11/2016 03:41 PM  
  
Lab Results Component Value Date/Time Sodium 140 08/15/2018 03:51 PM  
 Potassium 4.5 08/15/2018 03:51 PM  
 Chloride 102 08/15/2018 03:51 PM  
 CO2 23 08/15/2018 03:51 PM  
 Anion gap 12 05/03/2016 01:05 PM  
 Glucose 103 (H) 08/15/2018 03:51 PM  
 BUN 11 08/15/2018 03:51 PM  
 Creatinine 0.85 08/15/2018 03:51 PM  
 BUN/Creatinine ratio 13 08/15/2018 03:51 PM  
 GFR est AA 74 08/15/2018 03:51 PM  
 GFR est non-AA 64 08/15/2018 03:51 PM  
 Calcium 9.6 08/15/2018 03:51 PM  
 Bilirubin, total 0.3 08/15/2018 03:51 PM  
 ALT (SGPT) 14 08/15/2018 03:51 PM  
 AST (SGOT) 20 08/15/2018 03:51 PM  
 Alk. phosphatase 87 08/15/2018 03:51 PM  
 Protein, total 6.6 08/15/2018 03:51 PM  
 Albumin 4.8 (H) 08/15/2018 03:51 PM  
 Globulin 3.2 05/03/2016 01:05 PM  
 A-G Ratio 2.7 (H) 08/15/2018 03:51 PM  
  
Lab Results Component Value Date/Time Hemoglobin A1c 5.9 (H) 08/28/2012 04:22 PM  
 Hemoglobin A1c (POC) 5.7 11/10/2015 03:16 PM  
 Hemoglobin A1c, External 11.1 07/22/2015 Review of Systems Constitutional: Negative for malaise/fatigue. HENT: Negative for congestion. Eyes: Negative for blurred vision. Respiratory: Negative for cough and shortness of breath. Cardiovascular: Negative for chest pain, palpitations and leg swelling. Gastrointestinal: Negative for abdominal pain, constipation and heartburn. Genitourinary: Negative for dysuria, frequency and urgency. Neurological: Negative for dizziness, tingling and headaches. Endo/Heme/Allergies: Negative for environmental allergies. Psychiatric/Behavioral: Negative for depression. The patient does not have insomnia. Physical Exam  
Constitutional: She appears well-developed and well-nourished. /65 (BP 1 Location: Left arm, BP Patient Position: Sitting)   Pulse 83   Temp 99 °F (37.2 °C) (Oral)   Resp 16   Ht 5' 2\" (1.575 m)   Wt 170 lb 6.4 oz (77.3 kg)   SpO2 96%   BMI 31.17 kg/m² HENT:  
Right Ear: Tympanic membrane and ear canal normal.  
Left Ear: Tympanic membrane and ear canal normal.  
Nose: No mucosal edema or rhinorrhea. Mouth/Throat: Oropharynx is clear and moist and mucous membranes are normal.  
Neck: Normal range of motion. Neck supple. No thyromegaly present. Cardiovascular: Normal rate and regular rhythm. No murmur heard. Pulmonary/Chest: Effort normal and breath sounds normal.  
Abdominal: Soft. Bowel sounds are normal. There is no tenderness. Musculoskeletal: Normal range of motion. She exhibits no edema. Lymphadenopathy:  
  She has no cervical adenopathy. Neurological: She has normal strength. No sensory deficit. Coordination and gait normal.  
Skin: Skin is warm and dry. Psychiatric: She has a normal mood and affect. Nursing note and vitals reviewed. ASSESSMENT and PLAN Diagnoses and all orders for this visit: 1. Essential hypertension -     LIPID PANEL 2. Hypothyroidism due to acquired atrophy of thyroid As per endo 3. Chronic ITP (idiopathic thrombocytopenia) (HCC) As per hematology 4. History of migraine Has been having less frequent headaches,  Seem to get a \"bunch of them together\" for several days then subside for a while. 5. Anxiety Stable 6. Primary insomnia Stable 7. Encounter for medication monitoring -     METABOLIC PANEL, COMPREHENSIVE 8. Fibromyalgia -     Refill cyclobenzaprine (FLEXERIL) 10 mg tablet; TAKE 1 TABLET BY MOUTH THREE TIMES DAILY AS NEEDED FOR MUSCLE SPASMS Follow-up Disposition: 
Return in about 6 months (around 8/18/2019) for medicare wellness exam. 
reviewed diet, exercise and weight control 
cardiovascular risk and specific lipid/LDL goals reviewed reviewed medications and side effects in detail I have discussed diagnosis listed in this note with pt and/or family. I have discussed treatment plans and options and the risk/benefit analysis of those options, including safe use of medications and possible medication side effects. Through the use of shared decision making we have agreed to the above plan. The patient has received an after-visit summary and questions were answered concerning future plans and follow up. Advise pt of any urgent changes then to proceed to the ER.

## 2019-02-19 LAB
ALBUMIN SERPL-MCNC: 4.8 G/DL (ref 3.5–4.7)
ALBUMIN/GLOB SERPL: 2.7 {RATIO} (ref 1.2–2.2)
ALP SERPL-CCNC: 78 IU/L (ref 39–117)
ALT SERPL-CCNC: 20 IU/L (ref 0–32)
AST SERPL-CCNC: 28 IU/L (ref 0–40)
BILIRUB SERPL-MCNC: 0.5 MG/DL (ref 0–1.2)
BUN SERPL-MCNC: 16 MG/DL (ref 8–27)
BUN/CREAT SERPL: 21 (ref 12–28)
CALCIUM SERPL-MCNC: 9.7 MG/DL (ref 8.7–10.3)
CHLORIDE SERPL-SCNC: 102 MMOL/L (ref 96–106)
CHOLEST SERPL-MCNC: 205 MG/DL (ref 100–199)
CO2 SERPL-SCNC: 23 MMOL/L (ref 20–29)
CREAT SERPL-MCNC: 0.76 MG/DL (ref 0.57–1)
GLOBULIN SER CALC-MCNC: 1.8 G/DL (ref 1.5–4.5)
GLUCOSE SERPL-MCNC: 104 MG/DL (ref 65–99)
HDLC SERPL-MCNC: 56 MG/DL
INTERPRETATION, 910389: NORMAL
LDLC SERPL CALC-MCNC: 105 MG/DL (ref 0–99)
POTASSIUM SERPL-SCNC: 4.5 MMOL/L (ref 3.5–5.2)
PROT SERPL-MCNC: 6.6 G/DL (ref 6–8.5)
SODIUM SERPL-SCNC: 143 MMOL/L (ref 134–144)
TRIGL SERPL-MCNC: 220 MG/DL (ref 0–149)
VLDLC SERPL CALC-MCNC: 44 MG/DL (ref 5–40)

## 2019-04-10 ENCOUNTER — OFFICE VISIT (OUTPATIENT)
Dept: CARDIOLOGY CLINIC | Age: 84
End: 2019-04-10

## 2019-04-10 ENCOUNTER — TELEPHONE (OUTPATIENT)
Dept: CARDIOLOGY CLINIC | Age: 84
End: 2019-04-10

## 2019-04-10 VITALS
DIASTOLIC BLOOD PRESSURE: 80 MMHG | OXYGEN SATURATION: 97 % | HEART RATE: 80 BPM | RESPIRATION RATE: 18 BRPM | BODY MASS INDEX: 32.39 KG/M2 | HEIGHT: 60 IN | WEIGHT: 165 LBS | SYSTOLIC BLOOD PRESSURE: 116 MMHG

## 2019-04-10 DIAGNOSIS — I10 ESSENTIAL HYPERTENSION: Primary | ICD-10-CM

## 2019-04-10 DIAGNOSIS — D69.3 CHRONIC ITP (IDIOPATHIC THROMBOCYTOPENIA) (HCC): ICD-10-CM

## 2019-04-10 DIAGNOSIS — E78.00 HYPERCHOLESTEROLEMIA: ICD-10-CM

## 2019-04-10 DIAGNOSIS — I50.22 CHRONIC SYSTOLIC HEART FAILURE (HCC): ICD-10-CM

## 2019-04-10 RX ORDER — PRAVASTATIN SODIUM 10 MG/1
10 TABLET ORAL
Qty: 30 TAB | Refills: 6 | Status: SHIPPED | OUTPATIENT
Start: 2019-04-10 | End: 2019-10-16 | Stop reason: SDUPTHER

## 2019-04-10 NOTE — PROGRESS NOTES
1. Have you been to the ER, urgent care clinic since your last visit? Hospitalized since your last visit? No    2. Have you seen or consulted any other health care providers outside of the [address] [place] [address] since your last visit? Include any pap smears or colon screening. No    Chief Complaint   Patient presents with    Hypertension     6 mo appt. Denied cardiac symptoms.

## 2019-04-11 PROBLEM — E78.00 HYPERCHOLESTEROLEMIA: Status: ACTIVE | Noted: 2019-04-11

## 2019-04-11 NOTE — PROGRESS NOTES
[address] [place] [address]        [phone]                             NEW PATIENT HPI/FOLLOW-UP    NAME:  Arely Query   :   1935   MRN:   [id number]   PCP:  Jesus Ramey MD           Subjective: The patient is a 82y.o. year old female  who returns for a routine follow-up. Since the last visit, patient reports no new symptoms. Thrombocytopenia and lymphadenopathy being followed by PCP and Hem. Denies change in exercise tolerance, chest pain, edema, medication intolerance, palpitations, shortness of breath, PND/orthopnea wheezing, sputum, syncope, dizziness or light headedness. Doing satisfactorily. Review of Systems  General: Pt denies excessive weight gain or loss. Pt is able to conduct ADL's. Respiratory: Denies shortness of breath, CARVAJAL, wheezing or stridor.   Cardiovascular: Denies precordial pain, palpitations, edema or PND  Gastrointestinal: Denies poor appetite, indigestion, abdominal pain or blood in stool  Peripheral vascular: Denies claudication, leg cramps  Neurological: Denies paresthesias, tingling.numbness  Psychiatric: Denies anxiety,depression,fatigue  Musculoskeletal: Denies pain,tenderness, soreness,swelling      Past Medical History:   Diagnosis Date    Arrhythmia     tachycardia; Dr. Juventino Underwood Autoimmune disease ([address]     fibromyalgia    Chronic ITP (idiopathic thrombocytopenia) (HCC)     Fibromyalgia     GERD (gastroesophageal reflux disease)     Headache(784.0)     migraine    Heart failure (HCC)     HTN (hypertension) 2010    Hypothyroidism 2010    OA (osteoarthritis) 2010    Seizures ([address]     56 years ago with childbirth    Tachycardia     Thromboembolus ([address]     blood clot on brain 56 years ago w childbirth    Thyroid disease      Patient Active Problem List    Diagnosis Date Noted    Iron deficiency anemia, unspecified 2013     Priority: 1 - One    Chronic ITP (idiopathic thrombocytopenia) ([facility] [address]     Pelvic lymphadenopathy 07/14/2017    Preop cardiovascular exam 06/07/2017    Encounter for medication monitoring 10/11/2016    Incidental lung nodule, greater than or equal to 8mm 05/11/2016    Reactive airway disease without complication 41/22/5852    Edema leg 06/19/2015    Heart palpitations 07/25/2013    Paroxysmal Inappropriate sinus tachycardia vs possible atrial tachycardia 07/25/2013    CARVAJAL (dyspnea on exertion) 06/28/2013    Headache(784.0)     Insomnia 06/01/2010    GERD (gastroesophageal reflux disease)     Fibromyalgia     Hypothyroidism 04/02/2010    HTN (hypertension) 04/02/2010    OA (osteoarthritis) 04/02/2010      Past Surgical History:   Procedure Laterality Date    COLONOSCOPY,REMV Ronelle Naz  2/5/2015         ENDOSCOPY, COLON, DIAGNOSTIC  12/2010    Dr. Denzel Gaspar- repeat 5 yrs    HX BREAST BIOPSY Right 1999    more than one yrs ago all benign    HX CARPAL TUNNEL RELEASE  9/28/2012    HX CATARACT REMOVAL  9/2014    bilateral     HX HYSTERECTOMY  1977    HX REFRACTIVE SURGERY  07/2018     Allergies   Allergen Reactions    Epinephrine Other (comments)     BP drops and pass out    Iodinated Contrast- Oral And Iv Dye Shortness of Breath    Novocain [Procaine] Palpitations     BP drops,passes out    Codeine Itching     Pt does not recall    Cymbalta [Duloxetine] Other (comments)     Head Pain and nausea    Daypro [Oxaprozin] Unknown (comments)     Pt does not recall    Prednisone Shortness of Breath     agitation    Sulfa (Sulfonamide Antibiotics) Unknown (comments)     Pt does not recall    Tetracycline Other (comments)     Low platelet count    Zithromax [Azithromycin] Nausea Only     Diarrhea, headaches      Family History   Problem Relation Age of Onset    Heart Disease Paternal Aunt     Heart Disease Paternal Uncle     Colon Cancer Maternal Grandmother     No Known Problems Mother     No Known Problems Father     Other Daughter         fibromyalgia    Heart Disease Maternal Grandfather     Other Sister         Raynauds      Social History     Socioeconomic History    Marital status:      Spouse name: Not on file    Number of children: Not on file    Years of education: Not on file    Highest education level: Not on file   Occupational History    Not on file   Social Needs    Financial resource strain: Not on file    Food insecurity:     Worry: Not on file     Inability: Not on file    Transportation needs:     Medical: Not on file     Non-medical: Not on file   Tobacco Use    Smoking status: Never Smoker    Smokeless tobacco: Never Used   Substance and Sexual Activity    Alcohol use: No     Alcohol/week: 0.0 oz    Drug use: No    Sexual activity: Not Currently   Lifestyle    Physical activity:     Days per week: Not on file     Minutes per session: Not on file    Stress: Not on file   Relationships    Social connections:     Talks on phone: Not on file     Gets together: Not on file     Attends [employer] service: Not on file     Active member of club or organization: Not on file     Attends meetings of clubs or organizations: Not on file     Relationship status: Not on file    Intimate partner violence:     Fear of current or ex partner: Not on file     Emotionally abused: Not on file     Physically abused: Not on file     Forced sexual activity: Not on file   Other Topics Concern    Not on file   Social History Narrative    Not on file      Current Outpatient Medications   Medication Sig    pravastatin (PRAVACHOL) 10 mg tablet Take 1 Tab by mouth nightly.  butalbital-acetaminophen-caffeine (FIORICET, ESGIC) -40 mg per tablet Take 1 Tab by mouth every six (6) hours as needed for Pain.     cyclobenzaprine (FLEXERIL) 10 mg tablet TAKE 1 TABLET BY MOUTH THREE TIMES DAILY AS NEEDED FOR MUSCLE SPASMS    ALPRAZolam (XANAX) 0.5 mg tablet TAKE 1 TABLET BY MOUTH DAILY MAXIMUM DAILY DOSE IS: 1 TABLET  temazepam (RESTORIL) 15 mg capsule TAKE 1 TO 2 CAPSULES BY MOUTH EVERY NIGHT AT BEDTIME    romiplostim (NPLATE SC) by SubCUTAneous route every seven (7) days.  ondansetron (ZOFRAN ODT) 4 mg disintegrating tablet Dissolve 1 tab under tongue every 8 hours as needed for nausea    acetaminophen (ARTHRITIS PAIN RELIEF) 650 mg TbER Take 650 mg by mouth every eight (8) hours as needed.  cetirizine (ZYRTEC) 10 mg tablet Take 10 mg by mouth daily as needed for Allergies.  benzonatate (TESSALON) 200 mg capsule Take 1 Cap by mouth three (3) times daily as needed for Cough.  levothyroxine (SYNTHROID) 50 mcg tablet Take 1 tablet Monday, Tuesday and Wednesday     chlorpheniramine-dm (CORICIDIN HBP COUGH AND COLD) 4-30 mg tab Take 1 tablet 2 times a day as needed    nebivolol (BYSTOLIC) 5 mg tablet Take 2 Tabs by mouth daily. No current facility-administered medications for this visit. I have reviewed the nurses notes, vitals, problem list, allergy list, medical history, family medical, social history and medications. Objective:     Physical Exam:     Vitals:    04/10/19 1414 04/10/19 1423   BP: 114/80 116/80   Pulse: 80    Resp: 18    SpO2: 97%    Weight: 165 lb (74.8 kg)    Height: 5' (1.524 m)     Body mass index is 32.22 kg/m². General: Well developed, in no acute distress. HEENT: No carotid bruits, no JVD, trach is midline. Heart:  Normal S1/S2 negative S3 or S4. Regular, no murmur, gallop or rub.   Respiratory: Clear bilaterally, no wheezing or rales  Abdomen:   Soft, non-tender, bowel sounds are active.   Extremities:  No edema, normal cap refill, no cyanosis. Neuro: A&Ox3, speech clear, gait stable. Skin: Skin color is normal. No rashes or lesions. No diaphoresis. Vascular: 2+ pulses symmetric in all extremities        Data Review:       Cardiographics:    EKG: NSR,LAE,clockwise rotation.     Cardiology Labs:    Results for orders placed or performed during the hospital encounter of 08/29/15   EKG, 12 LEAD, INITIAL   Result Value Ref Range    Ventricular Rate 99 BPM    Atrial Rate 99 BPM    P-R Interval 188 ms    QRS Duration 86 ms    Q-T Interval 340 ms    QTC Calculation (Bezet) 436 ms    Calculated P Axis 35 degrees    Calculated R Axis -15 degrees    Calculated T Axis 30 degrees    Diagnosis       Normal sinus rhythm  Normal ECG  When compared with ECG of 08-JUL-2013 19:21,  No significant change was found  Confirmed by Gerardo Nj ([id number]) on 8/30/2015 1:55:05 PM     Results for orders placed or performed in visit on 07/16/13   HOLTER MONITOR, 24 HOURS    Narrative    ECG Monitor/24 hours, Complete    Reason for Holter Monitor   tachycardia    Heartbeat    Slowest 60  Average 83  Fastest  144      Results:   Underlying Rhythm: Normal sinus rhythm      Atrial Arrhythmias: paroxysmal supraventricular tachycardia             AV Conduction: normal    Ventricular Arrhythmias: premature ventricular contractions;  occasional     ST Segment Analysis:normal     Symptom Correlation:  None reported    Comment:   Sinus rhythm with paroxysmal atrial tachycardia upto 130 bpm  (asymptomatic).      Stveen Chen MD, [facility], [facility]              Lab Results   Component Value Date/Time    Cholesterol, total 205 (H) 02/18/2019 04:01 PM    HDL Cholesterol 56 02/18/2019 04:01 PM    LDL, calculated 105 (H) 02/18/2019 04:01 PM    Triglyceride 220 (H) 02/18/2019 04:01 PM    CHOL/HDL Ratio 3.7 01/27/2010 05:43 PM       Lab Results   Component Value Date/Time    Sodium 143 02/18/2019 04:01 PM    Potassium 4.5 02/18/2019 04:01 PM    Chloride 102 02/18/2019 04:01 PM    CO2 23 02/18/2019 04:01 PM    Anion gap 12 05/03/2016 01:05 PM    Glucose 104 (H) 02/18/2019 04:01 PM    BUN 16 02/18/2019 04:01 PM    Creatinine 0.76 02/18/2019 04:01 PM    BUN/Creatinine ratio 21 02/18/2019 04:01 PM    GFR est AA 85 02/18/2019 04:01 PM    GFR est non-AA 74 02/18/2019 04:01 PM    Calcium 9.7 02/18/2019 04:01 PM Bilirubin, total 0.5 02/18/2019 04:01 PM    AST (SGOT) 28 02/18/2019 04:01 PM    Alk. phosphatase 78 02/18/2019 04:01 PM    Protein, total 6.6 02/18/2019 04:01 PM    Albumin 4.8 (H) 02/18/2019 04:01 PM    Globulin 3.2 05/03/2016 01:05 PM    A-G Ratio 2.7 (H) 02/18/2019 04:01 PM    ALT (SGPT) 20 02/18/2019 04:01 PM          Assessment:       ICD-10-CM ICD-9-CM    1. Essential hypertension I10 401.9 AMB POC EKG ROUTINE W/ 12 LEADS, INTER & REP   2. Chronic ITP (idiopathic thrombocytopenia) (HCC) D69.3 287.31    3. Chronic systolic heart failure (HCC)--resolved--LVEF >75% via NST 2017 I50.22 428.22    4. Hypercholesterolemia E78.00 272.0 LIPID PANEL      HEPATIC FUNCTION PANEL      CK         Discussion: Patient presents at this time stable from a cardiac perspective. CSHF compensated,resolved. LVEF >75% via Nuke 2017. BP at goal. No cardiac concerns. Lymphadenopathy and thrombocytopenia being followed closely by PCP/Heme. Pleased with present status. Plan: 1. Continue same meds. Lipid profile and labs not at goal. Will start Pravachol 10 mg qhs and repeat labs in 3 months. Advised use of Co-enzyme Q10 200 MG every day. 2.Encouraged to exercise to tolerance, lose weight and follow low fat, low cholesterol, low sodium predominantly Plant-based (consider Mediterranean) diet. Call with questions or concerns. Will follow up any test results by phone and/or f/u here in office if needed. Tristan Deleon 3.Follow up: 6 months    I have discussed the diagnosis with the patient and the intended plan as seen in the above orders. The patient has received an after-visit summary and questions were answered concerning future plans. I have discussed any concerning medication side effects and warnings with the patient as well.     Richar Rodriguez MD  4/11/2019

## 2019-04-16 DIAGNOSIS — F51.01 PRIMARY INSOMNIA: ICD-10-CM

## 2019-04-16 RX ORDER — TEMAZEPAM 15 MG/1
CAPSULE ORAL
Qty: 60 CAP | Refills: 0 | OUTPATIENT
Start: 2019-04-16 | End: 2019-06-01 | Stop reason: SDUPTHER

## 2019-04-17 NOTE — TELEPHONE ENCOUNTER
Tried to call patient, could not leave message, voice mail full, Medication on  backorder, per Dr. Tanmay Bellamy, patient needs to try Melatonin or Tylenol PM

## 2019-04-18 DIAGNOSIS — Z86.69 HISTORY OF MIGRAINE: ICD-10-CM

## 2019-04-18 RX ORDER — BUTALBITAL, ACETAMINOPHEN AND CAFFEINE 50; 325; 40 MG/1; MG/1; MG/1
TABLET ORAL
Qty: 30 TAB | Refills: 0 | Status: SHIPPED | OUTPATIENT
Start: 2019-04-18 | End: 2019-05-14 | Stop reason: SDUPTHER

## 2019-04-18 NOTE — TELEPHONE ENCOUNTER
Spoke to patient and advised to take Melatonin or Tylenol PM since Restoril is on  backorder, she agreed.

## 2019-06-01 DIAGNOSIS — F51.01 PRIMARY INSOMNIA: ICD-10-CM

## 2019-06-01 DIAGNOSIS — Z86.69 HISTORY OF MIGRAINE: ICD-10-CM

## 2019-06-01 DIAGNOSIS — F41.9 ANXIETY: ICD-10-CM

## 2019-06-05 ENCOUNTER — TELEPHONE (OUTPATIENT)
Dept: FAMILY MEDICINE CLINIC | Age: 84
End: 2019-06-05

## 2019-06-05 RX ORDER — ALPRAZOLAM 0.5 MG/1
TABLET ORAL
Qty: 30 TAB | Refills: 0 | OUTPATIENT
Start: 2019-06-05

## 2019-06-05 RX ORDER — HYDROXYZINE 25 MG/1
25 TABLET, FILM COATED ORAL
Qty: 30 TAB | Refills: 1 | Status: SHIPPED | OUTPATIENT
Start: 2019-06-05 | End: 2019-08-20 | Stop reason: SINTOL

## 2019-06-05 RX ORDER — BUTALBITAL, ACETAMINOPHEN AND CAFFEINE 50; 325; 40 MG/1; MG/1; MG/1
TABLET ORAL
Qty: 30 TAB | Refills: 0 | Status: SHIPPED | OUTPATIENT
Start: 2019-06-05 | End: 2019-07-05 | Stop reason: SDUPTHER

## 2019-06-05 RX ORDER — TEMAZEPAM 15 MG/1
CAPSULE ORAL
Qty: 60 CAP | Refills: 1 | OUTPATIENT
Start: 2019-06-05 | End: 2019-08-04 | Stop reason: SDUPTHER

## 2019-06-16 RX ORDER — BENZONATATE 200 MG/1
CAPSULE ORAL
Qty: 30 CAP | Refills: 0 | Status: SHIPPED | OUTPATIENT
Start: 2019-06-16 | End: 2019-08-20 | Stop reason: SDUPTHER

## 2019-07-05 DIAGNOSIS — Z86.69 HISTORY OF MIGRAINE: ICD-10-CM

## 2019-07-05 RX ORDER — BUTALBITAL, ACETAMINOPHEN AND CAFFEINE 50; 325; 40 MG/1; MG/1; MG/1
TABLET ORAL
Qty: 30 TAB | Refills: 0 | Status: SHIPPED | OUTPATIENT
Start: 2019-07-05 | End: 2019-08-20 | Stop reason: SDUPTHER

## 2019-07-28 DIAGNOSIS — I10 ESSENTIAL HYPERTENSION: Primary | ICD-10-CM

## 2019-07-29 RX ORDER — NEBIVOLOL HYDROCHLORIDE 5 MG/1
TABLET ORAL
Qty: 60 TAB | Refills: 0 | Status: SHIPPED | OUTPATIENT
Start: 2019-07-29 | End: 2019-08-20

## 2019-08-02 LAB
ALBUMIN SERPL-MCNC: 4.8 G/DL (ref 3.5–4.7)
ALP SERPL-CCNC: 81 IU/L (ref 39–117)
ALT SERPL-CCNC: 13 IU/L (ref 0–32)
AST SERPL-CCNC: 20 IU/L (ref 0–40)
BILIRUB DIRECT SERPL-MCNC: 0.11 MG/DL (ref 0–0.4)
BILIRUB SERPL-MCNC: 0.3 MG/DL (ref 0–1.2)
CHOLEST SERPL-MCNC: 178 MG/DL (ref 100–199)
CK SERPL-CCNC: 50 U/L (ref 24–173)
HDLC SERPL-MCNC: 55 MG/DL
LDLC SERPL CALC-MCNC: 79 MG/DL (ref 0–99)
PROT SERPL-MCNC: 6.7 G/DL (ref 6–8.5)
TRIGL SERPL-MCNC: 218 MG/DL (ref 0–149)
VLDLC SERPL CALC-MCNC: 44 MG/DL (ref 5–40)

## 2019-08-04 DIAGNOSIS — F51.01 PRIMARY INSOMNIA: ICD-10-CM

## 2019-08-05 RX ORDER — TEMAZEPAM 15 MG/1
CAPSULE ORAL
Qty: 60 CAP | Refills: 0 | OUTPATIENT
Start: 2019-08-05 | End: 2019-09-04 | Stop reason: SDUPTHER

## 2019-08-08 ENCOUNTER — TELEPHONE (OUTPATIENT)
Dept: CARDIOLOGY CLINIC | Age: 84
End: 2019-08-08

## 2019-08-08 NOTE — TELEPHONE ENCOUNTER
----- Message from Carlo Chery MD sent at 8/7/2019 11:24 PM EDT -----  Regarding: [address]    B  ----- Message -----  From: Jillian Crespo Lab Results In  Sent: 8/2/2019   8:36 AM EDT  To: Carlo Chery MD        Called pt,verified pt with two pt identifiers, advised pt her labs look better. Went over lab results. Advised to watch carb intake. Went over healthy eating. Pt verbalized understanding.

## 2019-08-20 ENCOUNTER — HOSPITAL ENCOUNTER (OUTPATIENT)
Dept: LAB | Age: 84
Discharge: HOME | End: 2019-08-20
Payer: MEDICARE

## 2019-08-20 ENCOUNTER — OFFICE VISIT (OUTPATIENT)
Dept: FAMILY MEDICINE CLINIC | Age: 84
End: 2019-08-20

## 2019-08-20 VITALS
BODY MASS INDEX: 30.43 KG/M2 | HEART RATE: 69 BPM | DIASTOLIC BLOOD PRESSURE: 62 MMHG | RESPIRATION RATE: 16 BRPM | TEMPERATURE: 96 F | HEIGHT: 61 IN | SYSTOLIC BLOOD PRESSURE: 121 MMHG | OXYGEN SATURATION: 96 % | WEIGHT: 161.2 LBS

## 2019-08-20 DIAGNOSIS — D69.3 CHRONIC ITP (IDIOPATHIC THROMBOCYTOPENIA) (HCC): ICD-10-CM

## 2019-08-20 DIAGNOSIS — Z12.31 ENCOUNTER FOR SCREENING MAMMOGRAM FOR BREAST CANCER: ICD-10-CM

## 2019-08-20 DIAGNOSIS — Z23 ENCOUNTER FOR IMMUNIZATION: ICD-10-CM

## 2019-08-20 DIAGNOSIS — I10 ESSENTIAL HYPERTENSION: ICD-10-CM

## 2019-08-20 DIAGNOSIS — Z51.81 ENCOUNTER FOR MEDICATION MONITORING: ICD-10-CM

## 2019-08-20 DIAGNOSIS — M62.838 MUSCLE SPASMS OF BOTH LOWER EXTREMITIES: ICD-10-CM

## 2019-08-20 DIAGNOSIS — E03.4 HYPOTHYROIDISM DUE TO ACQUIRED ATROPHY OF THYROID: ICD-10-CM

## 2019-08-20 DIAGNOSIS — L98.9 FACIAL SKIN LESION: ICD-10-CM

## 2019-08-20 DIAGNOSIS — E78.2 MIXED HYPERLIPIDEMIA: ICD-10-CM

## 2019-08-20 DIAGNOSIS — Z00.00 MEDICARE ANNUAL WELLNESS VISIT, SUBSEQUENT: Primary | ICD-10-CM

## 2019-08-20 DIAGNOSIS — Z91.09 ENVIRONMENTAL ALLERGIES: ICD-10-CM

## 2019-08-20 DIAGNOSIS — Z86.69 HISTORY OF MIGRAINE: ICD-10-CM

## 2019-08-20 LAB
BILIRUB UR QL STRIP: NEGATIVE
GLUCOSE UR-MCNC: NEGATIVE MG/DL
KETONES P FAST UR STRIP-MCNC: NEGATIVE MG/DL
PH UR STRIP: 5 [PH] (ref 4.6–8)
PROT UR QL STRIP: NEGATIVE
SP GR UR STRIP: 1.01 (ref 1–1.03)
UA UROBILINOGEN AMB POC: NORMAL (ref 0.2–1)
URINALYSIS CLARITY POC: CLEAR
URINALYSIS COLOR POC: YELLOW
URINE BLOOD POC: NEGATIVE
URINE LEUKOCYTES POC: NORMAL
URINE NITRITES POC: NEGATIVE

## 2019-08-20 PROCEDURE — 84443 ASSAY THYROID STIM HORMONE: CPT

## 2019-08-20 PROCEDURE — 80061 LIPID PANEL: CPT

## 2019-08-20 PROCEDURE — 80053 COMPREHEN METABOLIC PANEL: CPT

## 2019-08-20 PROCEDURE — 36415 COLL VENOUS BLD VENIPUNCTURE: CPT

## 2019-08-20 RX ORDER — BENZONATATE 200 MG/1
200 CAPSULE ORAL
Qty: 30 CAP | Refills: 1 | Status: SHIPPED | OUTPATIENT
Start: 2019-08-20 | End: 2020-02-25

## 2019-08-20 RX ORDER — BUTALBITAL, ACETAMINOPHEN AND CAFFEINE 50; 325; 40 MG/1; MG/1; MG/1
1 TABLET ORAL
Qty: 30 TAB | Refills: 1 | Status: SHIPPED | OUTPATIENT
Start: 2019-08-20 | End: 2019-11-19 | Stop reason: SDUPTHER

## 2019-08-20 RX ORDER — CYCLOBENZAPRINE HCL 10 MG
TABLET ORAL
Qty: 30 TAB | Refills: 0 | Status: SHIPPED | OUTPATIENT
Start: 2019-08-20 | End: 2020-02-25

## 2019-08-20 NOTE — PROGRESS NOTES
This is a Subsequent Medicare Annual Wellness Visit providing Personalized Prevention Plan Services (PPPS) (Performed 12 months after initial AWV and PPPS )    I have reviewed the patient's medical history in detail and updated the computerized patient record. Cardiovascular Review:  The patient has hypertension, hyperlipidemia, and atrial tachycardia. Diet and Lifestyle: generally follows a low fat low cholesterol diet, generally follows a low sodium diet, exercises sporadically  Home BP Monitoring: is not measured at home. Pertinent ROS: taking medications as instructed, no medication side effects noted, no TIA's, no swelling of ankles, no palpitations. C/o SOB with exertion over the past several months but thinks that it is getting worse. Denies chest pain. No pressure or chest tightness. Last seen by cardiology on this past January but did not discussed at that time. She has been extremely more fatigue also. Thyroid Review:  Patient is seen for followup of hypothyroidism. Followed by endo. Thyroid ROS: denies weight changes, heat/cold intolerance, bowel/skin changes or CVS symptoms. Osteoarthritis and Chronic Pain:  Patient has osteoarthritis and fibromyalgia. Rheumatological ROS: Has burning and pain in the legs at feet that is worse at night. Aches all the time. Has not been exercising. Controlled by PRN meds. Response to treatment plan: stable. 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 Platelet counts have been overall stable. She is on romipostim. HM:  Mammogram 11/20/2018    Colonoscopy 8/1/2016 by Dr. Lan Cintron- no further test needed. Eye exam 8/13/2018 by Dr. Cherie Ba. Patient states she has an appt next week.     History     Past Medical History:   Diagnosis Date    Arrhythmia     tachycardia; Dr. Shad Loyola Autoimmune disease [facility])     fibromyalgia    Chronic ITP (idiopathic thrombocytopenia) (HCC)     Fibromyalgia     GERD (gastroesophageal reflux disease)     Headache(784.0)     migraine    Heart failure ([address]     HTN (hypertension) 4/2/2010    Hypothyroidism 4/2/2010    OA (osteoarthritis) 4/2/2010    Seizures ([address]     56 years ago with childbirth    Tachycardia 2013    Thromboembolus ([address]     blood clot on brain 56 years ago w childbirth    Thyroid disease       Past Surgical History:   Procedure Laterality Date    Eli Asp  2/5/2015         ENDOSCOPY, COLON, DIAGNOSTIC  12/2010    Dr. Salome Gaitan- repeat 5 yrs    HX BREAST BIOPSY Right 1999    more than one yrs ago all benign    HX CARPAL TUNNEL RELEASE  9/28/2012    HX CATARACT REMOVAL  9/2014    bilateral     HX HYSTERECTOMY  1977    HX REFRACTIVE SURGERY  07/2018     Current Outpatient Medications   Medication Sig Dispense Refill    temazepam (RESTORIL) 15 mg capsule TAKE 1 TO 2 CAPSULES BY MOUTH EVERY NIGHT AT BEDTIME 60 Cap 0    benzonatate (TESSALON) 200 mg capsule TAKE 1 CAPSULE BY MOUTH THREE TIMES DAILY AS NEEDED FOR COUGH 30 Cap 0    levothyroxine (SYNTHROID) 50 mcg tablet TAKE 1 TABLET BY MOUTH EVERY MONDAY, TUESDAY, WEDNESDAY AND THURSDAY 90 Tab 1    butalbital-acetaminophen-caffeine (FIORICET, ESGIC) -40 mg per tablet TAKE 1 TABLET BY MOUTH EVERY 6 HOURS AS NEEDED FOR PAIN 30 Tab 1    pravastatin (PRAVACHOL) 10 mg tablet Take 1 Tab by mouth nightly. 30 Tab 6    cyclobenzaprine (FLEXERIL) 10 mg tablet TAKE 1 TABLET BY MOUTH THREE TIMES DAILY AS NEEDED FOR MUSCLE SPASMS 30 Tab 0    romiplostim (NPLATE SC) by SubCUTAneous route every seven (7) days.  ondansetron (ZOFRAN ODT) 4 mg disintegrating tablet Dissolve 1 tab under tongue every 8 hours as needed for nausea  0    acetaminophen (ARTHRITIS PAIN RELIEF) 650 mg TbER Take 650 mg by mouth every eight (8) hours as needed.  cetirizine (ZYRTEC) 10 mg tablet Take 10 mg by mouth daily as needed for Allergies.       chlorpheniramine-dm (CORICIDIN HBP COUGH AND COLD) 4-30 mg tab Take 1 tablet 2 times a day as needed      nebivolol (BYSTOLIC) 5 mg tablet Take 2 Tabs by mouth daily. 60 Tab 6    BYSTOLIC 5 mg tablet TAKE 1 TABLET BY MOUTH TWICE DAILY.  HOLD DOSE IF SYSTOLIC PRESSURE IS LESS THAN 100 OR PULSE IS LESS THAN 50 60 Tab 0    ALPRAZolam (XANAX) 0.5 mg tablet TAKE 1 TABLET BY MOUTH DAILY MAXIMUM DAILY DOSE IS: 1 TABLET 30 Tab 1    levothyroxine (SYNTHROID) 50 mcg tablet Take 1 tablet Monday, Tuesday and Wednesday        Allergies   Allergen Reactions    Epinephrine Other (comments)     BP drops and pass out    Iodinated Contrast Media Shortness of Breath    Novocain [Procaine] Palpitations     BP drops,passes out    Codeine Itching     Pt does not recall    Cymbalta [Duloxetine] Other (comments)     Head Pain and nausea    Daypro [Oxaprozin] Unknown (comments)     Pt does not recall    Prednisone Shortness of Breath     agitation    Sulfa (Sulfonamide Antibiotics) Unknown (comments)     Pt does not recall    Tetracycline Other (comments)     Low platelet count    Zithromax [Azithromycin] Nausea Only     Diarrhea, headaches     Family History   Problem Relation Age of Onset    Heart Disease Paternal Aunt     Heart Disease Paternal Uncle     Colon Cancer Maternal Grandmother     No Known Problems Mother     No Known Problems Father     Other Daughter         fibromyalgia    Heart Disease Maternal Grandfather     Other Sister         Raynauds     Social History     Tobacco Use    Smoking status: Never Smoker    Smokeless tobacco: Never Used   Substance Use Topics    Alcohol use: No     Alcohol/week: 0.0 standard drinks     Patient Active Problem List   Diagnosis Code    Hypothyroidism E03.9    HTN (hypertension) I10    OA (osteoarthritis) M19.90    GERD (gastroesophageal reflux disease) K21.9    Fibromyalgia M79.7    Insomnia G47.00    Headache(784.0) R51    Iron deficiency anemia, unspecified D50.9    CARVAJAL (dyspnea on exertion) R06.09  Heart palpitations R00.2    Paroxysmal Inappropriate sinus tachycardia vs possible atrial tachycardia R00.0    Edema leg R60.0    Reactive airway disease without complication N63.524    Incidental lung nodule, greater than or equal to 8mm R91.1    Encounter for medication monitoring Z51.81    Preop cardiovascular exam Z01.810    Pelvic lymphadenopathy R59.0    Chronic ITP (idiopathic thrombocytopenia) (HCC) D69.3    Hypercholesterolemia E78.00       Depression Risk Factor Screening:     3 most recent PHQ Screens 8/20/2019   Little interest or pleasure in doing things Not at all   Feeling down, depressed, irritable, or hopeless Not at all   Total Score PHQ 2 0     Alcohol Risk Factor Screening: On any occasion during the past 3 months, have you had more than 3 drinks containing alcohol? No    Do you average more than 7 drinks per week? No      Functional Ability and Level of Safety:     Hearing Loss   none    Activities of Daily Living   Self-care. Requires assistance with: no ADLs    Fall Risk     Fall Risk Assessment, last 12 mths 8/20/2019   Able to walk? Yes   Fall in past 12 months? No   Functional Ability:   Does the patient exhibit a steady gait? yes    How long did it take the patient to get up and walk from a sitting position? seconds    Is the patient self reliant? (ie can do own laundry, meals, household chores)  yes   Does the patient handle his/her own medications? yes   Does the patient handle his/her own money? yes   Is the patients home safe (ie good lighting, handrails on stairs and bath, etc.)? yes   Did you notice or did patient express any hearing difficulties? no   Did you notice or did patient express any vision difficulties? no        Advance Care Planning:   Patient was offered the opportunity to discuss advance care planning:  yes    Does patient have an Advance Directive:  yes    If no, did you provide information on [facility]? Yes, booklet given. Abuse Screen   Patient is not abused    Review of Systems   Eyes: negative for irritation and redness  Ears, nose, mouth, throat, and face: negative for earaches, nasal congestion and hoarseness  Respiratory: negative for cough, sputum or dyspnea on exertion  Cardiovascular: negative for chest pain, chest pressure/discomfort, dyspnea, palpitations, irregular heart beats, fatigue, lower extremity edema  Gastrointestinal: negative for dysphagia, dyspepsia, reflux symptoms, nausea, vomiting, change in bowel habits, melena, constipation and abdominal pain. Has hemorrhoid but denies bleeding or pain. Genitourinary:negative for frequency, dysuria, nocturia, urinary incontinence  Integument/breast: negative for rash and dryness  Hematologic/lymphatic: negative for easy bruising and lymphadenopathy  Neurological: negative for headaches, dizziness, tremor and weakness  Endocrine: negative for diabetic symptoms including polyuria and polydipsia    Physical Examination     Evaluation of Cognitive Function:  Mood/affect:  neutral  Appearance: age appropriate  Family member/caregiver input: NA    Visit Vitals  /62 (BP 1 Location: Left arm, BP Patient Position: Sitting)   Pulse 69   Temp 96 °F (35.6 °C) (Oral)   Resp 16   Ht 5' 1\" (1.549 m)   Wt 161 lb 3.2 oz (73.1 kg)   SpO2 96%   BMI 30.46 kg/m²     General:  Alert, cooperative, no distress, appears stated age. Head:  Normocephalic, without obvious abnormality, atraumatic. Eyes:  Conjunctivae/corneas clear. PERRL, EOMs intact. Fundi benign. Ears:  Normal TMs and external ear canals both ears. Nose: Nares normal. Septum midline. Mucosa normal. No drainage or sinus tenderness. Throat: Lips, mucosa, and tongue normal. Teeth and gums normal.   Neck: Supple, symmetrical, trachea midline, no adenopathy, thyroid: no enlargement/tenderness/nodules, no carotid bruit and no JVD. Back:   Symmetric, no curvature. ROM normal. No CVA tenderness.    Lungs:   Clear to auscultation bilaterally. Chest wall:  No tenderness or deformity. Heart:  Regular rate and rhythm, S1, S2 normal, no murmur, click, rub or gallop. Breast Exam:  No tenderness, masses, or nipple abnormality. Abdomen:   Soft, non-tender. Bowel sounds normal. No masses,  No organomegaly. Rectal:  Pt declined   Extremities: Extremities normal, atraumatic, no cyanosis or edema. Pulses: 2+ and symmetric all extremities. Skin: Skin color, texture, turgor normal.  Has facial skin lesion on the right upper cheek. (pt states that it has been there for several months and not healing). Lymph nodes: Cervical, supraclavicular, and axillary nodes normal.   Neurologic: CNII-XII intact. Normal strength, sensation and reflexes throughout. Patient Care Team:  Santosh Currie MD as PCP - General Yuridia Belle MD as Physician (Cardiology)    Advice/Referrals/Counseling   Education and counseling provided:  Are appropriate based on today's review and evaluation  End-of-Life planning (with patient's consent)      Assessment/Plan   Nimisha Carballo was seen today for annual wellness visit. ASSESSMENT and PLAN  Diagnoses and all orders for this visit:    1. Medicare annual wellness visit, subsequent  -     AMB POC URINALYSIS DIP STICK AUTO W/ MICRO    2. Essential hypertension  Stable     3. Mixed hyperlipidemia  -     LIPID PANEL    4. Hypothyroidism due to acquired atrophy of thyroid  -     TSH 3RD GENERATION    5. Chronic ITP (idiopathic thrombocytopenia) (HCC)  As per hematology    6. Environmental allergies  Has cough that comes and goes from her allergies. -     benzonatate (TESSALON) 200 mg capsule; Take 1 Cap by mouth three (3) times daily as needed for Cough. 7. Muscle spasms of both lower extremities//  General Arthralgias and Fibromyalgia  -     Refill cyclobenzaprine (FLEXERIL) 10 mg tablet; TAKE 1 TABLET BY MOUTH THREE TIMES DAILY AS NEEDED FOR MUSCLE SPASMS    8.  History of migraine  -     Refill butalbital-acetaminophen-caffeine (FIORICET, ESGIC) -40 mg per tablet; Take 1 Tab by mouth every six (6) hours as needed for Pain. 9. Facial skin lesion  -     REFERRAL TO DERMATOLOGY    10. Encounter for medication monitoring  -     METABOLIC PANEL, COMPREHENSIVE    11. Encounter for screening mammogram for breast cancer  -     [facility] MAMMO BI SCREENING INCL CAD; Future    12. Encounter for immunization  -     PNEUMOCOCCAL POLYSACCHARIDE VACCINE, 23-VALENT, ADULT OR IMMUNOSUPPRESSED PT DOSE,  -     MO IMMUNIZ ADMIN,1 SINGLE/COMB VAC/TOXOID      Follow-up and Dispositions    · Return in about 6 months (around 2/20/2020). reviewed diet, exercise and weight control  cardiovascular risk and specific lipid/LDL goals reviewed  reviewed medications and side effects in detail    I have discussed diagnosis listed in this note with pt and/or family. I have discussed treatment plans and options and the risk/benefit analysis of those options, including safe use of medications and possible medication side effects. Through the use of shared decision making we have agreed to the above plan. The patient has received an after-visit summary and questions were answered concerning future plans and follow up. Advise pt of any urgent changes then to proceed to the ER.

## 2019-08-20 NOTE — PROGRESS NOTES
Chief Complaint   Patient presents with    Annual Wellness Visit     medicare       Mammogram 11/20/2018    Colonoscopy 8/1/2016 by Dr. Giovanni Hill- repeat in 5 years    Eye exam 8/13/2018 by Dr. Lindi Drew. Patient states she has an appt next week. Verbal order received per Dr. Deisy Narayan 23 IM for need for immunization-VORB. Pt received Pneumococcal 23 IM in left deltoid without any difficulty. 1. Have you been to the ER, urgent care clinic since your last visit? Hospitalized since your last visit? No    2. Have you seen or consulted any other health care providers outside of the [address] [place] [address] since your last visit? Include any pap smears or colon screening.  No

## 2019-08-21 ENCOUNTER — TELEPHONE (OUTPATIENT)
Dept: FAMILY MEDICINE CLINIC | Age: 84
End: 2019-08-21

## 2019-08-21 LAB
ALBUMIN SERPL-MCNC: 4.8 G/DL (ref 3.5–4.7)
ALBUMIN/GLOB SERPL: 2.3 {RATIO} (ref 1.2–2.2)
ALP SERPL-CCNC: 77 IU/L (ref 39–117)
ALT SERPL-CCNC: 17 IU/L (ref 0–32)
AST SERPL-CCNC: 23 IU/L (ref 0–40)
BILIRUB SERPL-MCNC: 0.2 MG/DL (ref 0–1.2)
BUN SERPL-MCNC: 13 MG/DL (ref 8–27)
BUN/CREAT SERPL: 16 (ref 12–28)
CALCIUM SERPL-MCNC: 10.5 MG/DL (ref 8.7–10.3)
CHLORIDE SERPL-SCNC: 101 MMOL/L (ref 96–106)
CHOLEST SERPL-MCNC: 167 MG/DL (ref 100–199)
CO2 SERPL-SCNC: 24 MMOL/L (ref 20–29)
CREAT SERPL-MCNC: 0.79 MG/DL (ref 0.57–1)
GLOBULIN SER CALC-MCNC: 2.1 G/DL (ref 1.5–4.5)
GLUCOSE SERPL-MCNC: 94 MG/DL (ref 65–99)
HDLC SERPL-MCNC: 60 MG/DL
INTERPRETATION, 910389: NORMAL
LDLC SERPL CALC-MCNC: 68 MG/DL (ref 0–99)
POTASSIUM SERPL-SCNC: 4.1 MMOL/L (ref 3.5–5.2)
PROT SERPL-MCNC: 6.9 G/DL (ref 6–8.5)
SODIUM SERPL-SCNC: 142 MMOL/L (ref 134–144)
TRIGL SERPL-MCNC: 197 MG/DL (ref 0–149)
TSH SERPL DL<=0.005 MIU/L-ACNC: 0.4 UIU/ML (ref 0.45–4.5)
VLDLC SERPL CALC-MCNC: 39 MG/DL (ref 5–40)

## 2019-08-21 NOTE — TELEPHONE ENCOUNTER
Made an appointment with Dr Alan Medley on September 30 th at 2:30 pm    [address]  [address]    [phone]    DX: Facial Skin Lesion    Patient aware

## 2019-08-22 DIAGNOSIS — E03.4 HYPOTHYROIDISM DUE TO ACQUIRED ATROPHY OF THYROID: Primary | ICD-10-CM

## 2019-08-22 RX ORDER — LEVOTHYROXINE SODIUM 50 UG/1
TABLET ORAL
Qty: 30 TAB | Refills: 0
Start: 2019-08-22 | End: 2019-10-21 | Stop reason: SDUPTHER

## 2019-08-22 NOTE — TELEPHONE ENCOUNTER
Confirmed that patient was taking Synthroid Monday , Tuesday, Wednesday and Thursday. Advised patient per Dr. Shah to take Synthroid 50 mcg Monday Wednesday and Friday only and made appointment for 10/22/19 for lab and flu shot .

## 2019-08-22 NOTE — TELEPHONE ENCOUNTER
----- Message from Lore Brady MD sent at 8/22/2019 10:16 AM EDT -----  Please call and have pt to reduce her synthroid 50 mcg to 1 tab on Mondays, Wednesdays and Fridays only. (Confirm that she was taking on Monday Tuesday Wednesday and Thursday only). Please schedule her and lab only visit to repeat her thyroid level in 2 months.

## 2019-08-22 NOTE — PROGRESS NOTES
Please call and have pt to reduce her synthroid 50 mcg to 1 tab on Mondays, Wednesdays and Fridays only. (Confirm that she was taking on Monday Tuesday Wednesday and Thursday only). Please schedule her and lab only visit to repeat her thyroid level in 2 months.

## 2019-09-04 DIAGNOSIS — F51.01 PRIMARY INSOMNIA: ICD-10-CM

## 2019-09-04 RX ORDER — TEMAZEPAM 15 MG/1
CAPSULE ORAL
Qty: 60 CAP | Refills: 0 | OUTPATIENT
Start: 2019-09-04 | End: 2019-10-06 | Stop reason: SDUPTHER

## 2019-10-06 DIAGNOSIS — F51.01 PRIMARY INSOMNIA: ICD-10-CM

## 2019-10-07 RX ORDER — TEMAZEPAM 15 MG/1
CAPSULE ORAL
Qty: 60 CAP | Refills: 1 | OUTPATIENT
Start: 2019-10-07 | End: 2019-12-06 | Stop reason: SDUPTHER

## 2019-10-15 DIAGNOSIS — Z86.69 HISTORY OF MIGRAINE: ICD-10-CM

## 2019-10-16 ENCOUNTER — OFFICE VISIT (OUTPATIENT)
Dept: CARDIOLOGY CLINIC | Age: 84
End: 2019-10-16

## 2019-10-16 VITALS
HEIGHT: 61 IN | WEIGHT: 163.8 LBS | DIASTOLIC BLOOD PRESSURE: 78 MMHG | SYSTOLIC BLOOD PRESSURE: 134 MMHG | HEART RATE: 70 BPM | BODY MASS INDEX: 30.93 KG/M2 | OXYGEN SATURATION: 97 % | RESPIRATION RATE: 18 BRPM

## 2019-10-16 DIAGNOSIS — R06.09 DOE (DYSPNEA ON EXERTION): Primary | ICD-10-CM

## 2019-10-16 DIAGNOSIS — J45.909 REACTIVE AIRWAY DISEASE WITHOUT COMPLICATION, UNSPECIFIED ASTHMA SEVERITY, UNSPECIFIED WHETHER PERSISTENT: ICD-10-CM

## 2019-10-16 DIAGNOSIS — E78.00 HYPERCHOLESTEROLEMIA: ICD-10-CM

## 2019-10-16 DIAGNOSIS — R00.2 HEART PALPITATIONS: ICD-10-CM

## 2019-10-16 DIAGNOSIS — I10 ESSENTIAL HYPERTENSION: ICD-10-CM

## 2019-10-16 DIAGNOSIS — M15.9 PRIMARY OSTEOARTHRITIS INVOLVING MULTIPLE JOINTS: ICD-10-CM

## 2019-10-16 RX ORDER — PRAVASTATIN SODIUM 10 MG/1
10 TABLET ORAL
Qty: 90 TAB | Refills: 1 | Status: SHIPPED | OUTPATIENT
Start: 2019-10-16 | End: 2019-11-15 | Stop reason: SDUPTHER

## 2019-10-16 RX ORDER — BUTALBITAL, ACETAMINOPHEN AND CAFFEINE 50; 325; 40 MG/1; MG/1; MG/1
TABLET ORAL
Qty: 30 TAB | Refills: 0 | Status: SHIPPED | OUTPATIENT
Start: 2019-10-16 | End: 2019-10-21 | Stop reason: SDUPTHER

## 2019-10-16 NOTE — PROGRESS NOTES
1. Have you been to the ER, urgent care clinic since your last visit? Hospitalized since your last visit? No    2. Have you seen or consulted any other health care providers outside of the [address] [place] [address] since your last visit? Include any pap smears or colon screening. No    Chief Complaint   Patient presents with    Hypertension     6 mo appt. Denied cardiac symptoms.

## 2019-10-16 NOTE — PROGRESS NOTES
[address] [facility]        [phone]                             NEW PATIENT HPI/FOLLOW-UP    NAME:  Eri Lockwood   :   1935   MRN:   [id number]   PCP:  Jennifer Liang MD           Subjective: The patient is a 82y.o. year old female  who returns for a routine follow-up. Since the last visit, patient reports no new symptoms. Denies change in exercise tolerance, chest pain, edema, medication intolerance, palpitations, shortness of breath, PND/orthopnea wheezing, sputum, syncope, dizziness or light headedness. Platelets up to normal after TTP Rx started. Doing satisfactorily. Review of Systems  General: Pt denies excessive weight gain or loss. Pt is able to conduct ADL's. Respiratory: Denies shortness of breath, CARVAJAL, wheezing or stridor.   Cardiovascular: Denies precordial pain, palpitations, edema or PND  Gastrointestinal: Denies poor appetite, indigestion, abdominal pain or blood in stool  Peripheral vascular: Denies claudication, leg cramps  Neurological: Denies paresthesias, tingling.numbness  Psychiatric: Denies anxiety,depression,fatigue  Musculoskeletal: Denies pain,tenderness, soreness,swelling      Past Medical History:   Diagnosis Date    Arrhythmia     tachycardia; Dr. Evan Scott Autoimmune disease ([address]     fibromyalgia    Chronic ITP (idiopathic thrombocytopenia) (HCC)     Fibromyalgia     GERD (gastroesophageal reflux disease)     Headache(784.0)     migraine    Heart failure (HCC)     HTN (hypertension) 2010    Hypothyroidism 2010    OA (osteoarthritis) 2010    Seizures ([address]     56 years ago with childbirth    Tachycardia     Thromboembolus ([address]     blood clot on brain 56 years ago w childbirth    Thyroid disease      Patient Active Problem List    Diagnosis Date Noted    Iron deficiency anemia, unspecified 2013     Priority: 1 - One    Hypercholesterolemia 2019    Chronic ITP (idiopathic thrombocytopenia) (HCC)     Pelvic lymphadenopathy 07/14/2017    Preop cardiovascular exam 06/07/2017    Encounter for medication monitoring 10/11/2016    Incidental lung nodule, greater than or equal to 8mm 05/11/2016    Reactive airway disease without complication 86/17/6028    Edema leg 06/19/2015    Heart palpitations 07/25/2013    Paroxysmal Inappropriate sinus tachycardia vs possible atrial tachycardia 07/25/2013    CARVAJAL (dyspnea on exertion) 06/28/2013    Headache(784.0)     Insomnia 06/01/2010    GERD (gastroesophageal reflux disease)     Fibromyalgia     Hypothyroidism 04/02/2010    HTN (hypertension) 04/02/2010    OA (osteoarthritis) 04/02/2010      Past Surgical History:   Procedure Laterality Date    COLONOSCOPY,BRIGETTE Freeman Rachelle  2/5/2015         ENDOSCOPY, COLON, DIAGNOSTIC  12/2010    Dr. Moriah Clements- repeat 5 yrs    HX BREAST BIOPSY Right 1999    more than one yrs ago all benign    HX CARPAL TUNNEL RELEASE  9/28/2012    HX CATARACT REMOVAL  9/2014    bilateral     HX HYSTERECTOMY  1977    HX REFRACTIVE SURGERY  07/2018     Allergies   Allergen Reactions    Epinephrine Other (comments)     BP drops and pass out    Iodinated Contrast Media Shortness of Breath    Novocain [Procaine] Palpitations     BP drops,passes out    Codeine Itching     Pt does not recall    Cymbalta [Duloxetine] Other (comments)     Head Pain and nausea    Daypro [Oxaprozin] Unknown (comments)     Pt does not recall    Prednisone Shortness of Breath     agitation    Sulfa (Sulfonamide Antibiotics) Unknown (comments)     Pt does not recall    Tetracycline Other (comments)     Low platelet count    Zithromax [Azithromycin] Nausea Only     Diarrhea, headaches      Family History   Problem Relation Age of Onset    Heart Disease Paternal Aunt     Heart Disease Paternal Uncle     Colon Cancer Maternal Grandmother     No Known Problems Mother     No Known Problems Father     Other Daughter         fibromyalgia    Heart Disease Maternal Grandfather     Other Sister         Raynauds      Social History     Socioeconomic History    Marital status:      Spouse name: Not on file    Number of children: Not on file    Years of education: Not on file    Highest education level: Not on file   Occupational History    Not on file   Social Needs    Financial resource strain: Not on file    Food insecurity:     Worry: Not on file     Inability: Not on file    Transportation needs:     Medical: Not on file     Non-medical: Not on file   Tobacco Use    Smoking status: Never Smoker    Smokeless tobacco: Never Used   Substance and Sexual Activity    Alcohol use: No     Alcohol/week: 0.0 standard drinks    Drug use: No    Sexual activity: Not Currently   Lifestyle    Physical activity:     Days per week: Not on file     Minutes per session: Not on file    Stress: Not on file   Relationships    Social connections:     Talks on phone: Not on file     Gets together: Not on file     Attends Worship service: Not on file     Active member of club or organization: Not on file     Attends meetings of clubs or organizations: Not on file     Relationship status: Not on file    Intimate partner violence:     Fear of current or ex partner: Not on file     Emotionally abused: Not on file     Physically abused: Not on file     Forced sexual activity: Not on file   Other Topics Concern    Not on file   Social History Narrative    Not on file      Current Outpatient Medications   Medication Sig    butalbital-acetaminophen-caffeine (FIORICET, ESGIC) -40 mg per tablet TAKE 1 TABLET BY MOUTH EVERY 6 HOURS AS NEEDED FOR PAIN    temazepam (RESTORIL) 15 mg capsule TAKE 1 TO 2 CAPSULES BY MOUTH AT BEDTIME    BYSTOLIC 5 mg tablet TAKE 1 TABLET BY MOUTH TWICE DAILY.  HOLD DOSE IF SYSTOLIC PRESSURE IS LESS THAN 100 OR PULSE IS LESS THAN 50    levothyroxine (SYNTHROID) 50 mcg tablet Take Monday, Wednesday and Friday only before breakfast.    benzonatate (TESSALON) 200 mg capsule Take 1 Cap by mouth three (3) times daily as needed for Cough.  cyclobenzaprine (FLEXERIL) 10 mg tablet TAKE 1 TABLET BY MOUTH THREE TIMES DAILY AS NEEDED FOR MUSCLE SPASMS    butalbital-acetaminophen-caffeine (FIORICET, ESGIC) -40 mg per tablet Take 1 Tab by mouth every six (6) hours as needed for Pain.  levothyroxine (SYNTHROID) 50 mcg tablet TAKE 1 TABLET BY MOUTH EVERY MONDAY, TUESDAY, WEDNESDAY AND THURSDAY    pravastatin (PRAVACHOL) 10 mg tablet Take 1 Tab by mouth nightly.  romiplostim (NPLATE SC) by SubCUTAneous route every seven (7) days.  ondansetron (ZOFRAN ODT) 4 mg disintegrating tablet Dissolve 1 tab under tongue every 8 hours as needed for nausea    acetaminophen (ARTHRITIS PAIN RELIEF) 650 mg TbER Take 650 mg by mouth every eight (8) hours as needed.  cetirizine (ZYRTEC) 10 mg tablet Take 10 mg by mouth daily as needed for Allergies.  chlorpheniramine-dm (CORICIDIN HBP COUGH AND COLD) 4-30 mg tab Take 1 tablet 2 times a day as needed    nebivolol (BYSTOLIC) 5 mg tablet Take 2 Tabs by mouth daily. No current facility-administered medications for this visit. I have reviewed the nurses notes, vitals, problem list, allergy list, medical history, family medical, social history and medications. Objective:     Physical Exam:     Vitals:    10/16/19 1600   BP: 134/78   Pulse: 70   Resp: 18   SpO2: 97%   Weight: 163 lb 12.8 oz (74.3 kg)   Height: 5' 1\" (1.549 m)    Body mass index is 30.95 kg/m². General: Well developed, in no acute distress. HEENT: No carotid bruits, no JVD, trach is midline. Heart:  Normal S1/S2 negative S3 or S4. Regular, no murmur, gallop or rub.   Respiratory: Clear bilaterally, no wheezing or rales  Abdomen:   Soft, non-tender, bowel sounds are active.   Extremities:  No edema, normal cap refill, no cyanosis. Neuro: A&Ox3, speech clear, gait stable. Skin: Skin color is normal. No rashes or lesions. No diaphoresis. Vascular: 2+ pulses symmetric in all extremities        Data Review:       Cardiographics:    EKG: NSR with moderate sinus arrhythmia, poor anterior Rwave progression     Cardiology Labs:    Results for orders placed or performed during the hospital encounter of 08/29/15   EKG, 12 LEAD, INITIAL   Result Value Ref Range    Ventricular Rate 99 BPM    Atrial Rate 99 BPM    P-R Interval 188 ms    QRS Duration 86 ms    Q-T Interval 340 ms    QTC Calculation (Bezet) 436 ms    Calculated P Axis 35 degrees    Calculated R Axis -15 degrees    Calculated T Axis 30 degrees    Diagnosis       Normal sinus rhythm  Normal ECG  When compared with ECG of 08-JUL-2013 19:21,  No significant change was found  Confirmed by Gerardo Izquierdo ([id number]) on 8/30/2015 1:55:05 PM     Results for orders placed or performed in visit on 07/16/13   HOLTER MONITOR, 24 HOURS    Narrative    ECG Monitor/24 hours, Complete    Reason for Holter Monitor   tachycardia    Heartbeat    Slowest 60  Average 83  Fastest  144      Results:   Underlying Rhythm: Normal sinus rhythm      Atrial Arrhythmias: paroxysmal supraventricular tachycardia             AV Conduction: normal    Ventricular Arrhythmias: premature ventricular contractions;  occasional     ST Segment Analysis:normal     Symptom Correlation:  None reported    Comment:   Sinus rhythm with paroxysmal atrial tachycardia upto 130 bpm  (asymptomatic).      Sima Teixeira MD, [facility]              Lab Results   Component Value Date/Time    Cholesterol, total 167 08/20/2019 03:27 PM    HDL Cholesterol 60 08/20/2019 03:27 PM    LDL, calculated 68 08/20/2019 03:27 PM    Triglyceride 197 (H) 08/20/2019 03:27 PM    CHOL/HDL Ratio 3.7 01/27/2010 05:43 PM       Lab Results   Component Value Date/Time    Sodium 142 08/20/2019 03:27 PM    Potassium 4.1 08/20/2019 03:27 PM    Chloride 101 08/20/2019 03:27 PM    CO2 24 08/20/2019 03:27 PM Anion gap 12 05/03/2016 01:05 PM    Glucose 94 08/20/2019 03:27 PM    BUN 13 08/20/2019 03:27 PM    Creatinine 0.79 08/20/2019 03:27 PM    BUN/Creatinine ratio 16 08/20/2019 03:27 PM    GFR est AA 81 08/20/2019 03:27 PM    GFR est non-AA 70 08/20/2019 03:27 PM    Calcium 10.5 (H) 08/20/2019 03:27 PM    Bilirubin, total 0.2 08/20/2019 03:27 PM    AST (SGOT) 23 08/20/2019 03:27 PM    Alk. phosphatase 77 08/20/2019 03:27 PM    Protein, total 6.9 08/20/2019 03:27 PM    Albumin 4.8 (H) 08/20/2019 03:27 PM    Globulin 3.2 05/03/2016 01:05 PM    A-G Ratio 2.3 (H) 08/20/2019 03:27 PM    ALT (SGPT) 17 08/20/2019 03:27 PM          Assessment:       ICD-10-CM ICD-9-CM    1. CARVAJAL (dyspnea on exertion) R06.09 786.09    2. Hypercholesterolemia E78.00 272.0    3. Essential hypertension I10 401.9 AMB POC EKG ROUTINE W/ 12 LEADS, INTER & REP   4. Reactive airway disease without complication, unspecified asthma severity, unspecified whether persistent J45.909 493.90    5. Heart palpitations R00.2 785.1    6. Primary osteoarthritis involving multiple joints M15.0 715.09          Discussion: Patient presents at this time stable from a cardiac perspective. No cardiac complaints. Pleased with present cardiac status. Plan: 1. Continue same meds. Lipid profile and labs followed by PCP--.at goal except Trigs but slowly improving on recently started Pravastatin. 2.Encouraged to exercise to tolerance, lose weight and follow low fat, low cholesterol, low sodium predominantly Plant-based (consider Mediterranean) diet. Call with questions or concerns. Will follow up any test results by phone and/or f/u here in office if needed. Gopal Bowden 3.Follow up: 6 months    I have discussed the diagnosis with the patient and the intended plan as seen in the above orders. The patient has received an after-visit summary and questions were answered concerning future plans.   I have discussed any concerning medication side effects and warnings with the patient as well.      Anisha Presley MD,FACC,[facility]  10/16/2019

## 2019-10-21 ENCOUNTER — OFFICE VISIT (OUTPATIENT)
Dept: FAMILY MEDICINE CLINIC | Age: 84
End: 2019-10-21

## 2019-10-21 ENCOUNTER — HOSPITAL ENCOUNTER (OUTPATIENT)
Dept: LAB | Age: 84
Discharge: HOME | End: 2019-10-21
Payer: MEDICARE

## 2019-10-21 VITALS
RESPIRATION RATE: 16 BRPM | TEMPERATURE: 98.2 F | WEIGHT: 162 LBS | OXYGEN SATURATION: 96 % | BODY MASS INDEX: 30.58 KG/M2 | HEIGHT: 61 IN | SYSTOLIC BLOOD PRESSURE: 125 MMHG | DIASTOLIC BLOOD PRESSURE: 67 MMHG | HEART RATE: 69 BPM

## 2019-10-21 DIAGNOSIS — E03.4 HYPOTHYROIDISM DUE TO ACQUIRED ATROPHY OF THYROID: Primary | ICD-10-CM

## 2019-10-21 DIAGNOSIS — Z23 ENCOUNTER FOR IMMUNIZATION: ICD-10-CM

## 2019-10-21 PROCEDURE — 84443 ASSAY THYROID STIM HORMONE: CPT

## 2019-10-21 PROCEDURE — 36415 COLL VENOUS BLD VENIPUNCTURE: CPT

## 2019-10-21 RX ORDER — LEVOTHYROXINE SODIUM 50 UG/1
TABLET ORAL
COMMUNITY
End: 2020-02-27 | Stop reason: SDUPTHER

## 2019-10-21 NOTE — PROGRESS NOTES
Chief Complaint   Patient presents with    Immunization/Injection     flu     Verbal order received by Dr. Espinoza Tineo dose flu vaccine IM. Pt received high dose flu vaccine IM in right deltoid without any difficulty. 1. Have you been to the ER, urgent care clinic since your last visit? Hospitalized since your last visit? No    2. Have you seen or consulted any other health care providers outside of the [address] [place] [address] since your last visit? Include any pap smears or colon screening.  no

## 2019-10-22 ENCOUNTER — TELEPHONE (OUTPATIENT)
Dept: FAMILY MEDICINE CLINIC | Age: 84
End: 2019-10-22

## 2019-10-22 LAB — TSH SERPL DL<=0.005 MIU/L-ACNC: 1.26 UIU/ML (ref 0.45–4.5)

## 2019-10-22 NOTE — TELEPHONE ENCOUNTER
Called patient and informed per Dr. Jose Alberto Givens repeat thyroid level was normal and to continue with current dose of thyroid medication. Patient verbalized understanding.

## 2019-10-22 NOTE — PROGRESS NOTES
Please let her know that her repeat thyroid test is normal.  Continue with current dose of the thyroid medication.

## 2019-11-16 RX ORDER — PRAVASTATIN SODIUM 10 MG/1
10 TABLET ORAL
Qty: 90 TAB | Refills: 1 | Status: SHIPPED | OUTPATIENT
Start: 2019-11-16 | End: 2020-05-26

## 2019-11-19 DIAGNOSIS — Z86.69 HISTORY OF MIGRAINE: ICD-10-CM

## 2019-11-19 RX ORDER — BUTALBITAL, ACETAMINOPHEN AND CAFFEINE 50; 325; 40 MG/1; MG/1; MG/1
TABLET ORAL
Qty: 30 TAB | Refills: 0 | Status: SHIPPED | OUTPATIENT
Start: 2019-11-19 | End: 2019-12-18

## 2019-11-26 ENCOUNTER — HOSPITAL ENCOUNTER (OUTPATIENT)
Dept: MAMMOGRAPHY | Age: 84
Discharge: HOME | End: 2019-11-26
Attending: FAMILY MEDICINE
Payer: MEDICARE

## 2019-11-26 DIAGNOSIS — Z12.39 SCREENING BREAST EXAMINATION: ICD-10-CM

## 2019-11-26 DIAGNOSIS — Z12.31 VISIT FOR SCREENING MAMMOGRAM: ICD-10-CM

## 2019-11-26 PROCEDURE — 77067 SCR MAMMO BI INCL CAD: CPT

## 2019-12-06 DIAGNOSIS — F51.01 PRIMARY INSOMNIA: ICD-10-CM

## 2019-12-06 DIAGNOSIS — E03.4 HYPOTHYROIDISM DUE TO ACQUIRED ATROPHY OF THYROID: ICD-10-CM

## 2019-12-06 RX ORDER — LEVOTHYROXINE SODIUM 50 UG/1
TABLET ORAL
Qty: 90 TAB | Refills: 1 | Status: SHIPPED | OUTPATIENT
Start: 2019-12-06 | End: 2020-02-25

## 2019-12-06 RX ORDER — TEMAZEPAM 15 MG/1
CAPSULE ORAL
Qty: 60 CAP | Refills: 1 | OUTPATIENT
Start: 2019-12-06 | End: 2020-02-17

## 2019-12-18 DIAGNOSIS — Z86.69 HISTORY OF MIGRAINE: ICD-10-CM

## 2019-12-18 RX ORDER — BUTALBITAL, ACETAMINOPHEN AND CAFFEINE 50; 325; 40 MG/1; MG/1; MG/1
TABLET ORAL
Qty: 30 TAB | Refills: 0 | Status: SHIPPED | OUTPATIENT
Start: 2019-12-18 | End: 2020-01-17

## 2020-01-02 ENCOUNTER — TELEPHONE (OUTPATIENT)
Dept: CARDIOLOGY CLINIC | Age: 85
End: 2020-01-02

## 2020-01-02 ENCOUNTER — TELEPHONE (OUTPATIENT)
Dept: FAMILY MEDICINE CLINIC | Age: 85
End: 2020-01-02

## 2020-01-02 NOTE — TELEPHONE ENCOUNTER
Uche wants a return call regarding the medication BYSTOLIC 5 mg tablet, she is out and needs to get a prior auth.  Please give her a call @ [phone]

## 2020-01-02 NOTE — TELEPHONE ENCOUNTER
Faxed back prior auth request to [facility], ph # [phone], fax # [phone]. Also received [facility] ph # [phone], fax # [phone]. Faxed back to both fax numbers for [facility]. [facility],Dr. Rudi Mejia filled this med on 12-4-19, # 61 with 11 refills. Advised to faxed to that office for prior auth.

## 2020-01-03 ENCOUNTER — TELEPHONE (OUTPATIENT)
Dept: FAMILY MEDICINE CLINIC | Age: 85
End: 2020-01-03

## 2020-01-03 NOTE — TELEPHONE ENCOUNTER
[phone], pt upset about prior auth for Bystolic, doesn't understand why no one is calling the ins comp for her to get this done, this is her blood pressure medication and she needs it

## 2020-01-03 NOTE — TELEPHONE ENCOUNTER
Verified patient with two types of identifiers. Contacted patient and advised her that PA was sent to cardiologist and that they forwarded it back for us to do and this is why it was delayed. PA approved thru 2/12/20. Auth number [id number].

## 2020-01-09 ENCOUNTER — TELEPHONE (OUTPATIENT)
Dept: CARDIOLOGY CLINIC | Age: 85
End: 2020-01-09

## 2020-01-09 NOTE — TELEPHONE ENCOUNTER
Spoke to Prateek Fernandez with Dr. Tristan Lyon office. She stated that prior auth for bystolic has been done and approved.

## 2020-01-16 DIAGNOSIS — Z86.69 HISTORY OF MIGRAINE: ICD-10-CM

## 2020-01-17 RX ORDER — BUTALBITAL, ACETAMINOPHEN AND CAFFEINE 50; 325; 40 MG/1; MG/1; MG/1
TABLET ORAL
Qty: 30 TAB | Refills: 0 | Status: SHIPPED | OUTPATIENT
Start: 2020-01-17 | End: 2020-02-17

## 2020-01-31 ENCOUNTER — DOCUMENTATION ONLY (OUTPATIENT)
Dept: FAMILY MEDICINE CLINIC | Age: 85
End: 2020-01-31

## 2020-01-31 NOTE — PROGRESS NOTES
Notified patient that per insurance they will note cover Bystolic , she will pay for it out of pocket per patient. She will let office know if that changes.

## 2020-02-17 DIAGNOSIS — F51.01 PRIMARY INSOMNIA: ICD-10-CM

## 2020-02-17 DIAGNOSIS — Z86.69 HISTORY OF MIGRAINE: ICD-10-CM

## 2020-02-17 RX ORDER — TEMAZEPAM 15 MG/1
CAPSULE ORAL
Qty: 60 CAP | Refills: 0 | OUTPATIENT
Start: 2020-02-17 | End: 2020-03-18

## 2020-02-17 RX ORDER — BUTALBITAL, ACETAMINOPHEN AND CAFFEINE 50; 325; 40 MG/1; MG/1; MG/1
TABLET ORAL
Qty: 30 TAB | Refills: 0 | Status: SHIPPED | OUTPATIENT
Start: 2020-02-17 | End: 2020-03-23

## 2020-02-25 ENCOUNTER — HOSPITAL ENCOUNTER (OUTPATIENT)
Dept: LAB | Age: 85
Discharge: HOME | End: 2020-02-25
Payer: MEDICARE

## 2020-02-25 ENCOUNTER — OFFICE VISIT (OUTPATIENT)
Dept: FAMILY MEDICINE CLINIC | Age: 85
End: 2020-02-25

## 2020-02-25 VITALS
BODY MASS INDEX: 30.85 KG/M2 | SYSTOLIC BLOOD PRESSURE: 136 MMHG | DIASTOLIC BLOOD PRESSURE: 72 MMHG | HEART RATE: 89 BPM | RESPIRATION RATE: 18 BRPM | TEMPERATURE: 98 F | OXYGEN SATURATION: 96 % | HEIGHT: 61 IN | WEIGHT: 163.4 LBS

## 2020-02-25 DIAGNOSIS — M79.7 FIBROMYALGIA: ICD-10-CM

## 2020-02-25 DIAGNOSIS — J40 BRONCHITIS: ICD-10-CM

## 2020-02-25 DIAGNOSIS — Z51.81 ENCOUNTER FOR MEDICATION MONITORING: ICD-10-CM

## 2020-02-25 DIAGNOSIS — E78.2 MIXED HYPERLIPIDEMIA: ICD-10-CM

## 2020-02-25 DIAGNOSIS — E03.4 HYPOTHYROIDISM DUE TO ACQUIRED ATROPHY OF THYROID: ICD-10-CM

## 2020-02-25 DIAGNOSIS — D69.3 CHRONIC ITP (IDIOPATHIC THROMBOCYTOPENIA) (HCC): ICD-10-CM

## 2020-02-25 DIAGNOSIS — Z86.69 HISTORY OF MIGRAINE: ICD-10-CM

## 2020-02-25 DIAGNOSIS — M15.9 GENERALIZED OSTEOARTHRITIS: ICD-10-CM

## 2020-02-25 DIAGNOSIS — I10 ESSENTIAL HYPERTENSION: Primary | ICD-10-CM

## 2020-02-25 PROCEDURE — 84443 ASSAY THYROID STIM HORMONE: CPT

## 2020-02-25 PROCEDURE — 80053 COMPREHEN METABOLIC PANEL: CPT

## 2020-02-25 PROCEDURE — 80061 LIPID PANEL: CPT

## 2020-02-25 PROCEDURE — 81001 URINALYSIS AUTO W/SCOPE: CPT

## 2020-02-25 PROCEDURE — 36415 COLL VENOUS BLD VENIPUNCTURE: CPT

## 2020-02-25 PROCEDURE — 85027 COMPLETE CBC AUTOMATED: CPT

## 2020-02-25 RX ORDER — AZITHROMYCIN 250 MG/1
TABLET, FILM COATED ORAL
Qty: 6 TAB | Refills: 0 | Status: SHIPPED | OUTPATIENT
Start: 2020-02-25 | End: 2020-04-14

## 2020-02-25 RX ORDER — PROMETHAZINE HYDROCHLORIDE AND DEXTROMETHORPHAN HYDROBROMIDE 6.25; 15 MG/5ML; MG/5ML
5 SYRUP ORAL
Qty: 180 ML | Refills: 1 | Status: SHIPPED | OUTPATIENT
Start: 2020-02-25 | End: 2020-07-28 | Stop reason: ALTCHOICE

## 2020-02-25 NOTE — PROGRESS NOTES
HISTORY OF PRESENT ILLNESS  Candy Gomez is a 82 y.o. female. HPI   Follow up on chronic medical problems. C/o nasal congestion, headache and chest congestion and cough for the past 2 weeks. Coughing up thick phlegm. Cough is loose and wet. No fever or chills noted. Has malaise. Throat is scratchy. Has post nasal drainage. No chest pain or SOB. No wheezing noted. Cardiovascular Review:  The patient has hypertension, hyperlipidemia, and atrial tachycardia. Diet and Lifestyle: generally follows a low fat low cholesterol diet, generally follows a low sodium diet, exercises sporadically  Home BP Monitoring: is not measured at home. Pertinent ROS: taking medications as instructed, no medication side effects noted, no TIA's, no swelling of ankles, no palpitations. C/o SOB with exertion over the past several months but thinks that it is getting worse. Denies chest pain. No pressure or chest tightness. Last seen by cardiology on this past January but did not discussed at that time. She has been extremely more fatigue also. Thyroid Review:  Patient is seen for followup of hypothyroidism. Followed by endo. Thyroid ROS: denies weight changes, heat/cold intolerance, bowel/skin changes or CVS symptoms. Osteoarthritis and Chronic Pain:  Patient has osteoarthritis and fibromyalgia. Rheumatological ROS: Has burning and pain in the legs at feet that is worse at night. Aches all the time. Has not been exercising. Controlled by PRN meds. Response to treatment plan: stable. 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 Platelet counts have been overall stable. She is on romipostim. HM:  Mammogram 11/26/2019   Colonoscopy 8/1/2016 by Dr. Saleem Ruiz - no more colonoscopies unless she has a problem.    Eye exam 8/13/2018 by Dr. Tawana Thompson. Patient states she has an appt next week.     Patient Active Problem List   Diagnosis Code    Hypothyroidism E03.9    HTN (hypertension) I10  OA (osteoarthritis) M19.90    GERD (gastroesophageal reflux disease) K21.9    Fibromyalgia M79.7    Insomnia G47.00    Headache(784.0) R51    Iron deficiency anemia, unspecified D50.9    CARVAJAL (dyspnea on exertion) R06.09    Heart palpitations R00.2    Paroxysmal Inappropriate sinus tachycardia vs possible atrial tachycardia R00.0    Edema leg R60.0    Reactive airway disease without complication Q57.884    Incidental lung nodule, greater than or equal to 8mm R91.1    Encounter for medication monitoring Z51.81    Preop cardiovascular exam Z01.810    Pelvic lymphadenopathy R59.0    Chronic ITP (idiopathic thrombocytopenia) (HCC) D69.3    Hypercholesterolemia E78.00       Current Outpatient Medications   Medication Sig Dispense Refill    temazepam (RESTORIL) 15 mg capsule TAKE 1 TO 2 CAPSULES BY MOUTH EVERY NIGHT AT BEDTIME 60 Cap 0    butalbital-acetaminophen-caffeine (FIORICET, ESGIC) -40 mg per tablet TAKE 1 TABLET BY MOUTH EVERY 6 HOURS AS NEEDED FOR PAIN 30 Tab 0    BYSTOLIC 5 mg tablet TAKE 1 TABLET BY MOUTH TWICE DAILY. HOLD DOSE IF SYSTOLIC PRESSURE IS LESS THAN 100 OR PULSE IS LESS THAN 50 60 Tab 11    pravastatin (PRAVACHOL) 10 mg tablet Take 1 Tab by mouth nightly. 90 Tab 1    levothyroxine (SYNTHROID) 50 mcg tablet Take 1 tab by mouth every Monday, Wednesday, and Friday      romiplostim (NPLATE SC) by SubCUTAneous route every seven (7) days.  ondansetron (ZOFRAN ODT) 4 mg disintegrating tablet Dissolve 1 tab under tongue every 8 hours as needed for nausea  0    acetaminophen (ARTHRITIS PAIN RELIEF) 650 mg TbER Take 650 mg by mouth every eight (8) hours as needed.  cetirizine (ZYRTEC) 10 mg tablet Take 10 mg by mouth daily as needed for Allergies.  chlorpheniramine-dm (CORICIDIN HBP COUGH AND COLD) 4-30 mg tab Take 1 tablet 2 times a day as needed      nebivolol (BYSTOLIC) 5 mg tablet Take 2 Tabs by mouth daily.  60 Tab 6    levothyroxine (SYNTHROID) 50 mcg tablet TAKE 1 TABLET BY MOUTH EVERY MONDAY, TUESDAY, WEDNESDAY AND THURSDAY 90 Tab 1    hydrOXYzine HCl (ATARAX) 25 mg tablet TAKE 1 TABLET BY MOUTH TWICE DAILY AS NEEDED FOR ANXIETY 30 Tab 3    benzonatate (TESSALON) 200 mg capsule Take 1 Cap by mouth three (3) times daily as needed for Cough.  30 Cap 1    cyclobenzaprine (FLEXERIL) 10 mg tablet TAKE 1 TABLET BY MOUTH THREE TIMES DAILY AS NEEDED FOR MUSCLE SPASMS 30 Tab 0       Allergies   Allergen Reactions    Epinephrine Other (comments)     BP drops and pass out    Iodinated Contrast Media Shortness of Breath    Novocain [Procaine] Palpitations     BP drops,passes out    Codeine Itching     Pt does not recall    Cymbalta [Duloxetine] Other (comments)     Head Pain and nausea    Daypro [Oxaprozin] Unknown (comments)     Pt does not recall    Prednisone Shortness of Breath     agitation    Sulfa (Sulfonamide Antibiotics) Unknown (comments)     Pt does not recall    Tetracycline Other (comments)     Low platelet count    Zithromax [Azithromycin] Nausea Only     Diarrhea, headaches       Past Medical History:   Diagnosis Date    Arrhythmia     tachycardia; Dr. Sim Fitzgerald Autoimmune disease ([facility] [address]     fibromyalgia    Chronic ITP (idiopathic thrombocytopenia) (HCC)     Fibromyalgia     GERD (gastroesophageal reflux disease)     Headache(784.0)     migraine    Heart failure (HCC)     HTN (hypertension) 4/2/2010    Hypothyroidism 4/2/2010    OA (osteoarthritis) 4/2/2010    Seizures ([address]     56 years ago with childbirth    Tachycardia 2013    Thromboembolus ([facility] [address]     blood clot on brain 56 years ago w childbirth    Thyroid disease        Past Surgical History:   Procedure Laterality Date    Otto Maddox  2/5/2015         ENDOSCOPY, COLON, DIAGNOSTIC  12/2010    Dr. Lacho Vila- repeat 5 yrs    HX BREAST BIOPSY Right 1999    more than one yrs ago all benign    HX CARPAL TUNNEL RELEASE  9/28/2012    HX CATARACT REMOVAL 9/2014    bilateral     HX HYSTERECTOMY  1977    HX REFRACTIVE SURGERY  07/2018       Family History   Problem Relation Age of Onset    Heart Disease Paternal Aunt     Heart Disease Paternal Uncle     Colon Cancer Maternal Grandmother     No Known Problems Mother     No Known Problems Father     Other Daughter         fibromyalgia    Heart Disease Maternal Grandfather     Other Sister         Raynauds       Social History     Tobacco Use    Smoking status: Never Smoker    Smokeless tobacco: Never Used   Substance Use Topics    Alcohol use: No     Alcohol/week: 0.0 standard drinks        Lab Results   Component Value Date/Time    WBC 3.9 11/15/2017 10:15 AM    HGB 10.7 (L) 11/15/2017 10:15 AM    Hemoglobin (POC) 11.6 07/14/2017 07:13 AM    HCT 34.0 (L) 11/15/2017 10:15 AM    Hematocrit (POC) 34 (L) 07/14/2017 07:13 AM    PLATELET 46 (LL) 75/99/5737 10:15 AM    MCV 82.3 11/15/2017 10:15 AM     Lab Results   Component Value Date/Time    Cholesterol, total 167 08/20/2019 03:27 PM    HDL Cholesterol 60 08/20/2019 03:27 PM    LDL, calculated 68 08/20/2019 03:27 PM    Triglyceride 197 (H) 08/20/2019 03:27 PM    CHOL/HDL Ratio 3.7 01/27/2010 05:43 PM     Lab Results   Component Value Date/Time    TSH 1.260 10/21/2019 02:50 PM    T4, Free 1.18 10/11/2016 03:41 PM      Lab Results   Component Value Date/Time    Sodium 142 08/20/2019 03:27 PM    Potassium 4.1 08/20/2019 03:27 PM    Chloride 101 08/20/2019 03:27 PM    CO2 24 08/20/2019 03:27 PM    Anion gap 12 05/03/2016 01:05 PM    Glucose 94 08/20/2019 03:27 PM    BUN 13 08/20/2019 03:27 PM    Creatinine 0.79 08/20/2019 03:27 PM    BUN/Creatinine ratio 16 08/20/2019 03:27 PM    GFR est AA 81 08/20/2019 03:27 PM    GFR est non-AA 70 08/20/2019 03:27 PM    Calcium 10.5 (H) 08/20/2019 03:27 PM    Bilirubin, total 0.2 08/20/2019 03:27 PM    ALT (SGPT) 17 08/20/2019 03:27 PM    AST (SGOT) 23 08/20/2019 03:27 PM    Alk.  phosphatase 77 08/20/2019 03:27 PM    Protein, total 6.9 08/20/2019 03:27 PM    Albumin 4.8 (H) 08/20/2019 03:27 PM    Globulin 3.2 05/03/2016 01:05 PM    A-G Ratio 2.3 (H) 08/20/2019 03:27 PM      Lab Results   Component Value Date/Time    Hemoglobin A1c 5.9 (H) 08/28/2012 04:22 PM    Hemoglobin A1c (POC) 5.7 11/10/2015 03:16 PM    Hemoglobin A1c, External 11.1 07/22/2015         Review of Systems   Constitutional: Negative for malaise/fatigue. HENT: Positive for congestion. Eyes: Negative for blurred vision. Respiratory: Positive for cough and sputum production. Negative for shortness of breath and wheezing. Cardiovascular: Negative for chest pain, palpitations and leg swelling. Gastrointestinal: Negative for abdominal pain, constipation and heartburn. Genitourinary: Negative for dysuria, frequency and urgency. Musculoskeletal: Negative for back pain and joint pain. Neurological: Negative for dizziness, tingling and headaches. Endo/Heme/Allergies: Negative for environmental allergies. Psychiatric/Behavioral: Negative for depression. The patient does not have insomnia. Physical Exam  Vitals signs and nursing note reviewed. Constitutional:       Appearance: Normal appearance. She is well-developed. Comments: /72 (BP 1 Location: Left arm, BP Patient Position: Sitting)   Pulse 89   Temp 98 °F (36.7 °C) (Oral)   Resp 18   Ht 5' 1\" (1.549 m)   Wt 163 lb 6.4 oz (74.1 kg)   SpO2 96%   BMI 30.87 kg/m²    HENT:      Right Ear: Tympanic membrane and ear canal normal.      Left Ear: Tympanic membrane and ear canal normal.      Nose: Mucosal edema and rhinorrhea present. Mouth/Throat:      Pharynx: Posterior oropharyngeal erythema present. No oropharyngeal exudate. Neck:      Musculoskeletal: Full passive range of motion without pain, normal range of motion and neck supple. No edema. Thyroid: No thyromegaly. Trachea: Trachea normal.   Cardiovascular:      Rate and Rhythm: Normal rate and regular rhythm. Heart sounds: Normal heart sounds. No gallop. Pulmonary:      Effort: Pulmonary effort is normal.      Breath sounds: Rhonchi (few noted) present. No wheezing or rales. Abdominal:      General: Bowel sounds are normal.      Palpations: Abdomen is soft. There is no mass. Tenderness: There is no abdominal tenderness. Musculoskeletal: Normal range of motion. General: No swelling. Lymphadenopathy:      Cervical: No cervical adenopathy. Skin:     General: Skin is warm and dry. ASSESSMENT and PLAN  Diagnoses and all orders for this visit:    1. Essential hypertension  Discussed sodium restriction, high k rich diet, maintaining ideal body weight and regular exercise program such as daily walking 30 min perday 4-5 times per week, as physiologic means to achieve blood pressure control.  Medication compliance advised. 2. Mixed hyperlipidemia  -     LIPID PANEL    3. Hypothyroidism due to acquired atrophy of thyroid  -     TSH 3RD GENERATION    4. Chronic ITP (idiopathic thrombocytopenia) (HCC)  As per hematology    5. Encounter for medication monitoring  -     CBC W/O DIFF  -     METABOLIC PANEL, COMPREHENSIVE  -     URINALYSIS W/ RFLX MICROSCOPIC    6. Bronchitis  -     XR CHEST PA LAT; Future  -     azithromycin (ZITHROMAX) 250 mg tablet; Take 2 tablets today, then take 1 tablet daily  -     promethazine-dextromethorphan (PROMETHAZINE-DM) 6.25-15 mg/5 mL syrup; Take 5 mL by mouth every six (6) hours as needed for Cough. Follow-up and Dispositions    · Return in about 5 months (around 7/25/2020). reviewed diet, exercise and weight control  cardiovascular risk and specific lipid/LDL goals reviewed    I have discussed diagnosis listed in this note with pt and/or family. I have discussed treatment plans and options and the risk/benefit analysis of those options, including safe use of medications and possible medication side effects.    Through the use of shared decision making we have agreed to the above plan. The patient has received an after-visit summary and questions were answered concerning future plans and follow up. Advise pt of any urgent changes then to proceed to the ER.

## 2020-02-25 NOTE — PROGRESS NOTES
Chief Complaint   Patient presents with    Hypertension     follow up    Thyroid Problem     follow up    Cholesterol Problem     follow up         Mammogram 11/26/2019    Colonoscopy 8/1/2016 by Dr. Iris Valenzuela - no more colonoscopies unless she has a problem. Eye exam 8/2019 by Dr. Pablito Dubois. 1. Have you been to the ER, urgent care clinic since your last visit? Hospitalized since your last visit? No    2. Have you seen or consulted any other health care providers outside of the [address] [place] [address] since your last visit? Include any pap smears or colon screening.  No

## 2020-02-26 LAB
ALBUMIN SERPL-MCNC: 5.1 G/DL (ref 3.6–4.6)
ALBUMIN/GLOB SERPL: 2.3 {RATIO} (ref 1.2–2.2)
ALP SERPL-CCNC: 105 IU/L (ref 39–117)
ALT SERPL-CCNC: 17 IU/L (ref 0–32)
APPEARANCE UR: CLEAR
AST SERPL-CCNC: 22 IU/L (ref 0–40)
BACTERIA #/AREA URNS HPF: ABNORMAL /[HPF]
BILIRUB SERPL-MCNC: 0.4 MG/DL (ref 0–1.2)
BILIRUB UR QL STRIP: NEGATIVE
BUN SERPL-MCNC: 11 MG/DL (ref 8–27)
BUN/CREAT SERPL: 13 (ref 12–28)
CALCIUM SERPL-MCNC: 10 MG/DL (ref 8.7–10.3)
CASTS URNS MICRO: ABNORMAL
CASTS URNS QL MICRO: PRESENT /LPF
CHLORIDE SERPL-SCNC: 103 MMOL/L (ref 96–106)
CHOLEST SERPL-MCNC: 192 MG/DL (ref 100–199)
CO2 SERPL-SCNC: 25 MMOL/L (ref 20–29)
COLOR UR: YELLOW
CREAT SERPL-MCNC: 0.86 MG/DL (ref 0.57–1)
CRYSTALS URNS MICRO: ABNORMAL
EPI CELLS #/AREA URNS HPF: ABNORMAL /HPF (ref 0–10)
ERYTHROCYTE [DISTWIDTH] IN BLOOD BY AUTOMATED COUNT: 12.9 % (ref 11.7–15.4)
GLOBULIN SER CALC-MCNC: 2.2 G/DL (ref 1.5–4.5)
GLUCOSE SERPL-MCNC: 91 MG/DL (ref 65–99)
GLUCOSE UR QL: NEGATIVE
HCT VFR BLD AUTO: 37.8 % (ref 34–46.6)
HDLC SERPL-MCNC: 56 MG/DL
HGB BLD-MCNC: 13 G/DL (ref 11.1–15.9)
HGB UR QL STRIP: NEGATIVE
INTERPRETATION, 910389: NORMAL
KETONES UR QL STRIP: NEGATIVE
LDLC SERPL CALC-MCNC: 100 MG/DL (ref 0–99)
LEUKOCYTE ESTERASE UR QL STRIP: NEGATIVE
MCH RBC QN AUTO: 29.8 PG (ref 26.6–33)
MCHC RBC AUTO-ENTMCNC: 34.4 G/DL (ref 31.5–35.7)
MCV RBC AUTO: 87 FL (ref 79–97)
MICRO URNS: ABNORMAL
MORPHOLOGY BLD-IMP: ABNORMAL
MUCOUS THREADS URNS QL MICRO: PRESENT
NITRITE UR QL STRIP: NEGATIVE
PH UR STRIP: 5 [PH] (ref 5–7.5)
PLATELET # BLD AUTO: 29 X10E3/UL (ref 150–450)
POTASSIUM SERPL-SCNC: 3.8 MMOL/L (ref 3.5–5.2)
PROT SERPL-MCNC: 7.3 G/DL (ref 6–8.5)
PROT UR QL STRIP: ABNORMAL
RBC # BLD AUTO: 4.36 X10E6/UL (ref 3.77–5.28)
RBC #/AREA URNS HPF: ABNORMAL /HPF (ref 0–2)
SODIUM SERPL-SCNC: 145 MMOL/L (ref 134–144)
SP GR UR: >=1.03 (ref 1–1.03)
TRIGL SERPL-MCNC: 180 MG/DL (ref 0–149)
TSH SERPL DL<=0.005 MIU/L-ACNC: 0.37 UIU/ML (ref 0.45–4.5)
UNIDENT CRYS URNS QL MICRO: PRESENT
UROBILINOGEN UR STRIP-MCNC: 1 MG/DL (ref 0.2–1)
VLDLC SERPL CALC-MCNC: 36 MG/DL (ref 5–40)
WBC # BLD AUTO: 3.4 X10E3/UL (ref 3.4–10.8)
WBC #/AREA URNS HPF: ABNORMAL /HPF (ref 0–5)

## 2020-02-27 ENCOUNTER — TELEPHONE (OUTPATIENT)
Dept: FAMILY MEDICINE CLINIC | Age: 85
End: 2020-02-27

## 2020-02-27 RX ORDER — LEVOTHYROXINE SODIUM 50 UG/1
TABLET ORAL
COMMUNITY
Start: 2020-02-27 | End: 2020-11-11 | Stop reason: SDUPTHER

## 2020-02-27 NOTE — TELEPHONE ENCOUNTER
Advised patient per Dr. Kalpana Jackson to go to ER, patient has a fever of 101.3, platelets are low, patient having chills and fatigue.

## 2020-02-27 NOTE — TELEPHONE ENCOUNTER
----- Message from Merle Valerio sent at 2/27/2020 11:45 AM EST -----  Regarding: Dr. Mei Hoyos: [phone]  Caller's first and last name and relationship (if not the patient): N/A  Best contact number(s): [phone]  What are the symptoms: Fever  Transfer successful - yes/no (include outcome): No  Transfer declined - yes/no (include reason): Yes   Was caller advised to seek appropriate level of care - yes/no: Yes   Details to clarify the request: Pt stated that she has a fever of 101.2 and would like to be seen as soon as possible.

## 2020-03-18 DIAGNOSIS — F51.01 PRIMARY INSOMNIA: ICD-10-CM

## 2020-03-18 RX ORDER — TEMAZEPAM 15 MG/1
CAPSULE ORAL
Qty: 60 CAP | Refills: 0 | OUTPATIENT
Start: 2020-03-18 | End: 2020-04-21

## 2020-03-23 DIAGNOSIS — Z86.69 HISTORY OF MIGRAINE: ICD-10-CM

## 2020-03-23 RX ORDER — BUTALBITAL, ACETAMINOPHEN AND CAFFEINE 50; 325; 40 MG/1; MG/1; MG/1
TABLET ORAL
Qty: 30 TAB | Refills: 0 | Status: SHIPPED | OUTPATIENT
Start: 2020-03-23 | End: 2020-04-15

## 2020-04-14 ENCOUNTER — VIRTUAL VISIT (OUTPATIENT)
Dept: CARDIOLOGY CLINIC | Age: 85
End: 2020-04-14

## 2020-04-14 DIAGNOSIS — I10 ESSENTIAL HYPERTENSION: Primary | ICD-10-CM

## 2020-04-14 DIAGNOSIS — Z86.69 HISTORY OF MIGRAINE: ICD-10-CM

## 2020-04-14 DIAGNOSIS — R06.02 SOB (SHORTNESS OF BREATH): ICD-10-CM

## 2020-04-14 DIAGNOSIS — E78.2 MIXED HYPERLIPIDEMIA: ICD-10-CM

## 2020-04-14 DIAGNOSIS — D69.3 CHRONIC ITP (IDIOPATHIC THROMBOCYTOPENIA) (HCC): ICD-10-CM

## 2020-04-14 RX ORDER — AMLODIPINE BESYLATE 2.5 MG/1
2.5 TABLET ORAL DAILY
Qty: 30 TAB | Refills: 3 | Status: SHIPPED | OUTPATIENT
Start: 2020-04-14 | End: 2020-04-22

## 2020-04-14 NOTE — PROGRESS NOTES
Niharika Celis is a 82 y.o. female evaluated via telephone on 4/14/2020. Consent:  She and/or health care decision maker is aware that that she may receive a bill for this telephone service, depending on her insurance coverage, and has provided verbal consent to proceed: Yes      Documentation:  I communicated with the patient and/or health care decision maker about: This is 83 YO F with pmhx HTN HLD and chronic ITP. Previous patient of Dr Adela Manley. She reports hx of sob in feb 2020, saw pcp and was given zpak. She continued to have fevers and cough productive of green sputum. Referred to pulmonary associates and was given nebs and augmentin---complete resolution of her symptoms. However has been having high BP readings since 150/90s. Details of this discussion including any medical advice provided:     HTN: added low dose amlodipine 2.5 mg daily. She will continue taking Bystolic 5 mg in am and 5 mg in pm.  HLD: On statin followed by PCP  Chronic ITP: followed by Dr Song Gao. Last Plt count 2/25/2020 was at 29. Hx SOB, resolved: Diagnosed with bronchitis in feb 2020, followed by Joel Rai NP with pulmonary associates. She will do virtual/telephone visit with me on Monday or sooner if needed. Will see dr Ines Matta in 2 mos when covid pandemic is over. I affirm this is a Patient Initiated Episode with an Established Patient who has not had a related appointment within my department in the past 7 days or scheduled within the next 24 hours.       Total Time: minutes: 11-20 minutes    Note: not billable if this call serves to triage the patient into an appointment for the relevant concern      Burak Mathew NP

## 2020-04-15 RX ORDER — BUTALBITAL, ACETAMINOPHEN AND CAFFEINE 50; 325; 40 MG/1; MG/1; MG/1
TABLET ORAL
Qty: 30 TAB | Refills: 0 | Status: SHIPPED | OUTPATIENT
Start: 2020-04-15 | End: 2020-05-13

## 2020-04-20 ENCOUNTER — TELEPHONE (OUTPATIENT)
Dept: CARDIOLOGY CLINIC | Age: 85
End: 2020-04-20

## 2020-04-20 NOTE — TELEPHONE ENCOUNTER
Patient not taking Amlodipine 2.5mg like you instructed her to its giving her headaches and her blood pressure is still going up. [phone].     Thanks  Olman Alanis

## 2020-04-20 NOTE — TELEPHONE ENCOUNTER
Verbal order per NP Jeb Fountain  patient to take 5 mg Norvasc today due to increase BP and switch to tylenol for HA not tylenol sinus since this is back of head pressure may be related to tooth ,recommend she contact dentist office that has emergency service to look at the tooth problem.  Martinez Shoemaker will you schedule her for  Telephone call 4/21/20 thanks

## 2020-04-21 DIAGNOSIS — F51.01 PRIMARY INSOMNIA: ICD-10-CM

## 2020-04-21 RX ORDER — TEMAZEPAM 15 MG/1
CAPSULE ORAL
Qty: 60 CAP | Refills: 0 | Status: SHIPPED | OUTPATIENT
Start: 2020-04-21 | End: 2020-05-21

## 2020-04-22 ENCOUNTER — VIRTUAL VISIT (OUTPATIENT)
Dept: CARDIOLOGY CLINIC | Age: 85
End: 2020-04-22

## 2020-04-22 DIAGNOSIS — D69.3 CHRONIC ITP (IDIOPATHIC THROMBOCYTOPENIA) (HCC): ICD-10-CM

## 2020-04-22 DIAGNOSIS — I10 ESSENTIAL HYPERTENSION: Primary | ICD-10-CM

## 2020-04-22 DIAGNOSIS — E78.2 MIXED HYPERLIPIDEMIA: ICD-10-CM

## 2020-04-22 DIAGNOSIS — R06.02 SOB (SHORTNESS OF BREATH): ICD-10-CM

## 2020-04-22 RX ORDER — AMLODIPINE BESYLATE 5 MG/1
5 TABLET ORAL DAILY
Qty: 90 TAB | Refills: 1 | Status: SHIPPED | OUTPATIENT
Start: 2020-04-22 | End: 2020-07-28 | Stop reason: DRUGHIGH

## 2020-04-22 NOTE — PROGRESS NOTES
Kike Aguila is a 82 y.o. female evaluated via telephone on 4/22/2020. Consent:  She and/or health care decision maker is aware that that she may receive a bill for this telephone service, depending on her insurance coverage, and has provided verbal consent to proceed: Yes      Documentation:  I communicated with the patient and/or health care decision maker about. Patient presents for telephone visit. Last encounter dated 4/14/2020. At that time I added low dose Amlodipine 2.5 mg daily to her medication therapy to help address her high BP 150s/90s. BP remains elevated + having some occipital headache, I increased her Amlodipine yesterday 5 mg daily  Today, 144/76---she feels better. Denies any further headache. Details of this discussion including any medical advice provided:    HTN: Bp now 140s/70s. Continue Bystolic 10 mg daily, amlodipine 5 mg daily. HLD: 2/2020 LDL at 100 On statin followed by PCP  Chronic ITP: followed by Dr Gissell Harper. Last Plt count 2/25/2020 was at 29. Has a f/u with Dr Christel Walker tomorrow  Hx SOB, resolved: Diagnosed with bronchitis in feb 2020, followed by Donya Nicolas NP with pulmonary associates. Continue current care and she will see DR Daune Bloch in clinic in June. I affirm this is a Patient Initiated Episode with an Established Patient who has not had a related appointment within my department in the past 7 days or scheduled within the next 24 hours.     Total Time: minutes: 11-20 minutes 17          Note: not billable if this call serves to triage the patient into an appointment for the relevant concern      Linda Thompson NP

## 2020-05-12 DIAGNOSIS — Z86.69 HISTORY OF MIGRAINE: ICD-10-CM

## 2020-05-13 RX ORDER — BUTALBITAL, ACETAMINOPHEN AND CAFFEINE 50; 325; 40 MG/1; MG/1; MG/1
TABLET ORAL
Qty: 30 TAB | Refills: 0 | Status: SHIPPED | OUTPATIENT
Start: 2020-05-13 | End: 2020-06-10

## 2020-05-26 RX ORDER — PRAVASTATIN SODIUM 10 MG/1
TABLET ORAL
Qty: 90 TAB | Refills: 1 | Status: SHIPPED | OUTPATIENT
Start: 2020-05-26 | End: 2020-11-16

## 2020-06-10 DIAGNOSIS — Z86.69 HISTORY OF MIGRAINE: ICD-10-CM

## 2020-06-10 RX ORDER — BUTALBITAL, ACETAMINOPHEN AND CAFFEINE 50; 325; 40 MG/1; MG/1; MG/1
TABLET ORAL
Qty: 30 TAB | Refills: 0 | Status: SHIPPED | OUTPATIENT
Start: 2020-06-10 | End: 2020-07-08

## 2020-07-24 DIAGNOSIS — F51.01 PRIMARY INSOMNIA: ICD-10-CM

## 2020-07-24 RX ORDER — TEMAZEPAM 15 MG/1
CAPSULE ORAL
Qty: 60 CAP | Refills: 1 | Status: SHIPPED | OUTPATIENT
Start: 2020-07-24 | End: 2020-09-23

## 2020-07-28 ENCOUNTER — HOSPITAL ENCOUNTER (OUTPATIENT)
Dept: LAB | Age: 85
Discharge: HOME | End: 2020-07-28
Payer: MEDICARE

## 2020-07-28 ENCOUNTER — OFFICE VISIT (OUTPATIENT)
Dept: FAMILY MEDICINE CLINIC | Age: 85
End: 2020-07-28

## 2020-07-28 VITALS
SYSTOLIC BLOOD PRESSURE: 138 MMHG | BODY MASS INDEX: 31.79 KG/M2 | DIASTOLIC BLOOD PRESSURE: 60 MMHG | TEMPERATURE: 98.2 F | OXYGEN SATURATION: 96 % | HEART RATE: 76 BPM | RESPIRATION RATE: 18 BRPM | WEIGHT: 168.4 LBS | HEIGHT: 61 IN

## 2020-07-28 DIAGNOSIS — M15.9 GENERALIZED OSTEOARTHRITIS: ICD-10-CM

## 2020-07-28 DIAGNOSIS — M79.7 FIBROMYALGIA: ICD-10-CM

## 2020-07-28 DIAGNOSIS — D69.3 CHRONIC ITP (IDIOPATHIC THROMBOCYTOPENIA) (HCC): ICD-10-CM

## 2020-07-28 DIAGNOSIS — Z12.31 ENCOUNTER FOR SCREENING MAMMOGRAM FOR BREAST CANCER: ICD-10-CM

## 2020-07-28 DIAGNOSIS — G44.049 CHRONIC PAROXYSMAL HEMICRANIA, NOT INTRACTABLE: ICD-10-CM

## 2020-07-28 DIAGNOSIS — E78.2 MIXED HYPERLIPIDEMIA: ICD-10-CM

## 2020-07-28 DIAGNOSIS — E03.4 HYPOTHYROIDISM DUE TO ACQUIRED ATROPHY OF THYROID: ICD-10-CM

## 2020-07-28 DIAGNOSIS — Z00.00 MEDICARE ANNUAL WELLNESS VISIT, SUBSEQUENT: Primary | ICD-10-CM

## 2020-07-28 DIAGNOSIS — I10 ESSENTIAL HYPERTENSION: ICD-10-CM

## 2020-07-28 DIAGNOSIS — Z86.69 HISTORY OF MIGRAINE: ICD-10-CM

## 2020-07-28 DIAGNOSIS — F51.01 PRIMARY INSOMNIA: ICD-10-CM

## 2020-07-28 DIAGNOSIS — Z51.81 ENCOUNTER FOR MEDICATION MONITORING: ICD-10-CM

## 2020-07-28 PROCEDURE — 84443 ASSAY THYROID STIM HORMONE: CPT

## 2020-07-28 PROCEDURE — 80048 BASIC METABOLIC PNL TOTAL CA: CPT

## 2020-07-28 NOTE — PROGRESS NOTES
This is a Subsequent Medicare Annual Wellness Exam (AWV) (Performed 12 months after IPPE or effective date of Medicare Part B enrollment)    I have reviewed the patient's medical history in detail and updated the computerized patient record. History     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 atrial tachycardia R00.0    Edema leg R60.0    Reactive airway disease without complication E84.440    Incidental lung nodule, greater than or equal to 8mm R91.1    Encounter for medication monitoring Z51.81    Preop cardiovascular exam Z01.810    Pelvic lymphadenopathy R59.0    Chronic ITP (idiopathic thrombocytopenia) (HCC) D69.3    Hypercholesterolemia E78.00       Current Outpatient Medications   Medication Sig Dispense Refill    temazepam (RESTORIL) 15 mg capsule TAKE 1 TO 2 CAPSULES BY MOUTH EVERY NIGHT AT BEDTIME 60 Cap 1    butalbital-acetaminophen-caffeine (FIORICET, ESGIC) -40 mg per tablet TAKE 1 TABLET BY MOUTH EVERY 6 HOURS AS NEEDED FOR PAIN 30 Tab 1    pravastatin (PRAVACHOL) 10 mg tablet TAKE 1 TABLET BY MOUTH NIGHTLY 90 Tab 1    amLODIPine (NORVASC) 5 mg tablet Take 1 Tab by mouth daily. 90 Tab 1    levothyroxine (SYNTHROID) 50 mcg tablet Take 1/2 tab by mouth every Monday, Wednesday, and Friday      promethazine-dextromethorphan (PROMETHAZINE-DM) 6.25-15 mg/5 mL syrup Take 5 mL by mouth every six (6) hours as needed for Cough. 180 mL 1    romiplostim (NPLATE SC) by SubCUTAneous route every seven (7) days.  acetaminophen (ARTHRITIS PAIN RELIEF) 650 mg TbER Take 650 mg by mouth every eight (8) hours as needed.  cetirizine (ZYRTEC) 10 mg tablet Take 10 mg by mouth daily as needed for Allergies.  nebivolol (BYSTOLIC) 5 mg tablet Take 2 Tabs by mouth daily.  60 Tab 6       Allergies   Allergen Reactions    Epinephrine Other (comments)     BP drops and pass out    Iodinated Contrast Media Shortness of Breath    Novocain [Procaine] Palpitations     BP drops,passes out    Codeine Itching     Pt does not recall    Cymbalta [Duloxetine] Other (comments)     Head Pain and nausea    Daypro [Oxaprozin] Unknown (comments)     Pt does not recall    Prednisone Shortness of Breath     agitation    Sulfa (Sulfonamide Antibiotics) Unknown (comments)     Pt does not recall    Tetracycline Other (comments)     Low platelet count    Zithromax [Azithromycin] Nausea Only     Diarrhea, headaches       Past Medical History:   Diagnosis Date    Arrhythmia     tachycardia; Dr. Ankur Lockhart Autoimmune disease ([facility] [address]     fibromyalgia    Chronic ITP (idiopathic thrombocytopenia) (HCC)     Fibromyalgia     GERD (gastroesophageal reflux disease)     Headache(784.0)     migraine    Heart failure (HCC)     HTN (hypertension) 4/2/2010    Hypothyroidism 4/2/2010    OA (osteoarthritis) 4/2/2010    Seizures ([address]     56 years ago with childbirth    Tachycardia 2013    Thromboembolus ([address]     blood clot on brain 56 years ago w childbirth    Thyroid disease        Past Surgical History:   Procedure Laterality Date    [address]  2/5/2015         ENDOSCOPY, COLON, DIAGNOSTIC  12/2010    Dr. Avani Kang- repeat 5 yrs    HX BREAST BIOPSY Right 1999    more than one yrs ago all benign    HX CARPAL TUNNEL RELEASE  9/28/2012    HX CATARACT REMOVAL  9/2014    bilateral     HX HYSTERECTOMY  1977    HX REFRACTIVE SURGERY  07/2018       Family History   Problem Relation Age of Onset    Heart Disease Paternal Aunt     Heart Disease Paternal Uncle     Colon Cancer Maternal Grandmother     No Known Problems Mother     No Known Problems Father     Other Daughter         fibromyalgia    Heart Disease Maternal Grandfather     Other Sister         Raynauds       Social History     Tobacco Use    Smoking status: Never Smoker    Smokeless tobacco: Never Used   Substance Use Topics    Alcohol use: No     Alcohol/week: 0.0 standard drinks         Depression Risk Factor Screening:     PHQ over the last two weeks 11/7/2017   Little interest or pleasure in doing things Not at all   Feeling down, depressed or hopeless Not at all   Total Score PHQ 2 0     Alcohol Risk Factor Screening: You do not drink alcohol or very rarely. Functional Ability and Level of Safety:   Hearing Loss  Hearing is good. Activities of Daily Living  The home contains: no safety equipment. Patient does total self care    Fall RiskFall Risk Assessment, last 12 mths 11/7/2017   Able to walk? Yes   Fall in past 12 months? No     Functional Ability:   Does the patient exhibit a steady gait? yes    How long did it take the patient to get up and walk from a sitting position? seconds    Is the patient self reliant? (ie can do own laundry, meals, household chores)  yes   Does the patient handle his/her own medications? yes   Does the patient handle his/her own money? yes   Is the patients home safe (ie good lighting, handrails on stairs and bath, etc.)? yes   Did you notice or did patient express any hearing difficulties? no   Did you notice or did patient express any vision difficulties? no        Advance Care Planning:   Patient was offered the opportunity to discuss advance care planning:  yes    Does patient have an Advance Directive:  Yes, pt will provide copy.           Abuse Screen  Patient is not abused    Cognitive Screening   Evaluation of Cognitive Function:  Has your family/caregiver stated any concerns about your memory: no    Patient Care Team   Patient Care Team:  Kathi Moses MD as PCP - General    Assessment/Plan Education and counseling provided:  Are appropriate based on today's review and evaluation  End-of-Life planning (with patient's consent)    ASSESSMENT and PLAN    Medicare Annual Wellness  Continue current treatment plan. Continue annual follow up. I have discussed diagnosis listed in this note with pt and/or family. I have discussed treatment plans and options and the risk/benefit analysis of those options, including safe use of medications and possible medication side effects. Through the use of shared decision making we have agreed to the above plan. The patient has received an after-visit summary and questions were answered concerning future plans and follow up. Advise pt of any urgent changes then to proceed to the ER.

## 2020-07-28 NOTE — PROGRESS NOTES
Chief Complaint   Patient presents with    Thyroid Problem     follow up    Cholesterol Problem     follow up    Hypertension     follow up         Mammogram 11/26/2019    Colonoscopy 8/1/2016 by Dr. Erick Lovell - no more colonoscopies unless she has a problem. Eye exam 8/2019 by Dr. Jagdeep Marr. Patient states she has an appt in 9/2020.        1. Have you been to the ER, urgent care clinic since your last visit? Hospitalized since your last visit? No    2. Have you seen or consulted any other health care providers outside of the [address] [place] [address] since your last visit? Include any pap smears or colon screening.  No

## 2020-07-28 NOTE — PROGRESS NOTES
HISTORY OF PRESENT ILLNESS  Kavon Kate is a 82 y.o. female. Follow up on chronic medical problems. Overall feeling well. Doing the precautionary measures at home to reduce risks of exposure COVID19. Also wearing mask when she is going out. No known sick contacts or known exposure to [address]. Cardiovascular Review:  The patient has hypertension, hyperlipidemia, and atrial tachycardia. Diet and Lifestyle: generally follows a low fat low cholesterol diet, generally follows a low sodium diet, exercises sporadically  Home BP Monitoring: is not measured at home. Pertinent ROS: taking medications as instructed, no medication side effects noted, no TIA's, no swelling of ankles, no palpitations. Denies chest pain. No pressure or chest tightness. Has upcoming cardiology appt for next month. Thyroid Review:  Patient is seen for followup of hypothyroidism. Followed by [facility] and was seen earlier this month. However she did not get her TSH level since she usually gets it done here. Thyroid ROS: denies weight changes, heat/cold intolerance, bowel/skin changes or CVS symptoms. Osteoarthritis and Chronic Pain:  Patient has osteoarthritis and fibromyalgia. Has aches and pain that comes and goes. Rheumatological ROS: Has burning and pain in the legs at feet that is worse at night. Aches all the time. Has not been exercising. Controlled by PRN meds. Response to treatment plan: stable. Has chronic headache almost every days. Generally will go away if she take the Fioricet. We discussed rebound headaches from taking this medication but she is convinced that she has had hx of headaches for over 50 years and that it is not from the medication. Decline referral to neurology. 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 Platelet counts have been overall stable. She is on romipostim.       HM:  Mammogram 11/26/2019  Colonoscopy 8/1/2016 by Dr. Brook Mathews - no more colonoscopies unless she has a problem. Eye exam 8/2019 by Dr. Alison Mayfield. 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 atrial tachycardia R00.0    Edema leg R60.0    Reactive airway disease without complication Y70.755    Incidental lung nodule, greater than or equal to 8mm R91.1    Encounter for medication monitoring Z51.81    Preop cardiovascular exam Z01.810    Pelvic lymphadenopathy R59.0    Chronic ITP (idiopathic thrombocytopenia) (HCC) D69.3    Hypercholesterolemia E78.00       Current Outpatient Medications   Medication Sig Dispense Refill    temazepam (RESTORIL) 15 mg capsule TAKE 1 TO 2 CAPSULES BY MOUTH EVERY NIGHT AT BEDTIME 60 Cap 1    butalbital-acetaminophen-caffeine (FIORICET, ESGIC) -40 mg per tablet TAKE 1 TABLET BY MOUTH EVERY 6 HOURS AS NEEDED FOR PAIN 30 Tab 1    pravastatin (PRAVACHOL) 10 mg tablet TAKE 1 TABLET BY MOUTH NIGHTLY 90 Tab 1    amLODIPine (NORVASC) 5 mg tablet Take 1 Tab by mouth daily. 90 Tab 1    levothyroxine (SYNTHROID) 50 mcg tablet Take 1/2 tab by mouth every Monday, Wednesday, and Friday      promethazine-dextromethorphan (PROMETHAZINE-DM) 6.25-15 mg/5 mL syrup Take 5 mL by mouth every six (6) hours as needed for Cough. 180 mL 1    romiplostim (NPLATE SC) by SubCUTAneous route every seven (7) days.  acetaminophen (ARTHRITIS PAIN RELIEF) 650 mg TbER Take 650 mg by mouth every eight (8) hours as needed.  cetirizine (ZYRTEC) 10 mg tablet Take 10 mg by mouth daily as needed for Allergies.  nebivolol (BYSTOLIC) 5 mg tablet Take 2 Tabs by mouth daily.  60 Tab 6       Allergies   Allergen Reactions  Epinephrine Other (comments)     BP drops and pass out    Iodinated Contrast Media Shortness of Breath    Novocain [Procaine] Palpitations     BP drops,passes out    Codeine Itching     Pt does not recall    Cymbalta [Duloxetine] Other (comments)     Head Pain and nausea    Daypro [Oxaprozin] Unknown (comments)     Pt does not recall    Prednisone Shortness of Breath     agitation    Sulfa (Sulfonamide Antibiotics) Unknown (comments)     Pt does not recall    Tetracycline Other (comments)     Low platelet count    Zithromax [Azithromycin] Nausea Only     Diarrhea, headaches         Past Medical History:   Diagnosis Date    Arrhythmia     tachycardia; Dr. Collette Ruddle Autoimmune disease ([facility]     fibromyalgia    Chronic ITP (idiopathic thrombocytopenia) (HCC)     Fibromyalgia     GERD (gastroesophageal reflux disease)     Headache(784.0)     migraine    Heart failure (HCC)     HTN (hypertension) 4/2/2010    Hypothyroidism 4/2/2010    OA (osteoarthritis) 4/2/2010    Seizures ([facility]     56 years ago with childbirth    Tachycardia 2013    Thromboembolus ([facility]     blood clot on brain 56 years ago w childbirth    Thyroid disease          Past Surgical History:   Procedure Laterality Date    Vester Phipps  2/5/2015         ENDOSCOPY, COLON, DIAGNOSTIC  12/2010    Dr. Ariel Davis- repeat 5 yrs    HX BREAST BIOPSY Right 1999    more than one yrs ago all benign    HX CARPAL TUNNEL RELEASE  9/28/2012    HX CATARACT REMOVAL  9/2014    bilateral     HX HYSTERECTOMY  1977    HX REFRACTIVE SURGERY  07/2018         Family History   Problem Relation Age of Onset    Heart Disease Paternal Aunt     Heart Disease Paternal Uncle     Colon Cancer Maternal Grandmother     No Known Problems Mother     No Known Problems Father     Other Daughter         fibromyalgia    Heart Disease Maternal Grandfather     Other Sister         Raynauds       Social History     Tobacco Use    Smoking status: Never Smoker    Smokeless tobacco: Never Used   Substance Use Topics    Alcohol use: No     Alcohol/week: 0.0 standard drinks        Lab Results   Component Value Date/Time    WBC 3.4 02/25/2020 03:00 PM    HGB 13.0 02/25/2020 03:00 PM    Hemoglobin (POC) 11.6 07/14/2017 07:13 AM    HCT 37.8 02/25/2020 03:00 PM    Hematocrit (POC) 34 (L) 07/14/2017 07:13 AM    PLATELET 29 (LL) 70/38/3683 03:00 PM    MCV 87 02/25/2020 03:00 PM     Lab Results   Component Value Date/Time    Cholesterol, total 192 02/25/2020 03:00 PM    HDL Cholesterol 56 02/25/2020 03:00 PM    LDL, calculated 100 (H) 02/25/2020 03:00 PM    Triglyceride 180 (H) 02/25/2020 03:00 PM    CHOL/HDL Ratio 3.7 01/27/2010 05:43 PM     Lab Results   Component Value Date/Time    TSH 0.366 (L) 02/25/2020 03:00 PM    T4, Free 1.18 10/11/2016 03:41 PM      Lab Results   Component Value Date/Time    Sodium 145 (H) 02/25/2020 03:00 PM    Potassium 3.8 02/25/2020 03:00 PM    Chloride 103 02/25/2020 03:00 PM    CO2 25 02/25/2020 03:00 PM    Anion gap 12 05/03/2016 01:05 PM    Glucose 91 02/25/2020 03:00 PM    BUN 11 02/25/2020 03:00 PM    Creatinine 0.86 02/25/2020 03:00 PM    BUN/Creatinine ratio 13 02/25/2020 03:00 PM    GFR est AA 73 02/25/2020 03:00 PM    GFR est non-AA 63 02/25/2020 03:00 PM    Calcium 10.0 02/25/2020 03:00 PM    Bilirubin, total 0.4 02/25/2020 03:00 PM    ALT (SGPT) 17 02/25/2020 03:00 PM    Alk. phosphatase 105 02/25/2020 03:00 PM    Protein, total 7.3 02/25/2020 03:00 PM    Albumin 5.1 (H) 02/25/2020 03:00 PM    Globulin 3.2 05/03/2016 01:05 PM    A-G Ratio 2.3 (H) 02/25/2020 03:00 PM      Lab Results   Component Value Date/Time    Hemoglobin A1c 5.9 (H) 08/28/2012 04:22 PM    Hemoglobin A1c (POC) 5.7 11/10/2015 03:16 PM    Hemoglobin A1c, External 11.1 07/22/2015         Review of Systems   Constitutional: Negative for malaise/fatigue. HENT: Negative for congestion. Eyes: Negative for blurred vision.    Respiratory: Negative for cough and shortness of breath. Cardiovascular: Negative for chest pain, palpitations and leg swelling. Gastrointestinal: Negative for abdominal pain, constipation and heartburn. Genitourinary: Negative for dysuria, frequency and urgency. Neurological: Positive for headaches. Negative for dizziness, tingling and focal weakness. Endo/Heme/Allergies: Positive for environmental allergies. Psychiatric/Behavioral: Negative for depression. The patient does not have insomnia. Physical Exam  Vitals signs and nursing note reviewed. Constitutional:       Appearance: Normal appearance. She is well-developed. HENT:      Right Ear: Tympanic membrane and ear canal normal.      Left Ear: Tympanic membrane and ear canal normal.      Nose: No mucosal edema or rhinorrhea. Neck:      Musculoskeletal: Normal range of motion and neck supple. Thyroid: No thyromegaly. Cardiovascular:      Rate and Rhythm: Normal rate and regular rhythm. Heart sounds: Normal heart sounds. Pulmonary:      Effort: Pulmonary effort is normal.      Breath sounds: Normal breath sounds. Abdominal:      General: Bowel sounds are normal.      Palpations: Abdomen is soft. There is no mass. Tenderness: There is no abdominal tenderness. Musculoskeletal: Normal range of motion. General: No swelling. Lymphadenopathy:      Cervical: No cervical adenopathy. Skin:     General: Skin is warm and dry. Neurological:      General: No focal deficit present. Mental Status: She is alert and oriented to person, place, and time. Cranial Nerves: No cranial nerve deficit. Sensory: No sensory deficit. Motor: No weakness. Coordination: Coordination normal.   Psychiatric:         Mood and Affect: Mood normal.         ASSESSMENT and PLAN  Diagnoses and all orders for this visit:    1. Medicare annual wellness visit, subsequent    2.  Essential hypertension  Discussed sodium restriction, high k rich diet, maintaining ideal body weight and regular exercise program such as daily walking 30 min perday 4-5 times per week, as physiologic means to achieve blood pressure control.  Medication compliance advised. 3. Mixed hyperlipidemia  Continue to monitor. Work on diet and exercise. 4. Hypothyroidism due to acquired atrophy of thyroid  -     TSH 3RD GENERATION    5. Chronic ITP (idiopathic thrombocytopenia) (HCC)  As per hematology    6. Fibromyalgia//  7. Generalized osteoarthritis  Stable     8. Primary insomnia  Stable     9. History of migraine//  10. Chronic paroxysmal hemicrania, not intractable  Will continue with her current regimen    11. Encounter for medication monitoring  -     METABOLIC PANEL, BASIC    12. Encounter for screening mammogram for breast cancer  -     [facility] MAMMO BI SCREENING INCL CAD; Future      Follow-up and Dispositions    · Return in about 6 months (around 1/28/2021). current treatment plan is effective, no change in therapy  reviewed diet, exercise and weight control  cardiovascular risk and specific lipid/LDL goals reviewed  reviewed medications and side effects in detail    I have discussed diagnosis listed in this note with pt and/or family. I have discussed treatment plans and options and the risk/benefit analysis of those options, including safe use of medications and possible medication side effects. Through the use of shared decision making we have agreed to the above plan. The patient has received an after-visit summary and questions were answered concerning future plans and follow up. Advise pt of any urgent changes then to proceed to the ER.

## 2020-07-29 LAB
BUN SERPL-MCNC: 19 MG/DL (ref 8–27)
BUN/CREAT SERPL: 25 (ref 12–28)
CALCIUM SERPL-MCNC: 9.9 MG/DL (ref 8.7–10.3)
CHLORIDE SERPL-SCNC: 100 MMOL/L (ref 96–106)
CO2 SERPL-SCNC: 26 MMOL/L (ref 20–29)
CREAT SERPL-MCNC: 0.75 MG/DL (ref 0.57–1)
GLUCOSE SERPL-MCNC: 97 MG/DL (ref 65–99)
POTASSIUM SERPL-SCNC: 4.8 MMOL/L (ref 3.5–5.2)
SODIUM SERPL-SCNC: 139 MMOL/L (ref 134–144)
TSH SERPL DL<=0.005 MIU/L-ACNC: 0.44 UIU/ML (ref 0.45–4.5)

## 2020-08-05 ENCOUNTER — HOSPITAL ENCOUNTER (OUTPATIENT)
Dept: ULTRASOUND IMAGING | Age: 85
Discharge: HOME | End: 2020-08-05
Attending: INTERNAL MEDICINE
Payer: MEDICARE

## 2020-08-05 DIAGNOSIS — E04.2 NONTOXIC MULTINODULAR GOITER: ICD-10-CM

## 2020-08-05 PROCEDURE — 76536 US EXAM OF HEAD AND NECK: CPT

## 2020-08-11 ENCOUNTER — TELEPHONE (OUTPATIENT)
Dept: FAMILY MEDICINE CLINIC | Age: 85
End: 2020-08-11

## 2020-08-11 NOTE — TELEPHONE ENCOUNTER
Pt would like her last lab work sent to Dr Roger Wilkerson endocrinologist  - Fax [phone]        Best number to reach her is [phone]

## 2020-08-12 ENCOUNTER — CLINICAL SUPPORT (OUTPATIENT)
Dept: CARDIOLOGY CLINIC | Age: 85
End: 2020-08-12
Payer: MEDICARE

## 2020-08-12 ENCOUNTER — OFFICE VISIT (OUTPATIENT)
Dept: CARDIOLOGY CLINIC | Age: 85
End: 2020-08-12
Payer: MEDICARE

## 2020-08-12 VITALS
HEIGHT: 61 IN | DIASTOLIC BLOOD PRESSURE: 76 MMHG | OXYGEN SATURATION: 95 % | RESPIRATION RATE: 16 BRPM | HEART RATE: 82 BPM | BODY MASS INDEX: 31.36 KG/M2 | SYSTOLIC BLOOD PRESSURE: 120 MMHG | WEIGHT: 166.1 LBS

## 2020-08-12 DIAGNOSIS — I47.1 PSVT (PAROXYSMAL SUPRAVENTRICULAR TACHYCARDIA) (HCC): ICD-10-CM

## 2020-08-12 DIAGNOSIS — I34.0 NONRHEUMATIC MITRAL VALVE REGURGITATION: ICD-10-CM

## 2020-08-12 DIAGNOSIS — R00.2 HEART PALPITATIONS: ICD-10-CM

## 2020-08-12 DIAGNOSIS — R00.0 TACHYCARDIA: Primary | ICD-10-CM

## 2020-08-12 DIAGNOSIS — E78.00 HYPERCHOLESTEROLEMIA: ICD-10-CM

## 2020-08-12 DIAGNOSIS — I10 ESSENTIAL HYPERTENSION: Primary | ICD-10-CM

## 2020-08-12 DIAGNOSIS — R00.0 INAPPROPRIATE SINUS TACHYCARDIA: ICD-10-CM

## 2020-08-12 PROCEDURE — 1090F PRES/ABSN URINE INCON ASSESS: CPT | Performed by: INTERNAL MEDICINE

## 2020-08-12 PROCEDURE — 93228 REMOTE 30 DAY ECG REV/REPORT: CPT | Performed by: INTERNAL MEDICINE

## 2020-08-12 PROCEDURE — G8432 DEP SCR NOT DOC, RNG: HCPCS | Performed by: INTERNAL MEDICINE

## 2020-08-12 PROCEDURE — G8536 NO DOC ELDER MAL SCRN: HCPCS | Performed by: INTERNAL MEDICINE

## 2020-08-12 PROCEDURE — G8427 DOCREV CUR MEDS BY ELIG CLIN: HCPCS | Performed by: INTERNAL MEDICINE

## 2020-08-12 PROCEDURE — 93010 ELECTROCARDIOGRAM REPORT: CPT | Performed by: INTERNAL MEDICINE

## 2020-08-12 PROCEDURE — G8417 CALC BMI ABV UP PARAM F/U: HCPCS | Performed by: INTERNAL MEDICINE

## 2020-08-12 PROCEDURE — G8754 DIAS BP LESS 90: HCPCS | Performed by: INTERNAL MEDICINE

## 2020-08-12 PROCEDURE — 99214 OFFICE O/P EST MOD 30 MIN: CPT | Performed by: INTERNAL MEDICINE

## 2020-08-12 PROCEDURE — G8752 SYS BP LESS 140: HCPCS | Performed by: INTERNAL MEDICINE

## 2020-08-12 PROCEDURE — G8399 PT W/DXA RESULTS DOCUMENT: HCPCS | Performed by: INTERNAL MEDICINE

## 2020-08-12 PROCEDURE — 1101F PT FALLS ASSESS-DOCD LE1/YR: CPT | Performed by: INTERNAL MEDICINE

## 2020-08-12 NOTE — PROGRESS NOTES
[facility] Rodriguez, [facility]    Subjective/HPI:     Ms. Azeem Trejo is a 82 y.o. female is here for routine follow-up. She has a PMHx of mitral regurgitation, PSVT, HTN, HLD and chronic ITP. She stopped taking her amlodipine because her blood pressure issues resolved once she was able to get her platelets under control again. She says she always has this problem when her platelet counts are elevated. She has been checking her BP twice day, reports BP between 110s to 130s/70s most days. Rarely it gets above 394 systolic. She notes palpitation symptoms and sensations of tachycardia during the day, either while at rest or getting groceries or with other activities. She cannot recall how often she has symptoms. She has some congestion type symptoms, gets short of breath with activity. She has to sleep in bed sitting up. She has seen her PCP about these symptoms, but has not found any relief. She is using some lozenges for a cough, which helps. She denies lower extremity edema.          PCP Provider  Juan Navas MD    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 atrial tachycardia R00.0    Edema leg R60.0    Reactive airway disease without complication A86.721    Incidental lung nodule, greater than or equal to 8mm R91.1    Encounter for medication monitoring Z51.81    Preop cardiovascular exam Z01.810    Pelvic lymphadenopathy R59.0    Chronic ITP (idiopathic thrombocytopenia) (HCC) D69.3    Hypercholesterolemia E78.00    Nonrheumatic mitral valve regurgitation I34.0    PSVT (paroxysmal supraventricular tachycardia) ([facility] I47.1       Social History     Tobacco Use    Smoking status: Never Smoker    Smokeless tobacco: Never Used   Substance Use Topics    Alcohol use: No     Alcohol/week: 0.0 standard drinks       Current Outpatient Medications   Medication Sig    temazepam (RESTORIL) 15 mg capsule TAKE 1 TO 2 CAPSULES BY MOUTH EVERY NIGHT AT BEDTIME    butalbital-acetaminophen-caffeine (FIORICET, ESGIC) -40 mg per tablet TAKE 1 TABLET BY MOUTH EVERY 6 HOURS AS NEEDED FOR PAIN    pravastatin (PRAVACHOL) 10 mg tablet TAKE 1 TABLET BY MOUTH NIGHTLY    levothyroxine (SYNTHROID) 50 mcg tablet Take 1/2 tab by mouth every Monday, Wednesday, and Friday    romiplostim (NPLATE SC) by SubCUTAneous route every fourteen (14) days.  acetaminophen (ARTHRITIS PAIN RELIEF) 650 mg TbER Take 650 mg by mouth every eight (8) hours as needed.  cetirizine (ZYRTEC) 10 mg tablet Take 10 mg by mouth daily as needed for Allergies.  nebivolol (BYSTOLIC) 5 mg tablet Take 2 Tabs by mouth daily. No current facility-administered medications for this visit. Allergies   Allergen Reactions    Epinephrine Other (comments)     BP drops and pass out    Iodinated Contrast Media Shortness of Breath    Novocain [Procaine] Palpitations     BP drops,passes out    Codeine Itching     Pt does not recall    Cymbalta [Duloxetine] Other (comments)     Head Pain and nausea    Daypro [Oxaprozin] Unknown (comments)     Pt does not recall    Prednisone Shortness of Breath     agitation    Sulfa (Sulfonamide Antibiotics) Unknown (comments)     Pt does not recall    Tetracycline Other (comments)     Low platelet count    Zithromax [Azithromycin] Nausea Only     Diarrhea, headaches        I have reviewed the problem list, allergy list, medical history, family, social history and medications. Review of Symptoms:    Review of Systems   Constitutional: Negative for chills, fever and weight loss. HENT: Positive for congestion.  Negative for nosebleeds. Eyes: Negative for blurred vision and double vision. Respiratory: Positive for cough. Negative for shortness of breath and wheezing. Cardiovascular: Positive for palpitations and orthopnea. Negative for chest pain, leg swelling and PND. Gastrointestinal: Negative for abdominal pain, blood in stool, diarrhea, nausea and vomiting. Musculoskeletal: Negative for joint pain. Skin: Negative for rash. Neurological: Negative for dizziness, tingling and loss of consciousness. Endo/Heme/Allergies: Does not bruise/bleed easily. Physical Exam:      General: Well developed, in no acute distress, cooperative and alert  HEENT: No carotid bruits, no JVD, trach is midline. Neck Supple, PEERL, EOM intact. Heart:  reg rate and rhythm; normal S1/S2; no murmurs, no gallops or rubs. Respiratory: Clear bilaterally x 4, no wheezing or rales  Abdomen:   Soft, non-tender, no distention, no masses. + BS. Extremities:  Normal cap refill, no cyanosis, atraumatic. No edema. Neuro: A&Ox3, speech clear, gait stable. Skin: Skin color is normal. No rashes or lesions.  Non diaphoretic  Vascular: 2+ pulses symmetric in all extremities    Vitals:    08/12/20 1348 08/12/20 1404   BP: 114/74 120/76   Pulse: 82    Resp: 16    SpO2: 95%    Weight: 166 lb 1.6 oz (75.3 kg)    Height: 5' 1\" (1.549 m)        ECG: sinus rhythm    Cardiology Labs:    Lab Results   Component Value Date/Time    Cholesterol, total 192 02/25/2020 03:00 PM    HDL Cholesterol 56 02/25/2020 03:00 PM    LDL, calculated 100 (H) 02/25/2020 03:00 PM    LDL, calculated 68 08/20/2019 03:27 PM    LDL, calculated 79 08/01/2019 01:19 PM    VLDL, calculated 36 02/25/2020 03:00 PM    CHOL/HDL Ratio 3.7 01/27/2010 05:43 PM       Lab Results   Component Value Date/Time    Hemoglobin A1c 5.9 (H) 08/28/2012 04:22 PM    Hemoglobin A1c (POC) 5.7 11/10/2015 03:16 PM    Hemoglobin A1c, External 11.1 07/22/2015       Lab Results   Component Value Date/Time Sodium 139 07/28/2020 03:39 PM    Potassium 4.8 07/28/2020 03:39 PM    Chloride 100 07/28/2020 03:39 PM    CO2 26 07/28/2020 03:39 PM    Glucose 97 07/28/2020 03:39 PM    BUN 19 07/28/2020 03:39 PM    Creatinine 0.75 07/28/2020 03:39 PM    BUN/Creatinine ratio 25 07/28/2020 03:39 PM    GFR est AA 85 07/28/2020 03:39 PM    GFR est non-AA 74 07/28/2020 03:39 PM    Calcium 9.9 07/28/2020 03:39 PM    Anion gap 12 05/03/2016 01:05 PM    Bilirubin, total 0.4 02/25/2020 03:00 PM    ALT (SGPT) 17 02/25/2020 03:00 PM    Alk. phosphatase 105 02/25/2020 03:00 PM    Protein, total 7.3 02/25/2020 03:00 PM    Albumin 5.1 (H) 02/25/2020 03:00 PM    Globulin 3.2 05/03/2016 01:05 PM    A-G Ratio 2.3 (H) 02/25/2020 03:00 PM       Orders Placed This Encounter    LOOP MONITOR     30-day     Standing Status:   Future     Standing Expiration Date:   2/12/2021     Order Specific Question:   Reason for Exam:     Answer:   PSVT/tachycardia    AMB POC EKG ROUTINE W/ 12 LEADS, INTER & REP     Order Specific Question:   Reason for Exam:     Answer:   routine        Assessment:         ICD-10-CM ICD-9-CM    1. Essential hypertension  I10 401.9 AMB POC EKG ROUTINE W/ 12 LEADS, INTER & REP   2. Hypercholesterolemia  E78.00 272.0 AMB POC EKG ROUTINE W/ 12 LEADS, INTER & REP   3. PSVT (paroxysmal supraventricular tachycardia) ([facility])  I47.1 427.0 LOOP MONITOR      ECHO ADULT COMPLETE   4. Nonrheumatic mitral valve regurgitation  I34.0 424.0 ECHO ADULT COMPLETE        Plan:     Heart palpitations/history of PSVT:  Event monitor in 7/2013 with PSVT, asymptomatic  Has symptoms of palpitations with tachycardia, but can't say how often she has symptoms  Will evaluate with 30-day event  Controlled with BB  Lexiscan stress test 6/2017 without evidence of ischemia    Essential hypertension  BP controlled.   Continue anti-hypertensive therapy and low sodium diet    Hypercholesterolemia   in 2/2020   Continue statin therapy and low fat, low cholesterol diet  Lipids managed by PCP -- advised to have statin refilled by PCP as they are checking labs    Mitral regurgitation  Echo done 6/2013 with preserved LVEF 60-65% with mild-mod MR with myxomatous valve  Repeat echo to reassess    F/u in 1 year if all testing normal, sooner if abnormal.    Ubaldo Rowe MD

## 2020-08-12 NOTE — LETTER
8/12/20 Patient: Nena Jiménez YOB: 1935 Date of Visit: 8/12/2020 Oscar Brice MD 
[address] [place] [id number] VIA In Basket Dear Oscar Brice MD, Thank you for referring Ms. Anna Marie Ramirez to [address] [place] [address] for evaluation. My notes for this consultation are attached. If you have questions, please do not hesitate to call me. I look forward to following your patient along with you.  
 
 
Sincerely, 
 
Mark Weinberg MD

## 2020-08-12 NOTE — PROGRESS NOTES
Patient received a 30 DAY event monitor. Instructions given verbally as well as an instruction sheet. Pt verbalized understanding.     [facility] Event Monitoring

## 2020-08-12 NOTE — PROGRESS NOTES
1. Have you been to the ER, urgent care clinic since your last visit? Hospitalized since your last visit? No.    2. Have you seen or consulted any other health care providers outside of the [address] [place] [address] since your last visit? Include any pap smears or colon screening.    No.      Chief Complaint   Patient presents with    Follow-up     4 month- chest heaviness

## 2020-09-10 NOTE — PROGRESS NOTES
Rare episodes of early heart beat from both upper and lower chambers of the heart that are benign. Continue her beta blocker which helps suppress these.

## 2020-09-11 ENCOUNTER — TELEPHONE (OUTPATIENT)
Dept: CARDIOLOGY CLINIC | Age: 85
End: 2020-09-11

## 2020-09-11 NOTE — TELEPHONE ENCOUNTER
----- Message from Shira Abdul NP sent at 9/10/2020  8:31 AM EDT -----  Rare episodes of early heart beat from both upper and lower chambers of the heart that are benign. Continue her beta blocker which helps suppress these.

## 2020-09-22 ENCOUNTER — ANCILLARY PROCEDURE (OUTPATIENT)
Dept: CARDIOLOGY CLINIC | Age: 85
End: 2020-09-22
Payer: MEDICARE

## 2020-09-22 VITALS
SYSTOLIC BLOOD PRESSURE: 120 MMHG | WEIGHT: 166 LBS | HEIGHT: 61 IN | DIASTOLIC BLOOD PRESSURE: 76 MMHG | BODY MASS INDEX: 31.34 KG/M2

## 2020-09-22 PROCEDURE — 93306 TTE W/DOPPLER COMPLETE: CPT | Performed by: INTERNAL MEDICINE

## 2020-09-23 DIAGNOSIS — F51.01 PRIMARY INSOMNIA: ICD-10-CM

## 2020-09-23 RX ORDER — TEMAZEPAM 15 MG/1
CAPSULE ORAL
Qty: 60 CAP | Refills: 0 | Status: SHIPPED | OUTPATIENT
Start: 2020-09-24 | End: 2020-10-23 | Stop reason: SDUPTHER

## 2020-09-24 LAB
ECHO AO ASC DIAM: 3.55 CM
ECHO AO ROOT DIAM: 3.05 CM
ECHO AV PEAK GRADIENT: 4.18 MMHG
ECHO AV PEAK VELOCITY: 102.22 CM/S
ECHO EST RA PRESSURE: 3 MMHG
ECHO LA VOL 2C: 36.33 ML (ref 22–52)
ECHO LA VOL 4C: 47.91 ML (ref 22–52)
ECHO LA VOLUME INDEX A2C: 20.82 ML/M2 (ref 16–28)
ECHO LA VOLUME INDEX A4C: 27.45 ML/M2 (ref 16–28)
ECHO LV INTERNAL DIMENSION DIASTOLIC: 4.42 CM (ref 3.9–5.3)
ECHO LV INTERNAL DIMENSION SYSTOLIC: 2.44 CM
ECHO LV ISOVOLUMETRIC RELAXATION TIME (IVRT): 0.07 S
ECHO LV IVSD: 0.9 CM (ref 0.6–0.9)
ECHO LV MASS 2D: 133.8 G (ref 67–162)
ECHO LV MASS INDEX 2D: 76.7 G/M2 (ref 43–95)
ECHO LV POSTERIOR WALL DIASTOLIC: 0.95 CM (ref 0.6–0.9)
ECHO LVOT PEAK GRADIENT: 3.88 MMHG
ECHO LVOT PEAK VELOCITY: 98.53 CM/S
ECHO LVOT SV: 71.6 ML
ECHO LVOT SV: 71.6 ML
ECHO MV "A" WAVE DURATION: 0.16 S
ECHO MV A VELOCITY: 100.72 CENTIMETER/SECOND
ECHO MV E DECELERATION TIME (DT): 0.2 S
ECHO MV E VELOCITY: 90.11 CENTIMETER/SECOND
ECHO PVEIN A DURATION: 0.13 S
ECHO PVEIN A VELOCITY: 24.16 CM/S
ECHO RIGHT VENTRICULAR SYSTOLIC PRESSURE (RVSP): 23.71 MMHG
ECHO TV REGURGITANT MAX VELOCITY: 227.56 CM/S
ECHO TV REGURGITANT PEAK GRADIENT: 20.71 MMHG

## 2020-10-05 ENCOUNTER — TELEPHONE (OUTPATIENT)
Dept: CARDIOLOGY CLINIC | Age: 85
End: 2020-10-05

## 2020-10-05 NOTE — TELEPHONE ENCOUNTER
----- Message from Delilah Hong NP sent at 9/24/2020  4:12 PM EDT -----  Garrett Wolff,    Please call the patient and inform that echocardiogram is normal, ejection fraction 55-60%. No concern for heart failure at this time. Mitral valve is leaking mildly -- actually improved from last check. No changes to meds for now. Keep f/u with Dr. Howie Kellogg as scheduled. Thanks,  [facility] pt,verified pt with two pt identifiers, advised pt her echo is normal, normal EF, no concern for heart failure at this time. Mitral valve has mild leakiness-actually improved from last time. No changes to medications , f/u with randy as scheduled. Pt verbalized understanding.

## 2020-10-06 ENCOUNTER — OFFICE VISIT (OUTPATIENT)
Dept: FAMILY MEDICINE CLINIC | Age: 85
End: 2020-10-06
Payer: MEDICARE

## 2020-10-06 VITALS
HEART RATE: 72 BPM | SYSTOLIC BLOOD PRESSURE: 131 MMHG | DIASTOLIC BLOOD PRESSURE: 75 MMHG | HEIGHT: 61 IN | TEMPERATURE: 96.9 F | WEIGHT: 165 LBS | BODY MASS INDEX: 31.15 KG/M2

## 2020-10-06 DIAGNOSIS — Z23 NEEDS FLU SHOT: Primary | ICD-10-CM

## 2020-10-06 DIAGNOSIS — Z86.69 HISTORY OF MIGRAINE: ICD-10-CM

## 2020-10-06 PROCEDURE — 90694 VACC AIIV4 NO PRSRV 0.5ML IM: CPT | Performed by: FAMILY MEDICINE

## 2020-10-06 RX ORDER — BUTALBITAL, ACETAMINOPHEN AND CAFFEINE 50; 325; 40 MG/1; MG/1; MG/1
TABLET ORAL
Qty: 30 TAB | Refills: 0 | Status: SHIPPED | OUTPATIENT
Start: 2020-10-06 | End: 2020-11-04

## 2020-10-06 NOTE — PROGRESS NOTES
Chief Complaint   Patient presents with    Immunization/Injection     flu           Verbal order received by Dr. Durán Repelaine dose flu vaccine IM. Pt received high dose flu vaccine IM in left deltoid without any difficulty.

## 2020-10-23 DIAGNOSIS — F51.01 PRIMARY INSOMNIA: ICD-10-CM

## 2020-10-23 RX ORDER — TEMAZEPAM 15 MG/1
CAPSULE ORAL
Qty: 60 CAP | Refills: 0 | Status: SHIPPED | OUTPATIENT
Start: 2020-10-23 | End: 2020-11-20

## 2020-10-23 NOTE — TELEPHONE ENCOUNTER
Last visit:10/6/20  Next visit:1/27/21  Previous refill 9/24/20(60+0R)    Please review  before prescribing new script for Restoril     Thanks,  Clara     Requested Prescriptions     Pending Prescriptions Disp Refills    temazepam (RESTORIL) 15 mg capsule 60 Cap 0     Sig: Take 1 to 2 tablets by mouth every night at bedtime

## 2020-10-27 ENCOUNTER — HOSPITAL ENCOUNTER (OUTPATIENT)
Dept: GENERAL RADIOLOGY | Age: 85
Discharge: HOME | End: 2020-10-27
Payer: MEDICARE

## 2020-10-27 ENCOUNTER — OFFICE VISIT (OUTPATIENT)
Dept: NEUROLOGY | Age: 85
End: 2020-10-27
Payer: MEDICARE

## 2020-10-27 VITALS
HEIGHT: 61 IN | OXYGEN SATURATION: 98 % | WEIGHT: 170 LBS | SYSTOLIC BLOOD PRESSURE: 148 MMHG | RESPIRATION RATE: 16 BRPM | BODY MASS INDEX: 32.1 KG/M2 | DIASTOLIC BLOOD PRESSURE: 82 MMHG | TEMPERATURE: 98 F | HEART RATE: 78 BPM

## 2020-10-27 DIAGNOSIS — G44.86 CERVICOGENIC HEADACHE: ICD-10-CM

## 2020-10-27 DIAGNOSIS — G43.109 MIGRAINE WITH AURA AND WITHOUT STATUS MIGRAINOSUS, NOT INTRACTABLE: ICD-10-CM

## 2020-10-27 DIAGNOSIS — G40.209 COMPLEX PARTIAL SEIZURES EVOLVING TO GENERALIZED TONIC-CLONIC SEIZURES (HCC): ICD-10-CM

## 2020-10-27 DIAGNOSIS — G44.209 TENSION VASCULAR HEADACHE: ICD-10-CM

## 2020-10-27 DIAGNOSIS — Z86.2 HISTORY OF ITP: ICD-10-CM

## 2020-10-27 DIAGNOSIS — G43.109 MIGRAINE WITH AURA AND WITHOUT STATUS MIGRAINOSUS, NOT INTRACTABLE: Primary | ICD-10-CM

## 2020-10-27 PROCEDURE — G8754 DIAS BP LESS 90: HCPCS | Performed by: PSYCHIATRY & NEUROLOGY

## 2020-10-27 PROCEDURE — G8427 DOCREV CUR MEDS BY ELIG CLIN: HCPCS | Performed by: PSYCHIATRY & NEUROLOGY

## 2020-10-27 PROCEDURE — G8399 PT W/DXA RESULTS DOCUMENT: HCPCS | Performed by: PSYCHIATRY & NEUROLOGY

## 2020-10-27 PROCEDURE — G8417 CALC BMI ABV UP PARAM F/U: HCPCS | Performed by: PSYCHIATRY & NEUROLOGY

## 2020-10-27 PROCEDURE — 1090F PRES/ABSN URINE INCON ASSESS: CPT | Performed by: PSYCHIATRY & NEUROLOGY

## 2020-10-27 PROCEDURE — 72052 X-RAY EXAM NECK SPINE 6/>VWS: CPT

## 2020-10-27 PROCEDURE — G8510 SCR DEP NEG, NO PLAN REQD: HCPCS | Performed by: PSYCHIATRY & NEUROLOGY

## 2020-10-27 PROCEDURE — G8536 NO DOC ELDER MAL SCRN: HCPCS | Performed by: PSYCHIATRY & NEUROLOGY

## 2020-10-27 PROCEDURE — G8753 SYS BP > OR = 140: HCPCS | Performed by: PSYCHIATRY & NEUROLOGY

## 2020-10-27 PROCEDURE — 1101F PT FALLS ASSESS-DOCD LE1/YR: CPT | Performed by: PSYCHIATRY & NEUROLOGY

## 2020-10-27 PROCEDURE — 99204 OFFICE O/P NEW MOD 45 MIN: CPT | Performed by: PSYCHIATRY & NEUROLOGY

## 2020-10-27 RX ORDER — AMITRIPTYLINE HYDROCHLORIDE 10 MG/1
10-20 TABLET, FILM COATED ORAL
Qty: 100 TAB | Refills: 11 | Status: SHIPPED | OUTPATIENT
Start: 2020-10-27 | End: 2020-11-18 | Stop reason: SDUPTHER
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
## 2020-10-28 LAB
BASOPHILS # BLD AUTO: 0 X10E3/UL (ref 0–0.2)
BASOPHILS NFR BLD AUTO: 0 %
CENTROMERE B AB SER-ACNC: <0.2 AI (ref 0–0.9)
CHROMATIN AB SERPL-ACNC: <0.2 AI (ref 0–0.9)
DSDNA AB SER-ACNC: <1 IU/ML (ref 0–9)
ENA JO1 AB SER-ACNC: <0.2 AI (ref 0–0.9)
ENA RNP AB SER-ACNC: <0.2 AI (ref 0–0.9)
ENA SCL70 AB SER-ACNC: <0.2 AI (ref 0–0.9)
ENA SM AB SER-ACNC: <0.2 AI (ref 0–0.9)
ENA SM+RNP AB SER-ACNC: <0.2 AI (ref 0–0.9)
ENA SS-A AB SER-ACNC: <0.2 AI (ref 0–0.9)
ENA SS-B AB SER-ACNC: <0.2 AI (ref 0–0.9)
EOSINOPHIL # BLD AUTO: 0.1 X10E3/UL (ref 0–0.4)
EOSINOPHIL NFR BLD AUTO: 2 %
ERYTHROCYTE [DISTWIDTH] IN BLOOD BY AUTOMATED COUNT: 14 % (ref 11.7–15.4)
ERYTHROCYTE [SEDIMENTATION RATE] IN BLOOD BY WESTERGREN METHOD: 5 MM/HR (ref 0–40)
HCT VFR BLD AUTO: 37.9 % (ref 34–46.6)
HGB BLD-MCNC: 12.8 G/DL (ref 11.1–15.9)
IMM GRANULOCYTES # BLD AUTO: 0 X10E3/UL (ref 0–0.1)
IMM GRANULOCYTES NFR BLD AUTO: 1 %
LYMPHOCYTES # BLD AUTO: 1 X10E3/UL (ref 0.7–3.1)
LYMPHOCYTES NFR BLD AUTO: 20 %
MCH RBC QN AUTO: 29.7 PG (ref 26.6–33)
MCHC RBC AUTO-ENTMCNC: 33.8 G/DL (ref 31.5–35.7)
MCV RBC AUTO: 88 FL (ref 79–97)
MONOCYTES # BLD AUTO: 0.5 X10E3/UL (ref 0.1–0.9)
MONOCYTES NFR BLD AUTO: 10 %
NEUTROPHILS # BLD AUTO: 3.5 X10E3/UL (ref 1.4–7)
NEUTROPHILS NFR BLD AUTO: 67 %
PLATELET # BLD AUTO: 120 X10E3/UL (ref 150–450)
RBC # BLD AUTO: 4.31 X10E6/UL (ref 3.77–5.28)
RIBOSOMAL P AB SER-ACNC: <0.2 AI (ref 0–0.9)
SEE BELOW:, 164879: NORMAL
WBC # BLD AUTO: 5.2 X10E3/UL (ref 3.4–10.8)
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
## 2020-11-03 DIAGNOSIS — Z86.69 HISTORY OF MIGRAINE: ICD-10-CM

## 2020-11-04 RX ORDER — BUTALBITAL, ACETAMINOPHEN AND CAFFEINE 50; 325; 40 MG/1; MG/1; MG/1
TABLET ORAL
Qty: 30 TAB | Refills: 0 | Status: SHIPPED | OUTPATIENT
Start: 2020-11-04 | End: 2020-11-18 | Stop reason: SDUPTHER

## 2020-11-11 RX ORDER — LEVOTHYROXINE SODIUM 50 UG/1
TABLET ORAL
Qty: 30 TAB | Refills: 3 | Status: SHIPPED | OUTPATIENT
Start: 2020-11-11 | End: 2020-11-18 | Stop reason: SDUPTHER

## 2020-11-17 ENCOUNTER — TELEPHONE (OUTPATIENT)
Dept: FAMILY MEDICINE CLINIC | Age: 85
End: 2020-11-17

## 2020-11-17 NOTE — TELEPHONE ENCOUNTER
----- Message from Srini Chavez LPN sent at 68/15/3089  4:17 PM EST -----  Regarding: FW: Prescription Question  Contact: [phone]  Patient had shingles 6 years ago in the same spot, back radiating to breast, itching is the main problem, nausea and vomiting have subsided. ----- Message -----  From: Darci Stoddard MD  Sent: 11/17/2020   1:09 PM EST  To: Srini Chavez LPN  Subject: FW: Prescription Question                        Triage message. ----- Message -----  From: Bonnie Geronimo LPN  Sent: 64/15/2496   1:01 PM EST  To: Kayli Billings MD  Subject: FW: Prescription Question                          ----- Message -----  From: Jeni Farah  Sent: 11/17/2020  10:27 AM EST  To: [facility]  Subject: Prescription Question                            I have the shingles again. Plus I am having nausea and vomiting. Since the last time I had shingles I cannot remember what or if there is anything that can help me at this point. Really need something for the nausea and vomiting and the beginning of pain associated with the shingles.

## 2020-11-17 NOTE — TELEPHONE ENCOUNTER
----- Message from Aide Macario LPN sent at 98/63/7442  4:17 PM EST -----  Regarding: FW: Prescription Question  Contact: [phone]  Patient had shingles 6 years ago in the same spot, back radiating to breast, itching is the main problem, nausea and vomiting have subsided. ----- Message -----  From: Jani Snyder MD  Sent: 11/17/2020   1:09 PM EST  To: Aide Macario LPN  Subject: FW: Prescription Question                        Triage message. ----- Message -----  From: Xavi Brice LPN  Sent: 09/56/8042   1:01 PM EST  To: Elizabeth Vega MD  Subject: FW: Prescription Question                          ----- Message -----  From: Isabella Conner  Sent: 11/17/2020  10:27 AM EST  To: [facility] [place] [place] [facility]  Subject: Prescription Question                            I have the shingles again. Plus I am having nausea and vomiting. Since the last time I had shingles I cannot remember what or if there is anything that can help me at this point. Really need something for the nausea and vomiting and the beginning of pain associated with the shingles.

## 2020-11-18 ENCOUNTER — OFFICE VISIT (OUTPATIENT)
Dept: FAMILY MEDICINE CLINIC | Age: 85
End: 2020-11-18
Payer: MEDICARE

## 2020-11-18 ENCOUNTER — HOSPITAL ENCOUNTER (OUTPATIENT)
Dept: MRI IMAGING | Age: 85
Discharge: HOME | End: 2020-11-18
Attending: PSYCHIATRY & NEUROLOGY
Payer: MEDICARE

## 2020-11-18 DIAGNOSIS — G44.86 CERVICOGENIC HEADACHE: ICD-10-CM

## 2020-11-18 DIAGNOSIS — G43.109 MIGRAINE WITH AURA AND WITHOUT STATUS MIGRAINOSUS, NOT INTRACTABLE: ICD-10-CM

## 2020-11-18 DIAGNOSIS — B02.9 HERPES ZOSTER WITHOUT COMPLICATION: Primary | ICD-10-CM

## 2020-11-18 DIAGNOSIS — Z86.2 HISTORY OF ITP: ICD-10-CM

## 2020-11-18 DIAGNOSIS — G44.209 TENSION VASCULAR HEADACHE: ICD-10-CM

## 2020-11-18 DIAGNOSIS — G40.209 COMPLEX PARTIAL SEIZURES EVOLVING TO GENERALIZED TONIC-CLONIC SEIZURES (HCC): ICD-10-CM

## 2020-11-18 DIAGNOSIS — Z86.69 HISTORY OF MIGRAINE: ICD-10-CM

## 2020-11-18 PROCEDURE — G0463 HOSPITAL OUTPT CLINIC VISIT: HCPCS | Performed by: FAMILY MEDICINE

## 2020-11-18 PROCEDURE — G8536 NO DOC ELDER MAL SCRN: HCPCS | Performed by: FAMILY MEDICINE

## 2020-11-18 PROCEDURE — G8399 PT W/DXA RESULTS DOCUMENT: HCPCS | Performed by: FAMILY MEDICINE

## 2020-11-18 PROCEDURE — 99212 OFFICE O/P EST SF 10 MIN: CPT | Performed by: FAMILY MEDICINE

## 2020-11-18 PROCEDURE — G8756 NO BP MEASURE DOC: HCPCS | Performed by: FAMILY MEDICINE

## 2020-11-18 PROCEDURE — 1101F PT FALLS ASSESS-DOCD LE1/YR: CPT | Performed by: FAMILY MEDICINE

## 2020-11-18 PROCEDURE — 1090F PRES/ABSN URINE INCON ASSESS: CPT | Performed by: FAMILY MEDICINE

## 2020-11-18 PROCEDURE — G8510 SCR DEP NEG, NO PLAN REQD: HCPCS | Performed by: FAMILY MEDICINE

## 2020-11-18 PROCEDURE — G8427 DOCREV CUR MEDS BY ELIG CLIN: HCPCS | Performed by: FAMILY MEDICINE

## 2020-11-18 PROCEDURE — G8417 CALC BMI ABV UP PARAM F/U: HCPCS | Performed by: FAMILY MEDICINE

## 2020-11-18 RX ORDER — GABAPENTIN 300 MG/1
300 CAPSULE ORAL 2 TIMES DAILY
Qty: 30 CAP | Refills: 3 | Status: SHIPPED | OUTPATIENT
Start: 2020-11-18 | End: 2021-01-27 | Stop reason: ALTCHOICE

## 2020-11-18 RX ORDER — AMITRIPTYLINE HYDROCHLORIDE 10 MG/1
10-20 TABLET, FILM COATED ORAL
Qty: 60 TAB | Refills: 11 | Status: SHIPPED | OUTPATIENT
Start: 2020-11-18 | End: 2021-01-27 | Stop reason: SDDI

## 2020-11-18 RX ORDER — LEVOTHYROXINE SODIUM 50 UG/1
50 TABLET ORAL
COMMUNITY
End: 2021-11-08

## 2020-11-18 RX ORDER — NEBIVOLOL 5 MG/1
5 TABLET ORAL 2 TIMES DAILY
COMMUNITY
End: 2020-12-14

## 2020-11-18 RX ORDER — BUTALBITAL, ACETAMINOPHEN AND CAFFEINE 50; 325; 40 MG/1; MG/1; MG/1
TABLET ORAL
Qty: 30 TAB | Refills: 0 | Status: SHIPPED | OUTPATIENT
Start: 2020-11-18 | End: 2020-12-30

## 2020-11-18 RX ORDER — VALACYCLOVIR HYDROCHLORIDE 1 G/1
1000 TABLET, FILM COATED ORAL 3 TIMES DAILY
Qty: 21 TAB | Refills: 0 | Status: SHIPPED | OUTPATIENT
Start: 2020-11-18 | End: 2020-11-25

## 2020-11-18 NOTE — PROGRESS NOTES
HISTORY OF PRESENT ILLNESS  Noa Arrieta is a 82 y.o. female. HPI   Started with tingling pain on the left side of the chest about 2 days ago. Then next day had rash that looks like shingles that she had had several years ago. Still has the tingling and pain in the left side. She has not had her shingle vaccine. 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 atrial tachycardia R00.0    Edema leg R60.0    Reactive airway disease without complication K83.624    Incidental lung nodule, greater than or equal to 8mm R91.1    Encounter for medication monitoring Z51.81    Preop cardiovascular exam Z01.810    Pelvic lymphadenopathy R59.0    Chronic ITP (idiopathic thrombocytopenia) ([facility]) D69.3    Hypercholesterolemia E78.00    Nonrheumatic mitral valve regurgitation I34.0    PSVT (paroxysmal supraventricular tachycardia) ([facility]) I47.1    Migraine with aura and without status migrainosus, not intractable G43. 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  nebivoloL (Bystolic) 5 mg tablet Take 5 mg by mouth two (2) times a day.       butalbital-acetaminophen-caffeine (FIORICET, ESGIC) -40 mg per tablet TAKE 1 TABLET BY MOUTH EVERY 6 HOURS AS NEEDED FOR PAIN 30 Tab 0    amitriptyline (ELAVIL) 10 mg tablet Take 1-2 Tabs by mouth nightly. 60 Tab 11    valACYclovir (VALTREX) 1 gram tablet Take 1 Tab by mouth three (3) times daily for 7 days. 21 Tab 0    gabapentin (NEURONTIN) 300 mg capsule Take 1 Cap by mouth two (2) times a day. Max Daily Amount: 600 mg. For nerve pain after shingles. 30 Cap 3    temazepam (RESTORIL) 15 mg capsule Take 1 to 2 tablets by mouth every night at bedtime 60 Cap 0    romiplostim (NPLATE SC) by SubCUTAneous route every fourteen (14) days.  acetaminophen (ARTHRITIS PAIN RELIEF) 650 mg TbER Take 650 mg by mouth every eight (8) hours as needed.  cetirizine (ZYRTEC) 10 mg tablet Take 10 mg by mouth daily as needed for Allergies.       pravastatin (PRAVACHOL) 10 mg tablet TAKE 1 TABLET BY MOUTH EVERY NIGHT 90 Tab 0       Allergies   Allergen Reactions    Epinephrine Other (comments)     BP drops and pass out    Iodinated Contrast Media Shortness of Breath    Novocain [Procaine] Palpitations     BP drops,passes out    Codeine Itching     Pt does not recall    Cymbalta [Duloxetine] Other (comments)     Head Pain and nausea    Daypro [Oxaprozin] Unknown (comments)     Pt does not recall    Prednisone Shortness of Breath     agitation    Sulfa (Sulfonamide Antibiotics) Unknown (comments)     Pt does not recall    Tetracycline Other (comments)     Low platelet count    Zithromax [Azithromycin] Nausea Only     Diarrhea, headaches       Past Medical History:   Diagnosis Date    Arrhythmia     tachycardia; Dr. Cristina Araya Autoimmune disease ([facility]     fibromyalgia    Chronic ITP (idiopathic thrombocytopenia) (HCC)     Fibromyalgia     GERD (gastroesophageal reflux disease)     Headache(784.0)     migraine    Heart failure (HCC)     HTN (hypertension) 4/2/2010    Hypothyroidism 4/2/2010    OA (osteoarthritis) 4/2/2010    Seizures ([facility]     56 years ago with childbirth    Tachycardia 2013    Thromboembolus ([facility]     blood clot on brain 56 years ago w childbirth    Thyroid disease Past Surgical History:   Procedure Laterality Date    Alberto Garcia  2/5/2015         ENDOSCOPY, COLON, DIAGNOSTIC  12/2010    Dr. Banks Ours- repeat 5 yrs    HX BREAST BIOPSY Right 1999    more than one yrs ago all benign    HX CARPAL TUNNEL RELEASE  9/28/2012    HX CATARACT REMOVAL  9/2014    bilateral     HX HYSTERECTOMY  1977    HX REFRACTIVE SURGERY  07/2018       Family History   Problem Relation Age of Onset    Heart Disease Paternal Aunt     Heart Disease Paternal Uncle     Colon Cancer Maternal Grandmother     Other Mother         murder    No Known Problems Father     Other Son         scooby's disease and UC    Other Daughter         fibromyalgia    Thyroid Disease Daughter     Heart Disease Maternal Grandfather     Other Sister         Raynauds       Social History     Tobacco Use    Smoking status: Never Smoker    Smokeless tobacco: Never Used   Substance Use Topics    Alcohol use: No     Alcohol/week: 0.0 standard drinks          Physical Exam  Cardiovascular:      Rate and Rhythm: Normal rate and regular rhythm. Pulses: Normal pulses. Heart sounds: Normal heart sounds. Pulmonary:      Effort: Pulmonary effort is normal.      Breath sounds: Normal breath sounds. Skin:     Comments: Cluster of blister on the left side of the mid chest wall c/w shingles           ASSESSMENT and PLAN  Diagnoses and all orders for this visit:    1. Herpes zoster without complication  -     gabapentin (NEURONTIN) 300 mg capsule; Take 1 Cap by mouth two (2) times a day. Max Daily Amount: 600 mg. For nerve pain after shingles. Other orders  -     valACYclovir (VALTREX) 1 gram tablet; Take 1 Tab by mouth three (3) times daily for 7 days. reviewed medications and side effects in detail    I have discussed diagnosis listed in this note with pt and/or family.  I have discussed treatment plans and options and the risk/benefit analysis of those options, including safe use of medications and possible medication side effects. Through the use of shared decision making we have agreed to the above plan. The patient has received an after-visit summary and questions were answered concerning future plans and follow up. Advise pt of any urgent changes then to proceed to the ER.

## 2020-11-18 NOTE — PROGRESS NOTES
Chief Complaint   Patient presents with    Rash     patient c/o few small, painful, itchy blisters on side of left breast- started 3 days ago           1. Have you been to the ER, urgent care clinic since your last visit? Hospitalized since your last visit? no    2. Have you seen or consulted any other health care providers outside of the [address] [place] [address] since your last visit? Include any pap smears or colon screening.  no

## 2020-11-20 VITALS
BODY MASS INDEX: 32.17 KG/M2 | HEIGHT: 61 IN | TEMPERATURE: 97.3 F | DIASTOLIC BLOOD PRESSURE: 77 MMHG | OXYGEN SATURATION: 96 % | SYSTOLIC BLOOD PRESSURE: 139 MMHG | HEART RATE: 76 BPM | WEIGHT: 170.4 LBS | RESPIRATION RATE: 18 BRPM

## 2020-11-25 ENCOUNTER — PATIENT MESSAGE (OUTPATIENT)
Dept: FAMILY MEDICINE CLINIC | Age: 85
End: 2020-11-25

## 2020-11-30 ENCOUNTER — TELEPHONE (OUTPATIENT)
Dept: FAMILY MEDICINE CLINIC | Age: 85
End: 2020-11-30

## 2020-11-30 DIAGNOSIS — B02.29 POSTHERPETIC NEURALGIA: Primary | ICD-10-CM

## 2020-11-30 RX ORDER — GABAPENTIN 300 MG/1
300 CAPSULE ORAL 3 TIMES DAILY
Qty: 90 CAP | Refills: 3 | Status: SHIPPED | OUTPATIENT
Start: 2020-11-30 | End: 2021-04-12

## 2020-11-30 NOTE — TELEPHONE ENCOUNTER
----- Message from Odilia Bateman LPN sent at 56/08/5541  1:30 PM EST -----  Regarding: FW: Prescription Question  Contact: [phone]    ----- Message -----  From: Gianni Cosby  Sent: 11/25/2020  12:11 PM EST  To: [facility]  Subject: Prescription Question                            Good Afternoon! Have completed Valacyclovir. The pain is my big concern. The pain usually wakes me up at 4:30AM and I take a pain pill usually takes about 2 hours before effectiveness kicks in. Then I can't take another pain pill until about 10PM. I really need something during the in between time because the med I took at 4:30AM has wore off and the pain in the afternoon is unbearable for me. Any thing you could do or recommend would be greatly appreciated.  Thank you

## 2020-11-30 NOTE — TELEPHONE ENCOUNTER
----- Message from Clara Olson LPN sent at 45/38/1936  1:30 PM EST -----  Regarding: FW: Prescription Question  Contact: [phone]    ----- Message -----  From: Kalyani Shukla  Sent: 11/25/2020  12:11 PM EST  To: [facility] Nurse Pool  Subject: Prescription Question                            Good Afternoon! Have completed Valacyclovir. The pain is my big concern. The pain usually wakes me up at 4:30AM and I take a pain pill usually takes about 2 hours before effectiveness kicks in. Then I can't take another pain pill until about 10PM. I really need something during the in between time because the med I took at 4:30AM has wore off and the pain in the afternoon is unbearable for me. Any thing you could do or recommend would be greatly appreciated.  Thank you

## 2020-12-08 ENCOUNTER — TRANSCRIBE ORDER (OUTPATIENT)
Dept: SCHEDULING | Age: 85
End: 2020-12-08

## 2020-12-08 DIAGNOSIS — R16.1 SPLENOMEGALY: ICD-10-CM

## 2020-12-08 DIAGNOSIS — D72.829 ELEVATED WHITE BLOOD CELL COUNT: Primary | ICD-10-CM

## 2020-12-08 DIAGNOSIS — R91.1 SOLITARY PULMONARY NODULE: ICD-10-CM

## 2020-12-08 DIAGNOSIS — R59.0 LOCALIZED ENLARGED LYMPH NODES: ICD-10-CM

## 2020-12-08 DIAGNOSIS — D69.3 IMMUNE THROMBOCYTOPENIC PURPURA (HCC): ICD-10-CM

## 2020-12-08 DIAGNOSIS — D47.2 GAMMOPATHY, MONOCLONAL: ICD-10-CM

## 2020-12-08 DIAGNOSIS — D69.6 THROMBOCYTOPENIA, UNSPECIFIED (HCC): ICD-10-CM

## 2020-12-21 ENCOUNTER — HOSPITAL ENCOUNTER (OUTPATIENT)
Dept: PET IMAGING | Age: 85
Discharge: HOME | End: 2020-12-21
Attending: INTERNAL MEDICINE
Payer: MEDICARE

## 2020-12-21 VITALS — BODY MASS INDEX: 32.1 KG/M2 | HEIGHT: 61 IN | WEIGHT: 170 LBS

## 2020-12-21 DIAGNOSIS — D69.3 IMMUNE THROMBOCYTOPENIC PURPURA (HCC): ICD-10-CM

## 2020-12-21 DIAGNOSIS — D69.6 THROMBOCYTOPENIA, UNSPECIFIED (HCC): ICD-10-CM

## 2020-12-21 DIAGNOSIS — R59.0 LOCALIZED ENLARGED LYMPH NODES: ICD-10-CM

## 2020-12-21 DIAGNOSIS — R91.1 SOLITARY PULMONARY NODULE: ICD-10-CM

## 2020-12-21 DIAGNOSIS — D47.2 GAMMOPATHY, MONOCLONAL: ICD-10-CM

## 2020-12-21 DIAGNOSIS — D72.829 ELEVATED WHITE BLOOD CELL COUNT: ICD-10-CM

## 2020-12-21 DIAGNOSIS — R16.1 SPLENOMEGALY: ICD-10-CM

## 2020-12-21 LAB
GLUCOSE BLD STRIP.AUTO-MCNC: 106 MG/DL (ref 65–100)
SERVICE CMNT-IMP: ABNORMAL

## 2020-12-21 PROCEDURE — A9552 F18 FDG: HCPCS

## 2020-12-21 RX ORDER — SODIUM CHLORIDE 0.9 % (FLUSH) 0.9 %
10 SYRINGE (ML) INJECTION
Status: COMPLETED | OUTPATIENT
Start: 2020-12-21 | End: 2020-12-21

## 2020-12-21 RX ADMIN — Medication 10 ML: at 15:28

## 2020-12-29 DIAGNOSIS — Z86.69 HISTORY OF MIGRAINE: ICD-10-CM

## 2020-12-30 RX ORDER — PRAVASTATIN SODIUM 10 MG/1
TABLET ORAL
Qty: 90 TAB | Refills: 3 | Status: SHIPPED | OUTPATIENT
Start: 2020-12-30 | End: 2022-01-24 | Stop reason: SDUPTHER

## 2020-12-30 RX ORDER — BUTALBITAL, ACETAMINOPHEN AND CAFFEINE 50; 325; 40 MG/1; MG/1; MG/1
TABLET ORAL
Qty: 30 TAB | Refills: 0 | Status: SHIPPED | OUTPATIENT
Start: 2020-12-30 | End: 2021-01-25

## 2021-01-21 ENCOUNTER — HOSPITAL ENCOUNTER (OUTPATIENT)
Dept: MAMMOGRAPHY | Age: 86
Discharge: HOME | End: 2021-01-21
Attending: FAMILY MEDICINE
Payer: MEDICARE

## 2021-01-21 DIAGNOSIS — Z12.31 ENCOUNTER FOR SCREENING MAMMOGRAM FOR BREAST CANCER: ICD-10-CM

## 2021-01-21 PROCEDURE — 77067 SCR MAMMO BI INCL CAD: CPT

## 2021-01-22 DIAGNOSIS — Z86.69 HISTORY OF MIGRAINE: ICD-10-CM

## 2021-01-22 DIAGNOSIS — F51.01 PRIMARY INSOMNIA: ICD-10-CM

## 2021-01-23 DIAGNOSIS — Z86.69 HISTORY OF MIGRAINE: ICD-10-CM

## 2021-01-23 DIAGNOSIS — F51.01 PRIMARY INSOMNIA: ICD-10-CM

## 2021-01-25 RX ORDER — BUTALBITAL, ACETAMINOPHEN AND CAFFEINE 50; 325; 40 MG/1; MG/1; MG/1
TABLET ORAL
Qty: 30 TAB | Refills: 0 | Status: SHIPPED | OUTPATIENT
Start: 2021-01-25 | End: 2021-01-29

## 2021-01-25 RX ORDER — TEMAZEPAM 15 MG/1
CAPSULE ORAL
Qty: 60 CAP | Refills: 0 | Status: SHIPPED | OUTPATIENT
Start: 2021-01-25 | End: 2021-01-27 | Stop reason: SDUPTHER

## 2021-01-25 RX ORDER — TEMAZEPAM 15 MG/1
CAPSULE ORAL
Qty: 60 CAP | Refills: 0 | Status: SHIPPED | OUTPATIENT
Start: 2021-01-25 | End: 2021-02-25

## 2021-01-25 RX ORDER — BUTALBITAL, ACETAMINOPHEN AND CAFFEINE 50; 325; 40 MG/1; MG/1; MG/1
TABLET ORAL
Qty: 30 TAB | Refills: 0 | Status: SHIPPED | OUTPATIENT
Start: 2021-01-25 | End: 2021-01-27 | Stop reason: SDUPTHER

## 2021-01-27 ENCOUNTER — HOSPITAL ENCOUNTER (OUTPATIENT)
Dept: LAB | Age: 86
Discharge: HOME | End: 2021-01-27
Payer: MEDICARE

## 2021-01-27 ENCOUNTER — OFFICE VISIT (OUTPATIENT)
Dept: FAMILY MEDICINE CLINIC | Age: 86
End: 2021-01-27
Payer: MEDICARE

## 2021-01-27 VITALS
DIASTOLIC BLOOD PRESSURE: 75 MMHG | RESPIRATION RATE: 18 BRPM | SYSTOLIC BLOOD PRESSURE: 134 MMHG | WEIGHT: 172.6 LBS | OXYGEN SATURATION: 96 % | HEART RATE: 85 BPM | BODY MASS INDEX: 32.59 KG/M2 | HEIGHT: 61 IN | TEMPERATURE: 96.8 F

## 2021-01-27 DIAGNOSIS — E78.2 MIXED HYPERLIPIDEMIA: ICD-10-CM

## 2021-01-27 DIAGNOSIS — M15.9 GENERALIZED OSTEOARTHRITIS: ICD-10-CM

## 2021-01-27 DIAGNOSIS — E03.4 HYPOTHYROIDISM DUE TO ACQUIRED ATROPHY OF THYROID: ICD-10-CM

## 2021-01-27 DIAGNOSIS — Z51.81 ENCOUNTER FOR MEDICATION MONITORING: ICD-10-CM

## 2021-01-27 DIAGNOSIS — G44.049 CHRONIC PAROXYSMAL HEMICRANIA, NOT INTRACTABLE: ICD-10-CM

## 2021-01-27 DIAGNOSIS — M79.7 FIBROMYALGIA: ICD-10-CM

## 2021-01-27 DIAGNOSIS — I10 ESSENTIAL HYPERTENSION: Primary | ICD-10-CM

## 2021-01-27 DIAGNOSIS — D69.3 CHRONIC ITP (IDIOPATHIC THROMBOCYTOPENIA) (HCC): ICD-10-CM

## 2021-01-27 PROCEDURE — G8399 PT W/DXA RESULTS DOCUMENT: HCPCS | Performed by: FAMILY MEDICINE

## 2021-01-27 PROCEDURE — G8754 DIAS BP LESS 90: HCPCS | Performed by: FAMILY MEDICINE

## 2021-01-27 PROCEDURE — 1090F PRES/ABSN URINE INCON ASSESS: CPT | Performed by: FAMILY MEDICINE

## 2021-01-27 PROCEDURE — G8417 CALC BMI ABV UP PARAM F/U: HCPCS | Performed by: FAMILY MEDICINE

## 2021-01-27 PROCEDURE — G8427 DOCREV CUR MEDS BY ELIG CLIN: HCPCS | Performed by: FAMILY MEDICINE

## 2021-01-27 PROCEDURE — G8510 SCR DEP NEG, NO PLAN REQD: HCPCS | Performed by: FAMILY MEDICINE

## 2021-01-27 PROCEDURE — 1101F PT FALLS ASSESS-DOCD LE1/YR: CPT | Performed by: FAMILY MEDICINE

## 2021-01-27 PROCEDURE — G8752 SYS BP LESS 140: HCPCS | Performed by: FAMILY MEDICINE

## 2021-01-27 PROCEDURE — G8536 NO DOC ELDER MAL SCRN: HCPCS | Performed by: FAMILY MEDICINE

## 2021-01-27 PROCEDURE — 80053 COMPREHEN METABOLIC PANEL: CPT

## 2021-01-27 PROCEDURE — 80061 LIPID PANEL: CPT

## 2021-01-27 PROCEDURE — G0463 HOSPITAL OUTPT CLINIC VISIT: HCPCS | Performed by: FAMILY MEDICINE

## 2021-01-27 PROCEDURE — 99213 OFFICE O/P EST LOW 20 MIN: CPT | Performed by: FAMILY MEDICINE

## 2021-01-27 RX ORDER — LEVOTHYROXINE SODIUM 50 UG/1
TABLET ORAL
COMMUNITY
Start: 2020-11-12 | End: 2021-01-27 | Stop reason: SDUPTHER

## 2021-01-27 RX ORDER — GABAPENTIN 300 MG/1
CAPSULE ORAL
COMMUNITY
Start: 2020-11-18 | End: 2021-01-27 | Stop reason: DRUGHIGH

## 2021-01-27 RX ORDER — AMITRIPTYLINE HYDROCHLORIDE 10 MG/1
TABLET, FILM COATED ORAL
COMMUNITY
Start: 2020-10-27 | End: 2021-01-27 | Stop reason: SDUPTHER

## 2021-01-27 RX ORDER — NEBIVOLOL 5 MG/1
TABLET ORAL
COMMUNITY
Start: 2020-11-17 | End: 2021-01-27 | Stop reason: SDUPTHER

## 2021-01-27 NOTE — PROGRESS NOTES
HISTORY OF PRESENT ILLNESS  Eliseo Valdez is a 82 y.o. female. HPI   Follow up on chronic medical problems. Overall feeling well. Doing the precautionary measures at home to reduce risks of exposure COVID19. Also wearing mask when she is going out. No known sick contacts or known exposure to Pervis Bucco. Pain in the left chest is improved form having shingles. Sometimes feels uncomfortable with wearing her bra. Cardiovascular Review:  The patient has hypertension, hyperlipidemia, and atrial tachycardia. Diet and Lifestyle: generally follows a low fat low cholesterol diet, generally follows a low sodium diet, exercises sporadically  Home BP Monitoring: is not measured at home. Pertinent ROS: taking medications as instructed, no medication side effects noted, no TIA's, no swelling of ankles, no palpitations. Denies chest pain. No pressure or chest tightness. Has upcoming cardiology appt for next month. Thyroid Review:  Patient is seen for followup of hypothyroidism. Followed by [facility]ateek. Thyroid ROS: denies weight changes, heat/cold intolerance, bowel/skin changes or CVS symptoms. Osteoarthritis and Chronic Pain:  Patient has osteoarthritis and fibromyalgia. Has aches and pain that comes and goes. On gabapentin which does help with some of the pain. Rheumatological ROS: Has burning and pain in the legs at feet that is worse at night. Aches all the time. Has not been exercising. Controlled by PRN meds. Response to treatment plan: stable. Headache:  Has chronic headache almost every days. Generally will go away if she take the Fioricet. She has seen neurology and was to get and MRI but could not tolerated the scanner. She was started on elavil but only tool it one night and did not like the way it made her feel. She did not take it again and did not all back to neurology. Has appt for follow up in March.   We discussed rebound headaches from taking this medication but she is convinced that she has had hx of headaches for over 50 years and that it is not from the medication. 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 Platelet counts have been overall stable. She is on romipostim. HM:  Mammogram 1/21/21  Colonoscopy 8/1/2016 by Dr. Aleksandr King - no more colonoscopies unless she has a problem. Eye exam 8/2019 by Dr. Kalie Lipscomb. 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 atrial tachycardia R00.0    Edema leg R60.0    Reactive airway disease without complication U62.913    Incidental lung nodule, greater than or equal to 8mm R91.1    Encounter for medication monitoring Z51.81    Preop cardiovascular exam Z01.810    Pelvic lymphadenopathy R59.0    Chronic ITP (idiopathic thrombocytopenia) (HCC) D69.3    Hypercholesterolemia E78.00    Nonrheumatic mitral valve regurgitation I34.0    PSVT (paroxysmal supraventricular tachycardia) ([facility]) I47.1    Migraine with aura and without status migrainosus, not intractable G43. 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.209    History of ITP Z86.2    Cervicogenic headache R51.9       Current Outpatient Medications   Medication Sig Dispense Refill    temazepam (RESTORIL) 15 mg capsule TAKE 1 TO 2 CAPSULES BY MOUTH EVERY NIGHT AT BEDTIME 60 Cap 0    butalbital-acetaminophen-caffeine (FIORICET, ESGIC) -40 mg per tablet TAKE 1 TABLET BY MOUTH EVERY 6 HOURS AS NEEDED FOR PAIN 30 Tab 0    butalbital-acetaminophen-caffeine (FIORICET, ESGIC) -40 mg per tablet TAKE 1 TABLET BY MOUTH EVERY 6 HOURS AS NEEDED FOR PAIN 30 Tab 0    temazepam (RESTORIL) 15 mg capsule TAKE 1 TO 2 CAPSULES BY MOUTH EVERY NIGHT AT BEDTIME 60 Cap 0    pravastatin (PRAVACHOL) 10 mg tablet TAKE 1 TABLET BY MOUTH EVERY NIGHT 90 Tab 3    Bystolic 5 mg tablet TAKE 1 TABLET BY MOUTH TWICE DAILY. HOLD DOSE IF SYSTOLIC PRESSURE IS LESS THAN 100 OR PULSE IS LESS THAN 50 60 Tab 11    gabapentin (NEURONTIN) 300 mg capsule Take 1 Cap by mouth three (3) times daily. Max Daily Amount: 900 mg. 90 Cap 3    levothyroxine (synthroid) 50 mcg tablet Take 50 mcg by mouth every Monday, Wednesday, Friday.  amitriptyline (ELAVIL) 10 mg tablet Take 1-2 Tabs by mouth nightly. 60 Tab 11    gabapentin (NEURONTIN) 300 mg capsule Take 1 Cap by mouth two (2) times a day. Max Daily Amount: 600 mg. For nerve pain after shingles. 30 Cap 3    romiplostim (NPLATE SC) by SubCUTAneous route every fourteen (14) days.  acetaminophen (ARTHRITIS PAIN RELIEF) 650 mg TbER Take 650 mg by mouth every eight (8) hours as needed.  cetirizine (ZYRTEC) 10 mg tablet Take 10 mg by mouth daily as needed for Allergies.          Allergies   Allergen Reactions    Epinephrine Other (comments)     BP drops and pass out    Iodinated Contrast Media Shortness of Breath    Novocain [Procaine] Palpitations     BP drops,passes out    Codeine Itching     Pt does not recall    Cymbalta [Duloxetine] Other (comments)     Head Pain and nausea    Daypro [Oxaprozin] Unknown (comments)     Pt does not recall    Prednisone Shortness of Breath     agitation    Sulfa (Sulfonamide Antibiotics) Unknown (comments)     Pt does not recall    Tetracycline Other (comments)     Low platelet count    Zithromax [Azithromycin] Nausea Only     Diarrhea, headaches       Past Medical History:   Diagnosis Date    Arrhythmia     tachycardia; Dr. Mony Reyes Autoimmune disease ([facility]     fibromyalgia    Chronic ITP [facility]idiopathic thrombocytopenia) ([facility])     Fibromyalgia     GERD (gastroesophageal reflux disease)     Headache(784.0)     migraine    Heart failure (HCC)     HTN (hypertension) 4/2/2010    Hypothyroidism 4/2/2010    OA (osteoarthritis) 4/2/2010    Seizures ([address]     56 years ago with childbirth    Tachycardia 2013    Thromboembolus ([address]     blood clot on brain 56 years ago w childbirth    Thyroid disease        Past Surgical History:   Procedure Laterality Date    Sarah Lockwood  2/5/2015         ENDOSCOPY, COLON, DIAGNOSTIC  12/2010    Dr. Bret Naqvi- repeat 5 yrs    HX BREAST BIOPSY Right 1999    more than one yrs ago all benign    HX CARPAL TUNNEL RELEASE  9/28/2012    HX CATARACT REMOVAL  9/2014    bilateral     HX HYSTERECTOMY  1977    HX REFRACTIVE SURGERY  07/2018       Family History   Problem Relation Age of Onset    Heart Disease Paternal Aunt     Heart Disease Paternal Uncle     Colon Cancer Maternal Grandmother     Other Mother         murder    No Known Problems Father     Other Son         scooby's disease and UC    Other Daughter         fibromyalgia    Thyroid Disease Daughter     Heart Disease Maternal Grandfather     Other Sister         Raynauds       Social History     Tobacco Use    Smoking status: Never Smoker    Smokeless tobacco: Never Used   Substance Use Topics    Alcohol use: No     Alcohol/week: 0.0 standard drinks        Lab Results   Component Value Date/Time    WBC 5.2 10/27/2020 02:26 PM    HGB 12.8 10/27/2020 02:26 PM    Hemoglobin (POC) 11.6 07/14/2017 07:13 AM    HCT 37.9 10/27/2020 02:26 PM    Hematocrit (POC) 34 (L) 07/14/2017 07:13 AM    PLATELET 928 (L) 66/68/5127 02:26 PM    MCV 88 10/27/2020 02:26 PM     Lab Results   Component Value Date/Time    Cholesterol, total 192 02/25/2020 03:00 PM    HDL Cholesterol 56 02/25/2020 03:00 PM    LDL, calculated 100 (H) 02/25/2020 03:00 PM    Triglyceride 180 (H) 02/25/2020 03:00 PM    CHOL/HDL Ratio 3.7 01/27/2010 05:43 PM     Lab Results   Component Value Date/Time    TSH 0.438 (L) 07/28/2020 03:39 PM    T4, Free 1.18 10/11/2016 03:41 PM      Lab Results   Component Value Date/Time    Sodium 139 07/28/2020 03:39 PM    Potassium 4.8 07/28/2020 03:39 PM    Chloride 100 07/28/2020 03:39 PM    CO2 26 07/28/2020 03:39 PM    Anion gap 12 05/03/2016 01:05 PM    Glucose 97 07/28/2020 03:39 PM    BUN 19 07/28/2020 03:39 PM    Creatinine 0.75 07/28/2020 03:39 PM    BUN/Creatinine ratio 25 07/28/2020 03:39 PM    GFR est AA 85 07/28/2020 03:39 PM    GFR est non-AA 74 07/28/2020 03:39 PM    Calcium 9.9 07/28/2020 03:39 PM    Bilirubin, total 0.4 02/25/2020 03:00 PM    ALT (SGPT) 17 02/25/2020 03:00 PM    Alk. phosphatase 105 02/25/2020 03:00 PM    Protein, total 7.3 02/25/2020 03:00 PM    Albumin 5.1 (H) 02/25/2020 03:00 PM    Globulin 3.2 05/03/2016 01:05 PM    A-G Ratio 2.3 (H) 02/25/2020 03:00 PM      Lab Results   Component Value Date/Time    Hemoglobin A1c 5.9 (H) 08/28/2012 04:22 PM    Hemoglobin A1c (POC) 5.7 11/10/2015 03:16 PM    Hemoglobin A1c, External 11.1 07/22/2015         Review of Systems   Constitutional: Negative for malaise/fatigue. HENT: Negative for congestion. Eyes: Negative for blurred vision. Respiratory: Negative for cough and shortness of breath. Cardiovascular: Negative for chest pain, palpitations and leg swelling. Gastrointestinal: Negative for abdominal pain, constipation and heartburn. Genitourinary: Negative for dysuria, frequency and urgency. Neurological: Negative for dizziness and tingling. Endo/Heme/Allergies: Negative for environmental allergies. Psychiatric/Behavioral: Negative for depression. The patient does not have insomnia. Physical Exam  Vitals signs and nursing note reviewed. Constitutional:       Appearance: Normal appearance. She is well-developed.       Comments: /75 (BP 1 Location: Left arm, BP Patient Position: Sitting)   Pulse 85   Temp 96.8 °F (36 °C) (Temporal) Resp 18   Ht 5' 1\" (1.549 m)   Wt 172 lb 9.6 oz (78.3 kg)   SpO2 96%   BMI 32.61 kg/m²    HENT:      Right Ear: Tympanic membrane and ear canal normal.      Left Ear: Tympanic membrane and ear canal normal.      Nose: No mucosal edema or rhinorrhea. Neck:      Musculoskeletal: Normal range of motion and neck supple. Thyroid: No thyromegaly. Cardiovascular:      Rate and Rhythm: Normal rate and regular rhythm. Heart sounds: Normal heart sounds. No gallop. Pulmonary:      Effort: Pulmonary effort is normal.      Breath sounds: Normal breath sounds. Abdominal:      General: Bowel sounds are normal.      Palpations: Abdomen is soft. There is no mass. Tenderness: There is no abdominal tenderness. Musculoskeletal: Normal range of motion. General: No swelling. Right lower leg: No edema. Left lower leg: No edema. Lymphadenopathy:      Cervical: No cervical adenopathy. Skin:     General: Skin is warm and dry. Neurological:      General: No focal deficit present. Mental Status: She is alert and oriented to person, place, and time. Psychiatric:         Mood and Affect: Mood normal.         ASSESSMENT and PLAN  Diagnoses and all orders for this visit:    1. Essential hypertension  Stale at treatment goal.    Discussed sodium restriction, high k rich diet, maintaining ideal body weight and regular exercise program such as daily walking 30 min perday 4-5 times per week, as physiologic means to achieve blood pressure control. Medication compliance advised. 2. Mixed hyperlipidemia  -     LIPID PANEL    3. Hypothyroidism due to acquired atrophy of thyroid  Stable     4. Chronic ITP (idiopathic thrombocytopenia) (HCC)  As per hemtology    5. Fibromyalgia//  6. Generalized osteoarthritis    7. Chronic paroxysmal hemicrania, not intractable  We defer to follow up with neurology. Also discussed that the gabapentin should also help with headache pain.       8. Encounter for medication monitoring  -     METABOLIC PANEL, COMPREHENSIVE      Follow-up and Dispositions    · Return in about 6 months (around 7/27/2021) for medicare wellness exam.       reviewed diet, exercise and weight control  cardiovascular risk and specific lipid/LDL goals reviewed  reviewed medications and side effects in detail    I have discussed diagnosis listed in this note with pt and/or family. I have discussed treatment plans and options and the risk/benefit analysis of those options, including safe use of medications and possible medication side effects. Through the use of shared decision making we have agreed to the above plan. The patient has received an after-visit summary and questions were answered concerning future plans and follow up. Advise pt of any urgent changes then to proceed to the ER.

## 2021-01-27 NOTE — PROGRESS NOTES
Chief Complaint   Patient presents with    Hypertension     follow up    Cholesterol Problem     follow up    Thyroid Problem     follow up         Mammogram 1/21/2021    Colonoscopy 8/1/2016 by Dr. Tyrel Lehman - no more colonoscopies unless she has a problem. Eye exam 9/1/2020 by Dr. Daria Rush. 1. Have you been to the ER, urgent care clinic since your last visit? Hospitalized since your last visit? No    2. Have you seen or consulted any other health care providers outside of the [address] [place] [address] since your last visit? Include any pap smears or colon screening.  No

## 2021-01-28 DIAGNOSIS — Z86.69 HISTORY OF MIGRAINE: ICD-10-CM

## 2021-01-28 LAB
ALBUMIN SERPL-MCNC: 4.7 G/DL (ref 3.6–4.6)
ALBUMIN/GLOB SERPL: 2.5 {RATIO} (ref 1.2–2.2)
ALP SERPL-CCNC: 113 IU/L (ref 39–117)
ALT SERPL-CCNC: 15 IU/L (ref 0–32)
AST SERPL-CCNC: 24 IU/L (ref 0–40)
BILIRUB SERPL-MCNC: 0.2 MG/DL (ref 0–1.2)
BUN SERPL-MCNC: 15 MG/DL (ref 8–27)
BUN/CREAT SERPL: 21 (ref 12–28)
CALCIUM SERPL-MCNC: 10 MG/DL (ref 8.7–10.3)
CHLORIDE SERPL-SCNC: 103 MMOL/L (ref 96–106)
CHOLEST SERPL-MCNC: 174 MG/DL (ref 100–199)
CO2 SERPL-SCNC: 26 MMOL/L (ref 20–29)
CREAT SERPL-MCNC: 0.72 MG/DL (ref 0.57–1)
GLOBULIN SER CALC-MCNC: 1.9 G/DL (ref 1.5–4.5)
GLUCOSE SERPL-MCNC: 99 MG/DL (ref 65–99)
HDLC SERPL-MCNC: 56 MG/DL
INTERPRETATION, 910389: NORMAL
LDLC SERPL CALC-MCNC: 83 MG/DL (ref 0–99)
POTASSIUM SERPL-SCNC: 4.7 MMOL/L (ref 3.5–5.2)
PROT SERPL-MCNC: 6.6 G/DL (ref 6–8.5)
SODIUM SERPL-SCNC: 141 MMOL/L (ref 134–144)
TRIGL SERPL-MCNC: 208 MG/DL (ref 0–149)
VLDLC SERPL CALC-MCNC: 35 MG/DL (ref 5–40)

## 2021-01-29 RX ORDER — BUTALBITAL, ACETAMINOPHEN AND CAFFEINE 50; 325; 40 MG/1; MG/1; MG/1
TABLET ORAL
Qty: 30 TAB | Refills: 0 | Status: SHIPPED | OUTPATIENT
Start: 2021-01-29 | End: 2021-02-23

## 2021-02-23 DIAGNOSIS — Z86.69 HISTORY OF MIGRAINE: ICD-10-CM

## 2021-02-23 RX ORDER — BUTALBITAL, ACETAMINOPHEN AND CAFFEINE 50; 325; 40 MG/1; MG/1; MG/1
TABLET ORAL
Qty: 30 TAB | Refills: 0 | Status: SHIPPED | OUTPATIENT
Start: 2021-02-23 | End: 2021-03-25

## 2021-02-24 DIAGNOSIS — F51.01 PRIMARY INSOMNIA: ICD-10-CM

## 2021-02-25 RX ORDER — TEMAZEPAM 15 MG/1
CAPSULE ORAL
Qty: 60 CAP | Refills: 0 | Status: SHIPPED | OUTPATIENT
Start: 2021-02-25 | End: 2021-03-25

## 2021-03-24 DIAGNOSIS — F51.01 PRIMARY INSOMNIA: ICD-10-CM

## 2021-03-24 DIAGNOSIS — Z86.69 HISTORY OF MIGRAINE: ICD-10-CM

## 2021-03-25 RX ORDER — TEMAZEPAM 15 MG/1
CAPSULE ORAL
Qty: 60 CAP | Refills: 1 | Status: SHIPPED | OUTPATIENT
Start: 2021-03-25 | End: 2021-07-08

## 2021-03-25 RX ORDER — BUTALBITAL, ACETAMINOPHEN AND CAFFEINE 50; 325; 40 MG/1; MG/1; MG/1
TABLET ORAL
Qty: 30 TAB | Refills: 0 | Status: SHIPPED | OUTPATIENT
Start: 2021-03-25 | End: 2021-04-23

## 2021-03-30 ENCOUNTER — DOCUMENTATION ONLY (OUTPATIENT)
Dept: FAMILY MEDICINE CLINIC | Age: 86
End: 2021-03-30

## 2021-03-30 NOTE — PROGRESS NOTES
PA initiated thru Cover My Meds under [facility] [id number] for Bystolic , waiting on reply

## 2021-03-31 ENCOUNTER — DOCUMENTATION ONLY (OUTPATIENT)
Dept: FAMILY MEDICINE CLINIC | Age: 86
End: 2021-03-31

## 2021-04-10 DIAGNOSIS — B02.29 POSTHERPETIC NEURALGIA: ICD-10-CM

## 2021-04-12 RX ORDER — GABAPENTIN 300 MG/1
CAPSULE ORAL
Qty: 90 CAP | Refills: 5 | Status: SHIPPED | OUTPATIENT
Start: 2021-04-12 | End: 2021-09-21

## 2021-04-27 ENCOUNTER — OFFICE VISIT (OUTPATIENT)
Dept: NEUROLOGY | Age: 86
End: 2021-04-27
Payer: MEDICARE

## 2021-04-27 VITALS
DIASTOLIC BLOOD PRESSURE: 74 MMHG | HEART RATE: 80 BPM | RESPIRATION RATE: 18 BRPM | SYSTOLIC BLOOD PRESSURE: 118 MMHG | WEIGHT: 173.8 LBS | BODY MASS INDEX: 32.84 KG/M2 | OXYGEN SATURATION: 98 %

## 2021-04-27 DIAGNOSIS — Z86.69 HISTORY OF MIGRAINE: ICD-10-CM

## 2021-04-27 DIAGNOSIS — G44.86 CERVICOGENIC HEADACHE: ICD-10-CM

## 2021-04-27 DIAGNOSIS — G43.109 MIGRAINE WITH AURA AND WITHOUT STATUS MIGRAINOSUS, NOT INTRACTABLE: Primary | ICD-10-CM

## 2021-04-27 DIAGNOSIS — G40.209 COMPLEX PARTIAL SEIZURES EVOLVING TO GENERALIZED TONIC-CLONIC SEIZURES (HCC): ICD-10-CM

## 2021-04-27 DIAGNOSIS — G44.209 TENSION VASCULAR HEADACHE: ICD-10-CM

## 2021-04-27 PROCEDURE — 1090F PRES/ABSN URINE INCON ASSESS: CPT | Performed by: PSYCHIATRY & NEUROLOGY

## 2021-04-27 PROCEDURE — G8432 DEP SCR NOT DOC, RNG: HCPCS | Performed by: PSYCHIATRY & NEUROLOGY

## 2021-04-27 PROCEDURE — G8427 DOCREV CUR MEDS BY ELIG CLIN: HCPCS | Performed by: PSYCHIATRY & NEUROLOGY

## 2021-04-27 PROCEDURE — G8754 DIAS BP LESS 90: HCPCS | Performed by: PSYCHIATRY & NEUROLOGY

## 2021-04-27 PROCEDURE — G8417 CALC BMI ABV UP PARAM F/U: HCPCS | Performed by: PSYCHIATRY & NEUROLOGY

## 2021-04-27 PROCEDURE — G8752 SYS BP LESS 140: HCPCS | Performed by: PSYCHIATRY & NEUROLOGY

## 2021-04-27 PROCEDURE — G8536 NO DOC ELDER MAL SCRN: HCPCS | Performed by: PSYCHIATRY & NEUROLOGY

## 2021-04-27 PROCEDURE — G8399 PT W/DXA RESULTS DOCUMENT: HCPCS | Performed by: PSYCHIATRY & NEUROLOGY

## 2021-04-27 PROCEDURE — 99214 OFFICE O/P EST MOD 30 MIN: CPT | Performed by: PSYCHIATRY & NEUROLOGY

## 2021-04-27 PROCEDURE — 1101F PT FALLS ASSESS-DOCD LE1/YR: CPT | Performed by: PSYCHIATRY & NEUROLOGY

## 2021-04-27 RX ORDER — BUTALBITAL, ACETAMINOPHEN AND CAFFEINE 50; 325; 40 MG/1; MG/1; MG/1
1 TABLET ORAL
Qty: 30 TAB | Refills: 5 | Status: SHIPPED | OUTPATIENT
Start: 2021-04-27 | End: 2021-10-19 | Stop reason: SDUPTHER
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
## 2021-06-17 ENCOUNTER — TELEPHONE (OUTPATIENT)
Dept: NEUROLOGY | Age: 86
End: 2021-06-17

## 2021-06-17 NOTE — TELEPHONE ENCOUNTER
----- Message from Piedad Sky sent at 6/17/2021  1:27 PM EDT -----  Regarding: /telephone  General Message/Vendor Calls    Caller's first and last name:      Reason for call: The pt would like to set up an MRI.       Callback required yes/no and why:Yes      Best contact number(s):[phone]

## 2021-06-18 DIAGNOSIS — G40.209 COMPLEX PARTIAL SEIZURES EVOLVING TO GENERALIZED TONIC-CLONIC SEIZURES (HCC): ICD-10-CM

## 2021-06-18 DIAGNOSIS — G44.209 TENSION VASCULAR HEADACHE: ICD-10-CM

## 2021-06-18 DIAGNOSIS — Z86.2 HISTORY OF ITP: ICD-10-CM

## 2021-06-18 DIAGNOSIS — G43.109 MIGRAINE WITH AURA AND WITHOUT STATUS MIGRAINOSUS, NOT INTRACTABLE: Primary | ICD-10-CM

## 2021-06-18 NOTE — TELEPHONE ENCOUNTER
Called and spoke with patient. Stated that Dr. Prince Gao will write a new order due to her being allergic to contrast dye and once order is written will send to her in the mail.

## 2021-07-07 DIAGNOSIS — F51.01 PRIMARY INSOMNIA: ICD-10-CM

## 2021-07-08 RX ORDER — TEMAZEPAM 15 MG/1
CAPSULE ORAL
Qty: 30 CAPSULE | Refills: 0 | Status: SHIPPED | OUTPATIENT
Start: 2021-07-08 | End: 2021-08-19

## 2021-07-27 ENCOUNTER — OFFICE VISIT (OUTPATIENT)
Dept: FAMILY MEDICINE CLINIC | Age: 86
End: 2021-07-27
Payer: MEDICARE

## 2021-07-27 VITALS
WEIGHT: 168 LBS | BODY MASS INDEX: 31.72 KG/M2 | HEIGHT: 61 IN | DIASTOLIC BLOOD PRESSURE: 74 MMHG | SYSTOLIC BLOOD PRESSURE: 135 MMHG | TEMPERATURE: 98.5 F | HEART RATE: 70 BPM | RESPIRATION RATE: 18 BRPM | OXYGEN SATURATION: 96 %

## 2021-07-27 DIAGNOSIS — M15.9 GENERALIZED OSTEOARTHRITIS: ICD-10-CM

## 2021-07-27 DIAGNOSIS — D69.3 CHRONIC ITP (IDIOPATHIC THROMBOCYTOPENIA) (HCC): ICD-10-CM

## 2021-07-27 DIAGNOSIS — F51.01 PRIMARY INSOMNIA: ICD-10-CM

## 2021-07-27 DIAGNOSIS — G44.049 CHRONIC PAROXYSMAL HEMICRANIA, NOT INTRACTABLE: ICD-10-CM

## 2021-07-27 DIAGNOSIS — E78.2 MIXED HYPERLIPIDEMIA: ICD-10-CM

## 2021-07-27 DIAGNOSIS — I10 ESSENTIAL HYPERTENSION: Primary | ICD-10-CM

## 2021-07-27 DIAGNOSIS — Z51.81 ENCOUNTER FOR MEDICATION MONITORING: ICD-10-CM

## 2021-07-27 DIAGNOSIS — M79.7 FIBROMYALGIA: ICD-10-CM

## 2021-07-27 DIAGNOSIS — E03.4 HYPOTHYROIDISM DUE TO ACQUIRED ATROPHY OF THYROID: ICD-10-CM

## 2021-07-27 LAB
ALBUMIN SERPL-MCNC: 4.4 G/DL (ref 3.5–5)
ALBUMIN/GLOB SERPL: 1.5 {RATIO} (ref 1.1–2.2)
ALP SERPL-CCNC: 111 U/L (ref 45–117)
ALT SERPL-CCNC: 25 U/L (ref 12–78)
ANION GAP SERPL CALC-SCNC: 5 MMOL/L (ref 5–15)
AST SERPL-CCNC: 22 U/L (ref 15–37)
BILIRUB SERPL-MCNC: 0.3 MG/DL (ref 0.2–1)
BUN SERPL-MCNC: 19 MG/DL (ref 6–20)
BUN/CREAT SERPL: 28 (ref 12–20)
CALCIUM SERPL-MCNC: 9.9 MG/DL (ref 8.5–10.1)
CHLORIDE SERPL-SCNC: 105 MMOL/L (ref 97–108)
CHOLEST SERPL-MCNC: 188 MG/DL
CO2 SERPL-SCNC: 27 MMOL/L (ref 21–32)
CREAT SERPL-MCNC: 0.69 MG/DL (ref 0.55–1.02)
ERYTHROCYTE [DISTWIDTH] IN BLOOD BY AUTOMATED COUNT: 13.2 % (ref 11.5–14.5)
GLOBULIN SER CALC-MCNC: 2.9 G/DL (ref 2–4)
GLUCOSE SERPL-MCNC: 103 MG/DL (ref 65–100)
HCT VFR BLD AUTO: 38.7 % (ref 35–47)
HDLC SERPL-MCNC: 67 MG/DL
HDLC SERPL: 2.8 {RATIO} (ref 0–5)
HGB BLD-MCNC: 12.2 G/DL (ref 11.5–16)
LDLC SERPL CALC-MCNC: 85.6 MG/DL (ref 0–100)
MCH RBC QN AUTO: 29.3 PG (ref 26–34)
MCHC RBC AUTO-ENTMCNC: 31.5 G/DL (ref 30–36.5)
MCV RBC AUTO: 93 FL (ref 80–99)
NRBC # BLD: 0 K/UL (ref 0–0.01)
NRBC BLD-RTO: 0 PER 100 WBC
PLATELET # BLD AUTO: 133 K/UL (ref 150–400)
PMV BLD AUTO: 11.3 FL (ref 8.9–12.9)
POTASSIUM SERPL-SCNC: 4.3 MMOL/L (ref 3.5–5.1)
PROT SERPL-MCNC: 7.3 G/DL (ref 6.4–8.2)
RBC # BLD AUTO: 4.16 M/UL (ref 3.8–5.2)
SODIUM SERPL-SCNC: 137 MMOL/L (ref 136–145)
TRIGL SERPL-MCNC: 177 MG/DL (ref ?–150)
VLDLC SERPL CALC-MCNC: 35.4 MG/DL
WBC # BLD AUTO: 3 K/UL (ref 3.6–11)

## 2021-07-27 PROCEDURE — 1090F PRES/ABSN URINE INCON ASSESS: CPT | Performed by: FAMILY MEDICINE

## 2021-07-27 PROCEDURE — 1101F PT FALLS ASSESS-DOCD LE1/YR: CPT | Performed by: FAMILY MEDICINE

## 2021-07-27 PROCEDURE — G8510 SCR DEP NEG, NO PLAN REQD: HCPCS | Performed by: FAMILY MEDICINE

## 2021-07-27 PROCEDURE — G8536 NO DOC ELDER MAL SCRN: HCPCS | Performed by: FAMILY MEDICINE

## 2021-07-27 PROCEDURE — G8752 SYS BP LESS 140: HCPCS | Performed by: FAMILY MEDICINE

## 2021-07-27 PROCEDURE — G8427 DOCREV CUR MEDS BY ELIG CLIN: HCPCS | Performed by: FAMILY MEDICINE

## 2021-07-27 PROCEDURE — 99214 OFFICE O/P EST MOD 30 MIN: CPT | Performed by: FAMILY MEDICINE

## 2021-07-27 PROCEDURE — G8399 PT W/DXA RESULTS DOCUMENT: HCPCS | Performed by: FAMILY MEDICINE

## 2021-07-27 PROCEDURE — G8754 DIAS BP LESS 90: HCPCS | Performed by: FAMILY MEDICINE

## 2021-07-27 PROCEDURE — G0463 HOSPITAL OUTPT CLINIC VISIT: HCPCS | Performed by: FAMILY MEDICINE

## 2021-07-27 PROCEDURE — G8417 CALC BMI ABV UP PARAM F/U: HCPCS | Performed by: FAMILY MEDICINE

## 2021-07-27 RX ORDER — FLUTICASONE PROPIONATE 50 MCG
2 SPRAY, SUSPENSION (ML) NASAL
COMMUNITY
End: 2021-09-21 | Stop reason: ALTCHOICE

## 2021-07-27 RX ORDER — LORATADINE 10 MG/1
10 TABLET ORAL
COMMUNITY
End: 2021-09-21

## 2021-07-27 NOTE — LETTER
CONTROLLED SUBSTANCE MEDICATION AGREEMENT  Patient Name: Esha Love  Patient YOB: 1935     I understand, that controlled substance medications may be used to help better manage my symptoms and to improve my ability to function at home, work and in social settings. However, I also understand that these medications do have risks, which have been discussed with me, including possible development of physical or psychological dependence. I understand that successful treatment requires mutual trust and honesty between me and my provider. I understand and agree that following this Medication Agreement is necessary in continuing my provider-patient relationship and the success of my treatment plan. Explanation from my Provider: Benefits and Goals of Controlled Substance Medications: There are two potential goals for your treatment: (1) decreased pain and suffering (2) improved daily life functions. There are many possible treatments for your chronic condition(s). Alternatives such as physical therapy, yoga, massage, home daily exercise, meditation, relaxation techniques, injections, chiropractic manipulations, surgery, cognitive therapy, hypnosis and many medications that are not habit-forming may be used. Use of controlled substance medications may be helpful, but they are unlikely to resolve all symptoms or restore all function. Explanation from my Provider: Risks of Controlled Substance Medications:   Opioid pain medications: These medications can lead to problems such as addiction/dependence, sedation, lightheadedness/dizziness, memory issues, falls, constipation, nausea, or vomiting. They may also impair the ability to drive or operate machinery. Additionally, these medications may lower testosterone levels, leading to loss of bone strength, stamina and sex drive.   They may cause problems with breathing, sleep apnea and reduced coughing, which is especially dangerous for patients with lung disease. Overdose or dangerous interactions with alcohol and other medications may occur, leading to death. Hyperalgesia may develop, which means patients receiving opioids for the treatment of pain may become more sensitive to certain painful stimuli, and in some cases, experience pain from ordinarily non-painful stimuli. Women between the ages of 14-53 who could become pregnant should carefully weigh the risks and benefits of opioids with their physicians, as these medications increase the risk of pregnancy complications, including miscarriage,  delivery and stillbirth. It is also possible for babies to be born addicted to opioids. Opioid dependence withdrawal symptoms may include; feelings of uneasiness, increased pain, irritability, belly pain, diarrhea, sweats and goose-flesh. Testosterone replacement therapy:  Potential side effects include increased risk of stroke and heart attack, blood clots, increased blood pressure, increased cholesterol, enlarged prostate, sleep apnea, irritability/aggression and other mood disorders, and decreased fertility. Katie Acosta (1935)             Page 1 of 4    Initials:_______    Benzodiazepines and non-benzodiazepine sleep medications: These medications can lead to problems such as addiction/dependence, sedation, fatigue, lightheadedness, dizziness, incoordination, falls, depression, hallucinations, and impaired judgment, memory and concentration. The ability to drive and operate machinery may also be affected. Abnormal sleep-related behaviors have been reported, including sleepwalking, driving, making telephone calls, eating, or having sex while not fully awake. These medications can suppress breathing and worsen sleep apnea, particularly when combined with alcohol or other sedating medications, potentially leading to death. Dependence withdrawal symptoms may include tremors, anxiety, hallucinations and seizures.    Stimulants:  Common adverse effects include addiction/dependence, increased blood pressure and heart rate, decreased appetite, nausea, involuntary weight loss, insomnia,  irritability, and headaches. These risks may increase when these medications are combined with other stimulants, such as caffeine pills or energy drinks, certain weight loss supplements and oral decongestants. Dependence withdrawal symptoms may include depressed mood, loss of interest, suicidal thoughts, anxiety, fatigue, appetite changes and agitation. I agree and understand that I and my prescriber have the following rights and responsibilities regarding my treatment plan:   1. MY RIGHTS:  To be informed of my treatment and medication plan. To be an active participant in my health and wellbeing. 2. MY RESPONSIBILITY AND UNDERSTANDING FOR USE OF MEDICATIONS   I will take medications at the dose and frequency as directed. For my safety, I will not increase or change how I take my medications without the recommendation of my healthcare provider.  I will actively participate in any program recommended by my provider which may improve function, including social, physical, psychological programs.  I will not take my medications with alcohol or other drugs not prescribed to me. I understand that drinking alcohol with my medications increases the chances of side effects, including reduced breathing rate and could lead to personal injury when operating machinery.  I understand that if I have a history of substance use disorders, including alcohol or other illicit drugs, that I may be at increased risk of addiction to my medications.  I agree to notify my provider immediately if I should become pregnant so that my treatment plan can be adjusted.    I agree and understand that I shall only receive controlled substance medications from the prescriber that signed this agreement unless there is written agreement among other prescribers of controlled substances outlining the responsibility of the medications being prescribed.  I understand that the if the controlled medication is not helping to achieve goals, the dosage may be tapered and no longer prescribed. 3. MY RESPONSIBILITY FOR COMMUNICATION / PRESCRIPTION RENEWALS   I agree that all controlled substance medications that I take will be prescribed only by my provider. If another healthcare provider prescribes me medication in an emergency, I will notify my provider within seventy-two (72) hours. Minerva Munoz (1935)             Page 2 of 4    Initials:_______  Ines Diehl I will arrange for refills at the prescribed interval ONLY during regular office hours. I will not ask for refills earlier than agreed, after-hours, on holidays or weekends. Refills may take up to 72 hours for processing and prescriptions to reach the pharmacy.  I will inform my other health care providers that I am taking these medications and of the existence of this [id number]. In the event of an emergency, I will provide the same information to the emergency department prescribers.  I will keep my provider updated on the pharmacy I am using for controlled medication prescription filling. 4. MY RESPONSIBILITY FOR PROTECTING MEDICATIONS   I will protect my prescriptions and medications. I understand that lost or misplaced prescriptions will not be replaced.  I will keep medications only for my own use and will not share them with others. I will keep all medications away from children.  I agree that if my medications are adjusted or discontinued, I will properly dispose of any remaining medications. I understand that I will be required to dispose of any remaining controlled medications as, directed by my prescriber, prior to being provided with any prescriptions for other controlled medications.   Medication drop box locations can be found at: [email or web address]  5. MY RESPONSIBILITY WITH ILLEGAL DRUGS    I will not use illegal or street drugs or another person's prescription medications not prescribed to me.  If there are identified addiction type symptoms, then referral to a program may be provided by my provider and I agree to follow through with this recommendation. 6. MY RESPONSIBILITY FOR COOPERATION WITH INVESTIGATIONS   I understand that my provider will comply with any applicable law and may discuss my use and/or possible misuse/abuse of controlled substances and alcohol, as appropriate, with any health care provider involved in my care, pharmacist, or legal authority.  I authorize my provider and pharmacy to cooperate fully with law enforcement agencies (as permitted by law) in the investigation of any possible misuse, sale, or other diversion of my controlled substances.  I agree to waive any applicable privilege or right of privacy or confidentiality with respect to these authorizations. 7. PROVIDERS RIGHT TO MONITOR FOR SAFETY: PRESCRIPTION MONITORING / DRUG TESTING   I consent to drug/toxicology screening and will submit to a drug screen upon my providers request to assure I am only taking the prescribed drugs for my safety monitoring. I understand that a drug screen is a laboratory test in which a sample of my urine, blood or saliva is checked to see what drugs I have been taking. This may entail an observed urine specimen, which means that a nurse or other health care provider may watch me provide urine, and I will cooperate if I am asked to provide an observed specimen. Nicole Navarro (1935)             Page 3 of 4    Initials:_______  Rosalia Mendez I understand that my provider will check a copy of my State Prescription Monitoring Program () Report in order to safely prescribe medications.      Pill Counts: I consent to pill counts when requested. I may be asked to bring all my prescribed controlled substance medications, in their original bottles, to all of my scheduled appointments. In addition, my provider may ask me to come to the practice at any time for a random pill count. 8. TERMINATION OF THIS AGREEMENT   For my safety, my prescriber has the right to stop prescribing controlled substance medications and may end this agreement.  Conditions that may result in termination of this agreement:  a. I do not show any improvement in pain, or my activity has not improved. b. I develop rapid tolerance or loss of improvement, as described in my treatment plan.  c. I develop significant side effects from the medication. d. My behavior is not consistent with the responsibilities outlined above, thereby causing safety concerns to continue prescribing controlled substance medications. e. I fail to follow the terms of this agreement. f. Other:____________________________     UNDERSTANDING THIS MEDICATION AGREEMENT:    I have read the above and have had all my questions answered. For chronic disease management, I know that my symptoms can be managed with many types of treatments. A chronic medication trial may be part of my treatment, but I must be an active participant in my care. Medication therapy is only one part of my symptom management plan. In some cases, there may be limited scientific evidence to support the chronic use of certain medications to improve symptoms and daily function. Furthermore, in certain circumstances, there may be scientific information that suggests that the use of chronic controlled substances may worsen my symptoms and increase my risk of unintentional death directly related to this medication therapy. I know that if my provider feels my risk from controlled medications is greater than my benefit, I will have my controlled substance medication(s) compassionately lowered or removed altogether. I further agree to allow this office to contact my HIPAA contact if there are concerns about my safety and use of the controlled medications. I have agreed to use the prescribed controlled substance medications to me as instructed by my provider and as stated in this Medication Agreement. My initial on each page and my signature below shows that I have read each page and I have had the opportunity to ask questions with answers provided by my provider.       Patient Name (Printed): _____________________________________    Patient Signature:  ______________________   Date: _____________      Prescriber Name (Printed): ___________________________________    Prescriber Signature: _____________________  Date: _____________     Feng Quarles (1935)             Page 4 of 4

## 2021-07-27 NOTE — PROGRESS NOTES
Chief Complaint   Patient presents with    Hypertension     follow up    Cholesterol Problem     follow up    Thyroid Problem     follow up         Mammogram 1/21/2021    Colonoscopy 8/1/2016 by Dr. Nathaly Galaviz - no more colonoscopies unless she has a problem. 1. Have you been to the ER, urgent care clinic since your last visit? Hospitalized since your last visit? No    2. Have you seen or consulted any other health care providers outside of the [address] [place] [address] since your last visit? Include any pap smears or colon screening.  No

## 2021-08-19 DIAGNOSIS — F51.01 PRIMARY INSOMNIA: ICD-10-CM

## 2021-08-19 RX ORDER — TEMAZEPAM 15 MG/1
CAPSULE ORAL
Qty: 30 CAPSULE | Refills: 0 | Status: SHIPPED | OUTPATIENT
Start: 2021-08-19 | End: 2021-10-22 | Stop reason: SDUPTHER

## 2021-08-27 ENCOUNTER — TRANSCRIBE ORDER (OUTPATIENT)
Dept: SCHEDULING | Age: 86
End: 2021-08-27

## 2021-08-27 DIAGNOSIS — R16.1 SPLENOMEGALY: ICD-10-CM

## 2021-08-27 DIAGNOSIS — R59.0 LOCALIZED ENLARGED LYMPH NODES: ICD-10-CM

## 2021-08-27 DIAGNOSIS — R91.8 PULMONARY NODULES: Primary | ICD-10-CM

## 2021-09-16 ENCOUNTER — HOSPITAL ENCOUNTER (OUTPATIENT)
Dept: PET IMAGING | Age: 86
Discharge: HOME | End: 2021-09-16
Attending: INTERNAL MEDICINE
Payer: MEDICARE

## 2021-09-16 DIAGNOSIS — R91.8 PULMONARY NODULES: ICD-10-CM

## 2021-09-16 DIAGNOSIS — R16.1 SPLENOMEGALY: ICD-10-CM

## 2021-09-16 DIAGNOSIS — F51.01 PRIMARY INSOMNIA: ICD-10-CM

## 2021-09-16 DIAGNOSIS — R59.0 LOCALIZED ENLARGED LYMPH NODES: ICD-10-CM

## 2021-09-16 LAB
GLUCOSE BLD STRIP.AUTO-MCNC: 105 MG/DL (ref 65–117)
SERVICE CMNT-IMP: NORMAL

## 2021-09-16 PROCEDURE — A9552 F18 FDG: HCPCS

## 2021-09-16 RX ORDER — FLUDEOXYGLUCOSE F-18 200 MCI/ML
10 INJECTION INTRAVENOUS ONCE
Status: COMPLETED | OUTPATIENT
Start: 2021-09-16 | End: 2021-09-16

## 2021-09-16 RX ORDER — SODIUM CHLORIDE 0.9 % (FLUSH) 0.9 %
10 SYRINGE (ML) INJECTION
Status: COMPLETED | OUTPATIENT
Start: 2021-09-16 | End: 2021-09-16

## 2021-09-16 RX ADMIN — FLUDEOXYGLUCOSE F-18 10 MILLICURIE: 200 INJECTION INTRAVENOUS at 15:47

## 2021-09-16 RX ADMIN — Medication 10 ML: at 15:47

## 2021-09-17 RX ORDER — TEMAZEPAM 15 MG/1
CAPSULE ORAL
Qty: 30 CAPSULE | OUTPATIENT
Start: 2021-09-17

## 2021-09-17 RX ORDER — BENZONATATE 200 MG/1
CAPSULE ORAL
Qty: 90 CAPSULE | Refills: 1 | Status: SHIPPED | OUTPATIENT
Start: 2021-09-17

## 2021-09-17 RX ORDER — TEMAZEPAM 15 MG/1
15-30 CAPSULE ORAL
Qty: 60 CAPSULE | Refills: 1 | Status: SHIPPED | OUTPATIENT
Start: 2021-09-17 | End: 2021-09-21 | Stop reason: SDUPTHER

## 2021-09-19 NOTE — PROGRESS NOTES
[address] [place] [address], [address] [facility]  [phone]     Subjective:      Corrie Paige is a 82 y.o. female is here for routine f/u. She has a PMHx of mitral regurgitation, PSVT, HTN, HLD and chronic ITP. Last OV 8/2020. Today, doing well. BP has been well controlled. No further palpitation. She received both of her covid vaccine, did fine. The patient denies chest pain/ shortness of breath, orthopnea, PND, LE edema, palpitations, syncope, or presyncope. ECHO 9/2020    · LV: Estimated LVEF is 55 - 60%. Normal cavity size, wall thickness and systolic function (ejection fraction normal). Wall motion: normal. Age-appropriate left ventricular diastolic function. · MV: Mild mitral annular calcification. Mild mitral valve regurgitation is present.          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 ([address] 10/27/2020    History of ITP 10/27/2020    Cervicogenic headache 10/27/2020    Nonrheumatic mitral valve regurgitation 08/12/2020    PSVT (paroxysmal supraventricular tachycardia) ([facility] 08/12/2020    Hypercholesterolemia 04/11/2019    Chronic ITP (idiopathic thrombocytopenia) (HCC)     Pelvic lymphadenopathy 07/14/2017    Preop cardiovascular exam 06/07/2017    Encounter for medication monitoring 10/11/2016    Incidental lung nodule, greater than or equal to 8mm 05/11/2016    Reactive airway disease without complication 42/62/7461    Edema leg 06/19/2015    Heart palpitations 07/25/2013    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 Tree Simpson Autoimmune disease [facility])     fibromyalgia    Chronic ITP (idiopathic thrombocytopenia) (HCC)     Fibromyalgia     GERD (gastroesophageal reflux disease)     Headache(784.0)     migraine    Heart failure ([address]     HTN (hypertension) 4/2/2010    Hypothyroidism 4/2/2010    OA (osteoarthritis) 4/2/2010    Seizures ([address]     56 years ago with childbirth    Tachycardia 2013    Thromboembolus ([address]     blood clot on brain 56 years ago w childbirth    Thyroid disease       Past Surgical History:   Procedure Laterality Date    Rodolfo Gala  2/5/2015         ENDOSCOPY, COLON, DIAGNOSTIC  12/2010    Dr. Roselia Chan- repeat 5 yrs    HX BREAST BIOPSY Right 1999    more than one yrs ago all benign    HX CARPAL TUNNEL RELEASE  9/28/2012    HX CATARACT REMOVAL  9/2014    bilateral     HX HYSTERECTOMY  1977    HX REFRACTIVE SURGERY  07/2018     Allergies   Allergen Reactions    Epinephrine Other (comments)     BP drops and pass out    Iodinated Contrast Media Shortness of Breath    Novocain [Procaine] Palpitations     BP drops,passes out    Codeine Itching     Pt does not recall    Cymbalta [Duloxetine] Other (comments)     Head Pain and nausea    Daypro [Oxaprozin] Unknown (comments)     Pt does not recall    Prednisone Shortness of Breath     agitation    Sulfa (Sulfonamide Antibiotics) Unknown (comments)     Pt does not recall    Tetracycline Other (comments)     Low platelet count    Zithromax [Azithromycin] Nausea Only     Diarrhea, headaches      Family History   Problem Relation Age of Onset    Heart Disease Paternal Aunt     Heart Disease Paternal Uncle     Colon Cancer Maternal Grandmother     Other Mother         murder    No Known Problems Father     Other Son         scooby's disease and UC    Other Daughter         fibromyalgia    Thyroid Disease Daughter     Heart Disease Maternal Grandfather     Other Sister         Raynauds      Social History     Socioeconomic History    Marital status:      Spouse name: Not on file    Number of children: Not on file    Years of education: Not on file    Highest education level: Not on file   Occupational History    Not on file   Tobacco Use    Smoking status: Never Smoker    Smokeless tobacco: Never Used   Vaping Use    Vaping Use: Never assessed   Substance and Sexual Activity    Alcohol use: No     Alcohol/week: 0.0 standard drinks    Drug use: No    Sexual activity: Not Currently   Other Topics Concern    Not on file   Social History Narrative    Not on file     Social Determinants of Health     Financial Resource Strain:     Difficulty of Paying Living Expenses:    Food Insecurity:     Worried About Running Out of Food in the Last Year:     Ran Out of Food in the Last Year:    Transportation Needs:     Lack of Transportation (Medical):  Lack of Transportation (Non-Medical):    Physical Activity:     Days of Exercise per Week:     Minutes of Exercise per Session:    Stress:     Feeling of Stress :    Social Connections:     Frequency of Communication with Friends and Family:     Frequency of Social Gatherings with Friends and Family:     Attends Jehov[employer] witness Services:     Active Member of Clubs or Organizations:     Attends Club or Organization Meetings:     Marital Status:    Intimate Partner Violence:     Fear of Current or Ex-Partner:     Emotionally Abused:     Physically Abused:     Sexually Abused:       Current Outpatient Medications   Medication Sig    benzonatate (TESSALON) 200 mg capsule TAKE 1 CAPSULE BY MOUTH THREE TIMES DAILY AS NEEDED    temazepam (RESTORIL) 15 mg capsule Take 1-2 Capsules by mouth nightly as needed for Sleep.  Max Daily Amount: 30 mg.  temazepam (RESTORIL) 15 mg capsule TAKE 1 TO 2 CAPSULES BY MOUTH EVERY NIGHT AT BEDTIME    loratadine (Claritin) 10 mg tablet Take 10 mg by mouth daily as needed for Allergies.  fluticasone propionate (Flonase Allergy Relief) 50 mcg/actuation nasal spray 2 Sprays by Both Nostrils route daily as needed.  butalbital-acetaminophen-caffeine (FIORICET, ESGIC) -40 mg per tablet Take 1 Tab by mouth every six (6) hours as needed for Headache. 1-2 po    gabapentin (NEURONTIN) 300 mg capsule TAKE 1 CAPSULE BY MOUTH THREE TIMES DAILY. MAX DAILY AMOUNT: 900 MG (Patient not taking: Reported on 7/27/2021)    pravastatin (PRAVACHOL) 10 mg tablet TAKE 1 TABLET BY MOUTH EVERY NIGHT    Bystolic 5 mg tablet TAKE 1 TABLET BY MOUTH TWICE DAILY. HOLD DOSE IF SYSTOLIC PRESSURE IS LESS THAN 100 OR PULSE IS LESS THAN 50    levothyroxine (synthroid) 50 mcg tablet Take 50 mcg by mouth every Monday, Wednesday, Friday.  romiplostim (NPLATE SC) by SubCUTAneous route every fourteen (14) days.  acetaminophen (ARTHRITIS PAIN RELIEF) 650 mg TbER Take 650 mg by mouth every eight (8) hours as needed.  cetirizine (ZYRTEC) 10 mg tablet Take 10 mg by mouth daily as needed for Allergies. (Patient not taking: Reported on 7/27/2021)     No current facility-administered medications for this visit. Review of Symptoms:  11 systems reviewed, negative other than as stated in the HPI    Physical ExamPhysical Exam:    There were no vitals filed for this visit. There is no height or weight on file to calculate BMI. General PE  Gen:  NAD  Mental Status - Alert. General Appearance - Not in acute distress. HEENT:  PERRL, no carotid bruits or JVD  Chest and Lung Exam   Inspection: Accessory muscles - No use of accessory muscles in breathing.    Auscultation:   Breath sounds: - Normal.   Cardiovascular   Inspection: Jugular vein - Bilateral - Inspection Normal.   Palpation/Percussion:   Apical Impulse: - Normal. Auscultation: Rhythm - Regular. Heart Sounds - S1 WNL and S2 WNL. No S3 or S4. Murmurs & Other Heart Sounds: Auscultation of the heart reveals - No Murmurs. Peripheral Vascular   Upper Extremity: Inspection - Bilateral - No Cyanotic nailbeds or Digital clubbing. Lower Extremity:   Palpation: Edema - Bilateral - No edema. Abdomen:   Soft, non-tender, bowel sounds are active.   Neuro: A&O times 3, CN and motor grossly WNL    Labs:   Lab Results   Component Value Date/Time    Cholesterol, total 188 07/27/2021 02:25 PM    Cholesterol, total 174 01/27/2021 04:00 PM    Cholesterol, total 192 02/25/2020 03:00 PM    Cholesterol, total 167 08/20/2019 03:27 PM    Cholesterol, total 178 08/01/2019 01:19 PM    HDL Cholesterol 67 07/27/2021 02:25 PM    HDL Cholesterol 56 01/27/2021 04:00 PM    HDL Cholesterol 56 02/25/2020 03:00 PM    HDL Cholesterol 60 08/20/2019 03:27 PM    HDL Cholesterol 55 08/01/2019 01:19 PM    LDL, calculated 85.6 07/27/2021 02:25 PM    LDL, calculated 83 01/27/2021 04:00 PM    LDL, calculated 100 (H) 02/25/2020 03:00 PM    LDL, calculated 68 08/20/2019 03:27 PM    LDL, calculated 79 08/01/2019 01:19 PM    LDL, calculated 105 (H) 02/18/2019 04:01 PM    Triglyceride 177 (H) 07/27/2021 02:25 PM    Triglyceride 208 (H) 01/27/2021 04:00 PM    Triglyceride 180 (H) 02/25/2020 03:00 PM    Triglyceride 197 (H) 08/20/2019 03:27 PM    Triglyceride 218 (H) 08/01/2019 01:19 PM    CHOL/HDL Ratio 2.8 07/27/2021 02:25 PM    CHOL/HDL Ratio 3.7 01/27/2010 05:43 PM    CHOL/HDL Ratio 3.1 03/24/2009 04:26 PM     Lab Results   Component Value Date/Time    CK 94 06/21/2013 03:13 AM     Lab Results   Component Value Date/Time    Sodium 137 07/27/2021 02:25 PM    Potassium 4.3 07/27/2021 02:25 PM    Chloride 105 07/27/2021 02:25 PM    CO2 27 07/27/2021 02:25 PM    Anion gap 5 07/27/2021 02:25 PM    Glucose 103 (H) 07/27/2021 02:25 PM    BUN 19 07/27/2021 02:25 PM    Creatinine 0.69 07/27/2021 02:25 PM    BUN/Creatinine ratio 28 (H) 07/27/2021 02:25 PM    GFR est AA >60 07/27/2021 02:25 PM    GFR est non-AA >60 07/27/2021 02:25 PM    Calcium 9.9 07/27/2021 02:25 PM    Bilirubin, total 0.3 07/27/2021 02:25 PM    Alk. phosphatase 111 07/27/2021 02:25 PM    Protein, total 7.3 07/27/2021 02:25 PM    Albumin 4.4 07/27/2021 02:25 PM    Globulin 2.9 07/27/2021 02:25 PM    A-G Ratio 1.5 07/27/2021 02:25 PM    ALT (SGPT) 25 07/27/2021 02:25 PM       EKG:  SR     Assessment:     Assessment:        ICD-10-CM ICD-9-CM    1. PSVT (paroxysmal supraventricular tachycardia) ([facility])  I47.1 427.0    2. Essential hypertension  I10 401.9    3. Mixed hyperlipidemia  E78.2 272.2    4. Nonrheumatic mitral valve regurgitation  I34.0 424.0        No orders of the defined types were placed in this encounter. Plan:     Heart palpitations/history of PSVT:  1 mos event monitor in 8/2020 showed rare PACs and PVCs  Event monitor in 7/2013 with PSVT, asymptomatic  Lexiscan stress test 6/2017 without evidence of ischemia  Controlled with BB       Essential hypertension  BP controlled.   Continue anti-hypertensive therapy and low sodium diet  Stable kidney fxn Serum Cr 0.69 in 7/2021     Hypercholesterolemia  7/2021 LDL 85   Continue statin therapy and low fat, low cholesterol diet  Lipids managed by PCP -- advised to have statin refilled by PCP as they are checking labs     Mitral regurgitation  Normal EF mild MR per echo in 9/2020  Echo done 6/2013 with preserved LVEF 60-65% with mild-mod MR with myxomatous valve        Continue current care and f/u in 1 yr      Malia Correia NP

## 2021-09-21 ENCOUNTER — OFFICE VISIT (OUTPATIENT)
Dept: CARDIOLOGY CLINIC | Age: 86
End: 2021-09-21
Payer: MEDICARE

## 2021-09-21 VITALS
WEIGHT: 168.2 LBS | OXYGEN SATURATION: 99 % | HEART RATE: 77 BPM | BODY MASS INDEX: 31.75 KG/M2 | RESPIRATION RATE: 18 BRPM | HEIGHT: 61 IN | DIASTOLIC BLOOD PRESSURE: 70 MMHG | SYSTOLIC BLOOD PRESSURE: 130 MMHG

## 2021-09-21 DIAGNOSIS — I34.0 NONRHEUMATIC MITRAL VALVE REGURGITATION: ICD-10-CM

## 2021-09-21 DIAGNOSIS — E78.2 MIXED HYPERLIPIDEMIA: ICD-10-CM

## 2021-09-21 DIAGNOSIS — I10 ESSENTIAL HYPERTENSION: ICD-10-CM

## 2021-09-21 DIAGNOSIS — I47.1 PSVT (PAROXYSMAL SUPRAVENTRICULAR TACHYCARDIA) (HCC): Primary | ICD-10-CM

## 2021-09-21 PROCEDURE — 93010 ELECTROCARDIOGRAM REPORT: CPT | Performed by: NURSE PRACTITIONER

## 2021-09-21 PROCEDURE — G8427 DOCREV CUR MEDS BY ELIG CLIN: HCPCS | Performed by: NURSE PRACTITIONER

## 2021-09-21 PROCEDURE — G8399 PT W/DXA RESULTS DOCUMENT: HCPCS | Performed by: NURSE PRACTITIONER

## 2021-09-21 PROCEDURE — G8752 SYS BP LESS 140: HCPCS | Performed by: NURSE PRACTITIONER

## 2021-09-21 PROCEDURE — 93005 ELECTROCARDIOGRAM TRACING: CPT | Performed by: NURSE PRACTITIONER

## 2021-09-21 PROCEDURE — 1101F PT FALLS ASSESS-DOCD LE1/YR: CPT | Performed by: NURSE PRACTITIONER

## 2021-09-21 PROCEDURE — G8417 CALC BMI ABV UP PARAM F/U: HCPCS | Performed by: NURSE PRACTITIONER

## 2021-09-21 PROCEDURE — G0463 HOSPITAL OUTPT CLINIC VISIT: HCPCS | Performed by: NURSE PRACTITIONER

## 2021-09-21 PROCEDURE — 99214 OFFICE O/P EST MOD 30 MIN: CPT | Performed by: NURSE PRACTITIONER

## 2021-09-21 PROCEDURE — 1090F PRES/ABSN URINE INCON ASSESS: CPT | Performed by: NURSE PRACTITIONER

## 2021-09-21 PROCEDURE — G8432 DEP SCR NOT DOC, RNG: HCPCS | Performed by: NURSE PRACTITIONER

## 2021-09-21 PROCEDURE — G8754 DIAS BP LESS 90: HCPCS | Performed by: NURSE PRACTITIONER

## 2021-09-21 PROCEDURE — G8536 NO DOC ELDER MAL SCRN: HCPCS | Performed by: NURSE PRACTITIONER

## 2021-09-21 NOTE — PROGRESS NOTES
1. Have you been to the ER, urgent care clinic since your last visit? Hospitalized since your last visit? No    2. Have you seen or consulted any other health care providers outside of the [address] [place] [address] since your last visit? Include any pap smears or colon screening. No    Chief Complaint   Patient presents with    Valvular Heart Disease     Yearly appt. Denied cardiac symptoms.

## 2021-09-27 ENCOUNTER — OFFICE VISIT (OUTPATIENT)
Dept: SURGERY | Age: 86
End: 2021-09-27
Payer: MEDICARE

## 2021-09-27 VITALS
SYSTOLIC BLOOD PRESSURE: 135 MMHG | RESPIRATION RATE: 18 BRPM | HEART RATE: 76 BPM | DIASTOLIC BLOOD PRESSURE: 68 MMHG | OXYGEN SATURATION: 98 % | TEMPERATURE: 98.5 F | BODY MASS INDEX: 31.72 KG/M2 | HEIGHT: 61 IN | WEIGHT: 168 LBS

## 2021-09-27 DIAGNOSIS — R59.1 LYMPHADENOPATHY: Primary | ICD-10-CM

## 2021-09-27 PROCEDURE — G8510 SCR DEP NEG, NO PLAN REQD: HCPCS | Performed by: SURGERY

## 2021-09-27 PROCEDURE — 1101F PT FALLS ASSESS-DOCD LE1/YR: CPT | Performed by: SURGERY

## 2021-09-27 PROCEDURE — G8536 NO DOC ELDER MAL SCRN: HCPCS | Performed by: SURGERY

## 2021-09-27 PROCEDURE — G8417 CALC BMI ABV UP PARAM F/U: HCPCS | Performed by: SURGERY

## 2021-09-27 PROCEDURE — 99203 OFFICE O/P NEW LOW 30 MIN: CPT | Performed by: SURGERY

## 2021-09-27 PROCEDURE — G8754 DIAS BP LESS 90: HCPCS | Performed by: SURGERY

## 2021-09-27 PROCEDURE — G8427 DOCREV CUR MEDS BY ELIG CLIN: HCPCS | Performed by: SURGERY

## 2021-09-27 PROCEDURE — 1090F PRES/ABSN URINE INCON ASSESS: CPT | Performed by: SURGERY

## 2021-09-27 PROCEDURE — G8399 PT W/DXA RESULTS DOCUMENT: HCPCS | Performed by: SURGERY

## 2021-09-27 PROCEDURE — G8752 SYS BP LESS 140: HCPCS | Performed by: SURGERY

## 2021-09-27 RX ORDER — ALBUTEROL SULFATE 90 UG/1
AEROSOL, METERED RESPIRATORY (INHALATION)
COMMUNITY
Start: 2021-09-22 | End: 2022-06-28 | Stop reason: ALTCHOICE

## 2021-09-27 NOTE — PROGRESS NOTES
Identified pt with two pt identifiers(name and ). Reviewed record in preparation for visit and have obtained necessary documentation. Chief Complaint   Patient presents with    New Patient     eval lymphadenopathy           Vitals:    21 1423   BP: 135/68   Pulse: 76   Resp: 18   Temp: 98.5 °F (36.9 °C)   TempSrc: Temporal   SpO2: 98%   Weight: 76.2 kg (168 lb)   Height: 5' 1\" (1.549 m)   PainSc:   0 - No pain       Health Maintenance Review: Patient reminded of \"due or due soon\" health maintenance. I have asked the patient to contact his/her primary care provider (PCP) for follow-up on his/her health maintenance. Coordination of Care Questionnaire:  :   1) Have you been to an emergency room, urgent care, or hospitalized since your last visit? If yes, where when, and reason for visit? no       2. Have seen or consulted any other health care provider since your last visit? If yes, where when, and reason for visit? NO      Patient is accompanied by self I have received verbal consent from Gabi Aguirre to discuss any/all medical information while they are present in the room.

## 2021-09-27 NOTE — Clinical Note
Will need to get her set up for robotic pelvic node biopsy - please remind me to call her on Friday.   Thx.

## 2021-09-27 NOTE — Clinical Note
10/6/2021    Patient: Trent Zavala   YOB: 1935   Date of Visit: 9/27/2021     Annette Delgado MD  [address] [place] [address]  Via In [address] [place] [address], [address] [place] [address]  [address]  [address] [phone]  Via Fax: [phone]    Dear MD Miguel Harvey MD,      Thank you for referring Ms. Dolores Arenas to  [facility] for [facility]. My notes for this consultation are attached. If you have questions, please do not hesitate to call me. I look forward to following your patient along with you.       Sincerely,    Angeles Schaefer MD

## 2021-10-04 ENCOUNTER — TELEPHONE (OUTPATIENT)
Dept: SURGERY | Age: 86
End: 2021-10-04

## 2021-10-06 NOTE — PROGRESS NOTES
To: Barak Acosta MD  CC:  Kalyani Grover MD , Kaylee Chávez MD      From: Rani Thacker MD    Thank you for sending Sharon Cook to see us. Please note that this dictation was completed with [employer], the computer voice recognition software. Quite often unanticipated grammatical, syntax, homophones, and other interpretive errors are inadvertently transcribed by the software. Please disregard these errors. Please excuse any errors that have escaped final proofreading. Encounter Date: 9/27/2021  History and Physical    Assessment:   Continued metabolically active lymphadenopathy in the right pelvis. Area was biopsied back in 2017 and it showed follicular hyperplasia with nonnecrotizing granulomatous inflammation. At the same time she had a bone marrow biopsy which showed monoclonal gammopathy of undetermined significance. Recent PET/CT with pelvic lymph node with maximal SUV of 9.9. There is concern for progression to lymphoma. Body mass index is 31.74 kg/m². Plan/Recommendations/Medical Decision Making:   Could consider repeat robotic excisional biopsy of pelvic nodes. Given the prior excision in this area there may be scar tissue and we should have urology place stents so the ureters can be identified with ICG. This will help prevent injury in a reoperative field. We will also likely need intraoperative ultrasound. We will touch base with her other caregivers to be sure they agree this is the preferred area for biopsy. HPI:   Patient is a 82 y.o. female who is seen in consultation at the request of Kalyani Grover MD for findings on PET/CT. She has followed by Dr. Annmarie Uribe for a history of MGUS. I actually had biopsy in her right pelvic nodes laparoscopically in 2017. This showed follicular hyperplasia. A bone marrow biopsy done at that time showed MGUS. The most recent PET/CT showed 2 hypermetabolic spots in the right pelvis.   It also showed some mediastinal activity so endobronchial ultrasound biopsy is also being considered. She is up-to-date on her mammograms. The last colonoscopy in our system is from 2015. A small tubular adenoma was biopsied. She denies fever, night sweats, lack of appetite. She does note cough and shortness of breath as well as anemia.     Past Medical History:   Diagnosis Date    Arrhythmia     tachycardia; Dr. Sabra Baumgarten Autoimmune disease [facility])     fibromyalgia    Chronic ITP (idiopathic thrombocytopenia) (HCC)     Congestive heart failure (HCC)     Fibromyalgia     GERD (gastroesophageal reflux disease)     Headache(784.0)     migraine    Heart failure (HCC)     HTN (hypertension) 4/2/2010    Hyperlipidemia     Hypothyroidism 4/2/2010    Murmur     OA (osteoarthritis) 4/2/2010    Seizures ([address]     56 years ago with childbirth    Tachycardia 2013    Thromboembolus ([address]     blood clot on brain 56 years ago w childbirth    Thyroid disease     Valvular heart disease       Past Surgical History:   Procedure Laterality Date    Denise Diez  2/5/2015         ENDOSCOPY, COLON, DIAGNOSTIC  12/2010    Dr. Karen Brar- repeat 5 yrs    HX BREAST BIOPSY Right 1999    more than one yrs ago all benign    HX CARPAL TUNNEL RELEASE  9/28/2012    HX CATARACT REMOVAL  9/2014    bilateral     HX HYSTERECTOMY  1977    HX REFRACTIVE SURGERY  07/2018     Social History     Tobacco Use    Smoking status: Never Smoker    Smokeless tobacco: Never Used   Substance Use Topics    Alcohol use: No     Alcohol/week: 0.0 standard drinks      Family History   Problem Relation Age of Onset    Heart Disease Paternal Aunt     Heart Disease Paternal Uncle     Colon Cancer Maternal Grandmother     Other Mother         murder    No Known Problems Father     Other Son         scooby's disease and UC    Other Daughter         fibromyalgia    Thyroid Disease Daughter     Heart Disease Maternal Grandfather     Other Sister         Raynauds       Current Outpatient Medications   Medication Sig    albuterol (PROVENTIL HFA, VENTOLIN HFA, PROAIR HFA) 90 mcg/actuation inhaler INHALE 2 PUFFS BY MOUTH EVERY 4 HOURS AS NEEDED    temazepam (RESTORIL) 15 mg capsule TAKE 1 TO 2 CAPSULES BY MOUTH EVERY NIGHT AT BEDTIME    butalbital-acetaminophen-caffeine (FIORICET, ESGIC) -40 mg per tablet Take 1 Tab by mouth every six (6) hours as needed for Headache. 1-2 po    pravastatin (PRAVACHOL) 10 mg tablet TAKE 1 TABLET BY MOUTH EVERY NIGHT    Bystolic 5 mg tablet TAKE 1 TABLET BY MOUTH TWICE DAILY. HOLD DOSE IF SYSTOLIC PRESSURE IS LESS THAN 100 OR PULSE IS LESS THAN 50    levothyroxine (synthroid) 50 mcg tablet Take 50 mcg by mouth every Monday, Wednesday, Friday.  romiplostim (NPLATE SC) by SubCUTAneous route every fourteen (14) days.  acetaminophen (ARTHRITIS PAIN RELIEF) 650 mg TbER Take 650 mg by mouth every eight (8) hours as needed.  benzonatate (TESSALON) 200 mg capsule TAKE 1 CAPSULE BY MOUTH THREE TIMES DAILY AS NEEDED (Patient not taking: Reported on 9/27/2021)     No current facility-administered medications for this visit. Allergies: Allergies   Allergen Reactions    Epinephrine Other (comments)     BP drops and pass out    Iodinated Contrast Media Shortness of Breath    Novocain [Procaine] Palpitations     BP drops,passes out    Codeine Itching     Pt does not recall    Cymbalta [Duloxetine] Other (comments)     Head Pain and nausea    Daypro [Oxaprozin] Unknown (comments)     Pt does not recall    Prednisone Shortness of Breath     agitation    Sulfa (Sulfonamide Antibiotics) Unknown (comments)     Pt does not recall    Tetracycline Other (comments)     Low platelet count    Zithromax [Azithromycin] Nausea Only     Diarrhea, headaches        Review of Systems:  10 systems reviewed. See scanned sheet in \"Media\" section. See HPI for pertinent positives and negatives.       Objective:     Visit Vitals  /68 (BP 1 Location: Right arm, BP Patient Position: Sitting, BP Cuff Size: Adult)   Pulse 76   Temp 98.5 °F (36.9 °C) (Temporal)   Resp 18   Ht 5' 1\" (1.549 m)   Wt 76.2 kg (168 lb)   SpO2 98%   BMI 31.74 kg/m²     General appearance  Alert, cooperative, no distress, appears stated age   HEENT  Anicteric   Neck Supple   Back   No CVA tenderness   Lungs   CTAB   Heart  Regular rate and rhythm. No murmur, rub or gallop   Abdomen   Soft. Bowel sounds normal. No palpable masses. Extremities No CCE. Pulses 2+ right radial   Skin Skin color, texture, turgor normal.    Lymph nodes No palpable neck, supraclavicular, axillary, or inguinal lymphadenopathy.    Neurologic Without overt sensory or motor deficit       Signed By: Rani Thacker MD     October 6, 2021

## 2021-10-13 ENCOUNTER — TELEPHONE (OUTPATIENT)
Dept: SURGERY | Age: 86
End: 2021-10-13

## 2021-10-13 NOTE — TELEPHONE ENCOUNTER
Patient return nurse's call regarding surgery. Patient has questions to ask prior to scheduling surgery. Please return call.

## 2021-10-19 DIAGNOSIS — G40.209 COMPLEX PARTIAL SEIZURES EVOLVING TO GENERALIZED TONIC-CLONIC SEIZURES (HCC): ICD-10-CM

## 2021-10-19 DIAGNOSIS — G43.109 MIGRAINE WITH AURA AND WITHOUT STATUS MIGRAINOSUS, NOT INTRACTABLE: ICD-10-CM

## 2021-10-19 DIAGNOSIS — G44.209 TENSION VASCULAR HEADACHE: ICD-10-CM

## 2021-10-19 DIAGNOSIS — Z86.69 HISTORY OF MIGRAINE: ICD-10-CM

## 2021-10-19 RX ORDER — BUTALBITAL, ACETAMINOPHEN AND CAFFEINE 50; 325; 40 MG/1; MG/1; MG/1
1 TABLET ORAL
Qty: 30 TABLET | Refills: 5 | Status: SHIPPED | OUTPATIENT
Start: 2021-10-19 | End: 2021-10-25

## 2021-10-22 DIAGNOSIS — F51.01 PRIMARY INSOMNIA: ICD-10-CM

## 2021-10-22 RX ORDER — TEMAZEPAM 15 MG/1
CAPSULE ORAL
Qty: 60 CAPSULE | Refills: 1 | Status: SHIPPED | OUTPATIENT
Start: 2021-10-22 | End: 2021-12-28 | Stop reason: SDUPTHER

## 2021-10-22 RX ORDER — TEMAZEPAM 15 MG/1
CAPSULE ORAL
Qty: 60 CAPSULE | Refills: 1 | Status: SHIPPED | OUTPATIENT
Start: 2021-10-22 | End: 2022-02-17

## 2021-11-01 NOTE — H&P
Radiology History and Physical    Patient: Edita Pandya 80 y.o. female       Chief Complaint: No chief complaint on file. History of Present Illness: ct guided rt femoral node biopsy    History:    Past Medical History:   Diagnosis Date    Fibromyalgia     GERD (gastroesophageal reflux disease)     Headache     migraine    Heart failure (HCC)     HTN (hypertension) 4/2/2010    Hypothyroidism 4/2/2010    OA (osteoarthritis) 4/2/2010    Tachycardia 2013    Thyroid disease      Family History   Problem Relation Age of Onset    Heart Disease Paternal Aunt     Heart Disease Paternal Uncle     Colon Cancer Maternal Grandmother     No Known Problems Mother     No Known Problems Father     Other Daughter      fibromyalgia    Heart Disease Maternal Grandfather     Hypertension Sister      Social History     Social History    Marital status: SINGLE     Spouse name: N/A    Number of children: N/A    Years of education: N/A     Occupational History    Not on file. Social History Main Topics    Smoking status: Never Smoker    Smokeless tobacco: Never Used    Alcohol use No    Drug use: No    Sexual activity: Not Currently     Other Topics Concern    Not on file     Social History Narrative       Allergies:    Allergies   Allergen Reactions    Epinephrine Other (comments)     BP drops and pass out    Iodinated Contrast Media - Oral And Iv Dye Shortness of Breath    Novocain [Procaine] Palpitations     BP drops,passes out    Codeine Itching     Pt does not recall    Cymbalta [Duloxetine] Other (comments)     Head Pain and nausea    Daypro [Oxaprozin] Unknown (comments)     Pt does not recall    Prednisone Shortness of Breath     agitation    Sulfa (Sulfonamide Antibiotics) Unknown (comments)     Pt does not recall    Zithromax [Azithromycin] Nausea Only     Diarrhea, headaches       Current Medications:  Current Outpatient Prescriptions   Medication Sig    nebivolol (BYSTOLIC) 5 mg tablet Take 1 Tab by mouth two (2) times a day.  PROCTOZONE-HC 2.5 % rectal cream Apply once or twice daily as needed    ALPRAZolam (XANAX) 0.5 mg tablet TAKE 1 TABLET BY MOUTH TWICE DAILY AS NEEDED    levothyroxine (SYNTHROID) 50 mcg tablet Take 1 pill every Monday through Thursday.  gabapentin (NEURONTIN) 300 mg capsule TAKE 1 CAPSULE BY MOUTH TWICE DAILY THEN TAKE 2 CAPSULES BY MOUTH EVERY NIGHT    temazepam (RESTORIL) 15 mg capsule TAKE 1 TO 2 CAPSULES BY MOUTH EVERY NIGHT AT BEDTIME    butalbital-acetaminophen-caffeine (FIORICET, ESGIC) -40 mg per tablet TAKE 1 TABLET BY MOUTH EVERY 6 HOURS AS NEEDED FOR HEADACHE. MAX DAILY AMT: 4 TABLETS    cyclobenzaprine (FLEXERIL) 10 mg tablet Take 1 Tab by mouth three (3) times daily as needed for Muscle Spasm(s).  ondansetron hcl (ZOFRAN, AS HYDROCHLORIDE,) 8 mg tablet Take 1 Tab by mouth every eight (8) hours as needed for Nausea.  calcium carbonate (TUMS) 200 mg calcium (500 mg) chew Take 1 Tab by mouth as needed.  loratadine (CLARITIN) 10 mg tablet Take 1 tab at 1am and take 1 tab at 1pm    furosemide (LASIX) 20 mg tablet TAKE 1 TABLET BY MOUTH EVERY DAY AS NEEDED FOR SWEELING     Current Facility-Administered Medications   Medication Dose Route Frequency    0.9% sodium chloride infusion  25 mL/hr IntraVENous CONTINUOUS    fentaNYL citrate (PF) injection 100 mcg  100 mcg IntraVENous Multiple    midazolam (VERSED) injection 5 mg  5 mg IntraVENous Rad Multiple        Physical Exam:  Blood pressure 123/51, pulse 86, temperature 99.1 °F (37.3 °C), resp. rate 16, height 5' 2\" (1.575 m), weight 80.7 kg (178 lb), SpO2 96 %, not currently breastfeeding.   LUNG: clear to auscultation bilaterally, HEART: regular rate and rhythm, S1, S2 normal, no murmur, click, rub or gallop      Alerts:    Hospital Problems  Date Reviewed: 4/12/2017    None          Laboratory:    No results for input(s): HGB, HCT, WBC, PLT, INR, BUN, CREA, K, CRCLT, HGBEXT, HCTEXT, PLTEXT in the last 72 hours. No lab exists for component: PTT, PT, INREXT      Plan of Care/Planned Procedure:  Risks, benefits, and alternatives reviewed with patient and she agrees to proceed with the procedure.        Keenan Escalante MD Resident

## 2021-11-04 RX ORDER — NEBIVOLOL 5 MG/1
TABLET ORAL
Qty: 60 TABLET | Refills: 11 | Status: SHIPPED | OUTPATIENT
Start: 2021-11-04 | End: 2021-12-28 | Stop reason: DRUGHIGH

## 2021-11-05 ENCOUNTER — HOSPITAL ENCOUNTER (OUTPATIENT)
Dept: PREADMISSION TESTING | Age: 86
Discharge: HOME | End: 2021-11-05
Attending: SURGERY
Payer: MEDICARE

## 2021-11-05 LAB
ANION GAP SERPL CALC-SCNC: 6 MMOL/L (ref 5–15)
BUN SERPL-MCNC: 21 MG/DL (ref 6–20)
BUN/CREAT SERPL: 24 (ref 12–20)
CALCIUM SERPL-MCNC: 9.6 MG/DL (ref 8.5–10.1)
CHLORIDE SERPL-SCNC: 106 MMOL/L (ref 97–108)
CO2 SERPL-SCNC: 27 MMOL/L (ref 21–32)
CREAT SERPL-MCNC: 0.89 MG/DL (ref 0.55–1.02)
ERYTHROCYTE [DISTWIDTH] IN BLOOD BY AUTOMATED COUNT: 13.3 % (ref 11.5–14.5)
GLUCOSE SERPL-MCNC: 90 MG/DL (ref 65–100)
HCT VFR BLD AUTO: 36.4 % (ref 35–47)
HGB BLD-MCNC: 11.2 G/DL (ref 11.5–16)
MCH RBC QN AUTO: 29.1 PG (ref 26–34)
MCHC RBC AUTO-ENTMCNC: 30.8 G/DL (ref 30–36.5)
MCV RBC AUTO: 94.5 FL (ref 80–99)
NRBC # BLD: 0 K/UL (ref 0–0.01)
NRBC BLD-RTO: 0 PER 100 WBC
PLATELET # BLD AUTO: 249 K/UL (ref 150–400)
PMV BLD AUTO: 10.2 FL (ref 8.9–12.9)
POTASSIUM SERPL-SCNC: 4.5 MMOL/L (ref 3.5–5.1)
RBC # BLD AUTO: 3.85 M/UL (ref 3.8–5.2)
SODIUM SERPL-SCNC: 139 MMOL/L (ref 136–145)
WBC # BLD AUTO: 3.6 K/UL (ref 3.6–11)

## 2021-11-05 PROCEDURE — 36415 COLL VENOUS BLD VENIPUNCTURE: CPT

## 2021-11-05 PROCEDURE — 85027 COMPLETE CBC AUTOMATED: CPT

## 2021-11-05 PROCEDURE — 80048 BASIC METABOLIC PNL TOTAL CA: CPT

## 2021-11-05 RX ORDER — FAMOTIDINE 20 MG/1
20 TABLET, FILM COATED ORAL DAILY
COMMUNITY
End: 2022-06-28 | Stop reason: ALTCHOICE

## 2021-11-05 RX ORDER — CETIRIZINE HCL 10 MG
10 TABLET ORAL DAILY
COMMUNITY
End: 2022-06-28 | Stop reason: ALTCHOICE

## 2021-11-05 RX ORDER — CHOLECALCIFEROL TAB 125 MCG (5000 UNIT) 125 MCG
5000 TAB ORAL EVERY EVENING
COMMUNITY
End: 2021-12-28 | Stop reason: ALTCHOICE

## 2021-11-05 RX ORDER — SODIUM CHLORIDE, SODIUM LACTATE, POTASSIUM CHLORIDE, CALCIUM CHLORIDE 600; 310; 30; 20 MG/100ML; MG/100ML; MG/100ML; MG/100ML
25 INJECTION, SOLUTION INTRAVENOUS CONTINUOUS
Status: CANCELLED | OUTPATIENT
Start: 2021-11-11

## 2021-11-08 ENCOUNTER — HOSPITAL ENCOUNTER (OUTPATIENT)
Dept: PREADMISSION TESTING | Age: 86
Discharge: HOME | End: 2021-11-08
Payer: MEDICARE

## 2021-11-08 PROCEDURE — U0005 INFEC AGEN DETEC AMPLI PROBE: HCPCS

## 2021-11-08 RX ORDER — CEFAZOLIN SODIUM/WATER 2 G/20 ML
2 SYRINGE (ML) INTRAVENOUS ONCE
Status: CANCELLED | OUTPATIENT
Start: 2021-11-11 | End: 2021-11-11

## 2021-11-09 LAB
SARS-COV-2, XPLCVT: NOT DETECTED
SOURCE, COVRS: NORMAL

## 2021-11-11 ENCOUNTER — HOSPITAL ENCOUNTER (OUTPATIENT)
Age: 86
Setting detail: OUTPATIENT SURGERY
Discharge: HOME | End: 2021-11-11
Attending: SURGERY | Admitting: SURGERY
Payer: MEDICARE

## 2021-11-11 ENCOUNTER — ANESTHESIA (OUTPATIENT)
Dept: SURGERY | Age: 86
End: 2021-11-11
Payer: MEDICARE

## 2021-11-11 ENCOUNTER — ANESTHESIA EVENT (OUTPATIENT)
Dept: SURGERY | Age: 86
End: 2021-11-11
Payer: MEDICARE

## 2021-11-11 VITALS
OXYGEN SATURATION: 98 % | TEMPERATURE: 97.8 F | HEIGHT: 61 IN | HEART RATE: 65 BPM | RESPIRATION RATE: 16 BRPM | WEIGHT: 168.87 LBS | SYSTOLIC BLOOD PRESSURE: 128 MMHG | DIASTOLIC BLOOD PRESSURE: 76 MMHG | BODY MASS INDEX: 31.88 KG/M2

## 2021-11-11 DIAGNOSIS — R59.0 PELVIC LYMPHADENOPATHY: Primary | ICD-10-CM

## 2021-11-11 PROCEDURE — 77030016151 HC PROTCTR LNS DFOG COVD -B: Performed by: SURGERY

## 2021-11-11 PROCEDURE — 77030008771 HC TU NG SALEM SUMP -A

## 2021-11-11 PROCEDURE — 77030005513 HC CATH URETH FOL11 MDII -B: Performed by: SURGERY

## 2021-11-11 PROCEDURE — 77030035279 HC SEAL VSL ENDOWR XI INTU -I2: Performed by: SURGERY

## 2021-11-11 PROCEDURE — 38570 LAPAROSCOPY LYMPH NODE BIOP: CPT | Performed by: SURGERY

## 2021-11-11 PROCEDURE — 77030008756 HC TU IRR SUC STRY -B: Performed by: SURGERY

## 2021-11-11 PROCEDURE — 76210000017 HC OR PH I REC 1.5 TO 2 HR: Performed by: SURGERY

## 2021-11-11 PROCEDURE — 88184 FLOWCYTOMETRY/ TC 1 MARKER: CPT

## 2021-11-11 PROCEDURE — 74011250636 HC RX REV CODE- 250/636: Performed by: ANESTHESIOLOGY

## 2021-11-11 PROCEDURE — 77030018390 HC SPNG HEMSTAT2 J&J -B: Performed by: SURGERY

## 2021-11-11 PROCEDURE — 2709999900 HC NON-CHARGEABLE SUPPLY: Performed by: SURGERY

## 2021-11-11 PROCEDURE — C1758 CATHETER, URETERAL: HCPCS | Performed by: SURGERY

## 2021-11-11 PROCEDURE — 77030013079 HC BLNKT BAIR HGGR 3M -A

## 2021-11-11 PROCEDURE — 74011250637 HC RX REV CODE- 250/637: Performed by: SURGERY

## 2021-11-11 PROCEDURE — 76010000876 HC OR TIME 2 TO 2.5HR INTENSV - TIER 2: Performed by: SURGERY

## 2021-11-11 PROCEDURE — 77030008684 HC TU ET CUF COVD -B

## 2021-11-11 PROCEDURE — 88333 PATH CONSLTJ SURG CYTO XM 1: CPT

## 2021-11-11 PROCEDURE — 74011250636 HC RX REV CODE- 250/636: Performed by: SURGERY

## 2021-11-11 PROCEDURE — 77030018673: Performed by: SURGERY

## 2021-11-11 PROCEDURE — 76060000035 HC ANESTHESIA 2 TO 2.5 HR: Performed by: SURGERY

## 2021-11-11 PROCEDURE — 88341 IMHCHEM/IMCYTCHM EA ADD ANTB: CPT

## 2021-11-11 PROCEDURE — 88342 IMHCHEM/IMCYTCHM 1ST ANTB: CPT

## 2021-11-11 PROCEDURE — 77030028402 HC SYS LAPSC TISS RETRV AMR -B: Performed by: SURGERY

## 2021-11-11 PROCEDURE — 77030026438 HC STYL ET INTUB CARD -A

## 2021-11-11 PROCEDURE — 76210000026 HC REC RM PH II 1 TO 1.5 HR: Performed by: SURGERY

## 2021-11-11 PROCEDURE — 77030012961 HC IRR KT CYSTO/TUR ICUM -A: Performed by: SURGERY

## 2021-11-11 PROCEDURE — 74011250636 HC RX REV CODE- 250/636: Performed by: NURSE ANESTHETIST, CERTIFIED REGISTERED

## 2021-11-11 PROCEDURE — 88185 FLOWCYTOMETRY/TC ADD-ON: CPT

## 2021-11-11 PROCEDURE — 77030002933 HC SUT MCRYL J&J -A: Performed by: SURGERY

## 2021-11-11 PROCEDURE — 74011000250 HC RX REV CODE- 250: Performed by: SURGERY

## 2021-11-11 PROCEDURE — 88305 TISSUE EXAM BY PATHOLOGIST: CPT

## 2021-11-11 PROCEDURE — 88312 SPECIAL STAINS GROUP 1: CPT

## 2021-11-11 PROCEDURE — 77030020703 HC SEAL CANN DISP INTU -B: Performed by: SURGERY

## 2021-11-11 PROCEDURE — 77030040361 HC SLV COMPR DVT MDII -B: Performed by: SURGERY

## 2021-11-11 PROCEDURE — 77030035277 HC OBTRTR BLDELSS DISP INTU -B: Performed by: SURGERY

## 2021-11-11 PROCEDURE — 74011000250 HC RX REV CODE- 250: Performed by: NURSE ANESTHETIST, CERTIFIED REGISTERED

## 2021-11-11 RX ORDER — NEOSTIGMINE METHYLSULFATE 1 MG/ML
INJECTION, SOLUTION INTRAVENOUS AS NEEDED
Status: DISCONTINUED | OUTPATIENT
Start: 2021-11-11 | End: 2021-11-11 | Stop reason: HOSPADM

## 2021-11-11 RX ORDER — SODIUM CHLORIDE 0.9 % (FLUSH) 0.9 %
5-40 SYRINGE (ML) INJECTION EVERY 8 HOURS
Status: DISCONTINUED | OUTPATIENT
Start: 2021-11-11 | End: 2021-11-11 | Stop reason: HOSPADM

## 2021-11-11 RX ORDER — SODIUM CHLORIDE, SODIUM LACTATE, POTASSIUM CHLORIDE, CALCIUM CHLORIDE 600; 310; 30; 20 MG/100ML; MG/100ML; MG/100ML; MG/100ML
25 INJECTION, SOLUTION INTRAVENOUS CONTINUOUS
Status: DISCONTINUED | OUTPATIENT
Start: 2021-11-11 | End: 2021-11-11 | Stop reason: HOSPADM

## 2021-11-11 RX ORDER — HYDROMORPHONE HYDROCHLORIDE 1 MG/ML
INJECTION, SOLUTION INTRAMUSCULAR; INTRAVENOUS; SUBCUTANEOUS AS NEEDED
Status: DISCONTINUED | OUTPATIENT
Start: 2021-11-11 | End: 2021-11-11 | Stop reason: HOSPADM

## 2021-11-11 RX ORDER — SUCCINYLCHOLINE CHLORIDE 20 MG/ML
INJECTION INTRAMUSCULAR; INTRAVENOUS AS NEEDED
Status: DISCONTINUED | OUTPATIENT
Start: 2021-11-11 | End: 2021-11-11 | Stop reason: HOSPADM

## 2021-11-11 RX ORDER — ROCURONIUM BROMIDE 10 MG/ML
INJECTION, SOLUTION INTRAVENOUS AS NEEDED
Status: DISCONTINUED | OUTPATIENT
Start: 2021-11-11 | End: 2021-11-11 | Stop reason: HOSPADM

## 2021-11-11 RX ORDER — POLYETHYLENE GLYCOL 3350 17 G/17G
17 POWDER, FOR SOLUTION ORAL DAILY
Qty: 510 G | Refills: 0 | Status: SHIPPED | OUTPATIENT
Start: 2021-11-11 | End: 2021-12-28 | Stop reason: ALTCHOICE

## 2021-11-11 RX ORDER — LIDOCAINE HYDROCHLORIDE 10 MG/ML
0.1 INJECTION, SOLUTION EPIDURAL; INFILTRATION; INTRACAUDAL; PERINEURAL AS NEEDED
Status: DISCONTINUED | OUTPATIENT
Start: 2021-11-11 | End: 2021-11-11 | Stop reason: HOSPADM

## 2021-11-11 RX ORDER — SODIUM CHLORIDE 0.9 % (FLUSH) 0.9 %
5-40 SYRINGE (ML) INJECTION AS NEEDED
Status: DISCONTINUED | OUTPATIENT
Start: 2021-11-11 | End: 2021-11-11 | Stop reason: HOSPADM

## 2021-11-11 RX ORDER — FENTANYL CITRATE 50 UG/ML
25 INJECTION, SOLUTION INTRAMUSCULAR; INTRAVENOUS
Status: DISCONTINUED | OUTPATIENT
Start: 2021-11-11 | End: 2021-11-11 | Stop reason: HOSPADM

## 2021-11-11 RX ORDER — LIDOCAINE HYDROCHLORIDE 20 MG/ML
INJECTION, SOLUTION EPIDURAL; INFILTRATION; INTRACAUDAL; PERINEURAL AS NEEDED
Status: DISCONTINUED | OUTPATIENT
Start: 2021-11-11 | End: 2021-11-11 | Stop reason: HOSPADM

## 2021-11-11 RX ORDER — HYDROMORPHONE HYDROCHLORIDE 1 MG/ML
0.2 INJECTION, SOLUTION INTRAMUSCULAR; INTRAVENOUS; SUBCUTANEOUS
Status: DISCONTINUED | OUTPATIENT
Start: 2021-11-11 | End: 2021-11-11 | Stop reason: HOSPADM

## 2021-11-11 RX ORDER — MIDAZOLAM HYDROCHLORIDE 1 MG/ML
INJECTION, SOLUTION INTRAMUSCULAR; INTRAVENOUS AS NEEDED
Status: DISCONTINUED | OUTPATIENT
Start: 2021-11-11 | End: 2021-11-11 | Stop reason: HOSPADM

## 2021-11-11 RX ORDER — FENTANYL CITRATE 50 UG/ML
INJECTION, SOLUTION INTRAMUSCULAR; INTRAVENOUS
Status: DISCONTINUED
Start: 2021-11-11 | End: 2021-11-11 | Stop reason: HOSPADM

## 2021-11-11 RX ORDER — ONDANSETRON 2 MG/ML
INJECTION INTRAMUSCULAR; INTRAVENOUS AS NEEDED
Status: DISCONTINUED | OUTPATIENT
Start: 2021-11-11 | End: 2021-11-11 | Stop reason: HOSPADM

## 2021-11-11 RX ORDER — HYDROCODONE BITARTRATE AND ACETAMINOPHEN 5; 325 MG/1; MG/1
1 TABLET ORAL
Qty: 20 TABLET | Refills: 0 | Status: SHIPPED | OUTPATIENT
Start: 2021-11-11 | End: 2021-11-16

## 2021-11-11 RX ORDER — FENTANYL CITRATE 50 UG/ML
INJECTION, SOLUTION INTRAMUSCULAR; INTRAVENOUS AS NEEDED
Status: DISCONTINUED | OUTPATIENT
Start: 2021-11-11 | End: 2021-11-11 | Stop reason: HOSPADM

## 2021-11-11 RX ORDER — DIPHENHYDRAMINE HYDROCHLORIDE 50 MG/ML
12.5 INJECTION, SOLUTION INTRAMUSCULAR; INTRAVENOUS AS NEEDED
Status: DISCONTINUED | OUTPATIENT
Start: 2021-11-11 | End: 2021-11-11 | Stop reason: HOSPADM

## 2021-11-11 RX ORDER — GLYCOPYRROLATE 0.2 MG/ML
INJECTION INTRAMUSCULAR; INTRAVENOUS AS NEEDED
Status: DISCONTINUED | OUTPATIENT
Start: 2021-11-11 | End: 2021-11-11 | Stop reason: HOSPADM

## 2021-11-11 RX ORDER — PROPOFOL 10 MG/ML
INJECTION, EMULSION INTRAVENOUS AS NEEDED
Status: DISCONTINUED | OUTPATIENT
Start: 2021-11-11 | End: 2021-11-11 | Stop reason: HOSPADM

## 2021-11-11 RX ORDER — BUPIVACAINE HYDROCHLORIDE 5 MG/ML
INJECTION, SOLUTION EPIDURAL; INTRACAUDAL AS NEEDED
Status: DISCONTINUED | OUTPATIENT
Start: 2021-11-11 | End: 2021-11-11 | Stop reason: HOSPADM

## 2021-11-11 RX ADMIN — PROPOFOL 100 MG: 10 INJECTION, EMULSION INTRAVENOUS at 08:13

## 2021-11-11 RX ADMIN — ROCURONIUM BROMIDE 5 MG: 10 INJECTION INTRAVENOUS at 08:13

## 2021-11-11 RX ADMIN — MIDAZOLAM HYDROCHLORIDE 1 MG: 1 INJECTION, SOLUTION INTRAMUSCULAR; INTRAVENOUS at 08:06

## 2021-11-11 RX ADMIN — FENTANYL CITRATE 25 MCG: 50 INJECTION, SOLUTION INTRAMUSCULAR; INTRAVENOUS at 10:42

## 2021-11-11 RX ADMIN — ROCURONIUM BROMIDE 25 MG: 10 INJECTION INTRAVENOUS at 08:55

## 2021-11-11 RX ADMIN — HYDROMORPHONE HYDROCHLORIDE 0.5 MG: 1 INJECTION, SOLUTION INTRAMUSCULAR; INTRAVENOUS; SUBCUTANEOUS at 09:10

## 2021-11-11 RX ADMIN — SODIUM CHLORIDE, POTASSIUM CHLORIDE, SODIUM LACTATE AND CALCIUM CHLORIDE: 600; 310; 30; 20 INJECTION, SOLUTION INTRAVENOUS at 10:31

## 2021-11-11 RX ADMIN — PROPOFOL 25 MG: 10 INJECTION, EMULSION INTRAVENOUS at 08:59

## 2021-11-11 RX ADMIN — FENTANYL CITRATE 50 MCG: 50 INJECTION, SOLUTION INTRAMUSCULAR; INTRAVENOUS at 08:13

## 2021-11-11 RX ADMIN — LIDOCAINE HYDROCHLORIDE 80 MG: 20 INJECTION, SOLUTION EPIDURAL; INFILTRATION; INTRACAUDAL; PERINEURAL at 08:13

## 2021-11-11 RX ADMIN — WATER 2 G: 1 INJECTION INTRAMUSCULAR; INTRAVENOUS; SUBCUTANEOUS at 08:20

## 2021-11-11 RX ADMIN — ROCURONIUM BROMIDE 35 MG: 10 INJECTION INTRAVENOUS at 08:20

## 2021-11-11 RX ADMIN — FENTANYL CITRATE 25 MCG: 50 INJECTION, SOLUTION INTRAMUSCULAR; INTRAVENOUS at 10:45

## 2021-11-11 RX ADMIN — Medication 3 AMPULE: at 06:57

## 2021-11-11 RX ADMIN — ROCURONIUM BROMIDE 30 MG: 10 INJECTION INTRAVENOUS at 09:48

## 2021-11-11 RX ADMIN — FENTANYL CITRATE 50 MCG: 50 INJECTION, SOLUTION INTRAMUSCULAR; INTRAVENOUS at 08:50

## 2021-11-11 RX ADMIN — PROPOFOL 25 MG: 10 INJECTION, EMULSION INTRAVENOUS at 08:21

## 2021-11-11 RX ADMIN — SUCCINYLCHOLINE CHLORIDE 120 MG: 20 INJECTION, SOLUTION INTRAMUSCULAR; INTRAVENOUS at 08:13

## 2021-11-11 RX ADMIN — MIDAZOLAM HYDROCHLORIDE 1 MG: 1 INJECTION, SOLUTION INTRAMUSCULAR; INTRAVENOUS at 08:11

## 2021-11-11 RX ADMIN — ROCURONIUM BROMIDE 10 MG: 10 INJECTION INTRAVENOUS at 08:37

## 2021-11-11 RX ADMIN — SODIUM CHLORIDE, POTASSIUM CHLORIDE, SODIUM LACTATE AND CALCIUM CHLORIDE 25 ML/HR: 600; 310; 30; 20 INJECTION, SOLUTION INTRAVENOUS at 06:57

## 2021-11-11 RX ADMIN — GLYCOPYRROLATE 0.4 MG: 0.2 INJECTION, SOLUTION INTRAMUSCULAR; INTRAVENOUS at 10:14

## 2021-11-11 RX ADMIN — NEOSTIGMINE METHYLSULFATE 3 MG: 1 INJECTION, SOLUTION INTRAVENOUS at 10:14

## 2021-11-11 RX ADMIN — ROCURONIUM BROMIDE 25 MG: 10 INJECTION INTRAVENOUS at 08:44

## 2021-11-11 RX ADMIN — ONDANSETRON HYDROCHLORIDE 4 MG: 2 INJECTION, SOLUTION INTRAMUSCULAR; INTRAVENOUS at 10:13

## 2021-11-11 NOTE — ANESTHESIA POSTPROCEDURE EVALUATION
Procedure(s):  ROBOTIC BIOPSY OF PELVIC LYMPH NODE  INSERTION OF BILATERAL URETERAL CATHETERS. general    Anesthesia Post Evaluation        Patient location during evaluation: PACU  Note status: Adequate. Level of consciousness: responsive to verbal stimuli and sleepy but conscious  Pain management: satisfactory to patient  Airway patency: patent  Anesthetic complications: no  Cardiovascular status: acceptable  Respiratory status: acceptable  Hydration status: acceptable  Comments: +Post-Anesthesia Evaluation and Assessment    Patient: Sumanth Esposito MRN: [id number]  SSN: [id number]   YOB: 1935  Age: 82 y.o. Sex: female      Cardiovascular Function/Vital Signs    /60   Pulse 73   Temp 36.6 °C (97.8 °F)   Resp 19   Ht 5' 1\" (1.549 m)   Wt 76.6 kg (168 lb 14 oz)   SpO2 97%   BMI 31.91 kg/m²     Patient is status post Procedure(s):  ROBOTIC BIOPSY OF PELVIC LYMPH NODE  INSERTION OF BILATERAL URETERAL CATHETERS. Nausea/Vomiting: Controlled. Postoperative hydration reviewed and adequate. Pain:  Pain Scale 1: Numeric (0 - 10) (11/11/21 0615)  Pain Intensity 1: 0 (11/11/21 0615)   Managed. Neurological Status:   Neuro (WDL): Within Defined Limits (11/11/21 0609)   At baseline. Mental Status and Level of Consciousness: Arousable. Pulmonary Status:   O2 Device: Nasal cannula (11/11/21 1159)   Adequate oxygenation and airway patent. Complications related to anesthesia: None    Post-anesthesia assessment completed. No concerns. Signed By: Keyon Gamboa MD    11/11/2021  Post anesthesia nausea and vomiting:  controlled      INITIAL Post-op Vital signs:   Vitals Value Taken Time   /60 11/11/21 1159   Temp 36.6 °C (97.8 °F) 11/11/21 1035   Pulse 71 11/11/21 1200   Resp 17 11/11/21 1200   SpO2 96 % 11/11/21 1200   Vitals shown include unvalidated device data.

## 2021-11-11 NOTE — DISCHARGE INSTRUCTIONS
Discharge Instructions:  Dr. Alan Velazquez    Call on next business day to arrange appointment for follow up in 3 week(s) -- [phone]. Activity:  Walk regularly starting immediately. No lifting more than 10 -15 pounds for 4 week(s). You may resume driving when off narcotics for pain. Diet:  You may resume normal diet. Nothing heavy or greasy    Wound Care:  Dermabond glue dressing will fall off on its own. We used dye to check urethras so your urine will go from green to clear as you drink more. Do not sit long time on toilet-this will increase pressure and pain. If you experience a lot of drainage, develop redness around the wound, or a fever over 101 F occurs please call the office. You may shower. No baths or swimming for 2 weeks. Medications:  Resume home medications as indicated on the Medical Reconciliation form. Do not use blood thinners (such as Aspirin, Coumadin, or Plavix) until 3 days after surgery. Pain medications:  Non steroidal antiinflammatories (ibuprofen -- Advil or Motrin) seem to work best for post surgical pain. Try these first.  A narcotic prescription will also be given for breakthrough pain. Do not use Tylenol while taking the narcotic because there is Tylenol in the narcotic pill, and too much Tylenol can injure your liver. Tylenol can be used in place of the narcotic, but do not take both at the same time. PUT ICE ON YOUR INCISION(S) 20 MINUTES EVERY HOUR WHILE AWAKE FOR THE NEXT 3 DAYS AT LEAST. PLACE A THIN CLOTH BETWEEN YOUR SKIN AND THE ICE BAG. Colace or Miralax should be used twice daily to prevent constipation while on narcotics. If you are still having trouble having a BM after 1-2 days, try milk of magnesia. If this does not work within 24 hours, try a bottle of magnesium citrate. Narcotics and anesthesia sometimes cause nausea and vomiting. If persistent please call the office.     Do not hesitate to call with questions or concerns. Tiera Garcia MD  Tel [phone]  Fax [phone]    For first hour when get home sit upright and take 5 deep breaths and hold to count of 5 and blow out every 15 minutes. Cough and clear lungs and repeat every hour until bedtime. DO NOT TAKE TYLENOL/ACETAMINOPHEN WITH PERCOCET, LORTAB, [address] TAKE NARCOTIC PAIN MEDICATIONS WITH FOOD     Narcotics tend to be constipating, we suggest taking a stool softener such as Colace or Miralax (follow package instructions). DO NOT DRIVE WHILE TAKING NARCOTIC PAIN MEDICATIONS. DO NOT TAKE SLEEPING MEDICATIONS OR ANTIANXIETY MEDICATIONS WHILE TAKING NARCOTIC PAIN MEDICATIONS,  ESPECIALLY THE NIGHT OF ANESTHESIA! CPAP PATIENTS BE SURE TO WEAR MACHINE WHENEVER NAPPING OR SLEEPING! DISCHARGE SUMMARY from Nurse    The following personal items collected during your admission are returned to you:   Dental Appliance: Dental Appliances: None  Vision: Visual Aid: Glasses (readers left in personal belongings bag)  Hearing Aid:    Jewelry: Jewelry: None  Clothing: Clothing: Other (comment) (street clothes)  Other Valuables: Other Valuables: Eyeglasses (readers left in personal belongings bag)  Valuables sent to safe: Personal Items Sent to Safe: none      PATIENT INSTRUCTIONS:    After General Anesthesia or Intravenous Sedation, for 24 hours or while taking prescription Narcotics:        Someone should be with you for the next 24 hours. For your own safety, a responsible adult must drive you home. · Limit your activities  · Recommended activity: Rest today, up with assistance today. Do not climb stairs or shower unattended for the next 24 hours. · Please start with a soft bland diet and advance as tolerated (no nausea) to regular diet. · If you have a sore throat you should try the following: fluids, warm salt water gargles, or throat lozenges.  If it does not improve after several days please follow up with your primary physician. · Do not drive and operate hazardous machinery  · Do not make important personal or business decisions  · Do  not drink alcoholic beverages  · If you have not urinated within 8 hours after discharge, please contact your surgeon on call. Report the following to your surgeon:  · Excessive pain, swelling, redness or odor of or around the surgical area  · Temperature over 100.5  · Nausea and vomiting lasting longer than 4 hours or if unable to take medications  · Any signs of decreased circulation or nerve impairment to extremity: change in color, persistent  numbness, tingling, coldness or increase pain      · You will receive a Post Operative Call from one of the Recovery Room Nurses on the day after your surgery to check on you. It is very important for us to know how you are recovering after your surgery. If you have an issue or need to speak with someone, please call your surgeon, do not wait for the post operative call. · You may receive an e-mail or letter in the mail from [employer] regarding your experience with us in the Ambulatory Surgery Unit. Your feedback is valuable to us and we appreciate your participation in the survey. · If the above instructions are not adequate or you are having problems after your surgery, call the physician at their office number. · We wish you a speedy recovery ? What to do at Home:      *  Please give a list of your current medications to your Primary Care Provider. *  Please update this list whenever your medications are discontinued, doses are      changed, or new medications (including over-the-counter products) are added. *  Please carry medication information at all times in case of emergency situations. If you have not received your influenza and/or pneumococcal vaccine, please follow up with your primary care physician. The discharge information has been reviewed with the patient and caregiver.   The patient and caregiver verbalized understanding.

## 2021-11-11 NOTE — OP NOTES
[address]  OPERATIVE REPORT    Name:  Jere Acuna  MR#:  [id number]  :  1935  ACCOUNT #:  [de-identified]  DATE OF SERVICE:  2021    PREOPERATIVE DIAGNOSIS:  Pelvic lymphadenopathy. POSTOPERATIVE DIAGNOSIS:  Pelvic lymphadenopathy. PROCEDURE PERFORMED:  Robotic biopsy of right femoral lymph nodes. SURGEON:  Yamilet Mcfarland MD    ASSISTANT:  Staff. ANESTHESIA:  General.    COMPLICATIONS:  None immediate. SPECIMENS REMOVED:  1. Frozen section for possible lymph node, negative. 2.  Right smaller lymph node, sent fresh. IMPLANTS:  None. ESTIMATED BLOOD LOSS:  Minimal.    DRAINS:  None. FINDINGS:  The iliac node seen on PET scan was not identifiable laparoscopically. There were other jessi-femoral nodes that also lit up on PET scan and these were able to be identified and removed. INDICATIONS:  The patient is an 62-year-old female who has had persistent PET avid lymphadenopathy in her pelvis. 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 There is some concern at this point that it may have transformed. DESCRIPTION OF PROCEDURE:  After obtaining informed consent, the patient was taken to the operating room and placed supine on the operating table. An operative time-out was performed and general endotracheal anesthesia was induced. Preoperative antibiotics were administered and the abdomen was prepped and draped in the usual sterile fashion. The abdomen was then entered just above the umbilicus using an 8-mm optical trocar. The abdomen was insufflated without incident, was visually explored. Under direct vision, a left lower quadrant 8-mm port was placed followed by a right mid abdominal 8-mm port. A 5-mm assistant port was placed in the right upper quadrant. The robot was then docked. There were adhesions in the pelvis from her prior surgery.   These were taken down with the vessel sealing device. We were then able to identify the right ureter using ICG. The retroperitoneal tissues were dissected using the vessel sealing device. The right iliac artery was identified and this area was explored for lymph nodes. There was nothing obvious and given the concern for possible injury to the iliac vein, we then created a preperitoneal space in the region of the inguinal canal and dissected the fatty tissue down off of the femoral vessels. In this area, we were able to identify an enlarged lymph node or cluster of nodes, and this was carefully dissected out using the vessel sealing device. It was removed using an EndoCatch bag and sent fresh for pathology. The areas of dissection were checked for hemostasis and excellent hemostasis was noted. A piece of Surgicel was left in the area of the femoral dissection. Excellent hemostasis was noted. The robot was undocked. The ports were removed and the skin incisions were closed with Monocryl and dressed with Dermabond. The patient was recovered from general anesthesia and taken to recovery area in satisfactory condition.         Indio Montemayor MD      [id number]  D:  11/11/2021 10:54  T:  11/11/2021 14:26  JOB #:  [id number]  CC:  MD Kate Santamaria MD Jeanett Raymond, MD

## 2021-11-11 NOTE — PERIOP NOTES
TRANSFER - OUT REPORT:    Verbal report given to [facility] on [place]  being transferred to Phase 2(unit) for routine progression of care       Report consisted of patients Situation, Background, Assessment and   Recommendations(SBAR). Information from the following report(s) SBAR, OR Summary, Procedure Summary, Intake/Output and MAR was reviewed with the receiving nurse. Opportunity for questions and clarification was provided. Patient transported with:   Registered Nurse     Transferred patient to Phase 2 and escorted to  and void small amount green urine-explained about dye and stressed don't sit for long periods.  Sa02 % on room air

## 2021-11-11 NOTE — BRIEF OP NOTE
[id number]  Brief Postoperative Note    Patient: Bhavesh oMy  YOB: 1935  MRN: [id number]    Date of Procedure: 11/11/2021     Pre-Op Diagnosis: PELVIC LYMPHADENOPATHY    Post-Op Diagnosis: Same as preoperative diagnosis. Procedure(s):  ROBOTIC BIOPSY OF PELVIC LYMPH NODE  INSERTION OF BILATERAL URETERAL CATHETERS    Surgeon(s):  MD Omar Zarate MD    Surgical Assistant: Surg Asst-1: Janes Steele RN    Anesthesia: General     Estimated Blood Loss (mL): Minimal    Complications: None immediate    Specimens:   ID Type Source Tests Collected by Time Destination   1 : right iliac lymph node? Frozen Section Lymph Node  Lord Gabrielle MD 11/11/2021 3623 Pathology   2 : right femoral lymph node Fresh Lymph Node  Lord Gabrielle MD 11/11/2021 0089 Pathology        Implants: * No implants in log *    Drains:   [REMOVED] Orogastric Tube 11/11/21 (Removed)       Findings: femoral node taken. Iliac node not identifiable.       Electronically Signed by Yvon Diane MD on 11/11/2021 at 10:41 AM

## 2021-11-11 NOTE — ANESTHESIA PREPROCEDURE EVALUATION
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 ABNORMAL     TTE (9/22/20):  ·LV: Estimated LVEF is 55 - 60%. Normal cavity size, wall thickness and systolic function (ejection fraction normal). Wall motion: normal. Age-appropriate left ventricular diastolic function. ·MV: Mild mitral annular calcification. Mild mitral valve regurgitation is present.            GI/Hepatic/Renal     GERD           Endo/Other      Hypothyroidism: well controlled  Obesity and arthritis     Other Findings   Comments: Pelvic Lymphadenopathy    Fibromyalgia           Physical Exam    Airway  Mallampati: III  TM Distance: 4 - 6 cm  Neck ROM: decreased range of motion        Cardiovascular    Rhythm: regular  Rate: normal         Dental    Dentition: Caps/crowns     Pulmonary  Breath sounds clear to auscultation               Abdominal  GI exam deferred       Other Findings            Anesthetic Plan    ASA: 3  Anesthesia type: general    Monitoring Plan: BIS      Induction: Intravenous  Anesthetic plan and risks discussed with: Patient

## 2021-11-11 NOTE — CONSULTS
Urology Consult    Subjective:     Date of Consultation:  November 11, 2021    Referring Physician: Ruy Vines MD    Reason for Consultation:  Ureteral catheters    Patient Name: Tierra Ochoa  MRN: [id number]    History of Present Illness:   Patient is a 82 y.o.  female who is being seen for above. She was admitted to the hospital for [address] She denies voiding issues.     Past Medical History:   Diagnosis Date    Arrhythmia     tachycardia; Dr. Kelly Gomez Autoimmune disease [facility])     fibromyalgia    Chronic ITP (idiopathic thrombocytopenia) (HCC)     Congestive heart failure (HCC)     Fibromyalgia     GERD (gastroesophageal reflux disease)     Headache(784.0)     migraine    Heart failure (HCC)     Hiatal hernia     HTN (hypertension) 4/2/2010    Hyperlipidemia     Hypothyroidism 4/2/2010    Ill-defined condition     intermittent brochitis    Murmur     OA (osteoarthritis) 4/2/2010    Seizures ([address]     56 years ago with childbirth    Tachycardia 2013    Thromboembolus ([address]     blood clot on brain 56 years ago w childbirth    Thyroid disease     Valvular heart disease       Past Surgical History:   Procedure Laterality Date    Adrian Bolden  2/5/2015         ENDOSCOPY, COLON, DIAGNOSTIC  12/2010    Dr. Ever Irwin- repeat 5 yrs    HX BREAST BIOPSY Right 1999    more than one yrs ago all benign    HX CARPAL TUNNEL RELEASE  9/28/2012    HX CATARACT REMOVAL  9/2014    bilateral     HX HYSTERECTOMY  1977    HX OTHER SURGICAL      bone biopsy    HX REFRACTIVE SURGERY  07/2018      Family History   Problem Relation Age of Onset    Heart Disease Paternal Aunt     Heart Disease Paternal Uncle     Colon Cancer Maternal Grandmother     Other Mother         murder    Lung Disease Father         COPD    Other Son         scooby's disease and UC    Other Daughter         fibromyalgia    Thyroid Disease Daughter     Heart Disease Maternal Grandfather     Other Sister         Raynauds      Social History     Tobacco Use    Smoking status: Never Smoker    Smokeless tobacco: Never Used   Substance Use Topics    Alcohol use: No     Alcohol/week: 0.0 standard drinks     Allergies   Allergen Reactions    Epinephrine Other (comments)     BP drops and pass out    Iodinated Contrast Media Shortness of Breath    Novocain [Procaine] Palpitations     BP drops,passes out    Codeine Itching     Pt does not recall    Cymbalta [Duloxetine] Other (comments)     Head Pain and nausea    Daypro [Oxaprozin] Unknown (comments)     Pt does not recall    Prednisone Shortness of Breath     agitation    Sulfa (Sulfonamide Antibiotics) Unknown (comments)     Pt does not recall    Tetracycline Other (comments)     Low platelet count    Zithromax [Azithromycin] Nausea Only     Diarrhea, headaches      Prior to Admission medications    Medication Sig Start Date End Date Taking? Authorizing Provider   cetirizine (ZYRTEC) 10 mg tablet Take  by mouth. Yes Provider, Historical   nebivoloL (BYSTOLIC) 5 mg tablet TAKE 1 TABLET BY MOUTH TWICE DAILY. HOLD DOSE IF SYSTOLIC PRESSURE IS LESS THAN 100 OR PULSE IS LESS THAN 50 11/4/21  Yes Kalyani MORLEY MD   temazepam (RESTORIL) 15 mg capsule TAKE 1 TO 2 CAPSULES BY MOUTH EVERY NIGHT AT BEDTIME 10/22/21  Yes Shona Irvin MD   albuterol (PROVENTIL HFA, VENTOLIN HFA, PROAIR HFA) 90 mcg/actuation inhaler INHALE 2 PUFFS BY MOUTH EVERY 4 HOURS AS NEEDED 9/22/21  Yes Provider, Historical   benzonatate (TESSALON) 200 mg capsule TAKE 1 CAPSULE BY MOUTH THREE TIMES DAILY AS NEEDED 9/17/21  Yes Shona Irvin MD   acetaminophen (ARTHRITIS PAIN RELIEF) 650 mg TbER Take 650 mg by mouth every eight (8) hours as needed.    Yes Provider, Historical   levothyroxine (SYNTHROID) 50 mcg tablet TAKE 1/2 TABLET BY MOUTH EVERY MONDAY, [facility] AND FRIDAY 11/8/21   Kalyani Grover MD   famotidine (PEPCID) 20 mg tablet Take 20 mg by mouth daily. Patient not taking: Reported on 2021    Provider, Historical   cyanocobalamin, vitamin B-12, (VITAMIN B12 PO) Take 5,000 mcg by mouth every evening. Provider, Historical   vit A/vit C/vit E/zinc/copper (ICAPS AREDS PO) Take  by mouth two (2) times a day. Provider, Historical   cholecalciferol (Vitamin D3) (5000 Units/125 mcg) tab tablet Take 5,000 Units by mouth every evening. Provider, Historical   OTHER 1 Tablet every evening. Super C    Provider, Historical   butalbital-acetaminophen-caffeine (FIORICET, ESGIC) -40 mg per tablet TAKE 1 TABLET BY MOUTH EVERY 6 HOURS AS NEEDED FOR PAIN 10/25/21   Sylvia Velez MD   temazepam (RESTORIL) 15 mg capsule TAKE 1 TO 2 CAPSULES BY MOUTH EVERY NIGHT AT BEDTIME 10/22/21   Veronica Irvin MD   pravastatin (PRAVACHOL) 10 mg tablet TAKE 1 TABLET BY MOUTH EVERY NIGHT 20   Veronica Irvin MD   romiplostim (NPLATE SC) by SubCUTAneous route every fourteen (14) days. Due to     Provider, Historical             Objective:     Data Review (Labs):    No results for input(s): WBC, HGB, PLT, NA, K, CREA, BUN, INR, HGBEXT, PLTEXT, INREXT in the last 72 hours. Patient Vitals for the past 8 hrs:   BP Temp Pulse Resp SpO2 Weight   21 0652 (!) 149/68 98.5 °F (36.9 °C) 83 18 100 %    21 0615      76.6 kg (168 lb 14 oz)     Temp (24hrs), Av.5 °F (36.9 °C), Min:98.5 °F (36.9 °C), Max:98.5 °F (36.9 °C)      Intake and Output:   No intake/output data recorded. Assessment:     Active Problems:    * No active hospital problems. *        For robotic lymph node sampling    Plan: For cysto and ureteral catheters.     Signed By: Natalia Rosas MD                         2021

## 2021-11-11 NOTE — H&P
H&P    Assessment:   PELVIC LYMPHADENOPATHY    Body mass index is 31.91 kg/m². Plan:   ROBOTIC BIOPSY OF PELVIC LYMPH NODE (N/A Abdomen)  INSERTION OF BILATERAL URETERAL CATHETERS (Bilateral Ureter) Discussed the risk of surgery including bleeding, infection, injury to adjacent structures, and the risks of general anesthetic. The patient understands the risks; any and all questions were answered to the patient's satisfaction. The patient was counseled at length about the risks of patrice Covid-19 during their perioperative period and any recovery window from their procedure. The patient was made aware that patrice Covid-19  may worsen their prognosis for recovering from their procedure and lend to a higher morbidity and/or mortality risk. All material risks, benefits, and reasonable alternatives including postponing the procedure were discussed. The patient wishes to proceed with the procedure at this time. Subjective:      Lorenza Shelton is a 82 y.o. female who presents for above procedure. Objective:   Blood pressure (!) 160/89, pulse (!) 114, temperature 97.8 °F (36.6 °C), resp. rate 22, height 5' 1\" (1.549 m), weight 76.6 kg (168 lb 14 oz), SpO2 99 %.   Temp (24hrs), Av.2 °F (36.8 °C), Min:97.8 °F (36.6 °C), Max:98.5 °F (36.9 °C)      Physical Exam:  PHYSICAL EXAM:    Chest:   [x]   CTA bialterally, no wheezing/rhonchi/rales/crackles    []   wheezing     []   rhonchi     []   crackles     []   use of accessory muscles    Heart:  [x]   regular rate and rhythm     [x]   No murmurs/rubs/gallops    []   irregular rhythm     []   Murmur     []   Rubs     []   Gallops         Past Medical History:   Diagnosis Date    Arrhythmia     tachycardia; Dr. Mckeon Felt Autoimmune disease ([facility] [address]     fibromyalgia    Chronic ITP (idiopathic thrombocytopenia) (HCC)     Congestive heart failure (HCC)     Fibromyalgia     GERD (gastroesophageal reflux disease)     Headache(784.0)     migraine  Heart failure ([address]     Hiatal hernia     HTN (hypertension) 4/2/2010    Hyperlipidemia     Hypothyroidism 4/2/2010    Ill-defined condition     intermittent brochitis    Murmur     OA (osteoarthritis) 4/2/2010    Seizures ([address]     56 years ago with childbirth    Tachycardia 2013    Thromboembolus ([facility] [address]     blood clot on brain 56 years ago w childbirth    Thyroid disease     Valvular heart disease      Past Surgical History:   Procedure Laterality Date    Aliya Romano  2/5/2015         ENDOSCOPY, COLON, DIAGNOSTIC  12/2010    Dr. Catina Lu- repeat 5 yrs    HX BREAST BIOPSY Right 1999    more than one yrs ago all benign    HX CARPAL TUNNEL RELEASE  9/28/2012    HX CATARACT REMOVAL  9/2014    bilateral     HX HYSTERECTOMY  1977    HX OTHER SURGICAL      bone biopsy    HX REFRACTIVE SURGERY  07/2018      Family History   Problem Relation Age of Onset    Heart Disease Paternal Aunt     Heart Disease Paternal Uncle     Colon Cancer Maternal Grandmother     Other Mother         murder    Lung Disease Father         COPD    Other Son         scooby's disease and UC    Other Daughter         fibromyalgia    Thyroid Disease Daughter     Heart Disease Maternal Grandfather     Other Sister         Raynauds     Social History     Socioeconomic History    Marital status:    Tobacco Use    Smoking status: Never Smoker    Smokeless tobacco: Never Used   Substance and Sexual Activity    Alcohol use: No     Alcohol/week: 0.0 standard drinks    Drug use: No    Sexual activity: Not Currently      Prior to Admission medications    Medication Sig Start Date End Date Taking? Authorizing Provider   HYDROcodone-acetaminophen (Norco) 5-325 mg per tablet Take 1 Tablet by mouth every six (6) hours as needed for Pain for up to 5 days. Max Daily Amount: 4 Tablets.  11/11/21 11/16/21 Yes Shweta Link MD   polyethylene glycol [facility]) 17 gram/dose powder Take 17 g by mouth daily. 11/11/21  Yes Shweta Link MD   cetirizine (ZYRTEC) 10 mg tablet Take  by mouth. Yes Provider, Historical   nebivoloL (BYSTOLIC) 5 mg tablet TAKE 1 TABLET BY MOUTH TWICE DAILY. HOLD DOSE IF SYSTOLIC PRESSURE IS LESS THAN 100 OR PULSE IS LESS THAN 50 11/4/21  Yes Jerry MORLEY MD   temazepam (RESTORIL) 15 mg capsule TAKE 1 TO 2 CAPSULES BY MOUTH EVERY NIGHT AT BEDTIME 10/22/21  Yes Sanna Irvin MD   albuterol (PROVENTIL HFA, VENTOLIN HFA, PROAIR HFA) 90 mcg/actuation inhaler INHALE 2 PUFFS BY MOUTH EVERY 4 HOURS AS NEEDED 9/22/21  Yes Provider, Historical   benzonatate (TESSALON) 200 mg capsule TAKE 1 CAPSULE BY MOUTH THREE TIMES DAILY AS NEEDED 9/17/21  Yes Sanna Irvin MD   acetaminophen (ARTHRITIS PAIN RELIEF) 650 mg TbER Take 650 mg by mouth every eight (8) hours as needed. Yes Provider, Historical   levothyroxine (SYNTHROID) 50 mcg tablet TAKE 1/2 TABLET BY MOUTH EVERY MONDAY, [facility] AND FRIDAY 11/8/21   Jerry Karimi MD   famotidine (PEPCID) 20 mg tablet Take 20 mg by mouth daily. Patient not taking: Reported on 11/11/2021    Provider, Historical   cyanocobalamin, vitamin B-12, (VITAMIN B12 PO) Take 5,000 mcg by mouth every evening. Provider, Historical   vit A/vit C/vit E/zinc/copper (ICAPS AREDS PO) Take  by mouth two (2) times a day. Provider, Historical   cholecalciferol (Vitamin D3) (5000 Units/125 mcg) tab tablet Take 5,000 Units by mouth every evening. Provider, Historical   OTHER 1 Tablet every evening.  Super C    Provider, Historical   butalbital-acetaminophen-caffeine (FIORICET, ESGIC) -40 mg per tablet TAKE 1 TABLET BY MOUTH EVERY 6 HOURS AS NEEDED FOR PAIN 10/25/21   Jerry Karimi MD   temazepam (RESTORIL) 15 mg capsule TAKE 1 TO 2 CAPSULES BY MOUTH EVERY NIGHT AT BEDTIME 10/22/21   Jerry MORLEY MD   pravastatin (PRAVACHOL) 10 mg tablet TAKE 1 TABLET BY MOUTH EVERY NIGHT 12/30/20 Radu Acuna MD   romiplostim (NPLATE SC) by SubCUTAneous route every fourteen (14) days. Due to Nov 11    Provider, Historical     ALLERGIES:    Allergies   Allergen Reactions    Epinephrine Other (comments)     BP drops and pass out    Iodinated Contrast Media Shortness of Breath    Novocain [Procaine] Palpitations     BP drops,passes out    Codeine Itching     Pt does not recall    Cymbalta [Duloxetine] Other (comments)     Head Pain and nausea    Daypro [Oxaprozin] Unknown (comments)     Pt does not recall    Prednisone Shortness of Breath     agitation    Sulfa (Sulfonamide Antibiotics) Unknown (comments)     Pt does not recall    Tetracycline Other (comments)     Low platelet count    Zithromax [Azithromycin] Nausea Only     Diarrhea, headaches       Review of Systems:    See prior consult, office note or scanned list in media section. 10 systems negative except as specified.                 Signed By: Kait Raya MD     November 11, 2021

## 2021-11-11 NOTE — BRIEF OP NOTE
Brief Postoperative Note-UROLOGY    Patient: Mahnaz Arrington  YOB: 1935  MRN: [id number]    Date of Procedure: 11/11/2021     Pre-Op Diagnosis: PELVIC LYMPHADENOPATHY    Post-Op Diagnosis: Same as preoperative diagnosis. Procedure(s):    INSERTION OF BILATERAL URETERAL CATHETERS    Surgeon(s):  MD Constantine Sewell MD    Surgical Assistant: Surg Asst-1: Juliana Estevez RN    Anesthesia: General     Estimated Blood Loss (mL): Minimal    Complications: None    Specimens:   ID Type Source Tests Collected by Time Destination   1 : right iliac lymph node?  Frozen Section Lymph Node  Sebastian Gaspar MD 11/11/2021 4224 Pathology        Implants: * No implants in log *    Drains:   Orogastric Tube 11/11/21 (Active)       Findings: Bladder WNL    5 fr spiral ureteral catheters placed    RED=RIGHT    BLUE=LEFT  dictated    Electronically Signed by Elsi Hutson MD on 11/11/2021 at 9:29 AM

## 2021-11-11 NOTE — PERIOP NOTES
Richie Correia  1935  [id number]    Situation:  Verbal report given from: RN and CRNA  Procedure: Procedure(s):  ROBOTIC BIOPSY OF PELVIC LYMPH NODE  INSERTION OF BILATERAL URETERAL CATHETERS    Background:    Preoperative diagnosis: PELVIC LYMPHADENOPATHY    Postoperative diagnosis: PELVIC LYMPHADENOPATHY    :  Dr. Renan Medina    Assistant(s): Circ-1: Wendy Cadet  Circ-Intern: Melchor Mas RN  Scrub Tech-1: Marisol Call  Surg Asst-1: Geoff Faustin RN    Specimens:   ID Type Source Tests Collected by Time Destination   1 : right iliac lymph node? Frozen Section Lymph Node  Tonny Simental MD 11/11/2021 3921 Pathology   2 : right femoral lymph node Fresh Lymph Node  Tonny Simental MD 11/11/2021 5186 Pathology       Assessment:  Intra-procedure medications         Anesthesia gave intra-procedure sedation and medications, see anesthesia flow sheet     Intravenous fluids: LR@ KVO     Vital signs stable . Pt very restless wanting to get out of bed. Needs to void and would not understand can not get up and just removed petty.        Recommendation:    Permission to share finding with sister : yes

## 2021-11-11 NOTE — OP NOTES
[address]  OPERATIVE REPORT    Name:  Sanford Teran  MR#:  [id number]  :  1935  ACCOUNT #:  [de-identified]  DATE OF SERVICE:  2021    This is the urology portion of the case. PREOPERATIVE DIAGNOSIS:  Pelvic lymphadenopathy. POSTOPERATIVE DIAGNOSIS:  Pelvic lymphadenopathy. PROCEDURE PERFORMED:  Cystoscopy, bilateral ureteral catheter insertion. SURGEON:  David Torres MD    ASSISTANT:  None. ANESTHESIA:  General.    COMPLICATIONS:  None. SPECIMENS REMOVED:  None. IMPLANTS:  Ureteral catheters and Longo catheter. ESTIMATED BLOOD LOSS:  Minimal.    INDICATIONS:  The patient is an 70-year-old female. She is to undergo robotic pelvic lymph node sampling by Dr. Jed Israel. We are requested to place intraoperative ureteral catheters. PROCEDURE:  The patient was taken to the operating room and given general anesthesia and placed in a dorsal lithotomy position and prepped and draped in standard fashion. A 21-scope was inserted. Bladder was fully inspected. This was found to be normal.  A 5-Czech spiral ureteral catheters were placed, red on the right, blue on the left. Each was instilled with 2 mL of 4 mL of ICG contrast per request.  They were connected to a Longo catheter in a standard fashion. This concludes the urology portion of the case.         Jamar Gómez MD [email or web address]  D:  2021 9:33  T:  2021 13:19  JOB #:  [id number]

## 2021-11-18 ENCOUNTER — TRANSCRIBE ORDER (OUTPATIENT)
Dept: SCHEDULING | Age: 86
End: 2021-11-18

## 2021-11-18 DIAGNOSIS — Z12.31 VISIT FOR SCREENING MAMMOGRAM: Primary | ICD-10-CM

## 2021-12-09 DIAGNOSIS — I10 ESSENTIAL HYPERTENSION: Primary | ICD-10-CM

## 2021-12-09 RX ORDER — NEBIVOLOL 10 MG/1
10 TABLET ORAL DAILY
Qty: 90 TABLET | Refills: 3 | Status: SHIPPED | OUTPATIENT
Start: 2021-12-09 | End: 2021-12-28 | Stop reason: SDDI

## 2021-12-09 NOTE — TELEPHONE ENCOUNTER
Spoke with Maikel Franklin, at [facility], insurance will only cover Bystolic 5 mg 1 tab daily- patient takes 2 daily. Will change to 10mg tab. Called patient LM will be changing to Bystolic 10 mg 1 tab daily due to insurance will not cover Bystolic 5mg - 2 tabs daily. If any questions please call back.

## 2021-12-09 NOTE — TELEPHONE ENCOUNTER
----- Message from Nba Fragoso sent at 12/7/2021  1:54 PM EST -----  Subject: Message to Provider    QUESTIONS  Information for Provider? [facility] and needs a clinical   staff member to call her back in regards to a script, bystolic 5mg   tablets.   ---------------------------------------------------------------------------  --------------  CALL BACK INFO  What is the best way for the office to contact you? Do not leave any   message, patient will call back for answer  Preferred Call Back Phone Number? [phone]  ---------------------------------------------------------------------------  --------------  SCRIPT ANSWERS  Relationship to Patient?  Self

## 2021-12-28 ENCOUNTER — OFFICE VISIT (OUTPATIENT)
Dept: FAMILY MEDICINE CLINIC | Age: 86
End: 2021-12-28
Payer: MEDICARE

## 2021-12-28 VITALS
DIASTOLIC BLOOD PRESSURE: 72 MMHG | WEIGHT: 168.8 LBS | OXYGEN SATURATION: 98 % | SYSTOLIC BLOOD PRESSURE: 138 MMHG | TEMPERATURE: 97.3 F | HEIGHT: 61 IN | HEART RATE: 77 BPM | RESPIRATION RATE: 18 BRPM | BODY MASS INDEX: 31.87 KG/M2

## 2021-12-28 DIAGNOSIS — I10 ESSENTIAL HYPERTENSION: ICD-10-CM

## 2021-12-28 DIAGNOSIS — Z00.00 MEDICARE ANNUAL WELLNESS VISIT, SUBSEQUENT: Primary | ICD-10-CM

## 2021-12-28 DIAGNOSIS — Z86.69 HISTORY OF MIGRAINE: ICD-10-CM

## 2021-12-28 DIAGNOSIS — M79.7 FIBROMYALGIA: ICD-10-CM

## 2021-12-28 DIAGNOSIS — E78.2 MIXED HYPERLIPIDEMIA: ICD-10-CM

## 2021-12-28 DIAGNOSIS — M15.9 GENERALIZED OSTEOARTHRITIS: ICD-10-CM

## 2021-12-28 DIAGNOSIS — Z51.81 ENCOUNTER FOR MEDICATION MONITORING: ICD-10-CM

## 2021-12-28 DIAGNOSIS — D69.3 CHRONIC ITP (IDIOPATHIC THROMBOCYTOPENIA) (HCC): ICD-10-CM

## 2021-12-28 DIAGNOSIS — E03.4 HYPOTHYROIDISM DUE TO ACQUIRED ATROPHY OF THYROID: ICD-10-CM

## 2021-12-28 LAB
APPEARANCE UR: ABNORMAL
BACTERIA URNS QL MICRO: ABNORMAL /HPF
BILIRUB UR QL: NEGATIVE
CAOX CRY URNS QL MICRO: ABNORMAL
COLOR UR: ABNORMAL
EPITH CASTS URNS QL MICRO: ABNORMAL /LPF
GLUCOSE UR STRIP.AUTO-MCNC: NEGATIVE MG/DL
HGB UR QL STRIP: NEGATIVE
KETONES UR QL STRIP.AUTO: ABNORMAL MG/DL
LEUKOCYTE ESTERASE UR QL STRIP.AUTO: NEGATIVE
NITRITE UR QL STRIP.AUTO: NEGATIVE
PH UR STRIP: 5 [PH] (ref 5–8)
PROT UR STRIP-MCNC: NEGATIVE MG/DL
RBC #/AREA URNS HPF: ABNORMAL /HPF (ref 0–5)
SP GR UR REFRACTOMETRY: 1.02 (ref 1–1.03)
UA: UC IF INDICATED,UAUC: ABNORMAL
UROBILINOGEN UR QL STRIP.AUTO: 0.2 EU/DL (ref 0.2–1)
WBC URNS QL MICRO: ABNORMAL /HPF (ref 0–4)

## 2021-12-28 PROCEDURE — G8417 CALC BMI ABV UP PARAM F/U: HCPCS | Performed by: FAMILY MEDICINE

## 2021-12-28 PROCEDURE — G8510 SCR DEP NEG, NO PLAN REQD: HCPCS | Performed by: FAMILY MEDICINE

## 2021-12-28 PROCEDURE — G8427 DOCREV CUR MEDS BY ELIG CLIN: HCPCS | Performed by: FAMILY MEDICINE

## 2021-12-28 PROCEDURE — 1090F PRES/ABSN URINE INCON ASSESS: CPT | Performed by: FAMILY MEDICINE

## 2021-12-28 PROCEDURE — G8754 DIAS BP LESS 90: HCPCS | Performed by: FAMILY MEDICINE

## 2021-12-28 PROCEDURE — G0463 HOSPITAL OUTPT CLINIC VISIT: HCPCS | Performed by: FAMILY MEDICINE

## 2021-12-28 PROCEDURE — G0439 PPPS, SUBSEQ VISIT: HCPCS | Performed by: FAMILY MEDICINE

## 2021-12-28 PROCEDURE — 1101F PT FALLS ASSESS-DOCD LE1/YR: CPT | Performed by: FAMILY MEDICINE

## 2021-12-28 PROCEDURE — 99213 OFFICE O/P EST LOW 20 MIN: CPT | Performed by: FAMILY MEDICINE

## 2021-12-28 PROCEDURE — G8752 SYS BP LESS 140: HCPCS | Performed by: FAMILY MEDICINE

## 2021-12-28 PROCEDURE — G8536 NO DOC ELDER MAL SCRN: HCPCS | Performed by: FAMILY MEDICINE

## 2021-12-28 PROCEDURE — G8399 PT W/DXA RESULTS DOCUMENT: HCPCS | Performed by: FAMILY MEDICINE

## 2021-12-28 RX ORDER — NEBIVOLOL 10 MG/1
10 TABLET ORAL DAILY
Qty: 90 TABLET | Refills: 3 | Status: SHIPPED | OUTPATIENT
Start: 2021-12-28 | End: 2022-01-18 | Stop reason: SDUPTHER

## 2021-12-28 RX ORDER — NEBIVOLOL HYDROCHLORIDE 10 MG/1
10 TABLET ORAL DAILY
Qty: 90 TABLET | Refills: 3 | Status: SHIPPED | OUTPATIENT
Start: 2021-12-28 | End: 2022-02-18 | Stop reason: SDUPTHER

## 2021-12-28 NOTE — PROGRESS NOTES
HISTORY OF PRESENT ILLNESS  Tierra Ochoa is a 82 y.o. female. HPI   Follow up on chronic medical problems. Overall feeling well. Doing the precautionary measures at home to reduce risks of exposure COVID19. Also wearing mask when she is going out. No known sick contacts or known exposure to [address]. Cardiovascular Review:  The patient has hypertension, hyperlipidemia, and atrial tachycardia. Diet and Lifestyle: generally follows a low fat low cholesterol diet, generally follows a low sodium diet, exercises sporadically  Home BP Monitoring: is not measured at home. Pertinent ROS: taking medications as instructed, no medication side effects noted, no TIA's, no swelling of ankles, no palpitations. Denies chest pain. No pressure or chest tightness. Has upcoming cardiology appt for next month. Thyroid Review:  Patient is seen for followup of hypothyroidism. Followed by prateek. Thyroid ROS: denies weight changes, heat/cold intolerance, bowel/skin changes or CVS symptoms. Osteoarthritis and Chronic Pain:  Patient has osteoarthritis and fibromyalgia. Has aches and pain that comes and goes. On gabapentin which does help with some of the pain. Rheumatological ROS: Has burning and pain in the legs at feet that is worse at night. Aches all the time. Has not been exercising. Controlled by PRN meds. Response to treatment plan: stable. Headache:  Has chronic headache almost every days. She had follow up with neurology. Ordered MRI but was not able to get tests done d/t increase anxiety in the MRI scanner. Generally will go away if she take the Fioricet. Overall she believes that headaches are better since having a \"bad tooth\" pull and thinks that it was causing some of the headache pain. Also thinks that some of the headaches is related to her sinuses and allergies. Associated with light sensitivity, slight nausea but no vomiting, no fever/chills, no neck stiffness.   Denies any known trigger and denies any increase in stress. No focal sx. 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 Platelet counts have been overall stable. HM:  Mammogram 1/21/21  Colonoscopy 8/1/2016 by Dr. Vonnie Wheeler - no more colonoscopies unless she has a problem. 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 atrial tachycardia R00.0    Edema leg R60.0    Reactive airway disease without complication O54.926    Incidental lung nodule, greater than or equal to 8mm R91.1    Encounter for medication monitoring Z51.81    Preop cardiovascular exam Z01.810    Pelvic lymphadenopathy R59.0    Chronic ITP (idiopathic thrombocytopenia) ([facility]) D69.3    Hypercholesterolemia E78.00    Nonrheumatic mitral valve regurgitation I34.0    PSVT (paroxysmal supraventricular tachycardia) ([facility]) I47.1    Migraine with aura and without status migrainosus, not intractable G43. 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 Brand Bystolic only 90 Tablet 3    levothyroxine (SYNTHROID) 50 mcg tablet TAKE 1/2 TABLET BY MOUTH EVERY MONDAY, WEDNESDAY AND FRIDAY 30 Tablet 1    famotidine (PEPCID) 20 mg tablet Take 20 mg by mouth daily.  cetirizine (ZYRTEC) 10 mg tablet Take 10 mg by mouth daily.  vit A/vit C/vit E/zinc/copper (ICAPS AREDS PO) Take  by mouth two (2) times a day.       butalbital-acetaminophen-caffeine (FIORICET, ESGIC) -40 mg per tablet TAKE 1 TABLET BY MOUTH EVERY 6 HOURS AS NEEDED FOR PAIN 30 Tablet 1    temazepam (RESTORIL) 15 mg capsule TAKE 1 TO 2 CAPSULES BY MOUTH EVERY NIGHT AT BEDTIME 60 Capsule 1    albuterol (PROVENTIL HFA, VENTOLIN HFA, PROAIR HFA) 90 mcg/actuation inhaler INHALE 2 PUFFS BY MOUTH EVERY 4 HOURS AS NEEDED      benzonatate (TESSALON) 200 mg capsule TAKE 1 CAPSULE BY MOUTH THREE TIMES DAILY AS NEEDED 90 Capsule 1    pravastatin (PRAVACHOL) 10 mg tablet TAKE 1 TABLET BY MOUTH EVERY NIGHT 90 Tab 3    romiplostim (NPLATE SC) by SubCUTAneous route every fourteen (14) days. Due to Nov 11      acetaminophen (ARTHRITIS PAIN RELIEF) 650 mg TbER Take 650 mg by mouth every eight (8) hours as needed.          Allergies   Allergen Reactions    Epinephrine Other (comments)     BP drops and pass out    Iodinated Contrast Media Shortness of Breath    Novocain [Procaine] Palpitations     BP drops,passes out    Codeine Itching     Pt does not recall    Cymbalta [Duloxetine] Other (comments)     Head Pain and nausea    Daypro [Oxaprozin] Unknown (comments)     Pt does not recall    Prednisone Shortness of Breath     agitation    Sulfa (Sulfonamide Antibiotics) Unknown (comments)     Pt does not recall    Tetracycline Other (comments)     Low platelet count    Zithromax [Azithromycin] Nausea Only     Diarrhea, headaches       Past Medical History:   Diagnosis Date    Arrhythmia     tachycardia; Dr. Naheed Lenz Autoimmune disease ([facility] [address]     fibromyalgia    Chronic ITP (idiopathic thrombocytopenia) (HCC)     Congestive heart failure (HCC)     Fibromyalgia     GERD (gastroesophageal reflux disease)     Headache(784.0)     migraine    Heart failure (HCC)     Hiatal hernia     HTN (hypertension) 4/2/2010    Hyperlipidemia     Hypothyroidism 4/2/2010    Ill-defined condition     intermittent brochitis    Murmur     OA (osteoarthritis) 4/2/2010  Seizures ([address]     56 years ago with childbirth    Tachycardia 2013    Thromboembolus ([facility] [address]     blood clot on brain 56 years ago w childbirth    Thyroid disease     Valvular heart disease        Past Surgical History:   Procedure Laterality Date    Lynn Bump  2/5/2015         ENDOSCOPY, COLON, DIAGNOSTIC  12/2010    Dr. Jose Lindo- repeat 5 yrs    HX BREAST BIOPSY Right 1999    more than one yrs ago all benign    HX CARPAL TUNNEL RELEASE  9/28/2012    HX CATARACT REMOVAL  9/2014    bilateral     HX HYSTERECTOMY  1977    HX OTHER SURGICAL      bone biopsy    HX REFRACTIVE SURGERY  07/2018    HX UROLOGICAL  11/11/2021    Cystoscopy, bilateral ureteral catheter insertion.  [place] ABDOMEN SURGERY PROC UNLISTED  11/11/2021    Cystoscopy, bilateral ureteral catheter insertion.        Family History   Problem Relation Age of Onset    Heart Disease Paternal Aunt     Heart Disease Paternal Uncle     Colon Cancer Maternal Grandmother     Other Mother         murder    Lung Disease Father         COPD    Other Son         scooby's disease and UC    Other Daughter         fibromyalgia    Thyroid Disease Daughter     Heart Disease Maternal Grandfather     Other Sister         Raynauds       Social History     Tobacco Use    Smoking status: Never Smoker    Smokeless tobacco: Never Used   Substance Use Topics    Alcohol use: No     Alcohol/week: 0.0 standard drinks        Lab Results   Component Value Date/Time    WBC 3.6 11/05/2021 01:15 PM    HGB 11.2 (L) 11/05/2021 01:15 PM    Hemoglobin (POC) 11.6 07/14/2017 07:13 AM    HCT 36.4 11/05/2021 01:15 PM    Hematocrit (POC) 34 (L) 07/14/2017 07:13 AM    PLATELET 250 56/22/7707 01:15 PM    MCV 94.5 11/05/2021 01:15 PM     Lab Results   Component Value Date/Time    Cholesterol, total 188 07/27/2021 02:25 PM    HDL Cholesterol 67 07/27/2021 02:25 PM    LDL, calculated 85.6 07/27/2021 02:25 PM    Triglyceride 177 (H) 07/27/2021 02:25 PM CHOL/HDL Ratio 2.8 07/27/2021 02:25 PM     Lab Results   Component Value Date/Time    TSH 0.438 (L) 07/28/2020 03:39 PM    T4, Free 1.18 10/11/2016 03:41 PM      Lab Results   Component Value Date/Time    Sodium 139 11/05/2021 01:15 PM    Potassium 4.5 11/05/2021 01:15 PM    Chloride 106 11/05/2021 01:15 PM    CO2 27 11/05/2021 01:15 PM    Anion gap 6 11/05/2021 01:15 PM    Glucose 90 11/05/2021 01:15 PM    BUN 21 (H) 11/05/2021 01:15 PM    Creatinine 0.89 11/05/2021 01:15 PM    BUN/Creatinine ratio 24 (H) 11/05/2021 01:15 PM    GFR est AA >60 11/05/2021 01:15 PM    GFR est non-AA >60 11/05/2021 01:15 PM    Calcium 9.6 11/05/2021 01:15 PM    Bilirubin, total 0.3 07/27/2021 02:25 PM    ALT (SGPT) 25 07/27/2021 02:25 PM    Alk. phosphatase 111 07/27/2021 02:25 PM    Protein, total 7.3 07/27/2021 02:25 PM    Albumin 4.4 07/27/2021 02:25 PM    Globulin 2.9 07/27/2021 02:25 PM    A-G Ratio 1.5 07/27/2021 02:25 PM      Lab Results   Component Value Date/Time    Hemoglobin A1c 5.9 (H) 08/28/2012 04:22 PM    Hemoglobin A1c (POC) 5.7 11/10/2015 03:16 PM    Hemoglobin A1c, External 11.1 07/22/2015 12:00 AM         Review of Systems   Constitutional: Negative for malaise/fatigue. HENT: Negative for congestion. Eyes: Negative for blurred vision. Respiratory: Negative for cough and shortness of breath. Cardiovascular: Negative for chest pain, palpitations and leg swelling. Gastrointestinal: Negative for abdominal pain, constipation and heartburn. Genitourinary: Negative for dysuria, frequency and urgency. Musculoskeletal: Negative for back pain and joint pain. Neurological: Positive for headaches (daily). Negative for dizziness and tingling. Endo/Heme/Allergies: Negative for environmental allergies. Psychiatric/Behavioral: Negative for depression. The patient does not have insomnia. Physical Exam  Vitals and nursing note reviewed. Constitutional:       Appearance: Normal appearance.  She is well-developed. Comments: /72 (BP 1 Location: Left arm, BP Patient Position: Sitting)   Pulse 77   Temp 97.3 °F (36.3 °C) (Oral)   Resp 18   Ht 5' 1\" (1.549 m)   Wt 168 lb 12.8 oz (76.6 kg)   SpO2 98%   BMI 31.89 kg/m²    HENT:      Right Ear: Tympanic membrane and ear canal normal.      Left Ear: Tympanic membrane and ear canal normal.      Nose: No mucosal edema. Neck:      Thyroid: No thyromegaly. Vascular: No carotid bruit. Cardiovascular:      Rate and Rhythm: Normal rate and regular rhythm. Pulses: Normal pulses. Heart sounds: Normal heart sounds. No gallop. Pulmonary:      Effort: Pulmonary effort is normal.      Breath sounds: Normal breath sounds. Chest:   Breasts:      Right: Normal. No axillary adenopathy or supraclavicular adenopathy. Left: Normal. No axillary adenopathy or supraclavicular adenopathy. Abdominal:      General: Bowel sounds are normal.      Palpations: Abdomen is soft. There is no mass. Tenderness: There is no abdominal tenderness. Genitourinary:     Comments: Declined rectal exam.   Musculoskeletal:         General: No swelling. Normal range of motion. Cervical back: Normal range of motion and neck supple. Right lower leg: No edema. Left lower leg: No edema. Lymphadenopathy:      Cervical: No cervical adenopathy. Upper Body:      Right upper body: No supraclavicular, axillary or pectoral adenopathy. Left upper body: No supraclavicular, axillary or pectoral adenopathy. Skin:     General: Skin is warm and dry. Neurological:      General: No focal deficit present. Mental Status: She is alert and oriented to person, place, and time. Psychiatric:         Mood and Affect: Mood normal.         ASSESSMENT and PLAN  Diagnoses and all orders for this visit:    1. Medicare annual wellness visit, subsequent    2.  Essential hypertension  Discussed sodium restriction, high k rich diet, maintaining ideal body weight and regular exercise program such as daily walking 30 min perday 4-5 times per week, as physiologic means to achieve blood pressure control. Medication compliance advised. -     Refill nebivoloL (BYSTOLIC) 10 mg tablet; Take 1 Tablet by mouth daily. Brand Bystolic only    3. Mixed hyperlipidemia  Continue to monitor. Work on diet and exercise. 4. History of migraine  -     REFERRAL TO NEUROLOGY    5. Hypothyroidism due to acquired atrophy of thyroid  As per endo    6. Fibromyalgia//  7. Generalized osteoarthritis  Stable     8. Chronic ITP (idiopathic thrombocytopenia) (HCC)  As per hematology    9. Encounter for medication monitoring  -     URINALYSIS W/ REFLEX CULTURE; Future      Follow-up and Dispositions    · Return in about 6 months (around 6/28/2022). reviewed diet, exercise and weight control  cardiovascular risk and specific lipid/LDL goals reviewed  reviewed medications and side effects in detail    I have discussed diagnosis listed in this note with pt and/or family. I have discussed treatment plans and options and the risk/benefit analysis of those options, including safe use of medications and possible medication side effects. Through the use of shared decision making we have agreed to the above plan. The patient has received an after-visit summary and questions were answered concerning future plans and follow up. Advise pt of any urgent changes then to proceed to the ER.

## 2021-12-28 NOTE — PROGRESS NOTES
This is a Subsequent Medicare Annual Wellness Exam (AWV) (Performed 12 months after IPPE or effective date of Medicare Part B enrollment)    I have reviewed the patient's medical history in detail and updated the computerized patient record. History     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 atrial tachycardia R00.0    Edema leg R60.0    Reactive airway disease without complication Z58.930    Incidental lung nodule, greater than or equal to 8mm R91.1    Encounter for medication monitoring Z51.81    Preop cardiovascular exam Z01.810    Pelvic lymphadenopathy R59.0    Chronic ITP (idiopathic thrombocytopenia) (HCC) D69.3    Hypercholesterolemia E78.00    Nonrheumatic mitral valve regurgitation I34.0    PSVT (paroxysmal supraventricular tachycardia) ([facility]) I47.1    Migraine with aura and without status migrainosus, not intractable G43. 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 Brand Bystolic only 90 Tablet 3    levothyroxine (SYNTHROID) 50 mcg tablet TAKE 1/2 TABLET BY MOUTH EVERY MONDAY, WEDNESDAY AND FRIDAY 30 Tablet 1    famotidine (PEPCID) 20 mg tablet Take 20 mg by mouth daily.  cetirizine (ZYRTEC) 10 mg tablet Take 10 mg by mouth daily.  vit A/vit C/vit E/zinc/copper (ICAPS AREDS PO) Take  by mouth two (2) times a day.       butalbital-acetaminophen-caffeine (FIORICET, ESGIC) -40 mg per tablet TAKE 1 TABLET BY MOUTH EVERY 6 HOURS AS NEEDED FOR PAIN 30 Tablet 1    temazepam (RESTORIL) 15 mg capsule TAKE 1 TO 2 CAPSULES BY MOUTH EVERY NIGHT AT BEDTIME 60 Capsule 1    albuterol (PROVENTIL HFA, VENTOLIN HFA, PROAIR HFA) 90 mcg/actuation inhaler INHALE 2 PUFFS BY MOUTH EVERY 4 HOURS AS NEEDED      benzonatate (TESSALON) 200 mg capsule TAKE 1 CAPSULE BY MOUTH THREE TIMES DAILY AS NEEDED 90 Capsule 1    pravastatin (PRAVACHOL) 10 mg tablet TAKE 1 TABLET BY MOUTH EVERY NIGHT 90 Tab 3    romiplostim (NPLATE SC) by SubCUTAneous route every fourteen (14) days. Due to Nov 11      acetaminophen (ARTHRITIS PAIN RELIEF) 650 mg TbER Take 650 mg by mouth every eight (8) hours as needed.          Allergies   Allergen Reactions    Epinephrine Other (comments)     BP drops and pass out    Iodinated Contrast Media Shortness of Breath    Novocain [Procaine] Palpitations     BP drops,passes out    Codeine Itching     Pt does not recall    Cymbalta [Duloxetine] Other (comments)     Head Pain and nausea    Daypro [Oxaprozin] Unknown (comments)     Pt does not recall    Prednisone Shortness of Breath     agitation    Sulfa (Sulfonamide Antibiotics) Unknown (comments)     Pt does not recall    Tetracycline Other (comments)     Low platelet count    Zithromax [Azithromycin] Nausea Only     Diarrhea, headaches       Past Medical History:   Diagnosis Date    Arrhythmia     tachycardia; Dr. Lin Austen Autoimmune disease ([facility] [address]     fibromyalgia    Chronic ITP (idiopathic thrombocytopenia) (HCC)     Congestive heart failure (HCC)     Fibromyalgia     GERD (gastroesophageal reflux disease)     Headache(784.0)     migraine    Heart failure (HCC)     Hiatal hernia     HTN (hypertension) 4/2/2010    Hyperlipidemia     Hypothyroidism 4/2/2010    Ill-defined condition     intermittent brochitis    Murmur     OA (osteoarthritis) 4/2/2010  Seizures ([address]     56 years ago with childbirth    Tachycardia 2013    Thromboembolus ([facility] [address]     blood clot on brain 56 years ago w childbirth    Thyroid disease     Valvular heart disease        Past Surgical History:   Procedure Laterality Date    John Osorio  2/5/2015         ENDOSCOPY, COLON, DIAGNOSTIC  12/2010    Dr. Gelacio Gore- repeat 5 yrs    HX BREAST BIOPSY Right 1999    more than one yrs ago all benign    HX CARPAL TUNNEL RELEASE  9/28/2012    HX CATARACT REMOVAL  9/2014    bilateral     HX HYSTERECTOMY  1977    HX OTHER SURGICAL      bone biopsy    HX REFRACTIVE SURGERY  07/2018    HX UROLOGICAL  11/11/2021    Cystoscopy, bilateral ureteral catheter insertion.  [place] ABDOMEN SURGERY PROC UNLISTED  11/11/2021    Cystoscopy, bilateral ureteral catheter insertion. Family History   Problem Relation Age of Onset    Heart Disease Paternal Aunt     Heart Disease Paternal Uncle     Colon Cancer Maternal Grandmother     Other Mother         murder    Lung Disease Father         COPD    Other Son         scooby's disease and UC    Other Daughter         fibromyalgia    Thyroid Disease Daughter     Heart Disease Maternal Grandfather     Other Sister         Raynauds       Social History     Tobacco Use    Smoking status: Never Smoker    Smokeless tobacco: Never Used   Substance Use Topics    Alcohol use: No     Alcohol/week: 0.0 standard drinks         Depression Risk Factor Screening:     PHQ over the last two weeks    Little interest or pleasure in doing things Not at all   Feeling down, depressed or hopeless Not at all   Total Score PHQ 2 0     Alcohol Risk Factor Screening: You do not drink alcohol or very rarely. Functional Ability and Level of Safety:   Hearing Loss  Hearing is good. Activities of Daily Living  The home contains: discussed safety equipment.   Patient does total self care    Fall RiskFall Risk Assessment, last 12 mths    Able to walk? Yes   Fall in past 12 months? No     Functional Ability:   Does the patient exhibit a steady gait? yes    How long did it take the patient to get up and walk from a sitting position? seconds    Is the patient self reliant? (ie can do own laundry, meals, household chores)  yes   Does the patient handle his/her own medications? yes   Does the patient handle his/her own money? yes   Is the patients home safe (ie good lighting, handrails on stairs and bath, etc.)? yes   Did you notice or did patient express any hearing difficulties? no   Did you notice or did patient express any vision difficulties? no        Advance Care Planning:   Patient was offered the opportunity to discuss advance care planning:  yes    Does patient have an Advance Directive:  yes        Abuse Screen  Patient is not abused    Cognitive Screening   Evaluation of Cognitive Function:  Has your family/caregiver stated any concerns about your memory: no      Patient Care Team   Patient Care Team:  Isaac Acevedo MD as PCP - General    Assessment/Plan   Education and counseling provided:  Are appropriate based on today's review and evaluation  End-of-Life planning (with patient's consent)    ASSESSMENT and PLAN    Medicare Annual Wellness  Continue current treatment plan. Continue annual follow up. I have discussed diagnosis listed in this note with pt and/or family. I have discussed treatment plans and options and the risk/benefit analysis of those options, including safe use of medications and possible medication side effects. Through the use of shared decision making we have agreed to the above plan. The patient has received an after-visit summary and questions were answered concerning future plans and follow up. Advise pt of any urgent changes then to proceed to the ER.

## 2021-12-28 NOTE — PROGRESS NOTES
Chief Complaint   Patient presents with    Annual Wellness Visit           Mammogram 1/21/2021    Colonoscopy 8/1/2016 by Dr. Alicja Ocasio - no more colonoscopies unless she has a problem. 1. Have you been to the ER, urgent care clinic since your last visit? Hospitalized since your last visit? Yes 11/11/2021 cystoscopy    2. Have you seen or consulted any other health care providers outside of the [address] [place] [address] since your last visit? Include any pap smears or colon screening.  No

## 2022-01-18 DIAGNOSIS — I10 ESSENTIAL HYPERTENSION: ICD-10-CM

## 2022-01-18 RX ORDER — NEBIVOLOL 10 MG/1
10 TABLET ORAL DAILY
Qty: 90 TABLET | Refills: 3 | Status: SHIPPED | OUTPATIENT
Start: 2022-01-18

## 2022-01-18 NOTE — TELEPHONE ENCOUNTER
Spoke with patient and states her Bystolic she spoke to her insurance company [employer] has to on the prescription so she can it the medication for $12. Patient sates she was told the number 5 after [employer] is a code so she can get the medication cheaper.

## 2022-01-24 RX ORDER — PRAVASTATIN SODIUM 10 MG/1
TABLET ORAL
Qty: 90 TABLET | Refills: 3 | Status: SHIPPED | OUTPATIENT
Start: 2022-01-24

## 2022-01-26 ENCOUNTER — HOSPITAL ENCOUNTER (OUTPATIENT)
Dept: MAMMOGRAPHY | Age: 87
Discharge: HOME | End: 2022-01-26
Attending: FAMILY MEDICINE
Payer: MEDICARE

## 2022-01-26 DIAGNOSIS — Z12.31 VISIT FOR SCREENING MAMMOGRAM: ICD-10-CM

## 2022-01-26 PROCEDURE — 77067 SCR MAMMO BI INCL CAD: CPT

## 2022-02-16 DIAGNOSIS — F51.01 PRIMARY INSOMNIA: ICD-10-CM

## 2022-02-17 RX ORDER — TEMAZEPAM 15 MG/1
CAPSULE ORAL
Qty: 30 CAPSULE | Refills: 1 | Status: SHIPPED | OUTPATIENT
Start: 2022-02-17 | End: 2022-04-08 | Stop reason: SDUPTHER

## 2022-02-18 DIAGNOSIS — G40.209 COMPLEX PARTIAL SEIZURES EVOLVING TO GENERALIZED TONIC-CLONIC SEIZURES (HCC): ICD-10-CM

## 2022-02-18 DIAGNOSIS — G43.109 MIGRAINE WITH AURA AND WITHOUT STATUS MIGRAINOSUS, NOT INTRACTABLE: ICD-10-CM

## 2022-02-18 DIAGNOSIS — Z86.69 HISTORY OF MIGRAINE: ICD-10-CM

## 2022-02-18 DIAGNOSIS — G44.209 TENSION VASCULAR HEADACHE: ICD-10-CM

## 2022-02-18 DIAGNOSIS — I10 ESSENTIAL HYPERTENSION: Primary | ICD-10-CM

## 2022-02-18 RX ORDER — BUTALBITAL, ACETAMINOPHEN AND CAFFEINE 50; 325; 40 MG/1; MG/1; MG/1
TABLET ORAL
Qty: 30 TABLET | Refills: 1 | Status: SHIPPED | OUTPATIENT
Start: 2022-02-18 | End: 2022-04-08

## 2022-02-18 RX ORDER — NEBIVOLOL HYDROCHLORIDE 10 MG/1
10 TABLET ORAL DAILY
Qty: 90 TABLET | Refills: 3 | Status: SHIPPED | OUTPATIENT
Start: 2022-02-18 | End: 2022-06-28 | Stop reason: ALTCHOICE

## 2022-02-18 NOTE — TELEPHONE ENCOUNTER
Message from Kash:   I talked with Kamlesh regarding the price of the Bystolic. They stated you need to write it as [employer] 9 which means the Bystolic can be obtained either generic or brand and then also there will be a big difference in price. So I want the name brand. Also when you do this the price for my Bystolic would be 33.51 instead of 89.24. I also would like refills if possible. Secondly I brought a paper to your office from [facility] regarding the Butalb the other day. I would like refills on this also. Thank you for your time with this matter. Any questions please call [phone].

## 2022-03-18 PROBLEM — G44.86 CERVICOGENIC HEADACHE: Status: ACTIVE | Noted: 2020-10-27

## 2022-03-18 PROBLEM — I34.0 NONRHEUMATIC MITRAL VALVE REGURGITATION: Status: ACTIVE | Noted: 2020-08-12

## 2022-03-18 PROBLEM — R59.0 PELVIC LYMPHADENOPATHY: Status: ACTIVE | Noted: 2017-07-14

## 2022-03-18 PROBLEM — Z86.2 HISTORY OF ITP: Status: ACTIVE | Noted: 2020-10-27

## 2022-03-19 PROBLEM — Z01.810 PREOP CARDIOVASCULAR EXAM: Status: ACTIVE | Noted: 2017-06-07

## 2022-03-19 PROBLEM — G44.209 TENSION VASCULAR HEADACHE: Status: ACTIVE | Noted: 2020-10-27

## 2022-03-19 PROBLEM — I47.1 PSVT (PAROXYSMAL SUPRAVENTRICULAR TACHYCARDIA) (HCC): Status: ACTIVE | Noted: 2020-08-12

## 2022-03-19 PROBLEM — G43.109 MIGRAINE WITH AURA AND WITHOUT STATUS MIGRAINOSUS, NOT INTRACTABLE: Status: ACTIVE | Noted: 2020-10-27

## 2022-03-19 PROBLEM — G40.209 COMPLEX PARTIAL SEIZURES EVOLVING TO GENERALIZED TONIC-CLONIC SEIZURES (HCC): Status: ACTIVE | Noted: 2020-10-27

## 2022-03-19 PROBLEM — E78.00 HYPERCHOLESTEROLEMIA: Status: ACTIVE | Noted: 2019-04-11

## 2022-03-19 PROBLEM — I47.10 PSVT (PAROXYSMAL SUPRAVENTRICULAR TACHYCARDIA): Status: ACTIVE | Noted: 2020-08-12

## 2022-03-29 ENCOUNTER — TELEPHONE (OUTPATIENT)
Dept: FAMILY MEDICINE CLINIC | Age: 87
End: 2022-03-29

## 2022-03-29 NOTE — TELEPHONE ENCOUNTER
Rep calling from [facility] - states patient needs prior auth for 2 medications    Re: butalbital/antihistamine - caffeine   tamazepam     She said to go on the Cover my meds site or you can fax         Best number to reach them is [phone]  Fax [phone]

## 2022-04-08 DIAGNOSIS — G43.109 MIGRAINE WITH AURA AND WITHOUT STATUS MIGRAINOSUS, NOT INTRACTABLE: ICD-10-CM

## 2022-04-08 DIAGNOSIS — G40.209 COMPLEX PARTIAL SEIZURES EVOLVING TO GENERALIZED TONIC-CLONIC SEIZURES (HCC): ICD-10-CM

## 2022-04-08 DIAGNOSIS — G44.209 TENSION VASCULAR HEADACHE: ICD-10-CM

## 2022-04-08 DIAGNOSIS — Z86.69 HISTORY OF MIGRAINE: ICD-10-CM

## 2022-04-08 DIAGNOSIS — F51.01 PRIMARY INSOMNIA: ICD-10-CM

## 2022-04-08 RX ORDER — BUTALBITAL, ACETAMINOPHEN AND CAFFEINE 50; 325; 40 MG/1; MG/1; MG/1
TABLET ORAL
Qty: 30 TABLET | Refills: 1 | Status: CANCELLED | OUTPATIENT
Start: 2022-04-08

## 2022-04-08 RX ORDER — TEMAZEPAM 15 MG/1
CAPSULE ORAL
Qty: 30 CAPSULE | Refills: 1 | OUTPATIENT
Start: 2022-04-08

## 2022-04-08 RX ORDER — BUTALBITAL, ACETAMINOPHEN AND CAFFEINE 50; 325; 40 MG/1; MG/1; MG/1
TABLET ORAL
Qty: 30 TABLET | Refills: 1 | Status: SHIPPED | OUTPATIENT
Start: 2022-04-08 | End: 2022-06-07

## 2022-04-11 RX ORDER — TEMAZEPAM 15 MG/1
CAPSULE ORAL
Qty: 30 CAPSULE | Refills: 1 | Status: SHIPPED | OUTPATIENT
Start: 2022-04-11 | End: 2022-06-07

## 2022-04-15 ENCOUNTER — PATIENT MESSAGE (OUTPATIENT)
Dept: FAMILY MEDICINE CLINIC | Age: 87
End: 2022-04-15

## 2022-06-07 DIAGNOSIS — Z86.69 HISTORY OF MIGRAINE: ICD-10-CM

## 2022-06-07 DIAGNOSIS — F51.01 PRIMARY INSOMNIA: ICD-10-CM

## 2022-06-07 DIAGNOSIS — G44.209 TENSION VASCULAR HEADACHE: ICD-10-CM

## 2022-06-07 DIAGNOSIS — G40.209 COMPLEX PARTIAL SEIZURES EVOLVING TO GENERALIZED TONIC-CLONIC SEIZURES (HCC): ICD-10-CM

## 2022-06-07 DIAGNOSIS — G43.109 MIGRAINE WITH AURA AND WITHOUT STATUS MIGRAINOSUS, NOT INTRACTABLE: ICD-10-CM

## 2022-06-07 RX ORDER — BUTALBITAL, ACETAMINOPHEN AND CAFFEINE 50; 325; 40 MG/1; MG/1; MG/1
TABLET ORAL
Qty: 30 TABLET | Refills: 1 | Status: SHIPPED | OUTPATIENT
Start: 2022-06-07 | End: 2022-07-26

## 2022-06-07 RX ORDER — TEMAZEPAM 15 MG/1
CAPSULE ORAL
Qty: 30 CAPSULE | Refills: 1 | Status: SHIPPED | OUTPATIENT
Start: 2022-06-10 | End: 2022-06-08 | Stop reason: DRUGHIGH

## 2022-06-08 DIAGNOSIS — F51.01 PRIMARY INSOMNIA: Primary | ICD-10-CM

## 2022-06-08 RX ORDER — TEMAZEPAM 30 MG/1
30 CAPSULE ORAL
Qty: 30 CAPSULE | Refills: 1 | Status: SHIPPED | OUTPATIENT
Start: 2022-06-08 | End: 2022-06-28 | Stop reason: SDUPTHER

## 2022-06-08 NOTE — TELEPHONE ENCOUNTER
[facility] sent fax stating that Temazepam 15 mg 2 times a day is not covered by patients insurance. Insurance will only cover 1 capsule per day. Last visit:12/28/21  Next visit:6/28/22  Previous refill 6/10/22(30+1R)    Requested Prescriptions     Pending Prescriptions Disp Refills    temazepam (RESTORIL) 30 mg capsule 30 Capsule 1     Sig: Take 1 Capsule by mouth nightly as needed for Sleep. Max Daily Amount: 30 mg. Take a 1/2 capsule to 1 capsule by mouth nightly as needed for sleep. Please review  before prescribing new script for this medication.      Thanks,  Jose Armando CardonaFormerly [facility] Only     Intervention Detail: New Rx: 1, reason: Patient Preference   Time Spent (min): 5

## 2022-06-28 ENCOUNTER — OFFICE VISIT (OUTPATIENT)
Dept: FAMILY MEDICINE CLINIC | Age: 87
End: 2022-06-28
Payer: MEDICARE

## 2022-06-28 VITALS
HEART RATE: 84 BPM | WEIGHT: 170.6 LBS | OXYGEN SATURATION: 98 % | DIASTOLIC BLOOD PRESSURE: 56 MMHG | RESPIRATION RATE: 16 BRPM | HEIGHT: 61 IN | TEMPERATURE: 97.3 F | BODY MASS INDEX: 32.21 KG/M2 | SYSTOLIC BLOOD PRESSURE: 130 MMHG

## 2022-06-28 DIAGNOSIS — E03.4 HYPOTHYROIDISM DUE TO ACQUIRED ATROPHY OF THYROID: ICD-10-CM

## 2022-06-28 DIAGNOSIS — D69.3 CHRONIC ITP (IDIOPATHIC THROMBOCYTOPENIA) (HCC): ICD-10-CM

## 2022-06-28 DIAGNOSIS — M15.9 GENERALIZED OSTEOARTHRITIS: ICD-10-CM

## 2022-06-28 DIAGNOSIS — M79.7 FIBROMYALGIA: ICD-10-CM

## 2022-06-28 DIAGNOSIS — Z51.81 ENCOUNTER FOR MEDICATION MONITORING: ICD-10-CM

## 2022-06-28 DIAGNOSIS — I10 ESSENTIAL HYPERTENSION: Primary | ICD-10-CM

## 2022-06-28 DIAGNOSIS — Z86.69 HISTORY OF MIGRAINE: ICD-10-CM

## 2022-06-28 DIAGNOSIS — E78.2 MIXED HYPERLIPIDEMIA: ICD-10-CM

## 2022-06-28 DIAGNOSIS — F51.01 PRIMARY INSOMNIA: ICD-10-CM

## 2022-06-28 PROBLEM — I47.1 PSVT (PAROXYSMAL SUPRAVENTRICULAR TACHYCARDIA) (HCC): Status: RESOLVED | Noted: 2020-08-12 | Resolved: 2022-06-28

## 2022-06-28 PROBLEM — I47.10 PSVT (PAROXYSMAL SUPRAVENTRICULAR TACHYCARDIA): Status: RESOLVED | Noted: 2020-08-12 | Resolved: 2022-06-28

## 2022-06-28 PROBLEM — G40.209 COMPLEX PARTIAL SEIZURES EVOLVING TO GENERALIZED TONIC-CLONIC SEIZURES (HCC): Status: RESOLVED | Noted: 2020-10-27 | Resolved: 2022-06-28

## 2022-06-28 PROCEDURE — 99214 OFFICE O/P EST MOD 30 MIN: CPT | Performed by: FAMILY MEDICINE

## 2022-06-28 PROCEDURE — 1123F ACP DISCUSS/DSCN MKR DOCD: CPT | Performed by: FAMILY MEDICINE

## 2022-06-28 PROCEDURE — 1101F PT FALLS ASSESS-DOCD LE1/YR: CPT | Performed by: FAMILY MEDICINE

## 2022-06-28 PROCEDURE — G8754 DIAS BP LESS 90: HCPCS | Performed by: FAMILY MEDICINE

## 2022-06-28 PROCEDURE — G8536 NO DOC ELDER MAL SCRN: HCPCS | Performed by: FAMILY MEDICINE

## 2022-06-28 PROCEDURE — G8510 SCR DEP NEG, NO PLAN REQD: HCPCS | Performed by: FAMILY MEDICINE

## 2022-06-28 PROCEDURE — G8417 CALC BMI ABV UP PARAM F/U: HCPCS | Performed by: FAMILY MEDICINE

## 2022-06-28 PROCEDURE — G8752 SYS BP LESS 140: HCPCS | Performed by: FAMILY MEDICINE

## 2022-06-28 PROCEDURE — G0463 HOSPITAL OUTPT CLINIC VISIT: HCPCS | Performed by: FAMILY MEDICINE

## 2022-06-28 PROCEDURE — G8399 PT W/DXA RESULTS DOCUMENT: HCPCS | Performed by: FAMILY MEDICINE

## 2022-06-28 PROCEDURE — 1090F PRES/ABSN URINE INCON ASSESS: CPT | Performed by: FAMILY MEDICINE

## 2022-06-28 PROCEDURE — G8427 DOCREV CUR MEDS BY ELIG CLIN: HCPCS | Performed by: FAMILY MEDICINE

## 2022-06-28 RX ORDER — TEMAZEPAM 30 MG/1
30 CAPSULE ORAL
Qty: 30 CAPSULE | Refills: 1 | COMMUNITY
Start: 2022-06-28 | End: 2022-08-06

## 2022-06-28 NOTE — PROGRESS NOTES
Room 6     Identified pt with two pt identifiers(name and ). Reviewed record in preparation for visit and have obtained necessary documentation. All patient medications has been reviewed. Chief Complaint   Patient presents with    Hypertension    Cholesterol Problem       3 most recent PHQ Screens 2022   Little interest or pleasure in doing things Not at all   Feeling down, depressed, irritable, or hopeless Not at all   Total Score PHQ 2 0     Abuse Screening Questionnaire 2021   Do you ever feel afraid of your partner? N   Are you in a relationship with someone who physically or mentally threatens you? N   Is it safe for you to go home? Y       There are no preventive care reminders to display for this patient. Health Maintenance Review: Patient reminded of \"due or due soon\" health maintenance. I have asked the patient to contact his/her primary care provider (PCP) for follow-up on his/her health maintenance. Vitals:    22 1453   BP: (!) 130/56   Pulse: 84   Resp: 16   Temp: 97.3 °F (36.3 °C)   TempSrc: Temporal   SpO2: 98%   Weight: 170 lb 9.6 oz (77.4 kg)   Height: 5' 1\" (1.549 m)   PainSc:   0 - No pain       Wt Readings from Last 3 Encounters:   22 170 lb 9.6 oz (77.4 kg)   21 168 lb 12.8 oz (76.6 kg)   21 168 lb 14 oz (76.6 kg)     Temp Readings from Last 3 Encounters:   22 97.3 °F (36.3 °C) (Temporal)   21 97.3 °F (36.3 °C) (Oral)   21 97.8 °F (36.6 °C)     BP Readings from Last 3 Encounters:   22 (!) 130/56   21 138/72   21 128/76     Pulse Readings from Last 3 Encounters:   22 84   21 77   21 65       Coordination of Care Questionnaire:   1) Have you been to an emergency room, urgent care, or hospitalized since your last visit?   no       2. Have seen or consulted any other health care provider since your last visit?  NO    Patient is accompanied by self I have received verbal consent from Alejandrina Lombardo to discuss any/all medical information while they are present in the room.

## 2022-06-28 NOTE — PROGRESS NOTES
HISTORY OF PRESENT ILLNESS  Maggie Preston is a 82 y.o. female. Follow up on chronic medical problems. Overall feeling well. Doing the precautionary measures at home to reduce risks of exposure COVID19. Also wearing mask when she is going out. No known sick contacts or known exposure to [address]. Cardiovascular Review:  The patient has hypertension, hyperlipidemia, and hx of atrial tachycardia. Diet and Lifestyle: generally follows a low fat low cholesterol diet, generally follows a low sodium diet, exercises sporadically  Home BP Monitoring: is not measured at home. Pertinent ROS: taking medications as instructed, no medication side effects noted, no TIA's, no swelling of ankles, no palpitations. Denies chest pain. No pressure or chest tightness. Has upcoming cardiology appt for next month. Thyroid Review:  Patient is seen for followup of hypothyroidism. Followed by prateek. Thyroid ROS: denies weight changes, heat/cold intolerance, bowel/skin changes or CVS symptoms. Osteoarthritis and Chronic Pain:  Patient has osteoarthritis and fibromyalgia. Has aches and pain that comes and goes. On gabapentin which does help with some of the pain. Rheumatological ROS: Has burning and pain in the legs at feet that is worse at night. Aches all the time. Has not been exercising. Controlled by PRN meds. Response to treatment plan: stable. Headache:  Has chronic headache almost every days. She had follow up with neurology. Ordered MRI but was not able to get tests done d/t increase anxiety in the MRI scanner. Generally will go away if she take the Fioricet. Overall she believes that headaches are better. Also thinks that some of the headaches is related to her sinuses and allergies. Associated with light sensitivity, slight nausea but no vomiting, no fever/chills, no neck stiffness. Denies any known trigger and denies any increase in stress. No focal sx.     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 Platelet counts have been overall stable. HM:  Mammogram 1/26/22  Colonoscopy 8/1/2016 by Dr. Michael López - no more colonoscopies unless she has a problem. 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 atrial tachycardia R00.0    Edema leg R60.0    Reactive airway disease without complication E29.512    Incidental lung nodule, greater than or equal to 8mm R91.1    Encounter for medication monitoring Z51.81    Preop cardiovascular exam Z01.810    Pelvic lymphadenopathy R59.0    Chronic ITP (idiopathic thrombocytopenia) ([facility]) D69.3    Hypercholesterolemia E78.00    Nonrheumatic mitral valve regurgitation I34.0    PSVT (paroxysmal supraventricular tachycardia) ([facility]) I47.1    Migraine with aura and without status migrainosus, not intractable G43. 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 Max Daily Amount: 30 mg. 30 Capsule 1    pravastatin (PRAVACHOL) 10 mg tablet TAKE 1 TABLET BY MOUTH EVERY NIGHT 90 Tablet 3    nebivoloL (BYSTOLIC) 10 mg tablet Take 1 Tablet by mouth daily.  Brand Bystolic only-HARVEY 5 90 Tablet 3    levothyroxine (SYNTHROID) 50 mcg tablet TAKE 1/2 TABLET BY MOUTH EVERY MONDAY, WEDNESDAY AND FRIDAY 30 Tablet 1    benzonatate (TESSALON) 200 mg capsule TAKE 1 CAPSULE BY MOUTH THREE TIMES DAILY AS NEEDED 90 Capsule 1    romiplostim (NPLATE SC) by SubCUTAneous route every fourteen (14) days. Due to Nov 11      acetaminophen (ARTHRITIS PAIN RELIEF) 650 mg TbER Take 650 mg by mouth every eight (8) hours as needed.       butalbital-acetaminophen-caffeine (FIORICET, ESGIC) -40 mg per tablet TAKE 1 TABLET BY MOUTH EVERY 6 HOURS AS NEEDED FOR PAIN 30 Tablet 1       Allergies   Allergen Reactions    Epinephrine Other (comments)     BP drops and pass out    Iodinated Contrast Media Shortness of Breath    Novocain [Procaine] Palpitations     BP drops,passes out    Codeine Itching     Pt does not recall    Cymbalta [Duloxetine] Other (comments)     Head Pain and nausea    Daypro [Oxaprozin] Unknown (comments)     Pt does not recall    Prednisone Shortness of Breath     agitation    Sulfa (Sulfonamide Antibiotics) Unknown (comments)     Pt does not recall    Tetracycline Other (comments)     Low platelet count    Zithromax [Azithromycin] Nausea Only     Diarrhea, headaches         Past Medical History:   Diagnosis Date    Arrhythmia     tachycardia; Dr. Nico Vargas Autoimmune disease ([facility])     fibromyalgia    Chronic ITP (idiopathic thrombocytopenia) (HCC)     Congestive heart failure (HCC)     Fibromyalgia     GERD (gastroesophageal reflux disease)     Headache(784.0)     migraine    Heart failure (HCC)     Hiatal hernia     HTN (hypertension) 4/2/2010    Hyperlipidemia     Hypothyroidism 4/2/2010    Ill-defined condition     intermittent brochitis    Murmur     OA (osteoarthritis) 4/2/2010    Seizures ([facility])     56 years ago with childbirth    Tachycardia 2013    Thromboembolus ([facility])     blood clot on brain 56 years ago w childbirth    Thyroid disease     Valvular heart disease          Past Surgical History:   Procedure Laterality Date    Yoko Chowdary  2/5/2015         ENDOSCOPY, COLON, DIAGNOSTIC  12/2010    Dr. Gomez Orozco- repeat 5 yrs    HX BREAST BIOPSY Right 1999    more than one yrs ago all benign    HX CARPAL TUNNEL RELEASE  9/28/2012    HX CATARACT REMOVAL  9/2014    bilateral     HX HYSTERECTOMY  1977    HX OTHER SURGICAL      bone biopsy    HX REFRACTIVE SURGERY  07/2018    HX UROLOGICAL  11/11/2021    Cystoscopy, bilateral ureteral catheter insertion.  ID ABDOMEN SURGERY PROC UNLISTED  11/11/2021    Cystoscopy, bilateral ureteral catheter insertion.          Family History   Problem Relation Age of Onset    Heart Disease Paternal Aunt     Heart Disease Paternal Uncle     Colon Cancer Maternal Grandmother     Other Mother         murder    Lung Disease Father         COPD    Other Son         scooby's disease and UC    Other Daughter         fibromyalgia    Thyroid Disease Daughter     Heart Disease Maternal Grandfather     Other Sister         Raynauds       Social History     Tobacco Use    Smoking status: Never Smoker    Smokeless tobacco: Never Used   Substance Use Topics    Alcohol use: No     Alcohol/week: 0.0 standard drinks        Lab Results   Component Value Date/Time    WBC 3.6 11/05/2021 01:15 PM    HGB 11.2 (L) 11/05/2021 01:15 PM    Hemoglobin (POC) 11.6 07/14/2017 07:13 AM    HCT 36.4 11/05/2021 01:15 PM    Hematocrit (POC) 34 (L) 07/14/2017 07:13 AM    PLATELET 559 34/64/9841 01:15 PM    MCV 94.5 11/05/2021 01:15 PM     Lab Results   Component Value Date/Time    Cholesterol, total 188 07/27/2021 02:25 PM    HDL Cholesterol 67 07/27/2021 02:25 PM    LDL, calculated 85.6 07/27/2021 02:25 PM    Triglyceride 177 (H) 07/27/2021 02:25 PM    CHOL/HDL Ratio 2.8 07/27/2021 02:25 PM     Lab Results   Component Value Date/Time    TSH 0.438 (L) 07/28/2020 03:39 PM    T4, Free 1.18 10/11/2016 03:41 PM      Lab Results   Component Value Date/Time    Sodium 139 11/05/2021 01:15 PM    Potassium 4.5 11/05/2021 01:15 PM    Chloride 106 11/05/2021 01:15 PM    CO2 27 11/05/2021 01:15 PM    Anion gap 6 11/05/2021 01:15 PM    Glucose 90 11/05/2021 01:15 PM    BUN 21 (H) 11/05/2021 01:15 PM    Creatinine 0.89 11/05/2021 01:15 PM    BUN/Creatinine ratio 24 (H) 11/05/2021 01:15 PM    GFR est AA >60 11/05/2021 01:15 PM    GFR est non-AA >60 11/05/2021 01:15 PM    Calcium 9.6 11/05/2021 01:15 PM    Bilirubin, total 0.3 07/27/2021 02:25 PM    ALT (SGPT) 25 07/27/2021 02:25 PM    Alk. phosphatase 111 07/27/2021 02:25 PM    Protein, total 7.3 07/27/2021 02:25 PM    Albumin 4.4 07/27/2021 02:25 PM    Globulin 2.9 07/27/2021 02:25 PM    A-G Ratio 1.5 07/27/2021 02:25 PM      Lab Results   Component Value Date/Time    Hemoglobin A1c 5.9 (H) 08/28/2012 04:22 PM    Hemoglobin A1c (POC) 5.7 11/10/2015 03:16 PM    Hemoglobin A1c, External 11.1 07/22/2015 12:00 AM         Review of Systems   Constitutional: Negative for malaise/fatigue. HENT: Negative for congestion. Eyes: Negative for blurred vision. Respiratory: Negative for cough and shortness of breath. Cardiovascular: Negative for chest pain, palpitations and leg swelling. Gastrointestinal: Negative for abdominal pain, constipation and heartburn. Genitourinary: Negative for dysuria, frequency and urgency. Musculoskeletal: Negative for back pain and joint pain. Neurological: Negative for dizziness and tingling. Endo/Heme/Allergies: Negative for environmental allergies. Psychiatric/Behavioral: Negative for depression. The patient does not have insomnia. Physical Exam  Vitals and nursing note reviewed. Constitutional:       Appearance: Normal appearance. She is well-developed. Comments: BP (!) 130/56 (BP 1 Location: Left upper arm, BP Patient Position: Sitting, BP Cuff Size: Adult)   Pulse 84   Temp 97.3 °F (36.3 °C) (Temporal)   Resp 16   Ht 5' 1\" (1.549 m)   Wt 170 lb 9.6 oz (77.4 kg)   SpO2 98%   BMI 32.23 kg/m²    HENT:      Right Ear: Tympanic membrane and ear canal normal.      Left Ear: Tympanic membrane and ear canal normal.   Neck:      Thyroid: No thyromegaly. Cardiovascular:      Rate and Rhythm: Normal rate and regular rhythm. Heart sounds: Normal heart sounds. Pulmonary:      Effort: Pulmonary effort is normal.      Breath sounds: Normal breath sounds. Abdominal:      General: Bowel sounds are normal.      Palpations: Abdomen is soft. There is no mass. Tenderness: There is no abdominal tenderness. Musculoskeletal:         General: Normal range of motion. Cervical back: Normal range of motion and neck supple. Right lower leg: No edema. Left lower leg: No edema. Lymphadenopathy:      Cervical: No cervical adenopathy. Skin:     General: Skin is warm and dry. Neurological:      General: No focal deficit present. Mental Status: She is alert and oriented to person, place, and time. Psychiatric:         Mood and Affect: Mood normal.         ASSESSMENT and PLAN  Diagnoses and all orders for this visit:    1. Essential hypertension  Stable     2. Mixed hyperlipidemia  -     LIPID PANEL; Future    3. Hypothyroidism due to acquired atrophy of thyroid  -     TSH 3RD GENERATION; Future  -     T4, FREE; Future    4. Primary insomnia  Stable with prn restoril    5. Generalized osteoarthritis//  6. Fibromyalgia  Stable     7. Chronic ITP (idiopathic thrombocytopenia) (HCC)  As per hematology    8. Encounter for medication monitoring  -     METABOLIC PANEL, COMPREHENSIVE; Future    9. History of migraine  Stable with prn fioricet      Follow-up and Dispositions    · Return in about 6 months (around 12/28/2022) for medicare wellness exam.       current treatment plan is effective, no change in therapy  reviewed diet, exercise and weight control  cardiovascular risk and specific lipid/LDL goals reviewed  reviewed medications and side effects in detail    I have discussed diagnosis listed in this note with pt and/or family.  I have discussed treatment plans and options and the risk/benefit analysis of those options, including safe use of medications and possible medication side effects. Through the use of shared decision making we have agreed to the above plan. The patient has received an after-visit summary and questions were answered concerning future plans and follow up. Advise pt of any urgent changes then to proceed to the ER.

## 2022-06-29 LAB
ALBUMIN SERPL-MCNC: 4.3 G/DL (ref 3.5–5)
ALBUMIN/GLOB SERPL: 1.4 {RATIO} (ref 1.1–2.2)
ALP SERPL-CCNC: 109 U/L (ref 45–117)
ALT SERPL-CCNC: 22 U/L (ref 12–78)
ANION GAP SERPL CALC-SCNC: 4 MMOL/L (ref 5–15)
AST SERPL-CCNC: 22 U/L (ref 15–37)
BILIRUB SERPL-MCNC: 0.3 MG/DL (ref 0.2–1)
BUN SERPL-MCNC: 19 MG/DL (ref 6–20)
BUN/CREAT SERPL: 21 (ref 12–20)
CALCIUM SERPL-MCNC: 10 MG/DL (ref 8.5–10.1)
CHLORIDE SERPL-SCNC: 106 MMOL/L (ref 97–108)
CHOLEST SERPL-MCNC: 171 MG/DL
CO2 SERPL-SCNC: 30 MMOL/L (ref 21–32)
CREAT SERPL-MCNC: 0.92 MG/DL (ref 0.55–1.02)
ERYTHROCYTE [DISTWIDTH] IN BLOOD BY AUTOMATED COUNT: 14.1 % (ref 11.5–14.5)
GLOBULIN SER CALC-MCNC: 3 G/DL (ref 2–4)
GLUCOSE SERPL-MCNC: 101 MG/DL (ref 65–100)
HCT VFR BLD AUTO: 38.6 % (ref 35–47)
HDLC SERPL-MCNC: 58 MG/DL
HDLC SERPL: 2.9 {RATIO} (ref 0–5)
HGB BLD-MCNC: 12 G/DL (ref 11.5–16)
LDLC SERPL CALC-MCNC: 81.8 MG/DL (ref 0–100)
MCH RBC QN AUTO: 28.6 PG (ref 26–34)
MCHC RBC AUTO-ENTMCNC: 31.1 G/DL (ref 30–36.5)
MCV RBC AUTO: 91.9 FL (ref 80–99)
NRBC # BLD: 0 K/UL (ref 0–0.01)
NRBC BLD-RTO: 0 PER 100 WBC
PLATELET # BLD AUTO: 233 K/UL (ref 150–400)
PMV BLD AUTO: 10.4 FL (ref 8.9–12.9)
POTASSIUM SERPL-SCNC: 4.3 MMOL/L (ref 3.5–5.1)
PROT SERPL-MCNC: 7.3 G/DL (ref 6.4–8.2)
RBC # BLD AUTO: 4.2 M/UL (ref 3.8–5.2)
SODIUM SERPL-SCNC: 140 MMOL/L (ref 136–145)
T4 FREE SERPL-MCNC: 1.19 NG/DL (ref 0.82–1.77)
TRIGL SERPL-MCNC: 156 MG/DL (ref ?–150)
TSH SERPL DL<=0.005 MIU/L-ACNC: 0.56 UIU/ML (ref 0.45–4.5)
VLDLC SERPL CALC-MCNC: 31.2 MG/DL
WBC # BLD AUTO: 3 K/UL (ref 3.6–11)

## 2022-07-26 DIAGNOSIS — Z86.69 HISTORY OF MIGRAINE: ICD-10-CM

## 2022-07-26 DIAGNOSIS — G44.209 TENSION VASCULAR HEADACHE: ICD-10-CM

## 2022-07-26 DIAGNOSIS — G43.109 MIGRAINE WITH AURA AND WITHOUT STATUS MIGRAINOSUS, NOT INTRACTABLE: ICD-10-CM

## 2022-07-26 DIAGNOSIS — G40.209 COMPLEX PARTIAL SEIZURES EVOLVING TO GENERALIZED TONIC-CLONIC SEIZURES (HCC): ICD-10-CM

## 2022-07-26 RX ORDER — BUTALBITAL, ACETAMINOPHEN AND CAFFEINE 50; 325; 40 MG/1; MG/1; MG/1
TABLET ORAL
Qty: 30 TABLET | Refills: 1 | Status: SHIPPED | OUTPATIENT
Start: 2022-07-26 | End: 2022-09-26

## 2022-08-05 DIAGNOSIS — F51.01 PRIMARY INSOMNIA: ICD-10-CM

## 2022-08-06 RX ORDER — TEMAZEPAM 30 MG/1
CAPSULE ORAL
Qty: 30 CAPSULE | Refills: 0 | Status: SHIPPED | OUTPATIENT
Start: 2022-08-06 | End: 2022-09-06

## 2022-09-04 DIAGNOSIS — F51.01 PRIMARY INSOMNIA: ICD-10-CM

## 2022-09-06 RX ORDER — TEMAZEPAM 30 MG/1
CAPSULE ORAL
Qty: 30 CAPSULE | Refills: 1 | Status: SHIPPED | OUTPATIENT
Start: 2022-09-06 | End: 2022-11-04

## 2022-09-12 ENCOUNTER — OFFICE VISIT (OUTPATIENT)
Dept: CARDIOLOGY CLINIC | Age: 87
End: 2022-09-12
Payer: MEDICARE

## 2022-09-12 VITALS
OXYGEN SATURATION: 99 % | WEIGHT: 170.6 LBS | HEART RATE: 97 BPM | DIASTOLIC BLOOD PRESSURE: 80 MMHG | RESPIRATION RATE: 18 BRPM | SYSTOLIC BLOOD PRESSURE: 132 MMHG | BODY MASS INDEX: 32.21 KG/M2 | HEIGHT: 61 IN

## 2022-09-12 DIAGNOSIS — R00.0 INAPPROPRIATE SINUS TACHYCARDIA: ICD-10-CM

## 2022-09-12 DIAGNOSIS — I34.0 NONRHEUMATIC MITRAL VALVE REGURGITATION: ICD-10-CM

## 2022-09-12 DIAGNOSIS — I47.1 PSVT (PAROXYSMAL SUPRAVENTRICULAR TACHYCARDIA) (HCC): Primary | ICD-10-CM

## 2022-09-12 DIAGNOSIS — I10 ESSENTIAL HYPERTENSION: ICD-10-CM

## 2022-09-12 DIAGNOSIS — E78.2 MIXED HYPERLIPIDEMIA: ICD-10-CM

## 2022-09-12 PROCEDURE — G8752 SYS BP LESS 140: HCPCS | Performed by: INTERNAL MEDICINE

## 2022-09-12 PROCEDURE — 1090F PRES/ABSN URINE INCON ASSESS: CPT | Performed by: INTERNAL MEDICINE

## 2022-09-12 PROCEDURE — G8510 SCR DEP NEG, NO PLAN REQD: HCPCS | Performed by: INTERNAL MEDICINE

## 2022-09-12 PROCEDURE — G8417 CALC BMI ABV UP PARAM F/U: HCPCS | Performed by: INTERNAL MEDICINE

## 2022-09-12 PROCEDURE — 93005 ELECTROCARDIOGRAM TRACING: CPT | Performed by: INTERNAL MEDICINE

## 2022-09-12 PROCEDURE — 1123F ACP DISCUSS/DSCN MKR DOCD: CPT | Performed by: INTERNAL MEDICINE

## 2022-09-12 PROCEDURE — 99214 OFFICE O/P EST MOD 30 MIN: CPT | Performed by: INTERNAL MEDICINE

## 2022-09-12 PROCEDURE — G8399 PT W/DXA RESULTS DOCUMENT: HCPCS | Performed by: INTERNAL MEDICINE

## 2022-09-12 PROCEDURE — G0463 HOSPITAL OUTPT CLINIC VISIT: HCPCS | Performed by: INTERNAL MEDICINE

## 2022-09-12 PROCEDURE — G8754 DIAS BP LESS 90: HCPCS | Performed by: INTERNAL MEDICINE

## 2022-09-12 PROCEDURE — 93010 ELECTROCARDIOGRAM REPORT: CPT | Performed by: INTERNAL MEDICINE

## 2022-09-12 PROCEDURE — G8536 NO DOC ELDER MAL SCRN: HCPCS | Performed by: INTERNAL MEDICINE

## 2022-09-12 PROCEDURE — G8427 DOCREV CUR MEDS BY ELIG CLIN: HCPCS | Performed by: INTERNAL MEDICINE

## 2022-09-12 PROCEDURE — 1101F PT FALLS ASSESS-DOCD LE1/YR: CPT | Performed by: INTERNAL MEDICINE

## 2022-09-12 NOTE — LETTER
9/12/2022    Patient: Eder Sinha   YOB: 1935   Date of Visit: 9/12/2022     MD Mj An Jd, Dr    Dear Ray Peguero MD,      Thank you for referring Ms. Tianna Powers to [facility] for evaluation. My notes for this consultation are attached. If you have questions, please do not hesitate to call me. I look forward to following your patient along with you.       Sincerely,    Mindy Antonio MD

## 2022-09-12 NOTE — PROGRESS NOTES
[address], [facility], [address]  [phone]     Subjective:      Bee Tariq is a 82 y.o. female is here for routine f/u. She has a PMHx of mitral regurgitation, PSVT, HTN, HLD and chronic ITP. Last OV 9/2021    Today  States recently diagnosed with Asthma, followed by Dr Jenny Eldridge. Has inhaler if needed. Stable sob. Also reports recently treated for left leg cellulitis at [facility] and she completed antibiotic therapy. The patient denies chest pain,  orthopnea, PND, LE edema, palpitations, syncope, or presyncope. ECHO 9/2020     LV: Estimated LVEF is 55 - 60%. Normal cavity size, wall thickness and systolic function (ejection fraction normal). Wall motion: normal. Age-appropriate left ventricular diastolic function. MV: Mild mitral annular calcification. Mild mitral valve regurgitation is present.     Patient Active Problem List    Diagnosis Date Noted    Iron deficiency anemia, unspecified 06/20/2013    Migraine with aura and without status migrainosus, not intractable 10/27/2020    Tension vascular headache 10/27/2020    History of ITP 10/27/2020    Cervicogenic headache 10/27/2020    Nonrheumatic mitral valve regurgitation 08/12/2020    Hypercholesterolemia 04/11/2019    Chronic ITP (idiopathic thrombocytopenia) (HCC)     Pelvic lymphadenopathy 07/14/2017    Preop cardiovascular exam 06/07/2017    Encounter for medication monitoring 10/11/2016    Incidental lung nodule, greater than or equal to 8mm 05/11/2016    Reactive airway disease without complication 36/36/0108    Edema leg 06/19/2015    Heart palpitations 07/25/2013    Paroxysmal Inappropriate sinus tachycardia 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 Theresa Pan    Autoimmune disease [facility])     fibromyalgia    Chronic ITP (idiopathic thrombocytopenia) (HCC)     Congestive heart failure (HCC)     Fibromyalgia     GERD (gastroesophageal reflux disease)     Headache(784.0)     migraine    Heart failure (HCC)     Hiatal hernia     HTN (hypertension) 4/2/2010    Hyperlipidemia     Hypothyroidism 4/2/2010    Ill-defined condition     intermittent brochitis    Murmur     OA (osteoarthritis) 4/2/2010    Seizures ([address]     56 years ago with childbirth    Tachycardia 2013    Thromboembolus ([address]     blood clot on brain 56 years ago w childbirth    Thyroid disease     Valvular heart disease       Past Surgical History:   Procedure Laterality Date    COLONOSCOPY,REMV LESN,SNARE  2/5/2015         ENDOSCOPY, COLON, DIAGNOSTIC  12/2010    Dr. William Lynch- repeat 5 yrs    HX BREAST BIOPSY Right 1999    more than one yrs ago all benign    HX CARPAL TUNNEL RELEASE  9/28/2012    HX CATARACT REMOVAL  9/2014    bilateral     HX HYSTERECTOMY  1977    HX OTHER SURGICAL      bone biopsy    HX REFRACTIVE SURGERY  07/2018    HX UROLOGICAL  11/11/2021    Cystoscopy, bilateral ureteral catheter insertion. HI ABDOMEN SURGERY PROC UNLISTED  11/11/2021    Cystoscopy, bilateral ureteral catheter insertion.      Allergies   Allergen Reactions    Epinephrine Other (comments)     BP drops and pass out    Iodinated Contrast Media Shortness of Breath    Novocain [Procaine] Palpitations     BP drops,passes out    Codeine Itching     Pt does not recall    Cymbalta [Duloxetine] Other (comments)     Head Pain and nausea    Daypro [Oxaprozin] Unknown (comments)     Pt does not recall    Prednisone Shortness of Breath     agitation    Sulfa (Sulfonamide Antibiotics) Unknown (comments)     Pt does not recall    Tetracycline Other (comments)     Low platelet count    Zithromax [Azithromycin] Nausea Only     Diarrhea, headaches      Family History   Problem Relation Age of Onset    Heart Disease Paternal Aunt     Heart Disease Paternal Uncle     Colon Cancer Maternal Grandmother     Other Mother         murder    Lung Disease Father         COPD    Other Son         scooby's disease and UC    Other Daughter         fibromyalgia    Thyroid Disease Daughter     Heart Disease Maternal Grandfather     Other Sister         Raynauds      Social History     Socioeconomic History    Marital status:      Spouse name: Not on file    Number of children: Not on file    Years of education: Not on file    Highest education level: Not on file   Occupational History    Not on file   Tobacco Use    Smoking status: Never    Smokeless tobacco: Never   Vaping Use    Vaping Use: Not on file   Substance and Sexual Activity    Alcohol use: No     Alcohol/week: 0.0 standard drinks    Drug use: No    Sexual activity: Not Currently   Other Topics Concern    Not on file   Social History Narrative    Not on file     Social Determinants of Health     Financial Resource Strain: Not on file   Food Insecurity: Not on file   Transportation Needs: Not on file   Physical Activity: Not on file   Stress: Not on file   Social Connections: Not on file   Intimate Partner Violence: Not on file   Housing Stability: Not on file      Current Outpatient Medications   Medication Sig    temazepam (RESTORIL) 30 mg capsule TAKE A 1/2 CAPSULE TO 1 CAPSULE BY MOUTH NIGHTLY AS NEEDED FOR SLEEP. MAX 1 CAP PER DAY    butalbital-acetaminophen-caffeine (FIORICET, ESGIC) -40 mg per tablet TAKE 1 TABLET BY MOUTH EVERY 6 HOURS AS NEEDED FOR PAIN    pravastatin (PRAVACHOL) 10 mg tablet TAKE 1 TABLET BY MOUTH EVERY NIGHT    nebivoloL (BYSTOLIC) 10 mg tablet Take 1 Tablet by mouth daily.  Brand Bystolic only-HARVEY 5    levothyroxine (SYNTHROID) 50 mcg tablet TAKE 1/2 TABLET BY MOUTH EVERY MONDAY, WEDNESDAY AND FRIDAY    benzonatate (TESSALON) 200 mg capsule TAKE 1 CAPSULE BY MOUTH THREE TIMES DAILY AS NEEDED    romiplostim (NPLATE SC) by SubCUTAneous route every fourteen (14) days. Due to Nov 11    acetaminophen (ARTHRITIS PAIN RELIEF) 650 mg TbER Take 650 mg by mouth every eight (8) hours as needed. No current facility-administered medications for this visit. Review of Symptoms:  11 systems reviewed, negative other than as stated in the HPI    Physical ExamPhysical Exam:    There were no vitals filed for this visit. There is no height or weight on file to calculate BMI. General PE  Gen:  NAD  Mental Status - Alert. General Appearance - Not in acute distress. HEENT:  PERRL, no carotid bruits or JVD  Chest and Lung Exam   Inspection: Accessory muscles - No use of accessory muscles in breathing. Auscultation:   Breath sounds: - Normal.   Cardiovascular   Inspection: Jugular vein - Bilateral - Inspection Normal.   Palpation/Percussion:   Apical Impulse: - Normal.   Auscultation: Rhythm - Regular. Heart Sounds - S1 WNL and S2 WNL. No S3 or S4. Murmurs & Other Heart Sounds: Auscultation of the heart reveals - No Murmurs. Peripheral Vascular   Upper Extremity: Inspection - Bilateral - No Cyanotic nailbeds or Digital clubbing. Lower Extremity:   Palpation: Edema - Bilateral - No edema. Abdomen:   Soft, non-tender, bowel sounds are active.   Neuro: A&O times 3, CN and motor grossly WNL    Labs:   Lab Results   Component Value Date/Time    Cholesterol, total 171 06/28/2022 03:31 PM    Cholesterol, total 188 07/27/2021 02:25 PM    Cholesterol, total 174 01/27/2021 04:00 PM    Cholesterol, total 192 02/25/2020 03:00 PM    Cholesterol, total 167 08/20/2019 03:27 PM    HDL Cholesterol 58 06/28/2022 03:31 PM    HDL Cholesterol 67 07/27/2021 02:25 PM    HDL Cholesterol 56 01/27/2021 04:00 PM    HDL Cholesterol 56 02/25/2020 03:00 PM    HDL Cholesterol 60 08/20/2019 03:27 PM    LDL, calculated 81.8 06/28/2022 03:31 PM    LDL, calculated 85.6 07/27/2021 02:25 PM    LDL, calculated 83 01/27/2021 04:00 PM    LDL, calculated 100 (H) 02/25/2020 03:00 PM    LDL, calculated 68 08/20/2019 03:27 PM    LDL, calculated 79 08/01/2019 01:19 PM    Triglyceride 156 (H) 06/28/2022 03:31 PM    Triglyceride 177 (H) 07/27/2021 02:25 PM    Triglyceride 208 (H) 01/27/2021 04:00 PM    Triglyceride 180 (H) 02/25/2020 03:00 PM    Triglyceride 197 (H) 08/20/2019 03:27 PM    CHOL/HDL Ratio 2.9 06/28/2022 03:31 PM    CHOL/HDL Ratio 2.8 07/27/2021 02:25 PM    CHOL/HDL Ratio 3.7 01/27/2010 05:43 PM    CHOL/HDL Ratio 3.1 03/24/2009 04:26 PM     Lab Results   Component Value Date/Time    CK 94 06/21/2013 03:13 AM     Lab Results   Component Value Date/Time    Sodium 140 06/28/2022 03:31 PM    Potassium 4.3 06/28/2022 03:31 PM    Chloride 106 06/28/2022 03:31 PM    CO2 30 06/28/2022 03:31 PM    Anion gap 4 (L) 06/28/2022 03:31 PM    Glucose 101 (H) 06/28/2022 03:31 PM    BUN 19 06/28/2022 03:31 PM    Creatinine 0.92 06/28/2022 03:31 PM    BUN/Creatinine ratio 21 (H) 06/28/2022 03:31 PM    GFR est AA >60 06/28/2022 03:31 PM    GFR est non-AA 58 (L) 06/28/2022 03:31 PM    Calcium 10.0 06/28/2022 03:31 PM    Bilirubin, total 0.3 06/28/2022 03:31 PM    Alk. phosphatase 109 06/28/2022 03:31 PM    Protein, total 7.3 06/28/2022 03:31 PM    Albumin 4.3 06/28/2022 03:31 PM    Globulin 3.0 06/28/2022 03:31 PM    A-G Ratio 1.4 06/28/2022 03:31 PM    ALT (SGPT) 22 06/28/2022 03:31 PM       EKG:  NSR       Assessment:          ICD-10-CM ICD-9-CM    1. PSVT (paroxysmal supraventricular tachycardia) (HCC)  I47.1 427.0       2. Essential hypertension  I10 401.9       3. Mixed hyperlipidemia  E78.2 272.2       4. Paroxysmal Inappropriate sinus tachycardia vs possible atrial tachycardia  R00.0 427.89       5. Nonrheumatic mitral valve regurgitation  I34.0 424.0           No orders of the defined types were placed in this encounter.        Plan:     Heart palpitations/history of PSVT:  1 mos event monitor in 8/2020 showed rare PACs and PVCs  Event monitor in 7/2013 with PSVT, asymptomatic  Lexiscan stress test 6/2017 without evidence of ischemia  Controlled with BB      Essential hypertension  BP controlled. Continue anti-hypertensive therapy and low sodium diet  Stable kidney fxn Serum Cr 0.92 in 6/2022     Hypercholesterolemia  6/2022 LDL 81 On prava 10 mg   Continue statin therapy and low fat, low cholesterol diet  Lipids managed by PCP -- advised to have statin refilled by PCP as they are checking labs     Mitral regurgitation  Normal EF mild MR per echo in 9/2020  Echo done 6/2013 with preserved LVEF 60-65% with mild-mod MR with myxomatous valve  Stable, will repeat at follow  up    Chronic ITP  Plt 157 in 9/2022  Followed by Dr Ruth Deng, new  Followed by Dr Garza Flair- doing fine with PRN inhaler       Today that all testing completed would be instantaneously available on their MyChart for review. Discussed that these results will be made available to the provider at the same time. They were advised to wait at least 3 business days to allow for provider's interpretation of results with follow-up before calling our office with concerns about their results. Continue current care and f/u in1 yr and she wants [facility] office         Link JONNATHAN Brito    Patient seen and examined by me with the above nurse practitioner. I personally performed all components of the history, physical, and medical decision making and agree with the assessment and plan with minor modifications as noted. Today the patient presents with stable history of SVT, hypertension, and hyperlipidemia. Clinically stable mild to moderate valvular heart disease. General PE  Gen:  NAD  Mental Status - Alert. General Appearance - Not in acute distress. HEENT:  PERRL, no carotid bruits or JVD  Chest and Lung Exam   Inspection: Accessory muscles - No use of accessory muscles in breathing.    Auscultation:   Breath sounds: - Normal.   Cardiovascular Inspection: Jugular vein - Bilateral - Inspection Normal.   Palpation/Percussion:   Apical Impulse: - Normal.   Auscultation: Rhythm - Regular. Heart Sounds - S1 WNL and S2 WNL. No S3 or S4. Murmurs & Other Heart Sounds: Auscultation of the heart reveals - No Murmurs. Peripheral Vascular   Upper Extremity: Inspection - Bilateral - No Cyanotic nailbeds or Digital clubbing. Lower Extremity:   Palpation: Edema - Bilateral - No edema. Abdomen:   Soft, non-tender, bowel sounds are active. Neuro: A&O times 3, CN and motor grossly WNL    Continue current care. Check echo prior to 1 year follow-up. Currently scheduled at the [facility] office, but she can call 3 months ahead of time to see if the [place] office has opened.

## 2022-09-23 DIAGNOSIS — G44.209 TENSION VASCULAR HEADACHE: ICD-10-CM

## 2022-09-23 DIAGNOSIS — Z86.69 HISTORY OF MIGRAINE: ICD-10-CM

## 2022-09-23 DIAGNOSIS — G43.109 MIGRAINE WITH AURA AND WITHOUT STATUS MIGRAINOSUS, NOT INTRACTABLE: ICD-10-CM

## 2022-09-23 DIAGNOSIS — G40.209 COMPLEX PARTIAL SEIZURES EVOLVING TO GENERALIZED TONIC-CLONIC SEIZURES (HCC): ICD-10-CM

## 2022-09-26 RX ORDER — BUTALBITAL, ACETAMINOPHEN AND CAFFEINE 50; 325; 40 MG/1; MG/1; MG/1
TABLET ORAL
Qty: 30 TABLET | Refills: 1 | Status: SHIPPED | OUTPATIENT
Start: 2022-09-26

## 2022-09-26 RX ORDER — BUTALBITAL, ACETAMINOPHEN AND CAFFEINE 50; 325; 40 MG/1; MG/1; MG/1
1 TABLET ORAL
Qty: 30 TABLET | Refills: 1 | Status: SHIPPED | OUTPATIENT
Start: 2022-09-26

## 2022-11-04 DIAGNOSIS — F51.01 PRIMARY INSOMNIA: ICD-10-CM

## 2022-11-04 RX ORDER — TEMAZEPAM 30 MG/1
CAPSULE ORAL
Qty: 30 CAPSULE | Refills: 1 | Status: SHIPPED | OUTPATIENT
Start: 2022-11-05 | End: 2022-11-07 | Stop reason: SDUPTHER

## 2022-11-06 DIAGNOSIS — F51.01 PRIMARY INSOMNIA: ICD-10-CM

## 2022-11-06 RX ORDER — TEMAZEPAM 30 MG/1
CAPSULE ORAL
Qty: 30 CAPSULE | Refills: 1 | Status: CANCELLED | OUTPATIENT
Start: 2022-11-06

## 2022-11-07 RX ORDER — TEMAZEPAM 30 MG/1
CAPSULE ORAL
Qty: 30 CAPSULE | Refills: 1 | Status: SHIPPED | OUTPATIENT
Start: 2022-11-07

## 2022-11-07 NOTE — TELEPHONE ENCOUNTER
For [facility] in place:   Recommendation Provided To:    Intervention Detail: New Rx: 1, reason: Patient Preference  Gap Closed?:   Intervention Accepted By:   Time Spent (min): 5

## 2022-11-07 NOTE — TELEPHONE ENCOUNTER
Med pended to new pharmacy as requested    Requested Prescriptions     Pending Prescriptions Disp Refills    temazepam (RESTORIL) 30 mg capsule 30 Capsule 1     Sig: TAKE A 1/2 CAPSULE TO 1 CAPSULE BY MOUTH NIGHTLY AS NEEDED FOR SLEEP. MAX 1 CAP PER DAY           For Pharmacy Admin Tracking Only    CPA in place:   Recommendation Provided To:    Intervention Detail: New Rx: 1, reason: Patient Preference  Gap Closed?:   Intervention Accepted By:   Time Spent (min): 5

## 2022-11-28 LAB
CREATININE, EXTERNAL: 0.81
HBA1C MFR BLD HPLC: 5.3 %
LDL CHOLESTEROL, EXTERNAL: 84

## 2022-12-13 DIAGNOSIS — G44.209 TENSION VASCULAR HEADACHE: ICD-10-CM

## 2022-12-13 DIAGNOSIS — G40.209 COMPLEX PARTIAL SEIZURES EVOLVING TO GENERALIZED TONIC-CLONIC SEIZURES (HCC): ICD-10-CM

## 2022-12-13 DIAGNOSIS — Z86.69 HISTORY OF MIGRAINE: ICD-10-CM

## 2022-12-13 DIAGNOSIS — G43.109 MIGRAINE WITH AURA AND WITHOUT STATUS MIGRAINOSUS, NOT INTRACTABLE: ICD-10-CM

## 2022-12-13 RX ORDER — BUTALBITAL, ACETAMINOPHEN AND CAFFEINE 50; 325; 40 MG/1; MG/1; MG/1
TABLET ORAL
Qty: 30 TABLET | Refills: 1 | Status: SHIPPED | OUTPATIENT
Start: 2022-12-13 | End: 2022-12-14 | Stop reason: ALTCHOICE

## 2022-12-14 ENCOUNTER — OFFICE VISIT (OUTPATIENT)
Dept: FAMILY MEDICINE CLINIC | Age: 87
End: 2022-12-14
Payer: MEDICARE

## 2022-12-14 VITALS
TEMPERATURE: 97.3 F | RESPIRATION RATE: 12 BRPM | HEIGHT: 61 IN | SYSTOLIC BLOOD PRESSURE: 126 MMHG | BODY MASS INDEX: 31.53 KG/M2 | OXYGEN SATURATION: 96 % | WEIGHT: 167 LBS | DIASTOLIC BLOOD PRESSURE: 65 MMHG | HEART RATE: 88 BPM

## 2022-12-14 DIAGNOSIS — Z00.00 MEDICARE ANNUAL WELLNESS VISIT, SUBSEQUENT: ICD-10-CM

## 2022-12-14 DIAGNOSIS — M15.9 GENERALIZED OSTEOARTHRITIS: ICD-10-CM

## 2022-12-14 DIAGNOSIS — I10 ESSENTIAL HYPERTENSION: Primary | ICD-10-CM

## 2022-12-14 DIAGNOSIS — D69.3 CHRONIC ITP (IDIOPATHIC THROMBOCYTOPENIA) (HCC): ICD-10-CM

## 2022-12-14 DIAGNOSIS — M79.7 FIBROMYALGIA: ICD-10-CM

## 2022-12-14 DIAGNOSIS — Z51.81 ENCOUNTER FOR MEDICATION MONITORING: ICD-10-CM

## 2022-12-14 DIAGNOSIS — E03.4 HYPOTHYROIDISM DUE TO ACQUIRED ATROPHY OF THYROID: ICD-10-CM

## 2022-12-14 DIAGNOSIS — E78.2 MIXED HYPERLIPIDEMIA: ICD-10-CM

## 2022-12-14 DIAGNOSIS — Z86.69 HISTORY OF MIGRAINE: ICD-10-CM

## 2022-12-14 PROCEDURE — G0463 HOSPITAL OUTPT CLINIC VISIT: HCPCS | Performed by: FAMILY MEDICINE

## 2022-12-14 PROCEDURE — 99214 OFFICE O/P EST MOD 30 MIN: CPT | Performed by: FAMILY MEDICINE

## 2022-12-14 PROCEDURE — 3078F DIAST BP <80 MM HG: CPT | Performed by: FAMILY MEDICINE

## 2022-12-14 PROCEDURE — G0439 PPPS, SUBSEQ VISIT: HCPCS | Performed by: FAMILY MEDICINE

## 2022-12-14 PROCEDURE — G8417 CALC BMI ABV UP PARAM F/U: HCPCS | Performed by: FAMILY MEDICINE

## 2022-12-14 PROCEDURE — G8752 SYS BP LESS 140: HCPCS | Performed by: FAMILY MEDICINE

## 2022-12-14 PROCEDURE — 1101F PT FALLS ASSESS-DOCD LE1/YR: CPT | Performed by: FAMILY MEDICINE

## 2022-12-14 PROCEDURE — G8536 NO DOC ELDER MAL SCRN: HCPCS | Performed by: FAMILY MEDICINE

## 2022-12-14 PROCEDURE — G8399 PT W/DXA RESULTS DOCUMENT: HCPCS | Performed by: FAMILY MEDICINE

## 2022-12-14 PROCEDURE — G8510 SCR DEP NEG, NO PLAN REQD: HCPCS | Performed by: FAMILY MEDICINE

## 2022-12-14 PROCEDURE — G8427 DOCREV CUR MEDS BY ELIG CLIN: HCPCS | Performed by: FAMILY MEDICINE

## 2022-12-14 PROCEDURE — 1090F PRES/ABSN URINE INCON ASSESS: CPT | Performed by: FAMILY MEDICINE

## 2022-12-14 PROCEDURE — 3074F SYST BP LT 130 MM HG: CPT | Performed by: FAMILY MEDICINE

## 2022-12-14 PROCEDURE — 1123F ACP DISCUSS/DSCN MKR DOCD: CPT | Performed by: FAMILY MEDICINE

## 2022-12-14 PROCEDURE — G8754 DIAS BP LESS 90: HCPCS | Performed by: FAMILY MEDICINE

## 2022-12-14 RX ORDER — NEBIVOLOL HYDROCHLORIDE 10 MG/1
10 TABLET ORAL DAILY
Qty: 90 TABLET | Refills: 3 | Status: SHIPPED | OUTPATIENT
Start: 2022-12-14

## 2022-12-14 RX ORDER — LEVOTHYROXINE SODIUM 50 UG/1
50 TABLET ORAL
COMMUNITY
End: 2022-12-14 | Stop reason: DRUGHIGH

## 2022-12-14 RX ORDER — NEBIVOLOL 10 MG/1
10 TABLET ORAL DAILY
Qty: 90 TABLET | Refills: 3 | Status: SHIPPED | OUTPATIENT
Start: 2022-12-14 | End: 2022-12-14 | Stop reason: DRUGHIGH

## 2022-12-14 NOTE — PROGRESS NOTES
HISTORY OF PRESENT ILLNESS  Maykel Rowe is a 82 y.o. female. Follow up on chronic medical problems. Overall feeling well. Cardiovascular Review:  The patient has hypertension, hyperlipidemia, and hx of atrial tachycardia. Diet and Lifestyle: generally follows a low fat low cholesterol diet, generally follows a low sodium diet, exercises sporadically  Home BP Monitoring: is not measured at home. Pertinent ROS: taking medications as instructed, no medication side effects noted, no TIA's, no swelling of ankles, no palpitations. Denies chest pain. No pressure or chest tightness. Has upcoming cardiology appt for next month. Thyroid Review:  Patient is seen for followup of hypothyroidism. Followed by endo. Thyroid ROS: denies weight changes, heat/cold intolerance, bowel/skin changes or CVS symptoms. Osteoarthritis and Chronic Pain:  Patient has osteoarthritis and fibromyalgia. Has aches and pain that comes and goes. On gabapentin which does help with some of the pain. Rheumatological ROS: Has burning and pain in the legs at feet that is worse at night. Aches all the time. Has not been exercising. Controlled by PRN meds. Response to treatment plan: stable. Headache:  Has chronic headache almost every days. She had follow up with neurology. Ordered MRI but was not able to get tests done d/t increase anxiety in the MRI scanner. Generally will go away if she take the Fioricet. Overall she believes that headaches are better. Also thinks that some of the headaches is related to her sinuses and allergies. Associated with light sensitivity, slight nausea but no vomiting, no fever/chills, no neck stiffness. Denies any known trigger and denies any increase in stress. No focal sx. 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 Platelet counts have been overall stable.     HM:  Mammogram 1/26/22  Colonoscopy 8/1/2016 by Dr. Breanne Ball - no more colonoscopies unless she has a problem. Patient Active Problem List   Diagnosis Code    Hypothyroidism E03.9    HTN (hypertension) I10    OA (osteoarthritis) M19.90    GERD (gastroesophageal reflux disease) K21.9    Fibromyalgia M79.7    Insomnia G47.00    Headache(784.0) R51    Iron deficiency anemia, unspecified D50.9    CARVAJAL (dyspnea on exertion) R06.09    Heart palpitations R00.2    Paroxysmal Inappropriate sinus tachycardia vs possible atrial tachycardia R00.0    Edema leg R60.0    Reactive airway disease without complication Z97.918    Incidental lung nodule, greater than or equal to 8mm R91.1    Encounter for medication monitoring Z51.81    Preop cardiovascular exam Z01.810    Pelvic lymphadenopathy R59.0    Chronic ITP (idiopathic thrombocytopenia) (HCC) D69.3    Hypercholesterolemia E78.00    Nonrheumatic mitral valve regurgitation I34.0    Migraine with aura and without status migrainosus, not intractable G43.109    Tension vascular headache G44.209    History of ITP Z86.2    Cervicogenic headache G44.86       Current Outpatient Medications   Medication Sig Dispense Refill    levothyroxine (synthroid) 50 mcg tablet Take 50 mcg by mouth Daily (before breakfast). butalbital-acetaminophen-caffeine (FIORICET, ESGIC) -40 mg per tablet TAKE 1-2 TABLETS BY MOUTH EVERY 6 HOURS AS NEEDED FOR HEADACHE 30 Tablet 5    temazepam (RESTORIL) 30 mg capsule TAKE A 1/2 CAPSULE TO 1 CAPSULE BY MOUTH NIGHTLY AS NEEDED FOR SLEEP. MAX 1 CAP PER DAY 30 Capsule 1    pravastatin (PRAVACHOL) 10 mg tablet TAKE 1 TABLET BY MOUTH EVERY NIGHT 90 Tablet 3    nebivoloL (BYSTOLIC) 10 mg tablet Take 1 Tablet by mouth daily. Brand Bystolic only-HARVEY 5 90 Tablet 3    benzonatate (TESSALON) 200 mg capsule TAKE 1 CAPSULE BY MOUTH THREE TIMES DAILY AS NEEDED 90 Capsule 1    romiplostim (NPLATE SC) by SubCUTAneous route every fourteen (14) days. Due to Nov 11      acetaminophen (TYLENOL) 650 mg TbER Take 650 mg by mouth every eight (8) hours as needed. levothyroxine (SYNTHROID) 50 mcg tablet TAKE 1/2 TABLET BY MOUTH EVERY MONDAY, WEDNESDAY AND FRIDAY 30 Tablet 1       Allergies   Allergen Reactions    Epinephrine Other (comments)     BP drops and pass out    Iodinated Contrast Media Shortness of Breath    Novocain [Procaine] Palpitations     BP drops,passes out    Codeine Itching     Pt does not recall    Cymbalta [Duloxetine] Other (comments)     Head Pain and nausea    Daypro [Oxaprozin] Unknown (comments)     Pt does not recall    Prednisone Shortness of Breath     agitation    Sulfa (Sulfonamide Antibiotics) Unknown (comments)     Pt does not recall    Tetracycline Other (comments)     Low platelet count    Zithromax [Azithromycin] Nausea Only     Diarrhea, headaches         Past Medical History:   Diagnosis Date    Arrhythmia     tachycardia; Dr. Kavitha Chacko    Autoimmune disease ([facility] [address]     fibromyalgia    Chronic ITP (idiopathic thrombocytopenia) (HCC)     Congestive heart failure (HCC)     Fibromyalgia     GERD (gastroesophageal reflux disease)     Headache(784.0)     migraine    Heart failure (HCC)     Hiatal hernia     HTN (hypertension) 4/2/2010    Hyperlipidemia     Hypothyroidism 4/2/2010    Ill-defined condition     intermittent brochitis    Murmur     OA (osteoarthritis) 4/2/2010    Seizures ([address]     56 years ago with childbirth    Tachycardia 2013    Thromboembolus ([address]     blood clot on brain 56 years ago w childbirth    Thyroid disease     Valvular heart disease          Past Surgical History:   Procedure Laterality Date    COLONOSCOPY,BRIGETTE LEONARD,SNARE  2/5/2015         ENDOSCOPY, COLON, DIAGNOSTIC  12/2010    Dr. Win Rowe- repeat 5 yrs    HX BREAST BIOPSY Right 1999    more than one yrs ago all benign    HX CARPAL TUNNEL RELEASE  9/28/2012    HX CATARACT REMOVAL  9/2014    bilateral     HX HYSTERECTOMY  1977    HX OTHER SURGICAL      bone biopsy    HX REFRACTIVE SURGERY  07/2018    HX UROLOGICAL  11/11/2021    Cystoscopy, bilateral ureteral catheter insertion. ND ABDOMEN SURGERY PROC UNLISTED  11/11/2021    Cystoscopy, bilateral ureteral catheter insertion.            Family History   Problem Relation Age of Onset    Heart Disease Paternal Aunt     Heart Disease Paternal Uncle     Colon Cancer Maternal Grandmother     Other Mother         murder    Lung Disease Father         COPD    Other Son         scooby's disease and UC    Other Daughter         fibromyalgia    Thyroid Disease Daughter     Heart Disease Maternal Grandfather     Other Sister         Raynauds       Social History     Tobacco Use    Smoking status: Never    Smokeless tobacco: Never   Substance Use Topics    Alcohol use: No     Alcohol/week: 0.0 standard drinks        Lab Results   Component Value Date/Time    WBC 3.0 (L) 06/28/2022 03:31 PM    HGB 12.0 06/28/2022 03:31 PM    Hemoglobin (POC) 11.6 07/14/2017 07:13 AM    HCT 38.6 06/28/2022 03:31 PM    Hematocrit (POC) 34 (L) 07/14/2017 07:13 AM    PLATELET 880 44/97/8762 03:31 PM    MCV 91.9 06/28/2022 03:31 PM     Lab Results   Component Value Date/Time    Cholesterol, total 171 06/28/2022 03:31 PM    HDL Cholesterol 58 06/28/2022 03:31 PM    LDL, calculated 81.8 06/28/2022 03:31 PM    Triglyceride 156 (H) 06/28/2022 03:31 PM    CHOL/HDL Ratio 2.9 06/28/2022 03:31 PM     Lab Results   Component Value Date/Time    TSH 0.559 06/28/2022 12:00 AM    T4, Free 1.19 06/28/2022 12:00 AM      Lab Results   Component Value Date/Time    Sodium 140 06/28/2022 03:31 PM    Potassium 4.3 06/28/2022 03:31 PM    Chloride 106 06/28/2022 03:31 PM    CO2 30 06/28/2022 03:31 PM    Anion gap 4 (L) 06/28/2022 03:31 PM    Glucose 101 (H) 06/28/2022 03:31 PM    BUN 19 06/28/2022 03:31 PM    Creatinine 0.92 06/28/2022 03:31 PM    BUN/Creatinine ratio 21 (H) 06/28/2022 03:31 PM    GFR est AA >60 06/28/2022 03:31 PM    GFR est non-AA 58 (L) 06/28/2022 03:31 PM    Calcium 10.0 06/28/2022 03:31 PM    Bilirubin, total 0.3 06/28/2022 03:31 PM    ALT (SGPT) 22 06/28/2022 03:31 PM    Alk. phosphatase 109 06/28/2022 03:31 PM    Protein, total 7.3 06/28/2022 03:31 PM    Albumin 4.3 06/28/2022 03:31 PM    Globulin 3.0 06/28/2022 03:31 PM    A-G Ratio 1.4 06/28/2022 03:31 PM      Lab Results   Component Value Date/Time    Hemoglobin A1c 5.9 (H) 08/28/2012 04:22 PM    Hemoglobin A1c (POC) 5.7 11/10/2015 03:16 PM    Hemoglobin A1c, External 11.1 07/22/2015 12:00 AM         Review of Systems   Constitutional:  Negative for malaise/fatigue. HENT:  Negative for congestion. Eyes:  Negative for blurred vision. Respiratory:  Negative for cough and shortness of breath. Cardiovascular:  Negative for chest pain, palpitations and leg swelling. Gastrointestinal:  Negative for abdominal pain, constipation and heartburn. Genitourinary:  Negative for dysuria, frequency and urgency. Musculoskeletal:  Negative for back pain and joint pain. Neurological:  Negative for dizziness and tingling. Endo/Heme/Allergies:  Negative for environmental allergies. Psychiatric/Behavioral:  Negative for depression. The patient does not have insomnia. Physical Exam  Vitals and nursing note reviewed. Constitutional:       Appearance: Normal appearance. She is well-developed. Comments: /65   Pulse 88   Temp 97.3 °F (36.3 °C)   Resp 12   Ht 5' 1\" (1.549 m)   Wt 167 lb (75.8 kg)   SpO2 96%   BMI 31.55 kg/m²    HENT:      Right Ear: Tympanic membrane and ear canal normal.      Left Ear: Tympanic membrane and ear canal normal.   Neck:      Thyroid: No thyromegaly. Cardiovascular:      Rate and Rhythm: Normal rate and regular rhythm. Heart sounds: Normal heart sounds. Pulmonary:      Effort: Pulmonary effort is normal.      Breath sounds: Normal breath sounds. Abdominal:      General: Bowel sounds are normal.      Palpations: Abdomen is soft. There is no mass. Tenderness: There is no abdominal tenderness.    Musculoskeletal:         General: Normal range of motion. Cervical back: Normal range of motion and neck supple. Right lower leg: No edema. Left lower leg: No edema. Lymphadenopathy:      Cervical: No cervical adenopathy. Skin:     General: Skin is warm and dry. Neurological:      General: No focal deficit present. Mental Status: She is alert and oriented to person, place, and time. Psychiatric:         Mood and Affect: Mood normal.     ASSESSMENT and PLAN  Diagnoses and all orders for this visit:    1. Essential hypertension  Stable     2. Mixed hyperlipidemia  Stable lipids. 3. Hypothyroidism due to acquired atrophy of thyroid  Stable TSH as per endo    4. Generalized osteoarthritis  5. Fibromyalgia  Stable     6. Chronic ITP (idiopathic thrombocytopenia) (HCC)  As per hematology. Getting iron infusion. 7. History of migraine  Stable     8. Encounter for medication monitoring      Follow-up and Dispositions    Return in about 6 months (around 6/14/2023). reviewed diet, exercise and weight control  cardiovascular risk and specific lipid/LDL goals reviewed  reviewed medications and side effects in detail    I have discussed diagnosis listed in this note with pt and/or family. I have discussed treatment plans and options and the risk/benefit analysis of those options, including safe use of medications and possible medication side effects. Through the use of shared decision making we have agreed to the above plan. The patient has received an after-visit summary and questions were answered concerning future plans and follow up. Advise pt of any urgent changes then to proceed to the ER.

## 2022-12-14 NOTE — LETTER
CONTROLLED SUBSTANCE MEDICATION AGREEMENT  Patient Name: Gabriel Francisco  Patient YOB: 1935     I understand, that controlled substance medications may be used to help better manage my symptoms and to improve my ability to function at home, work and in social settings. However, I also understand that these medications do have risks, which have been discussed with me, including possible development of physical or psychological dependence. I understand that successful treatment requires mutual trust and honesty between me and my provider. I understand and agree that following this Medication Agreement is necessary in continuing my provider-patient relationship and the success of my treatment plan. Explanation from my Provider: Benefits and Goals of Controlled Substance Medications: There are two potential goals for your treatment: (1) decreased pain and suffering (2) improved daily life functions. There are many possible treatments for your chronic condition(s). Alternatives such as physical therapy, yoga, massage, home daily exercise, meditation, relaxation techniques, injections, chiropractic manipulations, surgery, cognitive therapy, hypnosis and many medications that are not habit-forming may be used. Use of controlled substance medications may be helpful, but they are unlikely to resolve all symptoms or restore all function. Explanation from my Provider: Risks of Controlled Substance Medications:   Opioid pain medications: These medications can lead to problems such as addiction/dependence, sedation, lightheadedness/dizziness, memory issues, falls, constipation, nausea, or vomiting. They may also impair the ability to drive or operate machinery. Additionally, these medications may lower testosterone levels, leading to loss of bone strength, stamina and sex drive.   They may cause problems with breathing, sleep apnea and reduced coughing, which is especially dangerous for patients with lung disease. Overdose or dangerous interactions with alcohol and other medications may occur, leading to death. Hyperalgesia may develop, which means patients receiving opioids for the treatment of pain may become more sensitive to certain painful stimuli, and in some cases, experience pain from ordinarily non-painful stimuli. Women between the ages of 14-53 who could become pregnant should carefully weigh the risks and benefits of opioids with their physicians, as these medications increase the risk of pregnancy complications, including miscarriage,  delivery and stillbirth. It is also possible for babies to be born addicted to opioids. Opioid dependence withdrawal symptoms may include; feelings of uneasiness, increased pain, irritability, belly pain, diarrhea, sweats and goose-flesh. Testosterone replacement therapy:  Potential side effects include increased risk of stroke and heart attack, blood clots, increased blood pressure, increased cholesterol, enlarged prostate, sleep apnea, irritability/aggression and other mood disorders, and decreased fertility. Juliann Doherty (1935)             Page 1 of 4    Initials:_______    Benzodiazepines and non-benzodiazepine sleep medications: These medications can lead to problems such as addiction/dependence, sedation, fatigue, lightheadedness, dizziness, incoordination, falls, depression, hallucinations, and impaired judgment, memory and concentration. The ability to drive and operate machinery may also be affected. Abnormal sleep-related behaviors have been reported, including sleepwalking, driving, making telephone calls, eating, or having sex while not fully awake. These medications can suppress breathing and worsen sleep apnea, particularly when combined with alcohol or other sedating medications, potentially leading to death. Dependence withdrawal symptoms may include tremors, anxiety, hallucinations and seizures.    Stimulants:  Common adverse effects include addiction/dependence, increased blood pressure and heart rate, decreased appetite, nausea, involuntary weight loss, insomnia,  irritability, and headaches. These risks may increase when these medications are combined with other stimulants, such as caffeine pills or energy drinks, certain weight loss supplements and oral decongestants. Dependence withdrawal symptoms may include depressed mood, loss of interest, suicidal thoughts, anxiety, fatigue, appetite changes and agitation. I agree and understand that I and my prescriber have the following rights and responsibilities regarding my treatment plan:   1. MY RIGHTS:  To be informed of my treatment and medication plan. To be an active participant in my health and wellbeing. 2. MY RESPONSIBILITY AND UNDERSTANDING FOR USE OF MEDICATIONS   I will take medications at the dose and frequency as directed. For my safety, I will not increase or change how I take my medications without the recommendation of my healthcare provider.  I will actively participate in any program recommended by my provider which may improve function, including social, physical, psychological programs.  I will not take my medications with alcohol or other drugs not prescribed to me. I understand that drinking alcohol with my medications increases the chances of side effects, including reduced breathing rate and could lead to personal injury when operating machinery.  I understand that if I have a history of substance use disorders, including alcohol or other illicit drugs, that I may be at increased risk of addiction to my medications.  I agree to notify my provider immediately if I should become pregnant so that my treatment plan can be adjusted.    I agree and understand that I shall only receive controlled substance medications from the prescriber that signed this agreement unless there is written agreement among other prescribers of controlled substances outlining the responsibility of the medications being prescribed.  I understand that the if the controlled medication is not helping to achieve goals, the dosage may be tapered and no longer prescribed. 3. MY RESPONSIBILITY FOR COMMUNICATION / PRESCRIPTION RENEWALS   I agree that all controlled substance medications that I take will be prescribed only by my provider. If another healthcare provider prescribes me medication in an emergency, I will notify my provider within seventy-two (72) hours. Raji Clements (1935)             Page 2 of 4    Initials:_______  Erin Jessica I will arrange for refills at the prescribed interval ONLY during regular office hours. I will not ask for refills earlier than agreed, after-hours, on holidays or weekends. Refills may take up to 72 hours for processing and prescriptions to reach the pharmacy.  I will inform my other health care providers that I am taking these medications and of the existence of this [id number]. In the event of an emergency, I will provide the same information to the emergency department prescribers.  I will keep my provider updated on the pharmacy I am using for controlled medication prescription filling. 4. MY RESPONSIBILITY FOR PROTECTING MEDICATIONS   I will protect my prescriptions and medications. I understand that lost or misplaced prescriptions will not be replaced.  I will keep medications only for my own use and will not share them with others. I will keep all medications away from children.  I agree that if my medications are adjusted or discontinued, I will properly dispose of any remaining medications. I understand that I will be required to dispose of any remaining controlled medications as, directed by my prescriber, prior to being provided with any prescriptions for other controlled medications.   Medication drop box locations can be found at: [employer]  5. MY RESPONSIBILITY WITH ILLEGAL DRUGS    I will not use illegal or street drugs or another person's prescription medications not prescribed to me.  If there are identified addiction type symptoms, then referral to a program may be provided by my provider and I agree to follow through with this recommendation. 6. MY RESPONSIBILITY FOR COOPERATION WITH INVESTIGATIONS   I understand that my provider will comply with any applicable law and may discuss my use and/or possible misuse/abuse of controlled substances and alcohol, as appropriate, with any health care provider involved in my care, pharmacist, or legal authority.  I authorize my provider and pharmacy to cooperate fully with law enforcement agencies (as permitted by law) in the investigation of any possible misuse, sale, or other diversion of my controlled substances.  I agree to waive any applicable privilege or right of privacy or confidentiality with respect to these authorizations. 7. PROVIDERS RIGHT TO MONITOR FOR SAFETY: PRESCRIPTION MONITORING / DRUG TESTING   I consent to drug/toxicology screening and will submit to a drug screen upon my providers request to assure I am only taking the prescribed drugs for my safety monitoring. I understand that a drug screen is a laboratory test in which a sample of my urine, blood or saliva is checked to see what drugs I have been taking. This may entail an observed urine specimen, which means that a nurse or other health care provider may watch me provide urine, and I will cooperate if I am asked to provide an observed specimen. Reji Grimes (1935)             Page 3 of 4    Initials:_______  [facility] I understand that my provider will check a copy of my State Prescription Monitoring Program () Report in order to safely prescribe medications.      Pill Counts: I consent to pill counts when requested. I may be asked to bring all my prescribed controlled substance medications, in their original bottles, to all of my scheduled appointments. In addition, my provider may ask me to come to the practice at any time for a random pill count. 8. TERMINATION OF THIS AGREEMENT   For my safety, my prescriber has the right to stop prescribing controlled substance medications and may end this agreement.  Conditions that may result in termination of this agreement:  a. I do not show any improvement in pain, or my activity has not improved. b. I develop rapid tolerance or loss of improvement, as described in my treatment plan.  c. I develop significant side effects from the medication. d. My behavior is not consistent with the responsibilities outlined above, thereby causing safety concerns to continue prescribing controlled substance medications. e. I fail to follow the terms of this agreement. f. Other:____________________________     UNDERSTANDING THIS MEDICATION AGREEMENT:    I have read the above and have had all my questions answered. For chronic disease management, I know that my symptoms can be managed with many types of treatments. A chronic medication trial may be part of my treatment, but I must be an active participant in my care. Medication therapy is only one part of my symptom management plan. In some cases, there may be limited scientific evidence to support the chronic use of certain medications to improve symptoms and daily function. Furthermore, in certain circumstances, there may be scientific information that suggests that the use of chronic controlled substances may worsen my symptoms and increase my risk of unintentional death directly related to this medication therapy. I know that if my provider feels my risk from controlled medications is greater than my benefit, I will have my controlled substance medication(s) compassionately lowered or removed altogether. I further agree to allow this office to contact my HIPAA contact if there are concerns about my safety and use of the controlled medications. I have agreed to use the prescribed controlled substance medications to me as instructed by my provider and as stated in this Medication Agreement. My initial on each page and my signature below shows that I have read each page and I have had the opportunity to ask questions with answers provided by my provider.       Patient Name (Printed): _____________________________________    Patient Signature:  ______________________   Date: _____________      Prescriber Name (Printed): ___________________________________    Prescriber Signature: _____________________  Date: _____________     [facility] [place] (1935)             Page 4 of 4

## 2022-12-14 NOTE — PROGRESS NOTES
This is a Subsequent Medicare Annual Wellness Exam (AWV) (Performed 12 months after IPPE or effective date of Medicare Part B enrollment)    I have reviewed the patient's medical history in detail and updated the computerized patient record. 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    Edema leg R60.0    Reactive airway disease without complication J82.796    Incidental lung nodule, greater than or equal to 8mm R91.1    Encounter for medication monitoring Z51.81    Preop cardiovascular exam Z01.810    Pelvic lymphadenopathy R59.0    Chronic ITP (idiopathic thrombocytopenia) (HCC) D69.3    Hypercholesterolemia E78.00    Nonrheumatic mitral valve regurgitation I34.0    Migraine with aura and without status migrainosus, not intractable G43.109    Tension vascular headache G44.209    History of ITP Z86.2    Cervicogenic headache G44.86       Current Outpatient Medications   Medication Sig Dispense Refill    butalbital-acetaminophen-caffeine (FIORICET, ESGIC) -40 mg per tablet TAKE 1-2 TABLETS BY MOUTH EVERY 6 HOURS AS NEEDED FOR HEADACHE 30 Tablet 5    temazepam (RESTORIL) 30 mg capsule TAKE A 1/2 CAPSULE TO 1 CAPSULE BY MOUTH NIGHTLY AS NEEDED FOR SLEEP. MAX 1 CAP PER DAY 30 Capsule 1    pravastatin (PRAVACHOL) 10 mg tablet TAKE 1 TABLET BY MOUTH EVERY NIGHT 90 Tablet 3    nebivoloL (BYSTOLIC) 10 mg tablet Take 1 Tablet by mouth daily.  Brand Bystolic only-HARVEY 5 90 Tablet 3    benzonatate (TESSALON) 200 mg capsule TAKE 1 CAPSULE BY MOUTH THREE TIMES DAILY AS NEEDED 90 Capsule 1    romiplostim (NPLATE SC) by SubCUTAneous route every fourteen (14) days. Due to Nov 11      acetaminophen (TYLENOL) 650 mg TbER Take 650 mg by mouth every eight (8) hours as needed.       levothyroxine (SYNTHROID) 50 mcg tablet TAKE 1/2 TABLET BY MOUTH EVERY MONDAY, WEDNESDAY AND FRIDAY 30 Tablet 1       Allergies   Allergen Reactions    Epinephrine Other (comments)     BP drops and pass out    Iodinated Contrast Media Shortness of Breath    Novocain [Procaine] Palpitations     BP drops,passes out    Codeine Itching     Pt does not recall    Cymbalta [Duloxetine] Other (comments)     Head Pain and nausea    Daypro [Oxaprozin] Unknown (comments)     Pt does not recall    Prednisone Shortness of Breath     agitation    Sulfa (Sulfonamide Antibiotics) Unknown (comments)     Pt does not recall    Tetracycline Other (comments)     Low platelet count    Zithromax [Azithromycin] Nausea Only     Diarrhea, headaches         Past Medical History:   Diagnosis Date    Arrhythmia     tachycardia; Dr. Christiano Worthy    Autoimmune disease ([facility] [address]     fibromyalgia    Chronic ITP (idiopathic thrombocytopenia) (HCC)     Congestive heart failure (HCC)     Fibromyalgia     GERD (gastroesophageal reflux disease)     Headache(784.0)     migraine    Heart failure (HCC)     Hiatal hernia     HTN (hypertension) 4/2/2010    Hyperlipidemia     Hypothyroidism 4/2/2010    Ill-defined condition     intermittent brochitis    Murmur     OA (osteoarthritis) 4/2/2010    Seizures ([address]     56 years ago with childbirth    Tachycardia 2013    Thromboembolus ([address]     blood clot on brain 56 years ago w childbirth    Thyroid disease     Valvular heart disease          Past Surgical History:   Procedure Laterality Date    COLONOSCOPY,REMV HORACIO,SNARE  2/5/2015         ENDOSCOPY, COLON, DIAGNOSTIC  12/2010    Dr. Prakash Anglin- repeat 5 yrs    HX BREAST BIOPSY Right 1999    more than one yrs ago all benign    HX CARPAL TUNNEL RELEASE  9/28/2012    HX CATARACT REMOVAL  9/2014    bilateral HX HYSTERECTOMY  1977    HX OTHER SURGICAL      bone biopsy    HX REFRACTIVE SURGERY  07/2018    HX UROLOGICAL  11/11/2021    Cystoscopy, bilateral ureteral catheter insertion. [facility] ABDOMEN SURGERY PROC UNLISTED  11/11/2021    Cystoscopy, bilateral ureteral catheter insertion. Family History   Problem Relation Age of Onset    Heart Disease Paternal Aunt     Heart Disease Paternal Uncle     Colon Cancer Maternal Grandmother     Other Mother         murder    Lung Disease Father         COPD    Other Son         scooby's disease and UC    Other Daughter         fibromyalgia    Thyroid Disease Daughter     Heart Disease Maternal Grandfather     Other Sister         Raynauds       Social History     Tobacco Use    Smoking status: Never    Smokeless tobacco: Never   Substance Use Topics    Alcohol use: No     Alcohol/week: 0.0 standard drinks         Depression Risk Factor Screening:     PHQ over the last two weeks    Little interest or pleasure in doing things Not at all   Feeling down, depressed or hopeless Not at all   Total Score PHQ 2 0     Alcohol Risk Factor Screening: You do not drink alcohol or very rarely. Functional Ability and Level of Safety:   Hearing Loss  Hearing is good. Activities of Daily Living  The home contains: discussed safety equipment. Patient does total self care    Fall RiskFall Risk Assessment, last 12 mths    Able to walk? Yes   Fall in past 12 months? No     Functional Ability:   Does the patient exhibit a steady gait? yes    How long did it take the patient to get up and walk from a sitting position? seconds    Is the patient self reliant? (ie can do own laundry, meals, household chores)  yes   Does the patient handle his/her own medications? yes   Does the patient handle his/her own money? yes   Is the patients home safe (ie good lighting, handrails on stairs and bath, etc.)? yes   Did you notice or did patient express any hearing difficulties?   no   Did you notice or did patient express any vision difficulties? no        Advance Care Planning:   Patient was offered the opportunity to discuss advance care planning:  yes    Does patient have an Advance Directive:  yes   If no, did you provide information on Caring Connections? Abuse Screen  Patient is not abused    Cognitive Screening   Evaluation of Cognitive Function:  Has your family/caregiver stated any concerns about your memory: no    Patient Care Team   Patient Care Team:  Alyssa Saravia MD as PCP - General    Assessment/Plan   Education and counseling provided:  Are appropriate based on today's review and evaluation  End-of-Life planning (with patient's consent)    ASSESSMENT and PLAN    Medicare Annual Wellness  Continue current treatment plan. Continue annual follow up. I have discussed diagnosis listed in this note with pt and/or family. I have discussed treatment plans and options and the risk/benefit analysis of those options, including safe use of medications and possible medication side effects. Through the use of shared decision making we have agreed to the above plan. The patient has received an after-visit summary and questions were answered concerning future plans and follow up. Advise pt of any urgent changes then to proceed to the ER.

## 2022-12-14 NOTE — PROGRESS NOTES
Patient identified by 2 identifiers. Chief Complaint   Patient presents with    Follow-up    Hypertension     1. Have you been to the ER, urgent care clinic since your last visit? Hospitalized since your last visit? No    2. Have you seen or consulted any other health care providers outside of the [address] [place] [address] since your last visit? Include any pap smears or colon screening.  No

## 2023-01-11 ENCOUNTER — TRANSCRIBE ORDER (OUTPATIENT)
Dept: SCHEDULING | Age: 88
End: 2023-01-11

## 2023-01-11 DIAGNOSIS — Z12.31 SCREENING MAMMOGRAM FOR BREAST CANCER: Primary | ICD-10-CM

## 2023-01-11 RX ORDER — NEBIVOLOL HYDROCHLORIDE 10 MG/1
TABLET ORAL
Qty: 90 TABLET | Refills: 3 | Status: SHIPPED | OUTPATIENT
Start: 2023-01-11

## 2023-01-13 RX ORDER — PRAVASTATIN SODIUM 10 MG/1
TABLET ORAL
Qty: 90 TABLET | Refills: 3 | Status: SHIPPED | OUTPATIENT
Start: 2023-01-13

## 2023-01-31 ENCOUNTER — DOCUMENTATION ONLY (OUTPATIENT)
Dept: FAMILY MEDICINE CLINIC | Age: 88
End: 2023-01-31

## 2023-02-06 RX ORDER — NEBIVOLOL 10 MG/1
10 TABLET ORAL DAILY
Qty: 90 TABLET | Refills: 3 | Status: SHIPPED | OUTPATIENT
Start: 2023-02-06

## 2023-02-06 NOTE — TELEPHONE ENCOUNTER
Dano Thomas is requesting a 90 day supply  Previous Rx was sent to [facility]    Last Visit: 12/14/22 with MD Reji Benito  Next Appointment: 2/14/23 with MD Irvin    Requested Prescriptions     Pending Prescriptions Disp Refills    nebivoloL (Bystolic) 10 mg tablet 90 Tablet 3     Sig: Take 1 Tablet by mouth daily. For Pharmacy Admin Tracking Only    Program: Medication Refill  CPA in place:   Recommendation Provided To:    Intervention Detail: New Rx: 1, reason: Patient Preference  Intervention Accepted By:   Baldomero Elliott Closed?:   Time Spent (min): 5

## 2023-02-08 DIAGNOSIS — G43.109 MIGRAINE WITH AURA, NOT INTRACTABLE, WITHOUT STATUS MIGRAINOSUS: ICD-10-CM

## 2023-02-08 DIAGNOSIS — Z86.69 PERSONAL HISTORY OF OTHER DISEASES OF THE NERVOUS SYSTEM AND SENSE ORGANS: ICD-10-CM

## 2023-02-08 RX ORDER — BUTALBITAL, ACETAMINOPHEN AND CAFFEINE 50; 325; 40 MG/1; MG/1; MG/1
TABLET ORAL
Qty: 30 TABLET | Refills: 1 | Status: SHIPPED | OUTPATIENT
Start: 2023-02-08

## 2023-02-13 DIAGNOSIS — F51.01 PRIMARY INSOMNIA: ICD-10-CM

## 2023-02-13 RX ORDER — TEMAZEPAM 30 MG/1
30 CAPSULE ORAL
Qty: 30 CAPSULE | Refills: 1 | Status: SHIPPED | OUTPATIENT
Start: 2023-02-13

## 2023-02-28 ENCOUNTER — HOSPITAL ENCOUNTER (OUTPATIENT)
Dept: MAMMOGRAPHY | Age: 88
Discharge: HOME | End: 2023-02-28
Attending: FAMILY MEDICINE
Payer: MEDICARE

## 2023-02-28 DIAGNOSIS — Z12.31 SCREENING MAMMOGRAM FOR BREAST CANCER: ICD-10-CM

## 2023-02-28 PROCEDURE — 77063 BREAST TOMOSYNTHESIS BI: CPT

## 2023-03-15 RX ORDER — PRAVASTATIN SODIUM 10 MG/1
10 TABLET ORAL
Qty: 90 TABLET | Refills: 3 | Status: SHIPPED | OUTPATIENT
Start: 2023-03-15

## 2023-03-15 NOTE — TELEPHONE ENCOUNTER
Jovany Moore is requesting a 90 day supply  Previous Rx was sent to [facility]    Last Visit: 12/14/22 with MD Yvonne Corley  Next Appointment: 6/14/23 with MD Yvonne Corley    Requested Prescriptions     Pending Prescriptions Disp Refills    pravastatin (PRAVACHOL) 10 mg tablet 90 Tablet 3     Sig: Take 1 Tablet by mouth nightly. For Pharmacy Admin Tracking Only    Program: Medication Refill  CPA in place:   Recommendation Provided To:    Intervention Detail: New Rx: 1, reason: Patient Preference  Intervention Accepted By:   Raj Pineda Closed?:   Time Spent (min): 5

## 2023-03-23 DIAGNOSIS — F51.01 PRIMARY INSOMNIA: ICD-10-CM

## 2023-03-23 DIAGNOSIS — G43.109 MIGRAINE WITH AURA, NOT INTRACTABLE, WITHOUT STATUS MIGRAINOSUS: ICD-10-CM

## 2023-03-23 DIAGNOSIS — Z86.69 PERSONAL HISTORY OF OTHER DISEASES OF THE NERVOUS SYSTEM AND SENSE ORGANS: ICD-10-CM

## 2023-03-23 RX ORDER — BUTALBITAL, ACETAMINOPHEN AND CAFFEINE 50; 325; 40 MG/1; MG/1; MG/1
TABLET ORAL
Qty: 30 TABLET | Refills: 1 | Status: SHIPPED | OUTPATIENT
Start: 2023-03-23

## 2023-03-23 RX ORDER — TEMAZEPAM 30 MG/1
30 CAPSULE ORAL
Qty: 30 CAPSULE | Refills: 1 | OUTPATIENT
Start: 2023-03-23

## 2023-03-23 NOTE — TELEPHONE ENCOUNTER
Spoke to Ryan at [facility] and she stated pt has to call Medicare to give them permission to start sending all of her medications by mail order because she has Medicare part D. I explained this to the pt and she will be calling the number on the back of her card to give them permission. Then we can re-send her medications to her [facility].

## 2023-03-23 NOTE — TELEPHONE ENCOUNTER
Last Visit: 12/14/22 with MD Krystal Musa  Next Appointment: 6/14/23 with MD Krystal Musa  Previous Refill Encounter(s): 2/8/23 Fioricet #30 with 1 refill, 2/13/23 Restoril #30 with 1 refill    Requested Prescriptions     Pending Prescriptions Disp Refills    butalbital-acetaminophen-caffeine (FIORICET, ESGIC) -40 mg per tablet 30 Tablet 1     Sig: TAKE 1 TABLET BY MOUTH EVERY 6 HOURS AS NEEDED FOR PAIN    temazepam (RESTORIL) 30 mg capsule 30 Capsule 1     Sig: Take 1 Capsule by mouth nightly as needed for Sleep. Max Daily Amount: 30 mg. For Pharmacy Admin Tracking Only    Program: Medication Refill  CPA in place:   Recommendation Provided To:    Intervention Detail: New Rx: 2, reason: Patient Preference  Intervention Accepted By:   Gregory Alexis Closed?:   Time Spent (min): 5

## 2023-06-22 DIAGNOSIS — F51.01 PRIMARY INSOMNIA: ICD-10-CM

## 2023-06-22 NOTE — TELEPHONE ENCOUNTER
Carmina Cruz is requesting New Rx  Previous Rx for Restoril was sent to [facility]    Last appointment: 6/14/23  Next appointment: 12/18/23  Previous refill encounter(s): 3/23/23 Fioricet #30 with 1 refill    Requested Prescriptions     Pending Prescriptions Disp Refills    butalbital-acetaminophen-caffeine (FIORICET, ESGIC) -40 MG per tablet 30 tablet 1     Sig: TAKE 1 TABLET BY MOUTH EVERY 6 HOURS AS NEEDED FOR PAIN    temazepam (RESTORIL) 30 MG capsule 30 capsule 1     Sig: TAKE 1 CAPSULE BY MOUTH NIGHTLY AS NEEDED FOR SLEEP. MAX DAILY AMOUNT: 30 MG. For Pharmacy Admin Tracking Only    Program: Medication Refill  CPA in place:    Recommendation Provided To:    Intervention Detail: New Rx: 2, reason: Patient Preference  Intervention Accepted By:   Michele Gardner Closed?:    Time Spent (min): 5

## 2023-06-23 RX ORDER — TEMAZEPAM 30 MG/1
CAPSULE ORAL
Qty: 30 CAPSULE | Refills: 1 | Status: SHIPPED | OUTPATIENT
Start: 2023-06-23 | End: 2023-08-22

## 2023-06-23 RX ORDER — BUTALBITAL, ACETAMINOPHEN AND CAFFEINE 50; 325; 40 MG/1; MG/1; MG/1
TABLET ORAL
Qty: 30 TABLET | Refills: 1 | Status: SHIPPED | OUTPATIENT
Start: 2023-06-23

## 2023-08-07 RX ORDER — BUTALBITAL, ACETAMINOPHEN AND CAFFEINE 50; 325; 40 MG/1; MG/1; MG/1
TABLET ORAL
Qty: 30 TABLET | Refills: 5 | OUTPATIENT
Start: 2023-08-07

## 2023-08-28 DIAGNOSIS — G43.109 MIGRAINE WITH AURA, NOT INTRACTABLE, WITHOUT STATUS MIGRAINOSUS: ICD-10-CM

## 2023-08-28 DIAGNOSIS — Z86.69 PERSONAL HISTORY OF OTHER DISEASES OF THE NERVOUS SYSTEM AND SENSE ORGANS: ICD-10-CM

## 2023-08-29 RX ORDER — BUTALBITAL, ACETAMINOPHEN AND CAFFEINE 50; 325; 40 MG/1; MG/1; MG/1
TABLET ORAL
Qty: 30 TABLET | Refills: 1 | Status: SHIPPED | OUTPATIENT
Start: 2023-08-29

## 2023-08-29 NOTE — TELEPHONE ENCOUNTER
Last appointment: 6/14/23  Next appointment: 12/18/23  Previous refill encounter(s): 6/23/23 #30 with 1 refill    Requested Prescriptions     Pending Prescriptions Disp Refills    butalbital-acetaminophen-caffeine (FIORICET, ESGIC) -40 MG per tablet [Pharmacy Med Name: [id number] -40] 30 tablet 1     Sig: TAKE 1 TABLET BY MOUTH EVERY 6 HOURS AS NEEDED FOR PAIN         For Pharmacy Admin Tracking Only    Program: Medication Refill  CPA in place:    Recommendation Provided To:    Intervention Detail: New Rx: 1, reason: Patient Preference  Intervention Accepted By:   Damian Fox Closed?:    Time Spent (min): 5

## 2023-08-30 RX ORDER — BUTALBITAL, ACETAMINOPHEN AND CAFFEINE 50; 325; 40 MG/1; MG/1; MG/1
TABLET ORAL
Qty: 30 TABLET | Refills: 5 | OUTPATIENT
Start: 2023-08-30

## 2023-09-28 DIAGNOSIS — Z86.69 PERSONAL HISTORY OF OTHER DISEASES OF THE NERVOUS SYSTEM AND SENSE ORGANS: ICD-10-CM

## 2023-09-28 DIAGNOSIS — F51.01 PRIMARY INSOMNIA: ICD-10-CM

## 2023-09-28 DIAGNOSIS — G43.109 MIGRAINE WITH AURA, NOT INTRACTABLE, WITHOUT STATUS MIGRAINOSUS: ICD-10-CM

## 2023-09-28 RX ORDER — BUTALBITAL, ACETAMINOPHEN AND CAFFEINE 50; 325; 40 MG/1; MG/1; MG/1
TABLET ORAL
Qty: 30 TABLET | Refills: 5 | OUTPATIENT
Start: 2023-09-28

## 2023-09-29 RX ORDER — TEMAZEPAM 30 MG/1
CAPSULE ORAL
Qty: 30 CAPSULE | Refills: 1 | Status: SHIPPED | OUTPATIENT
Start: 2023-09-29 | End: 2023-11-28

## 2023-09-29 NOTE — TELEPHONE ENCOUNTER
Last appointment: 6/14/23  Next appointment: 12/18/23  Previous refill encounter(s): 6/23/23 #30 with 1 refill    Requested Prescriptions     Pending Prescriptions Disp Refills    temazepam (RESTORIL) 30 MG capsule [Pharmacy Med Name: TEMAZEPAM 30 MG CAPSULE] 30 capsule 1     Sig: TAKE 1 CAPSULE BY MOUTH NIGHTLY AS NEEDED FOR SLEEP. MAX DAILY AMOUNT: 30 MG. For Pharmacy Admin Tracking Only    Program: Medication Refill  CPA in place:    Recommendation Provided To:    Intervention Detail: New Rx: 1, reason: Patient Preference  Intervention Accepted By:   Marques Gustafson Closed?:    Time Spent (min): 5

## 2023-10-29 DIAGNOSIS — Z86.69 PERSONAL HISTORY OF OTHER DISEASES OF THE NERVOUS SYSTEM AND SENSE ORGANS: ICD-10-CM

## 2023-10-29 DIAGNOSIS — G43.109 MIGRAINE WITH AURA, NOT INTRACTABLE, WITHOUT STATUS MIGRAINOSUS: ICD-10-CM

## 2023-11-01 DIAGNOSIS — Z86.69 PERSONAL HISTORY OF OTHER DISEASES OF THE NERVOUS SYSTEM AND SENSE ORGANS: ICD-10-CM

## 2023-11-01 DIAGNOSIS — G43.109 MIGRAINE WITH AURA, NOT INTRACTABLE, WITHOUT STATUS MIGRAINOSUS: ICD-10-CM

## 2023-11-01 RX ORDER — BUTALBITAL, ACETAMINOPHEN AND CAFFEINE 50; 325; 40 MG/1; MG/1; MG/1
TABLET ORAL
Qty: 30 TABLET | Refills: 5 | OUTPATIENT
Start: 2023-11-01

## 2023-11-02 RX ORDER — BUTALBITAL, ACETAMINOPHEN AND CAFFEINE 50; 325; 40 MG/1; MG/1; MG/1
TABLET ORAL
Qty: 30 TABLET | Refills: 1 | Status: SHIPPED | OUTPATIENT
Start: 2023-11-02

## 2023-11-02 NOTE — TELEPHONE ENCOUNTER
Last appointment: 6/14/23  Next appointment: 12/18/23  Previous refill encounter(s): 8/29/23 #30 with 1 refill    Requested Prescriptions     Pending Prescriptions Disp Refills    butalbital-acetaminophen-caffeine (FIORICET, ESGIC) -40 MG per tablet [Pharmacy Med Name: [id number] -40] 30 tablet 1     Sig: TAKE 1 TABLET BY MOUTH EVERY 6 HOURS AS NEEDED FOR PAIN         For Pharmacy Admin Tracking Only    Program: Medication Refill  CPA in place:    Recommendation Provided To:    Intervention Detail: New Rx: 1, reason: Patient Preference  Intervention Accepted By:   Ever Little Closed?:    Time Spent (min): 5

## 2023-11-17 ENCOUNTER — TELEPHONE (OUTPATIENT)
Age: 88
End: 2023-11-17

## 2023-11-17 NOTE — TELEPHONE ENCOUNTER
Pt called about elevated bp.    11/16/23 9:46am 156/88      11:59am 159/93      2:30pm 178/90      4:22pm 143/83    11/17/23 9:12am  170/96      Pt# [phone]

## 2023-11-17 NOTE — TELEPHONE ENCOUNTER
Spoke with patient, verified with 2 identifiers. Patient stated that BP is still high and having headaches. Ms. Natali Capone also informed that suffers from migraines but, took medication and HH did not go away. BP during encounter 173/91. This nurse advise patient that dr. Saida Olea will be contacted but, if she still having increased  BP readings with Headaches she will need to go to the ED to be evaluated and treat. Patient voiced understanding.    Noted patient only on Bystolic 10 mg

## 2023-11-21 RX ORDER — AMLODIPINE BESYLATE 2.5 MG/1
2.5 TABLET ORAL DAILY
Qty: 90 TABLET | Refills: 1 | Status: SHIPPED | OUTPATIENT
Start: 2023-11-21

## 2023-11-21 NOTE — TELEPHONE ENCOUNTER
Spoke with patient, verified with 2 identifiers. Patient stated that BP is still high and headaches are not always present. Agree to start Amlodipine 2.5 mg once a day. Patient was instructed to get BP reading every day after 2 hours of taking BP meds plus one more time during the day  and call me back in 5 -7 days.   Patient voiced understanding

## 2023-11-21 NOTE — TELEPHONE ENCOUNTER
Reviewed previous blood pressures which are generally normal.    There are 2 possibilities here. If she has a headache as the primary events, that might cause her blood pressure to go up secondarily. She should of course treat the headache with Tylenol and any other recommendations from PCP. If she has consistently high blood pressures for several days, regardless of headache, I would likely add amlodipine 2.5 mg daily, and if after 3 days blood pressure remains generally greater than 150, increase to 5 mg.   Keep us posted on symptoms, blood pressures, need for amlodipine, etc.

## 2023-11-27 ENCOUNTER — TELEPHONE (OUTPATIENT)
Age: 88
End: 2023-11-27

## 2023-11-27 NOTE — TELEPHONE ENCOUNTER
Patient states that her cardiologist switch her medication nebivolol (BYSTOLIC) 10 MG tablet  to amLODIPine (NORVASC) 2.5 MG tablet she says that she is not happy with taking this medication she would like to speak with Erich Danielson her call back number is [phone]

## 2023-11-27 NOTE — TELEPHONE ENCOUNTER
Pt is calling because she stated Dr. Alhaji Nelson recently put her on AMLODIPINE 2.5 mg on 11/21/2023 however she did not start taking it until 11/23/2023 at night, since that time her pulse has been as high as 123 at rest. This morning /96 pulse 107, pt would like a call back.  Tried to reach Agate at 9:33 AM.       Pt ph # [phone] (home)  [phone] (cell)

## 2023-11-27 NOTE — TELEPHONE ENCOUNTER
Spoke with patient, verified with 2 identifiers. Patient started amlodipine  just 3 days ago. Patient seemed to be very anxious, speaking very quickly stated \" I always been an anxious person\". This nurse advised patient to sit down, take some deep breaths and do not talk for a few seconds. BP taken after that 142/86 Pulse 83. No cardiac symptoms   This nurse recommended to call PCP to help with anxiety and also to give a couple of weeks for amlodipine to take full effect. Patient voiced understanding and will call back Friday afternoon with BP readings.

## 2023-11-27 NOTE — TELEPHONE ENCOUNTER
Patient states that her cardiologist switch her medication nebivolol (BYSTOLIC) 10 MG tablet  to amLODIPine (NORVASC) 2.5 MG tablet she says that she is not happy with taking this medication she would like to speak with Gopi Alvarez her call back number is [phone]  ----------------------------------------------------------------------------------------------  Pt state Dr. Jose Zapata changed her BP medication from bystolic to amlodipine and she is not happy with it. She started it Thursday and her pulse 104 resting. She states her HR is elevated. She called the cardiologist nurse, but the pt is still not happy and they advised her to call her PCP because it may be her nerves. She wants a call back from Dr. Santos [facility].   [phone]

## 2023-11-30 DIAGNOSIS — F51.01 PRIMARY INSOMNIA: ICD-10-CM

## 2023-12-01 ENCOUNTER — TELEPHONE (OUTPATIENT)
Age: 88
End: 2023-12-01

## 2023-12-01 RX ORDER — TEMAZEPAM 30 MG/1
CAPSULE ORAL
Qty: 30 CAPSULE | Refills: 1 | Status: SHIPPED | OUTPATIENT
Start: 2023-12-01 | End: 2024-01-30

## 2023-12-01 NOTE — TELEPHONE ENCOUNTER
Last appointment: 6/14/23  Next appointment: 12/18/23  Previous refill encounter(s): 9/29/23 #30 with 1 refill    Requested Prescriptions     Pending Prescriptions Disp Refills    temazepam (RESTORIL) 30 MG capsule [Pharmacy Med Name: TEMAZEPAM 30 MG CAPSULE] 30 capsule 1     Sig: TAKE 1 CAPSULE BY MOUTH NIGHTLY AS NEEDED FOR SLEEP. MAX DAILY AMOUNT: 30 MG. For Pharmacy Admin Tracking Only    Program: Medication Refill  CPA in place:    Recommendation Provided To:    Intervention Detail: New Rx: 1, reason: Patient Preference  Intervention Accepted By:   Farheen Liu Closed?:    Time Spent (min): 5

## 2024-01-29 ENCOUNTER — OFFICE VISIT (OUTPATIENT)
Age: 89
End: 2024-01-29
Payer: MEDICARE

## 2024-01-29 VITALS
TEMPERATURE: 97.3 F | HEART RATE: 74 BPM | DIASTOLIC BLOOD PRESSURE: 65 MMHG | SYSTOLIC BLOOD PRESSURE: 129 MMHG | BODY MASS INDEX: 29.87 KG/M2 | WEIGHT: 158.2 LBS | OXYGEN SATURATION: 99 % | HEIGHT: 61 IN | RESPIRATION RATE: 16 BRPM

## 2024-01-29 DIAGNOSIS — R30.9 URINARY PAIN: ICD-10-CM

## 2024-01-29 DIAGNOSIS — I10 ESSENTIAL (PRIMARY) HYPERTENSION: Primary | ICD-10-CM

## 2024-01-29 DIAGNOSIS — Z79.899 ENCOUNTER FOR LONG-TERM (CURRENT) USE OF MEDICATIONS: ICD-10-CM

## 2024-01-29 DIAGNOSIS — Z86.69 HISTORY OF MIGRAINE HEADACHES: ICD-10-CM

## 2024-01-29 DIAGNOSIS — M79.7 FIBROMYALGIA: ICD-10-CM

## 2024-01-29 DIAGNOSIS — E78.2 MIXED HYPERLIPIDEMIA: ICD-10-CM

## 2024-01-29 DIAGNOSIS — F51.01 PRIMARY INSOMNIA: ICD-10-CM

## 2024-01-29 DIAGNOSIS — M15.9 PRIMARY OSTEOARTHRITIS INVOLVING MULTIPLE JOINTS: ICD-10-CM

## 2024-01-29 DIAGNOSIS — E03.9 HYPOTHYROIDISM, ACQUIRED: ICD-10-CM

## 2024-01-29 PROCEDURE — G8417 CALC BMI ABV UP PARAM F/U: HCPCS | Performed by: FAMILY MEDICINE

## 2024-01-29 PROCEDURE — G8427 DOCREV CUR MEDS BY ELIG CLIN: HCPCS | Performed by: FAMILY MEDICINE

## 2024-01-29 PROCEDURE — 1123F ACP DISCUSS/DSCN MKR DOCD: CPT | Performed by: FAMILY MEDICINE

## 2024-01-29 PROCEDURE — 99214 OFFICE O/P EST MOD 30 MIN: CPT | Performed by: FAMILY MEDICINE

## 2024-01-29 PROCEDURE — 1090F PRES/ABSN URINE INCON ASSESS: CPT | Performed by: FAMILY MEDICINE

## 2024-01-29 PROCEDURE — 1036F TOBACCO NON-USER: CPT | Performed by: FAMILY MEDICINE

## 2024-01-29 PROCEDURE — G8484 FLU IMMUNIZE NO ADMIN: HCPCS | Performed by: FAMILY MEDICINE

## 2024-01-29 RX ORDER — TEMAZEPAM 30 MG/1
CAPSULE ORAL
Qty: 30 CAPSULE | Refills: 1 | Status: SHIPPED | OUTPATIENT
Start: 2024-01-29 | End: 2024-02-29

## 2024-01-29 RX ORDER — BUTALBITAL, ACETAMINOPHEN AND CAFFEINE 50; 325; 40 MG/1; MG/1; MG/1
1 TABLET ORAL EVERY 6 HOURS PRN
Qty: 30 TABLET | Refills: 1 | Status: SHIPPED | OUTPATIENT
Start: 2024-01-29

## 2024-01-29 SDOH — ECONOMIC STABILITY: INCOME INSECURITY: HOW HARD IS IT FOR YOU TO PAY FOR THE VERY BASICS LIKE FOOD, HOUSING, MEDICAL CARE, AND HEATING?: NOT HARD AT ALL

## 2024-01-29 SDOH — ECONOMIC STABILITY: HOUSING INSECURITY
IN THE LAST 12 MONTHS, WAS THERE A TIME WHEN YOU DID NOT HAVE A STEADY PLACE TO SLEEP OR SLEPT IN A SHELTER (INCLUDING NOW)?: NO

## 2024-01-29 SDOH — ECONOMIC STABILITY: FOOD INSECURITY: WITHIN THE PAST 12 MONTHS, THE FOOD YOU BOUGHT JUST DIDN'T LAST AND YOU DIDN'T HAVE MONEY TO GET MORE.: NEVER TRUE

## 2024-01-29 SDOH — ECONOMIC STABILITY: FOOD INSECURITY: WITHIN THE PAST 12 MONTHS, YOU WORRIED THAT YOUR FOOD WOULD RUN OUT BEFORE YOU GOT MONEY TO BUY MORE.: NEVER TRUE

## 2024-01-29 ASSESSMENT — PATIENT HEALTH QUESTIONNAIRE - PHQ9
SUM OF ALL RESPONSES TO PHQ9 QUESTIONS 1 & 2: 0
SUM OF ALL RESPONSES TO PHQ QUESTIONS 1-9: 0
2. FEELING DOWN, DEPRESSED OR HOPELESS: 0
1. LITTLE INTEREST OR PLEASURE IN DOING THINGS: 0
SUM OF ALL RESPONSES TO PHQ QUESTIONS 1-9: 0

## 2024-01-29 ASSESSMENT — ENCOUNTER SYMPTOMS
SHORTNESS OF BREATH: 0
CHEST TIGHTNESS: 0
ABDOMINAL PAIN: 0
RHINORRHEA: 0
BLOOD IN STOOL: 0
COUGH: 0
CONSTIPATION: 0

## 2024-01-29 NOTE — PROGRESS NOTES
Chief Complaint   Patient presents with    Follow-up     1 month elevated BP.       \"Have you been to the ER, urgent care clinic since your last visit?  Hospitalized since your last visit?\"    NO    “Have you seen or consulted any other health care providers outside of [facility] since your last visit?”    NO             Vitals:    24 1405   BP: 129/65   Pulse: 74   Resp: 16   Temp: 97.3 °F (36.3 °C)   SpO2: 99%       Health Maintenance Due   Topic Date Due    Respiratory Syncytial Virus (RSV) Pregnant or age 60 yrs+ (1 - 1-dose 60+ series) Never done    COVID-19 Vaccine (2023- season) 2023        The patient, Corina Dueñas, identity was verified by name and .

## 2024-01-29 NOTE — PROGRESS NOTES
Subjective:      Patient ID: Corina Dueñas is a 88 y.o. female.    HPI  Follow up on blood pressure.  Overall feeling better since her BP has been improved.    Cardiovascular Review:  The patient has hypertension, hyperlipidemia, and hx of atrial tachycardia.    Diet and Lifestyle: generally follows a low fat low cholesterol diet, generally follows a low sodium diet, exercises sporadically  Pertinent ROS: taking medications as instructed, no medication side effects noted, no TIA's, no swelling of ankles, no palpitations.   Denies chest pain.  No pressure or chest tightness.  Has upcoming cardiology appt for next month.    Thyroid Review:  Patient is seen for followup of hypothyroidism.  Followed by meena.  Thyroid ROS: denies weight changes, heat/cold intolerance, bowel/skin changes or CVS symptoms.  Osteoarthritis and Chronic Pain:  Patient has osteoarthritis and fibromyalgia.  Has aches and pain that comes and goes.    Rheumatological ROS: Has burning and pain in the legs at feet that is worse at night but overall has been stable.  Controlled by taking tylenol PRN meds.   Response to treatment plan: stable.   Headache:  Has chronic headache almost every days.  She had follow up with neurology.  Ordered MRI but was not able to get tests done d/t increase anxiety in the MRI scanner.  Generally will go away if she take the Fioricet.  Overall she believes that headaches are better.  Associated with light sensitivity, slight nausea but no vomiting, no fever/chills, no neck stiffness.  Denies any known trigger and denies any increase in stress.  No focal sx.      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  Platelet counts have been low over the past several weeks and is being monitored.      HM:  Mammogram 2/28/23  Colonoscopy 8/1/2016 by Dr. Nunez - no more colonoscopies unless she has a problem.     Past Medical History:   Diagnosis Date    Arrhythmia     tachycardia; Dr. cohen    Autoimmune disease

## 2024-01-30 ENCOUNTER — TELEPHONE (OUTPATIENT)
Age: 89
End: 2024-01-30

## 2024-01-30 LAB
APPEARANCE UR: ABNORMAL
BACTERIA URNS QL MICRO: ABNORMAL /HPF
BILIRUB UR QL: NEGATIVE
CAOX CRY URNS QL MICRO: ABNORMAL
COLOR UR: ABNORMAL
EPITH CASTS URNS QL MICRO: ABNORMAL /LPF
GLUCOSE UR STRIP.AUTO-MCNC: NEGATIVE MG/DL
HGB UR QL STRIP: NEGATIVE
KETONES UR QL STRIP.AUTO: NEGATIVE MG/DL
LEUKOCYTE ESTERASE UR QL STRIP.AUTO: NEGATIVE
NITRITE UR QL STRIP.AUTO: NEGATIVE
PH UR STRIP: 5 (ref 5–8)
PROT UR STRIP-MCNC: ABNORMAL MG/DL
RBC #/AREA URNS HPF: ABNORMAL /HPF (ref 0–5)
SP GR UR REFRACTOMETRY: 1.03
UROBILINOGEN UR QL STRIP.AUTO: 0.2 EU/DL (ref 0.2–1)
WBC URNS QL MICRO: ABNORMAL /HPF (ref 0–4)

## 2024-01-30 NOTE — TELEPHONE ENCOUNTER
Spoke to the pt ans she stated on her levothyroxine 50 mcg, you changed the dose to 1/2 tab on Mondays and 1/2 tab on Fridays instead of 1 whole tab.  Is this correct?

## 2024-01-31 RX ORDER — LEVOTHYROXINE SODIUM 0.05 MG/1
TABLET ORAL
Qty: 45 TABLET | Refills: 3 | Status: SHIPPED | OUTPATIENT
Start: 2024-01-31

## 2024-02-05 RX ORDER — NEBIVOLOL 10 MG/1
10 TABLET ORAL DAILY
Qty: 10 TABLET | Refills: 0 | Status: SHIPPED | OUTPATIENT
Start: 2024-02-05

## 2024-02-05 RX ORDER — NEBIVOLOL 10 MG/1
10 TABLET ORAL DAILY
Qty: 90 TABLET | Refills: 3 | Status: SHIPPED | OUTPATIENT
Start: 2024-02-05

## 2024-02-07 ENCOUNTER — TELEPHONE (OUTPATIENT)
Age: 89
End: 2024-02-07

## 2024-02-07 ENCOUNTER — PATIENT MESSAGE (OUTPATIENT)
Age: 89
End: 2024-02-07

## 2024-02-07 NOTE — TELEPHONE ENCOUNTER
Patient does not want the generic Bystolic. She has only taken the brand and wants that.    I am unsure if you would want to prescribe these as DAW1 or DAW2.     Please advise

## 2024-02-07 NOTE — TELEPHONE ENCOUNTER
Patient would like to speak with you regarding the medication nebivolol (BYSTOLIC) 10 MG tablet , she has a question to ask.  Please give her a call @ [phone]

## 2024-02-08 RX ORDER — NEBIVOLOL HYDROCHLORIDE 10 MG/1
10 TABLET ORAL DAILY
Qty: 90 TABLET | Refills: 3 | Status: SHIPPED | OUTPATIENT
Start: 2024-02-08

## 2024-02-08 RX ORDER — NEBIVOLOL HYDROCHLORIDE 10 MG/1
10 TABLET ORAL DAILY
Qty: 10 TABLET | Refills: 0 | Status: SHIPPED | OUTPATIENT
Start: 2024-02-08

## 2024-02-08 RX ORDER — NEBIVOLOL 10 MG/1
10 TABLET ORAL DAILY
Qty: 90 TABLET | Refills: 3 | Status: CANCELLED | OUTPATIENT
Start: 2024-02-08

## 2024-02-08 RX ORDER — NEBIVOLOL 10 MG/1
10 TABLET ORAL DAILY
Qty: 10 TABLET | Refills: 0 | Status: CANCELLED | OUTPATIENT
Start: 2024-02-08

## 2024-02-14 DIAGNOSIS — Z86.69 HISTORY OF MIGRAINE HEADACHES: ICD-10-CM

## 2024-02-14 RX ORDER — AMLODIPINE BESYLATE 2.5 MG/1
2.5 TABLET ORAL DAILY
Qty: 90 TABLET | Refills: 1 | Status: SHIPPED | OUTPATIENT
Start: 2024-02-14

## 2024-02-14 NOTE — TELEPHONE ENCOUNTER
Refill Request Received for the Following Medication     Requested Prescriptions     Pending Prescriptions Disp Refills    amLODIPine (NORVASC) 2.5 MG tablet [Pharmacy Med Name: AMLODIPINE BESYLATE 2.5 MG TAB] 90 tablet 1     Sig: TAKE 1 TABLET BY MOUTH EVERY DAY       Last Prescribed: 11-    Last Appointment With Me:  11/10/2023     Future Appointments:  Future Appointments   Date Time Provider Department Center   7/1/2024  2:40 PM Matilde Sequeira MD [facility]   11/18/2024  2:20 PM Matthew Almanzar MD [facility] BS AMB

## 2024-02-15 RX ORDER — BUTALBITAL, ACETAMINOPHEN AND CAFFEINE 50; 325; 40 MG/1; MG/1; MG/1
TABLET ORAL
Qty: 30 TABLET | Refills: 5 | OUTPATIENT
Start: 2024-02-15

## 2024-02-15 RX ORDER — PRAVASTATIN SODIUM 10 MG
TABLET ORAL
Qty: 90 TABLET | Refills: 0 | Status: SHIPPED | OUTPATIENT
Start: 2024-02-15

## 2024-02-15 NOTE — TELEPHONE ENCOUNTER
Last appointment: 1/29/24  Next appointment: 7/1/24  Previous refill encounter(s): 1/13/23    Requested Prescriptions     Pending Prescriptions Disp Refills    pravastatin (PRAVACHOL) 10 MG tablet [Pharmacy Med Name: PRAVASTATIN SODIUM 10 MG TAB] 30 tablet 0     Sig: TAKE 1 TABLET BY MOUTH EVERY DAY AT NIGHT         For Pharmacy Admin Tracking Only    Program: Medication Refill  CPA in place:    Recommendation Provided To:   Intervention Detail: New Rx: 1, reason: Patient Preference  Intervention Accepted By:   Gap Closed?:    Time Spent (min): 5

## 2024-02-16 DIAGNOSIS — Z86.69 HISTORY OF MIGRAINE HEADACHES: ICD-10-CM

## 2024-02-16 RX ORDER — BUTALBITAL, ACETAMINOPHEN AND CAFFEINE 50; 325; 40 MG/1; MG/1; MG/1
1 TABLET ORAL EVERY 6 HOURS PRN
Qty: 30 TABLET | Refills: 1 | Status: SHIPPED | OUTPATIENT
Start: 2024-02-16

## 2024-02-16 RX ORDER — NEBIVOLOL HYDROCHLORIDE 10 MG/1
10 TABLET ORAL DAILY
Qty: 10 TABLET | Refills: 0 | Status: SHIPPED | OUTPATIENT
Start: 2024-02-16

## 2024-02-16 NOTE — TELEPHONE ENCOUNTER
Last appointment: 1/29/24  Next appointment: 7/1/24  Previous refill encounter(s): 1/29/24 #30 with 1 refill    Requested Prescriptions     Pending Prescriptions Disp Refills    butalbital-acetaminophen-caffeine (FIORICET, ESGIC) -40 MG per tablet 30 tablet 1     Sig: Take 1 tablet by mouth every 6 hours as needed for Migraine         For Pharmacy Admin Tracking Only    Program: Medication Refill  CPA in place:    Recommendation Provided To:   Intervention Detail: New Rx: 1, reason: Patient Preference  Intervention Accepted By:   Gap Closed?:    Time Spent (min): 5

## 2024-02-19 ENCOUNTER — TRANSCRIBE ORDERS (OUTPATIENT)
Facility: HOSPITAL | Age: 89
End: 2024-02-19

## 2024-02-19 DIAGNOSIS — Z12.31 VISIT FOR SCREENING MAMMOGRAM: Primary | ICD-10-CM

## 2024-02-27 DIAGNOSIS — Z86.69 HISTORY OF MIGRAINE HEADACHES: ICD-10-CM

## 2024-02-27 RX ORDER — NEBIVOLOL HYDROCHLORIDE 10 MG/1
10 TABLET ORAL DAILY
Qty: 10 TABLET | Refills: 0 | Status: SHIPPED | OUTPATIENT
Start: 2024-02-27

## 2024-02-27 RX ORDER — BUTALBITAL, ACETAMINOPHEN AND CAFFEINE 50; 325; 40 MG/1; MG/1; MG/1
1 TABLET ORAL EVERY 6 HOURS PRN
Qty: 10 TABLET | Refills: 0 | Status: SHIPPED | OUTPATIENT
Start: 2024-02-27

## 2024-03-12 ENCOUNTER — HOSPITAL ENCOUNTER (OUTPATIENT)
Facility: HOSPITAL | Age: 89
Discharge: HOME | End: 2024-03-15
Attending: FAMILY MEDICINE
Payer: MEDICARE

## 2024-03-12 VITALS — BODY MASS INDEX: 29.83 KG/M2 | HEIGHT: 61 IN | WEIGHT: 158 LBS

## 2024-03-12 DIAGNOSIS — Z12.31 VISIT FOR SCREENING MAMMOGRAM: ICD-10-CM

## 2024-03-12 PROCEDURE — 77063 BREAST TOMOSYNTHESIS BI: CPT

## 2024-03-15 RX ORDER — BUTALBITAL, ACETAMINOPHEN AND CAFFEINE 300; 40; 50 MG/1; MG/1; MG/1
CAPSULE ORAL
Qty: 30 CAPSULE | Refills: 0 | OUTPATIENT
Start: 2024-03-15

## 2024-03-15 RX ORDER — TEMAZEPAM 30 MG/1
CAPSULE ORAL
Qty: 30 CAPSULE | Refills: 0 | OUTPATIENT
Start: 2024-03-15 | End: 2024-04-15

## 2024-03-21 RX ORDER — PRAVASTATIN SODIUM 10 MG
10 TABLET ORAL NIGHTLY
Qty: 90 TABLET | Refills: 3 | Status: SHIPPED | OUTPATIENT
Start: 2024-03-21

## 2024-03-21 NOTE — TELEPHONE ENCOUNTER
[employer] is requesting a 90 day supply  Previous Rx was sent to [facility]    Last appointment: 1/29/24  Next appointment: 7/1/24    Requested Prescriptions     Pending Prescriptions Disp Refills    pravastatin (PRAVACHOL) 10 MG tablet [Pharmacy Med Name: PRAVASTATIN SODIUM 10MG TABS] 90 tablet 3     Sig: TAKE 1 TABLET NIGHTLY         For Pharmacy Admin Tracking Only    Program: Medication Refill  CPA in place:    Recommendation Provided To:   Intervention Detail: New Rx: 1, reason: Patient Preference  Intervention Accepted By:   Gap Closed?:    Time Spent (min): 5

## 2024-03-30 DIAGNOSIS — Z86.69 HISTORY OF MIGRAINE HEADACHES: ICD-10-CM

## 2024-04-01 ENCOUNTER — TELEPHONE (OUTPATIENT)
Age: 89
End: 2024-04-01

## 2024-04-01 DIAGNOSIS — Z86.69 HISTORY OF MIGRAINE HEADACHES: ICD-10-CM

## 2024-04-01 RX ORDER — BUTALBITAL, ACETAMINOPHEN AND CAFFEINE 300; 40; 50 MG/1; MG/1; MG/1
1 CAPSULE ORAL EVERY 6 HOURS PRN
Qty: 30 CAPSULE | Refills: 1 | Status: SHIPPED | OUTPATIENT
Start: 2024-04-01

## 2024-04-01 RX ORDER — BUTALBITAL, ACETAMINOPHEN AND CAFFEINE 50; 325; 40 MG/1; MG/1; MG/1
TABLET ORAL
Qty: 30 TABLET | Refills: 5 | OUTPATIENT
Start: 2024-04-01

## 2024-04-01 RX ORDER — BUTALBITAL, ACETAMINOPHEN AND CAFFEINE 50; 325; 40 MG/1; MG/1; MG/1
1 TABLET ORAL EVERY 6 HOURS PRN
Qty: 30 TABLET | Refills: 1 | Status: SHIPPED | OUTPATIENT
Start: 2024-04-01 | End: 2024-04-01 | Stop reason: DRUGHIGH

## 2024-04-01 NOTE — TELEPHONE ENCOUNTER
----- Message from Lianne Glover MA sent at 4/1/2024 12:54 PM EDT -----  Regarding: FW: Headache medication  Contact: [phone]  Pt Rx was sent for approval and she is requesting the capsules and not the tablets.  She wants a call back from Dr. Rainey.  ----- Message -----  From: Corina Dueñas  Sent: 4/1/2024  12:17 PM EDT  [facility]  Subject: Headache medication                              Could she call me when she can . I would greatly appreciate it. Thank you.

## 2024-04-16 DIAGNOSIS — F51.01 PRIMARY INSOMNIA: Primary | ICD-10-CM

## 2024-04-16 RX ORDER — TEMAZEPAM 30 MG/1
CAPSULE ORAL
Qty: 30 CAPSULE | Refills: 1 | Status: SHIPPED | OUTPATIENT
Start: 2024-04-16 | End: 2024-04-19

## 2024-04-18 DIAGNOSIS — F51.01 PRIMARY INSOMNIA: ICD-10-CM

## 2024-04-19 RX ORDER — TEMAZEPAM 30 MG/1
CAPSULE ORAL
Qty: 30 CAPSULE | Refills: 1 | Status: SHIPPED | OUTPATIENT
Start: 2024-04-19 | End: 2024-07-01

## 2024-04-19 NOTE — TELEPHONE ENCOUNTER
This failed during e-scribe transmission on 4/16/24 so it was never received by the pharmacy. Rx has been re-pended.      For Pharmacy Admin Tracking Only    Program: Medication Refill  CPA in place:    Recommendation Provided To:   Intervention Detail: New Rx: 1, reason: Patient Preference  Intervention Accepted By:   Gap Closed?:    Time Spent (min): 5

## 2024-05-20 RX ORDER — BUTALBITAL, ACETAMINOPHEN AND CAFFEINE 50; 325; 40 MG/1; MG/1; MG/1
TABLET ORAL
Qty: 30 TABLET | Refills: 5 | OUTPATIENT
Start: 2024-05-20

## 2024-05-20 RX ORDER — PRAVASTATIN SODIUM 10 MG
10 TABLET ORAL NIGHTLY
Qty: 90 TABLET | Refills: 3 | Status: SHIPPED | OUTPATIENT
Start: 2024-05-20

## 2024-05-20 RX ORDER — BUTALBITAL, ACETAMINOPHEN AND CAFFEINE 300; 40; 50 MG/1; MG/1; MG/1
1 CAPSULE ORAL EVERY 6 HOURS PRN
Qty: 30 CAPSULE | Refills: 1 | Status: SHIPPED | OUTPATIENT
Start: 2024-05-20 | End: 2024-05-23 | Stop reason: SDUPTHER

## 2024-05-20 NOTE — TELEPHONE ENCOUNTER
[facility] is requesting a refill. Previous Rx was sent to mailorder.    Last appointment: 1/29/24  Next appointment: 7/1/24    Requested Prescriptions     Pending Prescriptions Disp Refills    pravastatin (PRAVACHOL) 10 MG tablet [Pharmacy Med Name: PRAVASTATIN SODIUM 10 MG TAB] 90 tablet 3     Sig: TAKE 1 TABLET BY MOUTH EVERY DAY AT NIGHT         For Pharmacy Admin Tracking Only    Program: Medication Refill  CPA in place:    Recommendation Provided To:   Intervention Detail: New Rx: 1, reason: Patient Preference  Intervention Accepted By:   Gap Closed?:    Time Spent (min): 5

## 2024-05-20 NOTE — TELEPHONE ENCOUNTER
Last appointment: 1/29/24  Next appointment: 7/1/24  Previous refill encounter(s): 4/1/24 #30 with 1 refill    Requested Prescriptions     Pending Prescriptions Disp Refills    butalbital-APAP-caffeine -40 MG CAPS per capsule [Pharmacy Med Name: [id number] -40] 30 capsule 1     Sig: TAKE 1 CAPSULE BY MOUTH EVERY 6 HOURS AS NEEDED FOR HEADACHES         For Pharmacy Admin Tracking Only    Program: Medication Refill  CPA in place:    Recommendation Provided To:   Intervention Detail: New Rx: 1, reason: Patient Preference  Intervention Accepted By:   Gap Closed?:    Time Spent (min): 5

## 2024-05-24 RX ORDER — BUTALBITAL, ACETAMINOPHEN AND CAFFEINE 300; 40; 50 MG/1; MG/1; MG/1
1 CAPSULE ORAL EVERY 6 HOURS PRN
Qty: 30 CAPSULE | Refills: 1 | Status: SHIPPED | OUTPATIENT
Start: 2024-05-24

## 2024-05-24 NOTE — TELEPHONE ENCOUNTER
Rx failed during e-scribe transmission on 5/20/24 so it was never received by the pharmacy.    Requested Prescriptions     Pending Prescriptions Disp Refills    butalbital-APAP-caffeine -40 MG CAPS per capsule 30 capsule 1     Sig: Take 1 capsule by mouth every 6 hours as needed for Headaches         For Pharmacy Admin Tracking Only    Program: Medication Refill  CPA in place:    Recommendation Provided To:   Intervention Detail: New Rx: 1, reason: Patient Preference  Intervention Accepted By:   Gap Closed?:    Time Spent (min): 5

## 2024-05-29 RX ORDER — BUTALBITAL, ACETAMINOPHEN AND CAFFEINE 300; 40; 50 MG/1; MG/1; MG/1
1 CAPSULE ORAL EVERY 6 HOURS PRN
Qty: 30 CAPSULE | Refills: 1 | Status: SHIPPED | OUTPATIENT
Start: 2024-05-29

## 2024-05-29 NOTE — TELEPHONE ENCOUNTER
Rx from 5/24/24 failed during e-scribe transmission so it was never received by the pharmacy.    Rx re-pended      For Pharmacy Admin Tracking Only    Program: Medication Refill  CPA in place:    Recommendation Provided To:   Intervention Detail: New Rx: 1, reason: Patient Preference  Intervention Accepted By:   Gap Closed?:    Time Spent (min): 5

## 2024-06-17 DIAGNOSIS — F51.01 PRIMARY INSOMNIA: ICD-10-CM

## 2024-06-18 RX ORDER — TEMAZEPAM 30 MG/1
CAPSULE ORAL
Qty: 30 CAPSULE | Refills: 1 | Status: SHIPPED | OUTPATIENT
Start: 2024-06-18 | End: 2024-12-15

## 2024-06-18 RX ORDER — BUTALBITAL, ACETAMINOPHEN AND CAFFEINE 50; 325; 40 MG/1; MG/1; MG/1
TABLET ORAL
Qty: 30 TABLET | Refills: 5 | OUTPATIENT
Start: 2024-06-18

## 2024-06-18 NOTE — TELEPHONE ENCOUNTER
Last appointment: 1/29/24  Next appointment: 7/1/24  Previous refill encounter(s): 4/19/24 #30 with 1 refill    Requested Prescriptions     Pending Prescriptions Disp Refills    temazepam (RESTORIL) 30 MG capsule [Pharmacy Med Name: TEMAZEPAM 30 MG CAPSULE] 90 capsule 1     Sig: TAKE 1 CAPSULE BY MOUTH NIGHTLY AS NEEDED FOR SLEEP. MAX DAILY AMOUNT: 30 MG.         For Pharmacy Admin Tracking Only    Program: Medication Refill  CPA in place:    Recommendation Provided To:   Intervention Detail: New Rx: 1, reason: Patient Preference  Intervention Accepted By:   Gap Closed?:    Time Spent (min): 5

## 2024-07-05 ENCOUNTER — OFFICE VISIT (OUTPATIENT)
Age: 89
End: 2024-07-05
Payer: MEDICARE

## 2024-07-05 VITALS
RESPIRATION RATE: 16 BRPM | SYSTOLIC BLOOD PRESSURE: 139 MMHG | HEART RATE: 88 BPM | HEIGHT: 61 IN | TEMPERATURE: 98.6 F | WEIGHT: 158.2 LBS | DIASTOLIC BLOOD PRESSURE: 71 MMHG | OXYGEN SATURATION: 96 % | BODY MASS INDEX: 29.87 KG/M2

## 2024-07-05 DIAGNOSIS — E03.9 HYPOTHYROIDISM, ACQUIRED: ICD-10-CM

## 2024-07-05 DIAGNOSIS — F51.01 PRIMARY INSOMNIA: ICD-10-CM

## 2024-07-05 DIAGNOSIS — Z79.899 ENCOUNTER FOR LONG-TERM (CURRENT) USE OF MEDICATIONS: ICD-10-CM

## 2024-07-05 DIAGNOSIS — Z86.69 HISTORY OF MIGRAINE HEADACHES: ICD-10-CM

## 2024-07-05 DIAGNOSIS — D69.3 IMMUNE THROMBOCYTOPENIC PURPURA (HCC): ICD-10-CM

## 2024-07-05 DIAGNOSIS — M15.9 PRIMARY OSTEOARTHRITIS INVOLVING MULTIPLE JOINTS: ICD-10-CM

## 2024-07-05 DIAGNOSIS — E78.2 MIXED HYPERLIPIDEMIA: ICD-10-CM

## 2024-07-05 DIAGNOSIS — I10 ESSENTIAL (PRIMARY) HYPERTENSION: Primary | ICD-10-CM

## 2024-07-05 LAB
ALBUMIN SERPL-MCNC: 4.5 G/DL (ref 3.5–5)
ALBUMIN/GLOB SERPL: 1.8 (ref 1.1–2.2)
ALP SERPL-CCNC: 97 U/L (ref 45–117)
ALT SERPL-CCNC: 24 U/L (ref 12–78)
ANION GAP SERPL CALC-SCNC: 3 MMOL/L (ref 5–15)
AST SERPL-CCNC: 29 U/L (ref 15–37)
BILIRUB SERPL-MCNC: 0.4 MG/DL (ref 0.2–1)
BUN SERPL-MCNC: 16 MG/DL (ref 6–20)
BUN/CREAT SERPL: 20 (ref 12–20)
CALCIUM SERPL-MCNC: 10.1 MG/DL (ref 8.5–10.1)
CHLORIDE SERPL-SCNC: 106 MMOL/L (ref 97–108)
CHOLEST SERPL-MCNC: 183 MG/DL
CO2 SERPL-SCNC: 30 MMOL/L (ref 21–32)
CREAT SERPL-MCNC: 0.8 MG/DL (ref 0.55–1.02)
GLOBULIN SER CALC-MCNC: 2.5 G/DL (ref 2–4)
GLUCOSE SERPL-MCNC: 102 MG/DL (ref 65–100)
HDLC SERPL-MCNC: 64 MG/DL
HDLC SERPL: 2.9 (ref 0–5)
LDLC SERPL CALC-MCNC: 92.6 MG/DL (ref 0–100)
POTASSIUM SERPL-SCNC: 4.8 MMOL/L (ref 3.5–5.1)
PROT SERPL-MCNC: 7 G/DL (ref 6.4–8.2)
SODIUM SERPL-SCNC: 139 MMOL/L (ref 136–145)
TRIGL SERPL-MCNC: 132 MG/DL
VLDLC SERPL CALC-MCNC: 26.4 MG/DL

## 2024-07-05 PROCEDURE — 1090F PRES/ABSN URINE INCON ASSESS: CPT | Performed by: FAMILY MEDICINE

## 2024-07-05 PROCEDURE — 1123F ACP DISCUSS/DSCN MKR DOCD: CPT | Performed by: FAMILY MEDICINE

## 2024-07-05 PROCEDURE — G8417 CALC BMI ABV UP PARAM F/U: HCPCS | Performed by: FAMILY MEDICINE

## 2024-07-05 PROCEDURE — G8427 DOCREV CUR MEDS BY ELIG CLIN: HCPCS | Performed by: FAMILY MEDICINE

## 2024-07-05 PROCEDURE — 99214 OFFICE O/P EST MOD 30 MIN: CPT | Performed by: FAMILY MEDICINE

## 2024-07-05 PROCEDURE — 1036F TOBACCO NON-USER: CPT | Performed by: FAMILY MEDICINE

## 2024-07-05 RX ORDER — BUTALBITAL, ACETAMINOPHEN AND CAFFEINE 300; 40; 50 MG/1; MG/1; MG/1
1 CAPSULE ORAL EVERY 6 HOURS PRN
Qty: 30 CAPSULE | Refills: 1 | Status: SHIPPED | OUTPATIENT
Start: 2024-07-05

## 2024-07-05 RX ORDER — TEMAZEPAM 30 MG/1
CAPSULE ORAL
Qty: 30 CAPSULE | Refills: 1 | Status: SHIPPED | OUTPATIENT
Start: 2024-07-05 | End: 2025-01-01

## 2024-07-05 ASSESSMENT — PATIENT HEALTH QUESTIONNAIRE - PHQ9
1. LITTLE INTEREST OR PLEASURE IN DOING THINGS: NOT AT ALL
SUM OF ALL RESPONSES TO PHQ QUESTIONS 1-9: 0
2. FEELING DOWN, DEPRESSED OR HOPELESS: NOT AT ALL
SUM OF ALL RESPONSES TO PHQ QUESTIONS 1-9: 0
SUM OF ALL RESPONSES TO PHQ9 QUESTIONS 1 & 2: 0

## 2024-07-05 ASSESSMENT — ENCOUNTER SYMPTOMS
BLOOD IN STOOL: 0
CHEST TIGHTNESS: 0
ABDOMINAL PAIN: 0
SHORTNESS OF BREATH: 0
RHINORRHEA: 0
CONSTIPATION: 0
COUGH: 0

## 2024-07-05 NOTE — PROGRESS NOTES
Chief Complaint   Patient presents with    Follow-up     5 month appointment        \"Have you been to the ER, urgent care clinic since your last visit?  Hospitalized since your last visit?\"    NO    “Have you seen or consulted any other health care providers outside of [facility] since your last visit?”    NO             Vitals:    24 1511   BP: 139/71   Pulse: 88   Resp: 16   Temp: 98.6 °F (37 °C)   SpO2: 96%       Health Maintenance Due   Topic Date Due    Respiratory Syncytial Virus (RSV) Pregnant or age 60 yrs+ (1 - 1-dose 60+ series) Never done    Shingles vaccine (2 of 2) 10/16/2022        The patient, Corina Dueñas, identity was verified by name and .

## 2024-07-05 NOTE — PROGRESS NOTES
Subjective:      Patient ID: Corina Dueñas is a 89 y.o. female.    HPI  Follow up on chronic medical problems.  Cardiovascular Review:  The patient has hypertension, hyperlipidemia, and hx of atrial tachycardia.    Diet and Lifestyle: generally follows a low fat low cholesterol diet, generally follows a low sodium diet, exercises sporadically  Pertinent ROS: taking medications as instructed, no medication side effects noted, no TIA's, no swelling of ankles, no palpitations.   Denies chest pain.  No pressure or chest tightness.  Has upcoming cardiology appt for next month.    Thyroid Review:  Patient is seen for followup of hypothyroidism.  Followed by endo.    Thyroid ROS: denies weight changes, heat/cold intolerance, bowel/skin changes or CVS symptoms.  Osteoarthritis and Chronic Pain:  Patient has osteoarthritis and fibromyalgia.  Has aches and pain that comes and goes.    Rheumatological ROS: Has burning and pain in the legs at feet that is worse at night but overall has been stable.  Controlled by taking tylenol PRN meds.   Response to treatment plan: stable.   Headache:  Has chronic headache almost every days.  She had follow up with neurology.  Ordered MRI but was not able to get tests done d/t increase anxiety in the MRI scanner.  Generally will go away if she take the Fioricet.  Overall she believes that headaches are better.  Associated with light sensitivity, slight nausea but no vomiting, no fever/chills, no neck stiffness.  Denies any known trigger and denies any increase in stress.  No focal sx.      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      HM:  Mammogram 3/12/2024   We discussed that no further mammogram is recommended.   Colonoscopy 8/1/2016 by Dr. Nunez - no more colonoscopies unless she has a problem.     Past Medical History:   Diagnosis Date    Arrhythmia     tachycardia; Dr. cohen    Autoimmune disease (HCC)     fibromyalgia    Chronic ITP (idiopathic thrombocytopenia)

## 2024-07-25 RX ORDER — BUTALBITAL, ACETAMINOPHEN AND CAFFEINE 300; 40; 50 MG/1; MG/1; MG/1
1 CAPSULE ORAL EVERY 6 HOURS PRN
Qty: 30 CAPSULE | Refills: 1 | Status: SHIPPED | OUTPATIENT
Start: 2024-07-25

## 2024-07-25 NOTE — TELEPHONE ENCOUNTER
Last appointment: 7/5/24  Next appointment: 12/11/24  Previous refill encounter(s): 7/5/24 #30 with 1 refill    Requested Prescriptions     Pending Prescriptions Disp Refills    butalbital-APAP-caffeine -40 MG CAPS per capsule [Pharmacy Med Name: [id number] -40] 30 capsule 1     Sig: TAKE 1 CAPSULE BY MOUTH EVERY 6 HOURS AS NEEDED FOR HEADACHES         For Pharmacy Admin Tracking Only    Program: Medication Refill  CPA in place:    Recommendation Provided To:   Intervention Detail: New Rx: 1, reason: Patient Preference  Intervention Accepted By:   Gap Closed?:    Time Spent (min): 5

## 2024-07-31 DIAGNOSIS — Z86.69 HISTORY OF MIGRAINE HEADACHES: ICD-10-CM

## 2024-07-31 RX ORDER — BUTALBITAL, ACETAMINOPHEN AND CAFFEINE 50; 325; 40 MG/1; MG/1; MG/1
1 TABLET ORAL EVERY 6 HOURS PRN
Qty: 10 TABLET | Refills: 0 | OUTPATIENT
Start: 2024-07-31

## 2024-07-31 NOTE — TELEPHONE ENCOUNTER
Fiorinal was sent on 7/25/24 for #30 with 1 refill    For Pharmacy Admin Tracking Only    Program: Medication Refill  CPA in place:    Recommendation Provided To:   Intervention Detail: Discontinued Rx: 1, reason: Therapy Complete  Intervention Accepted By:   Gap Closed?:    Time Spent (min): 5

## 2024-08-19 RX ORDER — AMLODIPINE BESYLATE 2.5 MG/1
2.5 TABLET ORAL DAILY
Qty: 90 TABLET | Refills: 0 | Status: SHIPPED | OUTPATIENT
Start: 2024-08-19

## 2024-08-19 NOTE — TELEPHONE ENCOUNTER
Refill Request Received for the Following Medication     Requested Prescriptions     Pending Prescriptions Disp Refills    amLODIPine (NORVASC) 2.5 MG tablet [Pharmacy Med Name: AMLODIPINE BESYLATE 2.5 MG TAB] 90 tablet 1     Sig: TAKE 1 TABLET BY MOUTH EVERY DAY       Last Prescribed: 02-    Last Appointment With Me:  11/10/2023     Future Appointments:  Future Appointments   Date Time Provider Department Center   11/18/2024  2:20 PM Matthew Almanzar MD [facility] BS [facility]   12/11/2024  2:20 PM Matilde Sequeira MD [facility] DEP

## 2024-09-23 RX ORDER — BUTALBITAL, ACETAMINOPHEN AND CAFFEINE 300; 40; 50 MG/1; MG/1; MG/1
1 CAPSULE ORAL EVERY 6 HOURS PRN
Qty: 30 CAPSULE | Refills: 1 | Status: SHIPPED | OUTPATIENT
Start: 2024-09-23

## 2024-10-16 DIAGNOSIS — F51.01 PRIMARY INSOMNIA: ICD-10-CM

## 2024-10-16 RX ORDER — TEMAZEPAM 30 MG
CAPSULE ORAL
Qty: 30 CAPSULE | Refills: 1 | Status: SHIPPED | OUTPATIENT
Start: 2024-10-16 | End: 2024-12-15

## 2024-10-16 NOTE — TELEPHONE ENCOUNTER
Last appointment: 7/5/24  Next appointment: 12/11/24  Previous refill encounter(s): 7/5/24 #30 with 1 refill    Requested Prescriptions     Pending Prescriptions Disp Refills    temazepam (RESTORIL) 30 MG capsule [Pharmacy Med Name: TEMAZEPAM 30 MG CAPSULE] 30 capsule 1     Sig: TAKE 1 CAPSULE BY MOUTH NIGHTLY AS NEEDED FOR SLEEP. MAX DAILY AMOUNT: 30 MG.         For Pharmacy Admin Tracking Only    Program: Medication Refill  CPA in place:    Recommendation Provided To:   Intervention Detail: New Rx: 1, reason: Patient Preference  Intervention Accepted By:   Gap Closed?:    Time Spent (min): 5

## 2024-11-18 RX ORDER — AMLODIPINE BESYLATE 2.5 MG/1
2.5 TABLET ORAL DAILY
Qty: 90 TABLET | Refills: 0 | Status: SHIPPED | OUTPATIENT
Start: 2024-11-18 | End: 2024-11-18

## 2024-11-18 NOTE — TELEPHONE ENCOUNTER
Refill Request Received for the Following Medication     Requested Prescriptions     Pending Prescriptions Disp Refills    amLODIPine (NORVASC) 2.5 MG tablet [Pharmacy Med Name: AMLODIPINE BESYLATE 2.5 MG TAB] 90 tablet 0     Sig: TAKE 1 TABLET BY MOUTH EVERY DAY       Last Prescribed: 08-    Last Appointment With Me:  11/10/2023     Future Appointments:  Future Appointments   Date Time Provider Department Center   11/18/2024  2:20 PM Matthew Almanzar MD [facility] BS [facility]   12/11/2024  2:20 PM Matilde Sequeira MD [facility] DEP

## 2024-11-19 RX ORDER — BUTALBITAL, ACETAMINOPHEN AND CAFFEINE 300; 40; 50 MG/1; MG/1; MG/1
1 CAPSULE ORAL EVERY 6 HOURS PRN
Qty: 30 CAPSULE | Refills: 1 | Status: SHIPPED | OUTPATIENT
Start: 2024-11-19

## 2024-11-19 NOTE — TELEPHONE ENCOUNTER
Last appointment: 7/5/24  Next appointment: 12/11/24  Previous refill encounter(s): 9/23/24 #30 with 1 refill    Requested Prescriptions     Pending Prescriptions Disp Refills    butalbital-APAP-caffeine -40 MG CAPS per capsule [Pharmacy Med Name: [id number] -40] 30 capsule 1     Sig: TAKE 1 CAPSULE BY MOUTH EVERY 6 HOURS AS NEEDED FOR HEADACHES         For Pharmacy Admin Tracking Only    Program: Medication Refill  CPA in place:    Recommendation Provided To:   Intervention Detail: New Rx: 1, reason: Patient Preference  Intervention Accepted By:   Gap Closed?:    Time Spent (min): 5

## 2024-12-09 SDOH — HEALTH STABILITY: PHYSICAL HEALTH: ON AVERAGE, HOW MANY DAYS PER WEEK DO YOU ENGAGE IN MODERATE TO STRENUOUS EXERCISE (LIKE A BRISK WALK)?: 0 DAYS

## 2024-12-09 SDOH — HEALTH STABILITY: PHYSICAL HEALTH: ON AVERAGE, HOW MANY MINUTES DO YOU ENGAGE IN EXERCISE AT THIS LEVEL?: 0 MIN

## 2024-12-09 ASSESSMENT — LIFESTYLE VARIABLES
HOW MANY STANDARD DRINKS CONTAINING ALCOHOL DO YOU HAVE ON A TYPICAL DAY: 0
HOW OFTEN DO YOU HAVE SIX OR MORE DRINKS ON ONE OCCASION: 1
HOW OFTEN DO YOU HAVE A DRINK CONTAINING ALCOHOL: 1
HOW MANY STANDARD DRINKS CONTAINING ALCOHOL DO YOU HAVE ON A TYPICAL DAY: PATIENT DOES NOT DRINK
HOW OFTEN DO YOU HAVE A DRINK CONTAINING ALCOHOL: NEVER

## 2024-12-09 ASSESSMENT — PATIENT HEALTH QUESTIONNAIRE - PHQ9
2. FEELING DOWN, DEPRESSED OR HOPELESS: NOT AT ALL
SUM OF ALL RESPONSES TO PHQ QUESTIONS 1-9: 0
1. LITTLE INTEREST OR PLEASURE IN DOING THINGS: NOT AT ALL
SUM OF ALL RESPONSES TO PHQ QUESTIONS 1-9: 0
SUM OF ALL RESPONSES TO PHQ QUESTIONS 1-9: 0
SUM OF ALL RESPONSES TO PHQ9 QUESTIONS 1 & 2: 0
SUM OF ALL RESPONSES TO PHQ QUESTIONS 1-9: 0

## 2024-12-11 ENCOUNTER — OFFICE VISIT (OUTPATIENT)
Age: 89
End: 2024-12-11
Payer: MEDICARE

## 2024-12-11 VITALS
OXYGEN SATURATION: 97 % | BODY MASS INDEX: 28.52 KG/M2 | RESPIRATION RATE: 23 BRPM | SYSTOLIC BLOOD PRESSURE: 126 MMHG | WEIGHT: 155 LBS | HEIGHT: 62 IN | DIASTOLIC BLOOD PRESSURE: 71 MMHG | TEMPERATURE: 98.2 F | HEART RATE: 95 BPM

## 2024-12-11 DIAGNOSIS — E78.2 MIXED HYPERLIPIDEMIA: ICD-10-CM

## 2024-12-11 DIAGNOSIS — I10 ESSENTIAL (PRIMARY) HYPERTENSION: ICD-10-CM

## 2024-12-11 DIAGNOSIS — U07.1 COVID-19 VIRUS INFECTION: ICD-10-CM

## 2024-12-11 DIAGNOSIS — D69.3 IMMUNE THROMBOCYTOPENIC PURPURA (HCC): ICD-10-CM

## 2024-12-11 DIAGNOSIS — Z00.00 MEDICARE ANNUAL WELLNESS VISIT, SUBSEQUENT: Primary | ICD-10-CM

## 2024-12-11 DIAGNOSIS — M15.0 PRIMARY OSTEOARTHRITIS INVOLVING MULTIPLE JOINTS: ICD-10-CM

## 2024-12-11 DIAGNOSIS — Z86.69 HISTORY OF MIGRAINE HEADACHES: ICD-10-CM

## 2024-12-11 DIAGNOSIS — E03.9 HYPOTHYROIDISM, ACQUIRED: ICD-10-CM

## 2024-12-11 DIAGNOSIS — J02.9 SORE THROAT: ICD-10-CM

## 2024-12-11 DIAGNOSIS — M79.7 FIBROMYALGIA: ICD-10-CM

## 2024-12-11 DIAGNOSIS — F51.01 PRIMARY INSOMNIA: ICD-10-CM

## 2024-12-11 DIAGNOSIS — Z79.899 ENCOUNTER FOR LONG-TERM (CURRENT) USE OF MEDICATIONS: ICD-10-CM

## 2024-12-11 LAB
GROUP A STREP ANTIGEN, POC: NEGATIVE
Lab: ABNORMAL
QC PASS/FAIL: ABNORMAL
SARS-COV-2, POC: DETECTED
VALID INTERNAL CONTROL, POC: NORMAL

## 2024-12-11 PROCEDURE — 87880 STREP A ASSAY W/OPTIC: CPT | Performed by: FAMILY MEDICINE

## 2024-12-11 PROCEDURE — 99214 OFFICE O/P EST MOD 30 MIN: CPT | Performed by: FAMILY MEDICINE

## 2024-12-11 PROCEDURE — 87635 SARS-COV-2 COVID-19 AMP PRB: CPT | Performed by: FAMILY MEDICINE

## 2024-12-11 RX ORDER — TEMAZEPAM 30 MG/1
CAPSULE ORAL
Qty: 30 CAPSULE | Refills: 1 | Status: SHIPPED | OUTPATIENT
Start: 2024-12-14 | End: 2025-02-09

## 2024-12-11 RX ORDER — DEXTROMETHORPHAN HYDROBROMIDE AND PROMETHAZINE HYDROCHLORIDE 15; 6.25 MG/5ML; MG/5ML
5 SYRUP ORAL EVERY 6 HOURS PRN
Qty: 180 ML | Refills: 0 | Status: SHIPPED | OUTPATIENT
Start: 2024-12-11

## 2024-12-11 SDOH — ECONOMIC STABILITY: FOOD INSECURITY: WITHIN THE PAST 12 MONTHS, YOU WORRIED THAT YOUR FOOD WOULD RUN OUT BEFORE YOU GOT MONEY TO BUY MORE.: NEVER TRUE

## 2024-12-11 SDOH — ECONOMIC STABILITY: INCOME INSECURITY: HOW HARD IS IT FOR YOU TO PAY FOR THE VERY BASICS LIKE FOOD, HOUSING, MEDICAL CARE, AND HEATING?: NOT VERY HARD

## 2024-12-11 SDOH — ECONOMIC STABILITY: FOOD INSECURITY: WITHIN THE PAST 12 MONTHS, THE FOOD YOU BOUGHT JUST DIDN'T LAST AND YOU DIDN'T HAVE MONEY TO GET MORE.: NEVER TRUE

## 2024-12-11 ASSESSMENT — ENCOUNTER SYMPTOMS
CHEST TIGHTNESS: 0
COUGH: 1
CONSTIPATION: 0
DIARRHEA: 0
BLOOD IN STOOL: 0
SHORTNESS OF BREATH: 0
RHINORRHEA: 1
VOMITING: 0
NAUSEA: 0
ABDOMINAL PAIN: 0
WHEEZING: 0
SORE THROAT: 1

## 2024-12-11 ASSESSMENT — ANXIETY QUESTIONNAIRES
7. FEELING AFRAID AS IF SOMETHING AWFUL MIGHT HAPPEN: NOT AT ALL
4. TROUBLE RELAXING: NOT AT ALL
1. FEELING NERVOUS, ANXIOUS, OR ON EDGE: NOT AT ALL
IF YOU CHECKED OFF ANY PROBLEMS ON THIS QUESTIONNAIRE, HOW DIFFICULT HAVE THESE PROBLEMS MADE IT FOR YOU TO DO YOUR WORK, TAKE CARE OF THINGS AT HOME, OR GET ALONG WITH OTHER PEOPLE: NOT DIFFICULT AT ALL
3. WORRYING TOO MUCH ABOUT DIFFERENT THINGS: NOT AT ALL
2. NOT BEING ABLE TO STOP OR CONTROL WORRYING: NOT AT ALL
GAD7 TOTAL SCORE: 0
5. BEING SO RESTLESS THAT IT IS HARD TO SIT STILL: NOT AT ALL
6. BECOMING EASILY ANNOYED OR IRRITABLE: NOT AT ALL
GAD7 TOTAL SCORE: 0

## 2024-12-11 NOTE — PROGRESS NOTES
Chief Complaint   Patient presents with    Medicare AWV       Here for annual medicare wellness.    She states that last night she started coughing and woke up with a sore throat, sinus drainage and had a fever of 101      \"Have you been to the ER, urgent care clinic since your last visit?  Hospitalized since your last visit?\"    NO    “Have you seen or consulted any other health care providers outside of [facility] since your last visit?”    NO            Click Here for Release of Records Request    
  promethazine-dextromethorphan (PROMETHAZINE-DM) 6.25-15 MG/5ML syrup Take 5 mLs by mouth every 6 hours as needed for Cough Yes Matiled Sequeira MD   butalbital-APAP-caffeine -40 MG CAPS per capsule TAKE 1 CAPSULE BY MOUTH EVERY 6 HOURS AS NEEDED FOR HEADACHES Yes Matilde Sequeira MD   romiPLOStim (NPLATE SC) Inject into the skin every 7 days Yes ProviderLamar MD   amLODIPine (NORVASC) 2.5 MG tablet Take 1 tablet by mouth daily . Appointment needed for further refills. Yes Matthew Almanzar MD   pravastatin (PRAVACHOL) 10 MG tablet TAKE 1 TABLET BY MOUTH EVERY DAY AT NIGHT Yes Matilde Sequeira MD   BYSTOLIC 10 MG tablet Take 1 tablet by mouth daily Yes Matilde Sequeira MD   levothyroxine (SYNTHROID) 50 MCG tablet Takes 1/2 tab on Mondays and Fridays ONLY Yes Matilde Sequeira MD   furosemide (LASIX) 20 MG tablet Take 1 tablet by mouth daily  Patient taking differently: Take 1 tablet by mouth as needed Yes Matilde Sequeira MD   levocetirizine (XYZAL) 5 MG tablet Take 1 tablet by mouth nightly Yes ProviderLamar MD   acetaminophen (TYLENOL) 650 MG extended release tablet Take by mouth every 8 hours as needed Yes Automatic Reconciliation, Ar   pregabalin (LYRICA) 25 MG capsule Take 1 capsule by mouth 2 times daily. Max Daily Amount: 50 mg  Patient not taking: Reported on 11/18/2024  ProviderLamar MD CareTeam (Including outside providers/suppliers regularly involved in providing care):   Patient Care Team:  Matilde Sequeira MD as PCP - General  Matilde Sequeira MD as PCP - Empaneled Provider  Willian Orta MD as Physician  Lisa Chew APRN - NP as Nurse Practitioner  Delmer Tejeda MD as Surgeon      Reviewed and updated this visit:  Tobacco  Allergies  Meds  Problems  Med Hx  Surg Hx  Soc Hx  Fam Hx              
changes then to proceed to the ER.            Matilde Sequeira MD

## 2024-12-17 RX ORDER — NEBIVOLOL HYDROCHLORIDE 10 MG/1
10 TABLET ORAL DAILY
Qty: 90 TABLET | Refills: 3 | Status: SHIPPED | OUTPATIENT
Start: 2024-12-17

## 2024-12-17 NOTE — TELEPHONE ENCOUNTER
Last appointment: 12/11/24  Next appointment: 1/14/25  Previous refill encounter(s): 2/8/24 #90 with 3 refills    Requested Prescriptions     Pending Prescriptions Disp Refills    BYSTOLIC 10 MG tablet [Pharmacy Med Name: BYSTOLIC 10MG TABS] 90 tablet 3     Sig: TAKE ONE TABLET BY MOUTH EVERY DAY         For Pharmacy Admin Tracking Only    Program: Medication Refill  CPA in place:    Recommendation Provided To:   Intervention Detail: New Rx: 1, reason: Patient Preference  Intervention Accepted By:   Gap Closed?:    Time Spent (min): 5

## 2024-12-20 ENCOUNTER — TELEPHONE (OUTPATIENT)
Age: 89
End: 2024-12-20

## 2024-12-20 RX ORDER — AMOXICILLIN 500 MG/1
500 CAPSULE ORAL 3 TIMES DAILY
Qty: 30 CAPSULE | Refills: 0 | Status: SHIPPED | OUTPATIENT
Start: 2024-12-20 | End: 2024-12-30

## 2024-12-20 NOTE — TELEPHONE ENCOUNTER
Patient states that she has completed the paxlovid now she has a sore throat can  send her something into the pharmacy for it she can be reached @ [phone]  -----------------------------------------------------------------------  Spoke to the pt and she stated she di finish the paxlovid, but now her ears and throat are hurting really bad.

## 2024-12-20 NOTE — TELEPHONE ENCOUNTER
Sore throat pain is so bad she can not swallow.  Feel feverish.  Completed the paxlovid for the COVID.  Will add mucinex and tylenol as needed.  Continue with slat water gargle.

## 2024-12-20 NOTE — TELEPHONE ENCOUNTER
Patient states that she has completed the paxlovid now she has a sore throat can  send her something into the pharmacy for it she can be reached @ [phone]

## 2025-01-12 SDOH — ECONOMIC STABILITY: TRANSPORTATION INSECURITY
IN THE PAST 12 MONTHS, HAS LACK OF TRANSPORTATION KEPT YOU FROM MEETINGS, WORK, OR FROM GETTING THINGS NEEDED FOR DAILY LIVING?: NO

## 2025-01-12 SDOH — ECONOMIC STABILITY: TRANSPORTATION INSECURITY
IN THE PAST 12 MONTHS, HAS THE LACK OF TRANSPORTATION KEPT YOU FROM MEDICAL APPOINTMENTS OR FROM GETTING MEDICATIONS?: NO

## 2025-01-12 SDOH — ECONOMIC STABILITY: INCOME INSECURITY: IN THE LAST 12 MONTHS, WAS THERE A TIME WHEN YOU WERE NOT ABLE TO PAY THE MORTGAGE OR RENT ON TIME?: NO

## 2025-01-12 SDOH — ECONOMIC STABILITY: FOOD INSECURITY: WITHIN THE PAST 12 MONTHS, YOU WORRIED THAT YOUR FOOD WOULD RUN OUT BEFORE YOU GOT MONEY TO BUY MORE.: NEVER TRUE

## 2025-01-12 SDOH — ECONOMIC STABILITY: FOOD INSECURITY: WITHIN THE PAST 12 MONTHS, THE FOOD YOU BOUGHT JUST DIDN'T LAST AND YOU DIDN'T HAVE MONEY TO GET MORE.: NEVER TRUE

## 2025-01-14 ENCOUNTER — OFFICE VISIT (OUTPATIENT)
Age: 89
End: 2025-01-14
Payer: MEDICARE

## 2025-01-14 VITALS
HEIGHT: 61 IN | OXYGEN SATURATION: 97 % | HEART RATE: 81 BPM | SYSTOLIC BLOOD PRESSURE: 127 MMHG | WEIGHT: 150.6 LBS | DIASTOLIC BLOOD PRESSURE: 60 MMHG | TEMPERATURE: 98.1 F | BODY MASS INDEX: 28.43 KG/M2 | RESPIRATION RATE: 22 BRPM

## 2025-01-14 DIAGNOSIS — M79.7 FIBROMYALGIA: ICD-10-CM

## 2025-01-14 DIAGNOSIS — I10 ESSENTIAL (PRIMARY) HYPERTENSION: Primary | ICD-10-CM

## 2025-01-14 DIAGNOSIS — F51.01 PRIMARY INSOMNIA: ICD-10-CM

## 2025-01-14 DIAGNOSIS — M15.0 PRIMARY OSTEOARTHRITIS INVOLVING MULTIPLE JOINTS: ICD-10-CM

## 2025-01-14 DIAGNOSIS — Z79.899 ENCOUNTER FOR LONG-TERM (CURRENT) USE OF MEDICATIONS: ICD-10-CM

## 2025-01-14 DIAGNOSIS — E78.2 MIXED HYPERLIPIDEMIA: ICD-10-CM

## 2025-01-14 DIAGNOSIS — Z86.69 HISTORY OF MIGRAINE HEADACHES: ICD-10-CM

## 2025-01-14 DIAGNOSIS — D69.3 IMMUNE THROMBOCYTOPENIC PURPURA (HCC): ICD-10-CM

## 2025-01-14 DIAGNOSIS — E03.9 HYPOTHYROIDISM, ACQUIRED: ICD-10-CM

## 2025-01-14 PROCEDURE — 1160F RVW MEDS BY RX/DR IN RCRD: CPT | Performed by: FAMILY MEDICINE

## 2025-01-14 PROCEDURE — 99214 OFFICE O/P EST MOD 30 MIN: CPT | Performed by: FAMILY MEDICINE

## 2025-01-14 PROCEDURE — G2211 COMPLEX E/M VISIT ADD ON: HCPCS | Performed by: FAMILY MEDICINE

## 2025-01-14 PROCEDURE — 1090F PRES/ABSN URINE INCON ASSESS: CPT | Performed by: FAMILY MEDICINE

## 2025-01-14 PROCEDURE — 1126F AMNT PAIN NOTED NONE PRSNT: CPT | Performed by: FAMILY MEDICINE

## 2025-01-14 PROCEDURE — G8417 CALC BMI ABV UP PARAM F/U: HCPCS | Performed by: FAMILY MEDICINE

## 2025-01-14 PROCEDURE — 1123F ACP DISCUSS/DSCN MKR DOCD: CPT | Performed by: FAMILY MEDICINE

## 2025-01-14 PROCEDURE — 1159F MED LIST DOCD IN RCRD: CPT | Performed by: FAMILY MEDICINE

## 2025-01-14 PROCEDURE — 1036F TOBACCO NON-USER: CPT | Performed by: FAMILY MEDICINE

## 2025-01-14 PROCEDURE — G8427 DOCREV CUR MEDS BY ELIG CLIN: HCPCS | Performed by: FAMILY MEDICINE

## 2025-01-14 RX ORDER — PREGABALIN 50 MG/1
50 CAPSULE ORAL 2 TIMES DAILY
COMMUNITY
Start: 2025-01-13

## 2025-01-14 RX ORDER — FUROSEMIDE 20 MG/1
20 TABLET ORAL DAILY PRN
COMMUNITY
Start: 2025-01-14

## 2025-01-14 ASSESSMENT — ENCOUNTER SYMPTOMS
BLOOD IN STOOL: 0
RHINORRHEA: 0
CONSTIPATION: 0
COUGH: 0
CHEST TIGHTNESS: 0
SHORTNESS OF BREATH: 0
ABDOMINAL PAIN: 0

## 2025-01-14 ASSESSMENT — ANXIETY QUESTIONNAIRES
GAD7 TOTAL SCORE: 0
2. NOT BEING ABLE TO STOP OR CONTROL WORRYING: NOT AT ALL
IF YOU CHECKED OFF ANY PROBLEMS ON THIS QUESTIONNAIRE, HOW DIFFICULT HAVE THESE PROBLEMS MADE IT FOR YOU TO DO YOUR WORK, TAKE CARE OF THINGS AT HOME, OR GET ALONG WITH OTHER PEOPLE: NOT DIFFICULT AT ALL
5. BEING SO RESTLESS THAT IT IS HARD TO SIT STILL: NOT AT ALL
7. FEELING AFRAID AS IF SOMETHING AWFUL MIGHT HAPPEN: NOT AT ALL
3. WORRYING TOO MUCH ABOUT DIFFERENT THINGS: NOT AT ALL
1. FEELING NERVOUS, ANXIOUS, OR ON EDGE: NOT AT ALL
4. TROUBLE RELAXING: NOT AT ALL
GAD7 TOTAL SCORE: 0
6. BECOMING EASILY ANNOYED OR IRRITABLE: NOT AT ALL

## 2025-01-14 ASSESSMENT — PATIENT HEALTH QUESTIONNAIRE - PHQ9
SUM OF ALL RESPONSES TO PHQ QUESTIONS 1-9: 0
2. FEELING DOWN, DEPRESSED OR HOPELESS: NOT AT ALL
SUM OF ALL RESPONSES TO PHQ QUESTIONS 1-9: 0
SUM OF ALL RESPONSES TO PHQ9 QUESTIONS 1 & 2: 0
1. LITTLE INTEREST OR PLEASURE IN DOING THINGS: NOT AT ALL

## 2025-01-14 NOTE — PROGRESS NOTES
Chief Complaint   Patient presents with    1 Month Follow-Up         Here for one month routine follow up.    She would like to discuss the bystolic and the synthroid.  Insurance will no longer cover bystolic and she often forgets to take synthroid.            \"Have you been to the ER, urgent care clinic since your last visit?  Hospitalized since your last visit?\"    NO    “Have you seen or consulted any other health care providers outside of [facility] since your last visit?”    NO            Click Here for Release of Records Request

## 2025-01-14 NOTE — PROGRESS NOTES
Subjective:      Patient ID: Corina Dueñas is a 89 y.o. female.    HPI  Follow up on chronic medical problems.  Overall feeling well.    Cardiovascular Review:  The patient has hypertension, hyperlipidemia, and hx of atrial tachycardia.    Diet and Lifestyle: generally follows a low fat low cholesterol diet, generally follows a low sodium diet, exercises sporadically  Pertinent ROS: taking medications as instructed, no medication side effects noted, no TIA's, no swelling of ankles, no palpitations.   Denies chest pain.  No pressure or chest tightness.  Has upcoming cardiology appt for next month.    Thyroid Review:  Patient is seen for followup of hypothyroidism.  Followed by endo.    Thyroid ROS: denies weight changes, heat/cold intolerance, bowel/skin changes or CVS symptoms.  Osteoarthritis and Chronic Pain:  Patient has osteoarthritis and fibromyalgia.  Has aches and pain that comes and goes.    Rheumatological ROS: Has burning and pain in the legs at feet that is worse at night but overall has been stable.  Controlled by taking tylenol PRN meds.   Response to treatment plan: stable.   Headache:  Has chronic headache almost every days.  She had follow up with neurology.  Ordered MRI but was not able to get tests done d/t increase anxiety in the MRI scanner.  Generally will go away if she take the Fioricet.  Overall she believes that headaches are better.  Associated with light sensitivity, slight nausea but no vomiting, no fever/chills, no neck stiffness.  Denies any known trigger and denies any increase in stress.  No focal sx.      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      HM:  Mammogram 3/12/2024   We discussed that no further mammogram is recommended.   Colonoscopy 8/1/2016 by Dr. Nunez - no more colonoscopies unless she has a problem.     Past Medical History:   Diagnosis Date    Arrhythmia     tachycardia; Dr. cohen    Autoimmune disease (HCC)     fibromyalgia    Chronic ITP

## 2025-01-16 LAB
ALBUMIN SERPL-MCNC: 4 G/DL (ref 3.5–5)
ALBUMIN/GLOB SERPL: 1.4 (ref 1.1–2.2)
ALP SERPL-CCNC: 93 U/L (ref 45–117)
ALT SERPL-CCNC: 18 U/L (ref 12–78)
ANION GAP SERPL CALC-SCNC: 5 MMOL/L (ref 2–12)
ANION GAP SERPL CALC-SCNC: 6 MMOL/L (ref 2–12)
AST SERPL-CCNC: 20 U/L (ref 15–37)
BILIRUB SERPL-MCNC: 0.5 MG/DL (ref 0.2–1)
BUN SERPL-MCNC: 14 MG/DL (ref 6–20)
BUN SERPL-MCNC: 14 MG/DL (ref 6–20)
BUN/CREAT SERPL: 20 (ref 12–20)
BUN/CREAT SERPL: 20 (ref 12–20)
CALCIUM SERPL-MCNC: 9.4 MG/DL (ref 8.5–10.1)
CALCIUM SERPL-MCNC: 9.6 MG/DL (ref 8.5–10.1)
CHLORIDE SERPL-SCNC: 104 MMOL/L (ref 97–108)
CHLORIDE SERPL-SCNC: 105 MMOL/L (ref 97–108)
CHOLEST SERPL-MCNC: 170 MG/DL
CO2 SERPL-SCNC: 27 MMOL/L (ref 21–32)
CO2 SERPL-SCNC: 28 MMOL/L (ref 21–32)
CREAT SERPL-MCNC: 0.69 MG/DL (ref 0.55–1.02)
CREAT SERPL-MCNC: 0.69 MG/DL (ref 0.55–1.02)
ERYTHROCYTE [DISTWIDTH] IN BLOOD BY AUTOMATED COUNT: 13 % (ref 11.5–14.5)
GLOBULIN SER CALC-MCNC: 2.8 G/DL (ref 2–4)
GLUCOSE SERPL-MCNC: 94 MG/DL (ref 65–100)
GLUCOSE SERPL-MCNC: 96 MG/DL (ref 65–100)
HCT VFR BLD AUTO: 38.6 % (ref 35–47)
HDLC SERPL-MCNC: 52 MG/DL
HDLC SERPL: 3.3 (ref 0–5)
HGB BLD-MCNC: 12.2 G/DL (ref 11.5–16)
LDLC SERPL CALC-MCNC: 80.6 MG/DL (ref 0–100)
MCH RBC QN AUTO: 28.2 PG (ref 26–34)
MCHC RBC AUTO-ENTMCNC: 31.6 G/DL (ref 30–36.5)
MCV RBC AUTO: 89.1 FL (ref 80–99)
NRBC # BLD: 0 K/UL (ref 0–0.01)
NRBC BLD-RTO: 0 PER 100 WBC
PLATELET # BLD AUTO: 157 K/UL (ref 150–400)
PMV BLD AUTO: 11.4 FL (ref 8.9–12.9)
POTASSIUM SERPL-SCNC: 4.4 MMOL/L (ref 3.5–5.1)
POTASSIUM SERPL-SCNC: 4.5 MMOL/L (ref 3.5–5.1)
PROT SERPL-MCNC: 6.8 G/DL (ref 6.4–8.2)
RBC # BLD AUTO: 4.33 M/UL (ref 3.8–5.2)
SODIUM SERPL-SCNC: 137 MMOL/L (ref 136–145)
SODIUM SERPL-SCNC: 138 MMOL/L (ref 136–145)
TRIGL SERPL-MCNC: 187 MG/DL
TSH SERPL DL<=0.05 MIU/L-ACNC: 0.57 UIU/ML (ref 0.36–3.74)
VLDLC SERPL CALC-MCNC: 37.4 MG/DL
WBC # BLD AUTO: 3.1 K/UL (ref 3.6–11)

## 2025-01-16 NOTE — TELEPHONE ENCOUNTER
Last appointment: 1/14/24  Next appointment: 7/15/25  Previous refill encounter(s): 11/19/24 #30 with 1 refill    Requested Prescriptions     Pending Prescriptions Disp Refills    butalbital-APAP-caffeine -40 MG CAPS per capsule [Pharmacy Med Name: [id number]-[id number] -40] 30 capsule 1     Sig: TAKE 1 CAPSULE BY MOUTH EVERY 6 HOURS AS NEEDED FOR HEADACHES         For Pharmacy Admin Tracking Only    Program: Medication Refill  CPA in place:    Recommendation Provided To:   Intervention Detail: New Rx: 1, reason: Patient Preference  Intervention Accepted By:   Gap Closed?:    Time Spent (min): 5

## 2025-01-17 RX ORDER — BUTALBITAL, ACETAMINOPHEN AND CAFFEINE 300; 40; 50 MG/1; MG/1; MG/1
1 CAPSULE ORAL EVERY 6 HOURS PRN
Qty: 30 CAPSULE | Refills: 1 | Status: SHIPPED | OUTPATIENT
Start: 2025-01-17

## 2025-01-27 RX ORDER — AMLODIPINE BESYLATE 2.5 MG/1
2.5 TABLET ORAL DAILY
Qty: 90 TABLET | Refills: 3 | Status: SHIPPED | OUTPATIENT
Start: 2025-01-27

## 2025-01-27 NOTE — TELEPHONE ENCOUNTER
[facility] is requesting a 90 day supply    This was last given by cardiology. Please sign if appropriate.    Last appointment: 1/14/25  Next appointment: 7/15/25    Requested Prescriptions     Pending Prescriptions Disp Refills    amLODIPine (NORVASC) 2.5 MG tablet 90 tablet 3     Sig: Take 1 tablet by mouth daily         For Pharmacy Admin Tracking Only    Program: Medication Refill  CPA in place:    Recommendation Provided To:   Intervention Detail: New Rx: 1, reason: Patient Preference  Intervention Accepted By:   Gap Closed?:    Time Spent (min): 5

## 2025-02-13 ENCOUNTER — TELEPHONE (OUTPATIENT)
Age: 89
End: 2025-02-13

## 2025-02-13 ENCOUNTER — HOSPITAL ENCOUNTER (EMERGENCY)
Facility: HOSPITAL | Age: 89
Discharge: HOME | End: 2025-02-13
Attending: EMERGENCY MEDICINE
Payer: MEDICARE

## 2025-02-13 ENCOUNTER — APPOINTMENT (OUTPATIENT)
Facility: HOSPITAL | Age: 89
End: 2025-02-13
Payer: MEDICARE

## 2025-02-13 VITALS
OXYGEN SATURATION: 87 % | WEIGHT: 145 LBS | DIASTOLIC BLOOD PRESSURE: 67 MMHG | BODY MASS INDEX: 27.38 KG/M2 | RESPIRATION RATE: 25 BRPM | TEMPERATURE: 97.5 F | HEIGHT: 61 IN | SYSTOLIC BLOOD PRESSURE: 148 MMHG | HEART RATE: 111 BPM

## 2025-02-13 DIAGNOSIS — R11.2 NAUSEA AND VOMITING IN ADULT: ICD-10-CM

## 2025-02-13 DIAGNOSIS — U07.1 COVID-19: ICD-10-CM

## 2025-02-13 DIAGNOSIS — J18.9 COMMUNITY ACQUIRED PNEUMONIA OF LEFT LOWER LOBE OF LUNG: Primary | ICD-10-CM

## 2025-02-13 LAB
ALBUMIN SERPL-MCNC: 3.4 G/DL (ref 3.5–5)
ALBUMIN/GLOB SERPL: 0.8 (ref 1.1–2.2)
ALP SERPL-CCNC: 96 U/L (ref 45–117)
ALT SERPL-CCNC: 23 U/L (ref 12–78)
ANION GAP SERPL CALC-SCNC: 8 MMOL/L (ref 2–12)
AST SERPL-CCNC: 54 U/L (ref 15–37)
BASOPHILS # BLD: 0.01 K/UL (ref 0–0.1)
BASOPHILS NFR BLD: 0.1 % (ref 0–1)
BILIRUB SERPL-MCNC: 0.9 MG/DL (ref 0.2–1)
BUN SERPL-MCNC: 22 MG/DL (ref 6–20)
BUN/CREAT SERPL: 35 (ref 12–20)
CALCIUM SERPL-MCNC: 9.9 MG/DL (ref 8.5–10.1)
CHLORIDE SERPL-SCNC: 98 MMOL/L (ref 97–108)
CO2 SERPL-SCNC: 25 MMOL/L (ref 21–32)
COMMENT:: NORMAL
CREAT SERPL-MCNC: 0.63 MG/DL (ref 0.55–1.02)
DIFFERENTIAL METHOD BLD: ABNORMAL
EOSINOPHIL # BLD: 0.02 K/UL (ref 0–0.4)
EOSINOPHIL NFR BLD: 0.2 % (ref 0–7)
ERYTHROCYTE [DISTWIDTH] IN BLOOD BY AUTOMATED COUNT: 13.3 % (ref 11.5–14.5)
FLUAV RNA SPEC QL NAA+PROBE: NOT DETECTED
FLUBV RNA SPEC QL NAA+PROBE: NOT DETECTED
GLOBULIN SER CALC-MCNC: 4.4 G/DL (ref 2–4)
GLUCOSE SERPL-MCNC: 109 MG/DL (ref 65–100)
HCT VFR BLD AUTO: 36.2 % (ref 35–47)
HGB BLD-MCNC: 11.9 G/DL (ref 11.5–16)
IMM GRANULOCYTES # BLD AUTO: 0.08 K/UL (ref 0–0.04)
IMM GRANULOCYTES NFR BLD AUTO: 0.8 % (ref 0–0.5)
LYMPHOCYTES # BLD: 0.94 K/UL (ref 0.8–3.5)
LYMPHOCYTES NFR BLD: 9.7 % (ref 12–49)
MCH RBC QN AUTO: 28.4 PG (ref 26–34)
MCHC RBC AUTO-ENTMCNC: 32.9 G/DL (ref 30–36.5)
MCV RBC AUTO: 86.4 FL (ref 80–99)
MONOCYTES # BLD: 1.08 K/UL (ref 0–1)
MONOCYTES NFR BLD: 11.2 % (ref 5–13)
NEUTS SEG # BLD: 7.55 K/UL (ref 1.8–8)
NEUTS SEG NFR BLD: 78 % (ref 32–75)
NRBC # BLD: 0 K/UL (ref 0–0.01)
NRBC BLD-RTO: 0 PER 100 WBC
PLATELET # BLD AUTO: 298 K/UL (ref 150–400)
PMV BLD AUTO: 11.8 FL (ref 8.9–12.9)
POTASSIUM SERPL-SCNC: 4.4 MMOL/L (ref 3.5–5.1)
PROT SERPL-MCNC: 7.8 G/DL (ref 6.4–8.2)
RBC # BLD AUTO: 4.19 M/UL (ref 3.8–5.2)
SARS-COV-2 RNA RESP QL NAA+PROBE: DETECTED
SODIUM SERPL-SCNC: 131 MMOL/L (ref 136–145)
SOURCE: ABNORMAL
SPECIMEN HOLD: NORMAL
WBC # BLD AUTO: 9.7 K/UL (ref 3.6–11)

## 2025-02-13 PROCEDURE — 6360000002 HC RX W HCPCS: Performed by: EMERGENCY MEDICINE

## 2025-02-13 PROCEDURE — 2580000003 HC RX 258: Performed by: EMERGENCY MEDICINE

## 2025-02-13 PROCEDURE — 36415 COLL VENOUS BLD VENIPUNCTURE: CPT

## 2025-02-13 PROCEDURE — 99284 EMERGENCY DEPT VISIT MOD MDM: CPT

## 2025-02-13 PROCEDURE — 85025 COMPLETE CBC W/AUTO DIFF WBC: CPT

## 2025-02-13 PROCEDURE — 80053 COMPREHEN METABOLIC PANEL: CPT

## 2025-02-13 PROCEDURE — 87636 SARSCOV2 & INF A&B AMP PRB: CPT

## 2025-02-13 PROCEDURE — 96374 THER/PROPH/DIAG INJ IV PUSH: CPT

## 2025-02-13 PROCEDURE — 96361 HYDRATE IV INFUSION ADD-ON: CPT

## 2025-02-13 PROCEDURE — 71046 X-RAY EXAM CHEST 2 VIEWS: CPT

## 2025-02-13 PROCEDURE — 6370000000 HC RX 637 (ALT 250 FOR IP): Performed by: EMERGENCY MEDICINE

## 2025-02-13 RX ORDER — DOXYCYCLINE HYCLATE 100 MG
100 TABLET ORAL
Status: COMPLETED | OUTPATIENT
Start: 2025-02-13 | End: 2025-02-13

## 2025-02-13 RX ORDER — ONDANSETRON 2 MG/ML
4 INJECTION INTRAMUSCULAR; INTRAVENOUS
Status: COMPLETED | OUTPATIENT
Start: 2025-02-13 | End: 2025-02-13

## 2025-02-13 RX ORDER — 0.9 % SODIUM CHLORIDE 0.9 %
1000 INTRAVENOUS SOLUTION INTRAVENOUS ONCE
Status: COMPLETED | OUTPATIENT
Start: 2025-02-13 | End: 2025-02-13

## 2025-02-13 RX ORDER — ACETAMINOPHEN 325 MG/1
650 TABLET ORAL
Status: COMPLETED | OUTPATIENT
Start: 2025-02-13 | End: 2025-02-13

## 2025-02-13 RX ADMIN — SODIUM CHLORIDE 1000 ML: 0.9 INJECTION, SOLUTION INTRAVENOUS at 17:23

## 2025-02-13 RX ADMIN — DOXYCYCLINE HYCLATE 100 MG: 100 TABLET, COATED ORAL at 17:22

## 2025-02-13 RX ADMIN — ACETAMINOPHEN 650 MG: 325 TABLET ORAL at 19:22

## 2025-02-13 RX ADMIN — ONDANSETRON 4 MG: 2 INJECTION INTRAMUSCULAR; INTRAVENOUS at 17:22

## 2025-02-13 ASSESSMENT — PAIN - FUNCTIONAL ASSESSMENT: PAIN_FUNCTIONAL_ASSESSMENT: NONE - DENIES PAIN

## 2025-02-13 NOTE — ED NOTES
2:51 PM  I have evaluated the patient as the Provider in Rapid Medical Evaluation (RME). I have reviewed her vital signs and the triage nurse assessment. I have talked with the patient and any available family and advised that I am the provider in triage and have ordered the appropriate study to initiate their work up based on the clinical presentation during my assessment. I have advised that the patient will be accommodated in the Main ED as soon as possible. I have also requested to contact the triage nurse or myself immediately if the patient experiences any changes in their condition during this brief waiting period.  DAVID Lagosnikkie Dueñas is a 89 y.o. female with history of ITP, hypertension, hyperlipidemia, arthritis, CHF, GERD who presents to the emergency department complaining of nausea, decreased appetite, productive cough.  Patient reports thick dark gray sputum.  Ports having a fever at home as well as an oxygen of 86%.  Reorts having shortness of breath and headache.  Reports symptoms ongoing on for the past 4 to 5 days.  Reports she was post to be seen today for her weekly platelet shot but reports feeling unwell.         Nneka Robertson PA  02/13/25 [id number]

## 2025-02-13 NOTE — ED PROVIDER NOTES
[facility] EMERGENCY DEPARTMENT  EMERGENCY DEPARTMENT ENCOUNTER      Pt Name: Corina Dueñas  MRN: [id number]  Birthdate 1935  Date of evaluation: 2/13/2025  Provider: Alan Huffman DO      HISTORY OF PRESENT ILLNESS      HPI    89-year-old female with history as below, presents to the emergency department complaining of a 5-day history of cough, nasal congestion, fever and shortness of breath.  She reportedly recently had COVID-19 for which she states that she was prescribed antibiotics but finished the course of about 2 weeks prior.  No GI symptoms or chest pain.    Nursing Notes were reviewed.    REVIEW OF SYSTEMS         Review of Systems        PAST MEDICAL HISTORY     Past Medical History:   Diagnosis Date    Arrhythmia     tachycardia; Dr. cohen    Autoimmune disease (HCC)     fibromyalgia    Chronic ITP (idiopathic thrombocytopenia) (HCC)     Congestive heart failure (HCC)     Fibromyalgia     GERD (gastroesophageal reflux disease)     Headache(784.0)     migraine    Heart failure (HCC)     Hiatal hernia     HTN (hypertension) 4/2/2010    Hyperlipidemia     Hypothyroidism 4/2/2010    Ill-defined condition     intermittent brochitis    Murmur     OA (osteoarthritis) 4/2/2010    Seizures (HCC)     56 years ago with childbirth    Tachycardia 2013    Thromboembolus (HCC)     blood clot on brain 56 years ago w childbirth    Thyroid disease     Valvular heart disease          SURGICAL HISTORY       Past Surgical History:   Procedure Laterality Date    BREAST BIOPSY Right 1999    more than one yrs ago all benign    CARPAL TUNNEL RELEASE  9/28/2012    CATARACT REMOVAL  9/2014    bilateral     COLONOSCOPY  12/2010    Dr. Etienne Addison- repeat 5 yrs    COLONOSCOPY,REMV BRY,SNARE  2/5/2015         CT BIOPSY BONE MARROW  11/15/2017    CT BONE MARROW BIOPSY 11/15/2017 [facility] RAD CT    HYSTERECTOMY (CERVIX STATUS UNKNOWN)  1977    OTHER SURGICAL HISTORY      bone biopsy    LA UNLISTED PROCEDURE ABDOMEN

## 2025-02-13 NOTE — TELEPHONE ENCOUNTER
Pt is not feeling better. Has increase cough and congestion.  Feeling fatigue and weak.  Advise pt to proceed to the ER.  She will have her sister to take her into the ER.

## 2025-02-15 RX ORDER — ONDANSETRON 4 MG/1
4 TABLET, ORALLY DISINTEGRATING ORAL 3 TIMES DAILY PRN
Qty: 21 TABLET | Refills: 0 | Status: SHIPPED | OUTPATIENT
Start: 2025-02-15

## 2025-02-15 RX ORDER — DOXYCYCLINE HYCLATE 100 MG
100 TABLET ORAL 2 TIMES DAILY
Qty: 20 TABLET | Refills: 0 | Status: SHIPPED | OUTPATIENT
Start: 2025-02-15 | End: 2025-02-25

## 2025-02-17 DIAGNOSIS — F51.01 PRIMARY INSOMNIA: Primary | ICD-10-CM

## 2025-02-17 RX ORDER — TEMAZEPAM 30 MG/1
CAPSULE ORAL
Qty: 30 CAPSULE | Refills: 1 | Status: SHIPPED | OUTPATIENT
Start: 2025-02-17 | End: 2025-04-17

## 2025-02-21 ENCOUNTER — TELEPHONE (OUTPATIENT)
Age: 89
End: 2025-02-21

## 2025-02-21 NOTE — TELEPHONE ENCOUNTER
Pt called.  Feeling better. Has COVID pneumonia but feel like she is getting better. Still has fatigue.  Coughing is better.  SOB at her usual baseline.

## 2025-03-07 ENCOUNTER — OFFICE VISIT (OUTPATIENT)
Age: 89
End: 2025-03-07
Payer: MEDICARE

## 2025-03-07 VITALS
HEART RATE: 87 BPM | RESPIRATION RATE: 16 BRPM | TEMPERATURE: 98.6 F | OXYGEN SATURATION: 94 % | WEIGHT: 130 LBS | BODY MASS INDEX: 24.56 KG/M2 | SYSTOLIC BLOOD PRESSURE: 118 MMHG | DIASTOLIC BLOOD PRESSURE: 67 MMHG

## 2025-03-07 DIAGNOSIS — R53.1 WEAKNESS: ICD-10-CM

## 2025-03-07 DIAGNOSIS — Z87.01 HISTORY OF PNEUMONIA: ICD-10-CM

## 2025-03-07 DIAGNOSIS — Z86.16 PERSONAL HISTORY OF COVID-19: Primary | ICD-10-CM

## 2025-03-07 DIAGNOSIS — Z79.899 ENCOUNTER FOR LONG-TERM (CURRENT) USE OF MEDICATIONS: ICD-10-CM

## 2025-03-07 DIAGNOSIS — R06.02 SOB (SHORTNESS OF BREATH): ICD-10-CM

## 2025-03-07 DIAGNOSIS — Z86.16 PERSONAL HISTORY OF COVID-19: ICD-10-CM

## 2025-03-07 PROCEDURE — 99213 OFFICE O/P EST LOW 20 MIN: CPT | Performed by: FAMILY MEDICINE

## 2025-03-07 RX ORDER — GUAIFENESIN 600 MG/1
600 TABLET, EXTENDED RELEASE ORAL 2 TIMES DAILY
Qty: 30 TABLET | Refills: 1 | Status: SHIPPED | OUTPATIENT
Start: 2025-03-07

## 2025-03-07 ASSESSMENT — ENCOUNTER SYMPTOMS
RHINORRHEA: 0
SHORTNESS OF BREATH: 1
BLOOD IN STOOL: 0
CHEST TIGHTNESS: 0
CONSTIPATION: 0
ABDOMINAL PAIN: 0
COUGH: 1

## 2025-03-07 ASSESSMENT — PATIENT HEALTH QUESTIONNAIRE - PHQ9
SUM OF ALL RESPONSES TO PHQ QUESTIONS 1-9: 0
SUM OF ALL RESPONSES TO PHQ QUESTIONS 1-9: 0
2. FEELING DOWN, DEPRESSED OR HOPELESS: NOT AT ALL
SUM OF ALL RESPONSES TO PHQ QUESTIONS 1-9: 0
1. LITTLE INTEREST OR PLEASURE IN DOING THINGS: NOT AT ALL
SUM OF ALL RESPONSES TO PHQ QUESTIONS 1-9: 0

## 2025-03-07 NOTE — PROGRESS NOTES
Chief Complaint   Patient presents with    Follow-up     Patient is here for ED follow up COVID, SARS, PNA to left lung.    Fatigue     Patient has had weakness for over 3 weeks.       \"Have you been to the ER, urgent care clinic since your last visit?  Hospitalized since your last visit?\"    YES - When: approximately 3  weeks ago.  Where and Why: PNA to left lobeof lung.    “Have you seen or consulted any other health care providers outside of [facility] since your last visit?”    NO            Click Here for Release of Records Request       There were no vitals filed for this visit.   There are no preventive care reminders to display for this patient.     The patient, Corina Dueñas, identity was verified by name and .       
Never Used   Substance and Sexual Activity    Alcohol use: No    Drug use: No    Sexual activity: Not Currently     Partners: Male     Social Determinants of Health     Financial Resource Strain: Low Risk  (12/11/2024)    Overall Financial Resource Strain (CARDIA)     Difficulty of Paying Living Expenses: Not very hard   Food Insecurity: No Food Insecurity (1/12/2025)    Hunger Vital Sign     Worried About Running Out of Food in the Last Year: Never true     Ran Out of Food in the Last Year: Never true   Transportation Needs: No Transportation Needs (1/12/2025)    PRAPARE - Transportation     Lack of Transportation (Medical): No     Lack of Transportation (Non-Medical): No   Physical Activity: Inactive (12/9/2024)    Exercise Vital Sign     Days of Exercise per Week: 0 days     Minutes of Exercise per Session: 0 min   Housing Stability: Low Risk  (1/12/2025)    Housing Stability Vital Sign     Unable to Pay for Housing in the Last Year: No     Number of Times Moved in the Last Year: 0     Homeless in the Last Year: No      Lab Results   Component Value Date    WBC 9.7 02/13/2025    HGB 11.9 02/13/2025    HCT 36.2 02/13/2025    MCV 86.4 02/13/2025     02/13/2025     Lab Results   Component Value Date     (L) 02/13/2025    K 4.4 02/13/2025    CL 98 02/13/2025    CO2 25 02/13/2025    BUN 22 (H) 02/13/2025    CREATININE 0.63 02/13/2025    GLUCOSE 109 (H) 02/13/2025    CALCIUM 9.9 02/13/2025    BILITOT 0.9 02/13/2025    ALKPHOS 96 02/13/2025    AST 54 (H) 02/13/2025    ALT 23 02/13/2025    LABGLOM 86 02/13/2025    GFRAA >60 06/28/2022    AGRATIO 1.4 06/28/2022    GLOB 4.4 (H) 02/13/2025     Lab Results   Component Value Date    CHOL 170 01/14/2025    TRIG 187 (H) 01/14/2025    HDL 52 01/14/2025    VLDL 37.4 01/14/2025    CHOLHDLRATIO 3.3 01/14/2025     No results found for: \"LABA1C\", \"[id number]\"  Lab Results   Component Value Date    TSH 0.57 01/14/2025         Review of Systems   Constitutional:  Positive

## 2025-03-08 LAB
ANION GAP SERPL CALC-SCNC: 8 MMOL/L (ref 2–12)
BUN SERPL-MCNC: 20 MG/DL (ref 6–20)
BUN/CREAT SERPL: 25 (ref 12–20)
CALCIUM SERPL-MCNC: 9.8 MG/DL (ref 8.5–10.1)
CHLORIDE SERPL-SCNC: 98 MMOL/L (ref 97–108)
CO2 SERPL-SCNC: 30 MMOL/L (ref 21–32)
CREAT SERPL-MCNC: 0.79 MG/DL (ref 0.55–1.02)
ERYTHROCYTE [DISTWIDTH] IN BLOOD BY AUTOMATED COUNT: 15.9 % (ref 11.5–14.5)
GLUCOSE SERPL-MCNC: 104 MG/DL (ref 65–100)
HCT VFR BLD AUTO: 42.4 % (ref 35–47)
HGB BLD-MCNC: 13.1 G/DL (ref 11.5–16)
MCH RBC QN AUTO: 27.7 PG (ref 26–34)
MCHC RBC AUTO-ENTMCNC: 30.9 G/DL (ref 30–36.5)
MCV RBC AUTO: 89.6 FL (ref 80–99)
NRBC # BLD: 0 K/UL (ref 0–0.01)
NRBC BLD-RTO: 0 PER 100 WBC
PLATELET # BLD AUTO: 131 K/UL (ref 150–400)
PMV BLD AUTO: 12.2 FL (ref 8.9–12.9)
POTASSIUM SERPL-SCNC: 4 MMOL/L (ref 3.5–5.1)
RBC # BLD AUTO: 4.73 M/UL (ref 3.8–5.2)
SODIUM SERPL-SCNC: 136 MMOL/L (ref 136–145)
WBC # BLD AUTO: 6.3 K/UL (ref 3.6–11)

## 2025-03-10 ENCOUNTER — HOSPITAL ENCOUNTER (OUTPATIENT)
Facility: HOSPITAL | Age: 89
Discharge: HOME | End: 2025-03-13
Payer: MEDICARE

## 2025-03-10 ENCOUNTER — RESULTS FOLLOW-UP (OUTPATIENT)
Age: 89
End: 2025-03-10

## 2025-03-10 DIAGNOSIS — Z86.16 PERSONAL HISTORY OF COVID-19: ICD-10-CM

## 2025-03-10 DIAGNOSIS — R53.1 WEAKNESS: ICD-10-CM

## 2025-03-10 DIAGNOSIS — Z87.01 HISTORY OF PNEUMONIA: ICD-10-CM

## 2025-03-10 DIAGNOSIS — R06.02 SOB (SHORTNESS OF BREATH): ICD-10-CM

## 2025-03-10 PROCEDURE — 71046 X-RAY EXAM CHEST 2 VIEWS: CPT

## 2025-03-13 ENCOUNTER — RESULTS FOLLOW-UP (OUTPATIENT)
Facility: HOSPITAL | Age: 89
End: 2025-03-13

## 2025-03-13 DIAGNOSIS — R91.1 NODULE OF LOWER LOBE OF RIGHT LUNG: Primary | ICD-10-CM

## 2025-03-19 ENCOUNTER — HOSPITAL ENCOUNTER (OUTPATIENT)
Facility: HOSPITAL | Age: 89
Discharge: HOME | End: 2025-03-22
Attending: FAMILY MEDICINE
Payer: MEDICARE

## 2025-03-19 DIAGNOSIS — R91.1 NODULE OF LOWER LOBE OF RIGHT LUNG: ICD-10-CM

## 2025-03-19 PROCEDURE — 71250 CT THORAX DX C-: CPT

## 2025-03-24 ENCOUNTER — OFFICE VISIT (OUTPATIENT)
Age: 89
End: 2025-03-24
Payer: MEDICARE

## 2025-03-24 VITALS
DIASTOLIC BLOOD PRESSURE: 71 MMHG | WEIGHT: 134.6 LBS | RESPIRATION RATE: 16 BRPM | OXYGEN SATURATION: 97 % | SYSTOLIC BLOOD PRESSURE: 123 MMHG | HEIGHT: 61 IN | BODY MASS INDEX: 25.41 KG/M2 | HEART RATE: 89 BPM | TEMPERATURE: 98.2 F

## 2025-03-24 DIAGNOSIS — Z86.69 HISTORY OF MIGRAINE HEADACHES: ICD-10-CM

## 2025-03-24 DIAGNOSIS — F51.01 PRIMARY INSOMNIA: ICD-10-CM

## 2025-03-24 DIAGNOSIS — D69.3 IMMUNE THROMBOCYTOPENIC PURPURA (HCC): ICD-10-CM

## 2025-03-24 DIAGNOSIS — I48.91 NEW ONSET A-FIB (HCC): Primary | ICD-10-CM

## 2025-03-24 DIAGNOSIS — Z79.899 ENCOUNTER FOR LONG-TERM (CURRENT) USE OF MEDICATIONS: ICD-10-CM

## 2025-03-24 DIAGNOSIS — Z87.01 HISTORY OF PNEUMONIA: ICD-10-CM

## 2025-03-24 DIAGNOSIS — M15.0 PRIMARY OSTEOARTHRITIS INVOLVING MULTIPLE JOINTS: ICD-10-CM

## 2025-03-24 DIAGNOSIS — E03.9 HYPOTHYROIDISM, ACQUIRED: ICD-10-CM

## 2025-03-24 DIAGNOSIS — I10 ESSENTIAL (PRIMARY) HYPERTENSION: ICD-10-CM

## 2025-03-24 DIAGNOSIS — Z86.16 PERSONAL HISTORY OF COVID-19: ICD-10-CM

## 2025-03-24 DIAGNOSIS — E78.2 MIXED HYPERLIPIDEMIA: ICD-10-CM

## 2025-03-24 DIAGNOSIS — J98.4 INFLAMMATION OF LUNG: ICD-10-CM

## 2025-03-24 DIAGNOSIS — M79.7 FIBROMYALGIA: ICD-10-CM

## 2025-03-24 LAB
Lab: NORMAL
QC PASS/FAIL: NORMAL
SARS-COV-2, POC: NORMAL

## 2025-03-24 PROCEDURE — 1123F ACP DISCUSS/DSCN MKR DOCD: CPT | Performed by: FAMILY MEDICINE

## 2025-03-24 PROCEDURE — 99214 OFFICE O/P EST MOD 30 MIN: CPT | Performed by: FAMILY MEDICINE

## 2025-03-24 PROCEDURE — 1036F TOBACCO NON-USER: CPT | Performed by: FAMILY MEDICINE

## 2025-03-24 PROCEDURE — G8427 DOCREV CUR MEDS BY ELIG CLIN: HCPCS | Performed by: FAMILY MEDICINE

## 2025-03-24 PROCEDURE — 1159F MED LIST DOCD IN RCRD: CPT | Performed by: FAMILY MEDICINE

## 2025-03-24 PROCEDURE — 87635 SARS-COV-2 COVID-19 AMP PRB: CPT | Performed by: FAMILY MEDICINE

## 2025-03-24 PROCEDURE — 93005 ELECTROCARDIOGRAM TRACING: CPT | Performed by: FAMILY MEDICINE

## 2025-03-24 PROCEDURE — 1126F AMNT PAIN NOTED NONE PRSNT: CPT | Performed by: FAMILY MEDICINE

## 2025-03-24 PROCEDURE — 1090F PRES/ABSN URINE INCON ASSESS: CPT | Performed by: FAMILY MEDICINE

## 2025-03-24 PROCEDURE — 1160F RVW MEDS BY RX/DR IN RCRD: CPT | Performed by: FAMILY MEDICINE

## 2025-03-24 PROCEDURE — 93010 ELECTROCARDIOGRAM REPORT: CPT | Performed by: FAMILY MEDICINE

## 2025-03-24 PROCEDURE — PBSHW POCT COVID-19, SARS-COV-2, PCR: Performed by: FAMILY MEDICINE

## 2025-03-24 PROCEDURE — G2211 COMPLEX E/M VISIT ADD ON: HCPCS | Performed by: FAMILY MEDICINE

## 2025-03-24 PROCEDURE — G8417 CALC BMI ABV UP PARAM F/U: HCPCS | Performed by: FAMILY MEDICINE

## 2025-03-24 RX ORDER — IPRATROPIUM BROMIDE 21 UG/1
SPRAY, METERED NASAL
COMMUNITY
Start: 2025-03-18

## 2025-03-24 RX ORDER — BUTALBITAL, ACETAMINOPHEN AND CAFFEINE 300; 40; 50 MG/1; MG/1; MG/1
1 CAPSULE ORAL EVERY 6 HOURS PRN
Qty: 30 CAPSULE | Refills: 1 | Status: CANCELLED | OUTPATIENT
Start: 2025-03-24

## 2025-03-24 ASSESSMENT — ENCOUNTER SYMPTOMS
ABDOMINAL PAIN: 0
COUGH: 0
SHORTNESS OF BREATH: 0
RHINORRHEA: 0
BLOOD IN STOOL: 0
CONSTIPATION: 0
CHEST TIGHTNESS: 0

## 2025-03-24 ASSESSMENT — PATIENT HEALTH QUESTIONNAIRE - PHQ9
SUM OF ALL RESPONSES TO PHQ QUESTIONS 1-9: 0
1. LITTLE INTEREST OR PLEASURE IN DOING THINGS: NOT AT ALL
SUM OF ALL RESPONSES TO PHQ QUESTIONS 1-9: 0
SUM OF ALL RESPONSES TO PHQ QUESTIONS 1-9: 0
2. FEELING DOWN, DEPRESSED OR HOPELESS: NOT AT ALL
SUM OF ALL RESPONSES TO PHQ QUESTIONS 1-9: 0

## 2025-03-24 NOTE — PROGRESS NOTES
Subjective:      Patient ID: Corina Dueñas is a 89 y.o. female.    HPI  Follow up on COVID pneumonia.  Was seen thru the ER on 2/13/2025.   Overall she is feeling better and weakness has improved.  Has very little cough and congestion.   No chest pain and SOB has improved.  Denies wheezing, fever or chills.  Had COVID as well in December 2024.  Had follow up with pulmonary and has had CT scan chest.  See report.  Will follow up with pulmonary in 1 week.      Cardiovascular Review:  The patient has hypertension, hyperlipidemia, and hx of atrial tachycardia.    Diet and Lifestyle: generally follows a low fat low cholesterol diet, generally follows a low sodium diet, exercises sporadically  Pertinent ROS: taking medications as instructed, no medication side effects noted, no TIA's, no swelling of ankles, no palpitations.   Denies chest pain.  No pressure or chest tightness.  Has upcoming cardiology appt for next month.    Thyroid Review:  Patient is seen for followup of hypothyroidism.  Followed by endo.    Thyroid ROS: denies weight changes, heat/cold intolerance, bowel/skin changes or CVS symptoms.  Osteoarthritis and Chronic Pain:  Patient has osteoarthritis and fibromyalgia.  Has aches and pain that comes and goes.    Rheumatological ROS: Has burning and pain in the legs at feet that is worse at night but overall has been stable.  Controlled by taking tylenol PRN meds.   Response to treatment plan: stable.   Headache:  Has chronic headache almost every days.  She had follow up with neurology.  Ordered MRI but was not able to get tests done d/t increase anxiety in the MRI scanner.  Generally will go away if she take the Fioricet but recently has been taking tylenol.  Does not always take away the headache pain.  Overall she believes that headaches are better.  Associated with light sensitivity, slight nausea but no vomiting, no fever/chills, no neck stiffness.  Denies any known trigger and denies any increase in

## 2025-03-24 NOTE — PROGRESS NOTES
Chief Complaint   Patient presents with    Follow-up     Patient is today for a 2 week follow-up for respiratory issues       \"Have you been to the ER, urgent care clinic since your last visit?  Hospitalized since your last visit?\"    NO    “Have you seen or consulted any other health care providers outside of [facility] since your last visit?”    NO            Click Here for Release of Records Request      Vitals:    25 1128   BP: 123/71   Pulse: 89   Resp: 16   Temp: 98.2 °F (36.8 °C)   SpO2: 97%       There are no preventive care reminders to display for this patient.     The patient, Corina Dueñas, identity was verified by name and .

## 2025-03-25 ENCOUNTER — OFFICE VISIT (OUTPATIENT)
Age: 89
End: 2025-03-25
Payer: MEDICARE

## 2025-03-25 VITALS
HEIGHT: 61 IN | SYSTOLIC BLOOD PRESSURE: 124 MMHG | WEIGHT: 134 LBS | DIASTOLIC BLOOD PRESSURE: 66 MMHG | HEART RATE: 86 BPM | OXYGEN SATURATION: 96 % | BODY MASS INDEX: 25.3 KG/M2

## 2025-03-25 DIAGNOSIS — I47.10 SUPRAVENTRICULAR TACHYCARDIA: ICD-10-CM

## 2025-03-25 DIAGNOSIS — E78.00 HYPERCHOLESTEROLEMIA: ICD-10-CM

## 2025-03-25 DIAGNOSIS — I48.0 PAF (PAROXYSMAL ATRIAL FIBRILLATION) (HCC): Primary | ICD-10-CM

## 2025-03-25 DIAGNOSIS — I1A.0 RESISTANT HYPERTENSION: ICD-10-CM

## 2025-03-25 DIAGNOSIS — R00.2 HEART PALPITATIONS: ICD-10-CM

## 2025-03-25 DIAGNOSIS — I34.0 NONRHEUMATIC MITRAL VALVE REGURGITATION: ICD-10-CM

## 2025-03-25 PROCEDURE — G2211 COMPLEX E/M VISIT ADD ON: HCPCS | Performed by: INTERNAL MEDICINE

## 2025-03-25 PROCEDURE — 1123F ACP DISCUSS/DSCN MKR DOCD: CPT | Performed by: INTERNAL MEDICINE

## 2025-03-25 PROCEDURE — 99214 OFFICE O/P EST MOD 30 MIN: CPT | Performed by: INTERNAL MEDICINE

## 2025-03-25 PROCEDURE — G8427 DOCREV CUR MEDS BY ELIG CLIN: HCPCS | Performed by: INTERNAL MEDICINE

## 2025-03-25 PROCEDURE — 1126F AMNT PAIN NOTED NONE PRSNT: CPT | Performed by: INTERNAL MEDICINE

## 2025-03-25 PROCEDURE — 1160F RVW MEDS BY RX/DR IN RCRD: CPT | Performed by: INTERNAL MEDICINE

## 2025-03-25 PROCEDURE — 1036F TOBACCO NON-USER: CPT | Performed by: INTERNAL MEDICINE

## 2025-03-25 PROCEDURE — G8417 CALC BMI ABV UP PARAM F/U: HCPCS | Performed by: INTERNAL MEDICINE

## 2025-03-25 PROCEDURE — 1090F PRES/ABSN URINE INCON ASSESS: CPT | Performed by: INTERNAL MEDICINE

## 2025-03-25 PROCEDURE — 1159F MED LIST DOCD IN RCRD: CPT | Performed by: INTERNAL MEDICINE

## 2025-03-25 ASSESSMENT — PATIENT HEALTH QUESTIONNAIRE - PHQ9
2. FEELING DOWN, DEPRESSED OR HOPELESS: NOT AT ALL
SUM OF ALL RESPONSES TO PHQ QUESTIONS 1-9: 0
1. LITTLE INTEREST OR PLEASURE IN DOING THINGS: NOT AT ALL
SUM OF ALL RESPONSES TO PHQ QUESTIONS 1-9: 0

## 2025-03-25 NOTE — PROGRESS NOTES
1. Have you been to the ER, urgent care clinic since your last visit?  Hospitalized since your last visit?  [facility] 2/13/2025  SOB, cough, nasal congestion, fever.    2. Have you seen or consulted any other health care providers outside of the [facility] since your last visit?  Include any pap smears or colon screening.   [facility]   
fibromyalgia    Other Son         sameer's disease and UC    Lung Disease Father         COPD      Social History     Socioeconomic History    Marital status:      Spouse name: Not on file    Number of children: Not on file    Years of education: Not on file    Highest education level: Not on file   Occupational History    Not on file   Tobacco Use    Smoking status: Never     Passive exposure: Never    Smokeless tobacco: Never   Vaping Use    Vaping status: Never Used   Substance and Sexual Activity    Alcohol use: No    Drug use: No    Sexual activity: Not Currently     Partners: Male   Other Topics Concern    Not on file   Social History Narrative    Not on file     Social Drivers of Health     Financial Resource Strain: Low Risk  (12/11/2024)    Overall Financial Resource Strain (CARDIA)     Difficulty of Paying Living Expenses: Not very hard   Food Insecurity: No Food Insecurity (1/12/2025)    Hunger Vital Sign     Worried About Running Out of Food in the Last Year: Never true     Ran Out of Food in the Last Year: Never true   Transportation Needs: No Transportation Needs (1/12/2025)    PRAPARE - Transportation     Lack of Transportation (Medical): No     Lack of Transportation (Non-Medical): No   Physical Activity: Inactive (12/9/2024)    Exercise Vital Sign     Days of Exercise per Week: 0 days     Minutes of Exercise per Session: 0 min   Stress: Not on file   Social Connections: Not on file   Intimate Partner Violence: Not on file   Housing Stability: Low Risk  (1/12/2025)    Housing Stability Vital Sign     Unable to Pay for Housing in the Last Year: No     Number of Times Moved in the Last Year: 0     Homeless in the Last Year: No      Current Outpatient Medications   Medication Sig    ipratropium (ATROVENT) 0.03 % nasal spray     guaiFENesin (MUCINEX) 600 MG extended release tablet Take 1 tablet by mouth 2 times daily    ondansetron (ZOFRAN-ODT) 4 MG disintegrating tablet Take 1 tablet by mouth 3

## 2025-03-26 RX ORDER — PRAVASTATIN SODIUM 10 MG
10 TABLET ORAL NIGHTLY
Qty: 90 TABLET | Refills: 3 | Status: SHIPPED | OUTPATIENT
Start: 2025-03-26

## 2025-03-26 NOTE — TELEPHONE ENCOUNTER
Last appointment: 3/24/25  Next appointment: 5/9/25, 7/15/25  Previous refill encounter(s): 5/20/24 #90 with 3 refills    Requested Prescriptions     Pending Prescriptions Disp Refills    pravastatin (PRAVACHOL) 10 MG tablet [Pharmacy Med Name: PRAVASTATIN SODIUM 10MG TABS] 90 tablet 3     Sig: TAKE 1 TABLET BY MOUTH NIGHTLY         For Pharmacy Admin Tracking Only    Program: Medication Refill  CPA in place:    Recommendation Provided To:   Intervention Detail: New Rx: 1, reason: Patient Preference  Intervention Accepted By:   Gap Closed?:    Time Spent (min): 5

## 2025-04-01 ENCOUNTER — TRANSCRIBE ORDERS (OUTPATIENT)
Facility: HOSPITAL | Age: 89
End: 2025-04-01

## 2025-04-01 DIAGNOSIS — R91.1 SOLITARY PULMONARY NODULE: Primary | ICD-10-CM

## 2025-04-02 ENCOUNTER — TELEPHONE (OUTPATIENT)
Age: 89
End: 2025-04-02

## 2025-04-02 ENCOUNTER — ANCILLARY PROCEDURE (OUTPATIENT)
Age: 89
End: 2025-04-02
Payer: MEDICARE

## 2025-04-02 VITALS
BODY MASS INDEX: 25.3 KG/M2 | SYSTOLIC BLOOD PRESSURE: 125 MMHG | DIASTOLIC BLOOD PRESSURE: 70 MMHG | WEIGHT: 134 LBS | HEIGHT: 61 IN

## 2025-04-02 DIAGNOSIS — I34.0 NONRHEUMATIC MITRAL VALVE REGURGITATION: ICD-10-CM

## 2025-04-02 DIAGNOSIS — R00.2 HEART PALPITATIONS: ICD-10-CM

## 2025-04-02 DIAGNOSIS — I47.10 SUPRAVENTRICULAR TACHYCARDIA: ICD-10-CM

## 2025-04-02 DIAGNOSIS — E78.00 HYPERCHOLESTEROLEMIA: ICD-10-CM

## 2025-04-02 DIAGNOSIS — I48.0 PAF (PAROXYSMAL ATRIAL FIBRILLATION) (HCC): ICD-10-CM

## 2025-04-02 DIAGNOSIS — I1A.0 RESISTANT HYPERTENSION: ICD-10-CM

## 2025-04-02 PROCEDURE — 93306 TTE W/DOPPLER COMPLETE: CPT | Performed by: INTERNAL MEDICINE

## 2025-04-02 NOTE — TELEPHONE ENCOUNTER
Pre-Procedure Cardiac Clearance Request:    Facility: [facility]    Procedure Date: tbd    Procedure: deep cleaning with anticipated bleeding of gums.    Surgeon: not listed    Anesthesia: local    Request for Interruption of Anticoagulants:   Eliquis    May stop anticoagulant how many days prior and when to resume    Is patient cleared from a cardiac standpoint to proceed with upcoming procedure.   Please advise.

## 2025-04-03 ENCOUNTER — TELEPHONE (OUTPATIENT)
Age: 89
End: 2025-04-03

## 2025-04-03 NOTE — TELEPHONE ENCOUNTER
This nurse call [facility] at [place] at [phone]. I did explained to Nohemi that Request for Clearance was received just yesterday and was submitted to the physician same day.   Nohemi voiced understanding.

## 2025-04-03 NOTE — TELEPHONE ENCOUNTER
Nohemi with the dental office - [facility] called to make you aware that the patient's procedure is tomorrow. Thanks

## 2025-04-05 LAB
ECHO AO ROOT DIAM: 3.2 CM
ECHO AO ROOT INDEX: 2.01 CM/M2
ECHO AV AREA PEAK VELOCITY: 3.2 CM2
ECHO AV AREA VTI: 2.8 CM2
ECHO AV AREA/BSA PEAK VELOCITY: 2 CM2/M2
ECHO AV AREA/BSA VTI: 1.8 CM2/M2
ECHO AV MEAN GRADIENT: 3 MMHG
ECHO AV MEAN VELOCITY: 0.8 M/S
ECHO AV PEAK GRADIENT: 4 MMHG
ECHO AV PEAK VELOCITY: 1 M/S
ECHO AV VELOCITY RATIO: 1
ECHO AV VTI: 17.7 CM
ECHO BSA: 1.62 M2
ECHO EST RA PRESSURE: 3 MMHG
ECHO LA DIAMETER INDEX: 2.08 CM/M2
ECHO LA DIAMETER: 3.3 CM
ECHO LA TO AORTIC ROOT RATIO: 1.03
ECHO LA VOL A-L A2C: 58 ML (ref 22–52)
ECHO LA VOL A-L A4C: 48 ML (ref 22–52)
ECHO LA VOL BP: 53 ML (ref 22–52)
ECHO LA VOL MOD A2C: 55 ML (ref 22–52)
ECHO LA VOL MOD A4C: 45 ML (ref 22–52)
ECHO LA VOL/BSA BIPLANE: 33 ML/M2 (ref 16–34)
ECHO LA VOLUME AREA LENGTH: 57 ML
ECHO LA VOLUME INDEX A-L A2C: 36 ML/M2 (ref 16–34)
ECHO LA VOLUME INDEX A-L A4C: 30 ML/M2 (ref 16–34)
ECHO LA VOLUME INDEX AREA LENGTH: 36 ML/M2 (ref 16–34)
ECHO LA VOLUME INDEX MOD A2C: 35 ML/M2 (ref 16–34)
ECHO LA VOLUME INDEX MOD A4C: 28 ML/M2 (ref 16–34)
ECHO LV E' LATERAL VELOCITY: 8.52 CM/S
ECHO LV E' SEPTAL VELOCITY: 6.42 CM/S
ECHO LV EDV A2C: 34 ML
ECHO LV EDV A4C: 33 ML
ECHO LV EDV BP: 35 ML (ref 56–104)
ECHO LV EDV INDEX A4C: 21 ML/M2
ECHO LV EDV INDEX BP: 22 ML/M2
ECHO LV EDV NDEX A2C: 21 ML/M2
ECHO LV EF PHYSICIAN: 60 %
ECHO LV EJECTION FRACTION A2C: 58 %
ECHO LV EJECTION FRACTION A4C: 55 %
ECHO LV EJECTION FRACTION BIPLANE: 59 % (ref 55–100)
ECHO LV ESV A2C: 15 ML
ECHO LV ESV A4C: 15 ML
ECHO LV ESV BP: 15 ML (ref 19–49)
ECHO LV ESV INDEX A2C: 9 ML/M2
ECHO LV ESV INDEX A4C: 9 ML/M2
ECHO LV ESV INDEX BP: 9 ML/M2
ECHO LV FRACTIONAL SHORTENING: 28 % (ref 28–44)
ECHO LV INTERNAL DIMENSION DIASTOLE INDEX: 2.52 CM/M2
ECHO LV INTERNAL DIMENSION DIASTOLIC: 4 CM (ref 3.9–5.3)
ECHO LV INTERNAL DIMENSION SYSTOLIC INDEX: 1.82 CM/M2
ECHO LV INTERNAL DIMENSION SYSTOLIC: 2.9 CM
ECHO LV IVSD: 1 CM (ref 0.6–0.9)
ECHO LV MASS 2D: 118.2 G (ref 67–162)
ECHO LV MASS INDEX 2D: 74.4 G/M2 (ref 43–95)
ECHO LV POSTERIOR WALL DIASTOLIC: 0.9 CM (ref 0.6–0.9)
ECHO LV RELATIVE WALL THICKNESS RATIO: 0.45
ECHO LVOT AREA: 3.1 CM2
ECHO LVOT AV VTI INDEX: 0.87
ECHO LVOT DIAM: 2 CM
ECHO LVOT MEAN GRADIENT: 2 MMHG
ECHO LVOT PEAK GRADIENT: 4 MMHG
ECHO LVOT PEAK VELOCITY: 1 M/S
ECHO LVOT STROKE VOLUME INDEX: 30.4 ML/M2
ECHO LVOT SV: 48.4 ML
ECHO LVOT VTI: 15.4 CM
ECHO MV A VELOCITY: 0.42 M/S
ECHO MV AREA VTI: 2 CM2
ECHO MV E DECELERATION TIME (DT): 181.6 MS
ECHO MV E VELOCITY: 1.29 M/S
ECHO MV E/A RATIO: 3.07
ECHO MV E/E' LATERAL: 15.14
ECHO MV E/E' RATIO (AVERAGED): 17.62
ECHO MV E/E' SEPTAL: 20.09
ECHO MV LVOT VTI INDEX: 1.59
ECHO MV MAX VELOCITY: 1.6 M/S
ECHO MV MEAN GRADIENT: 4 MMHG
ECHO MV MEAN VELOCITY: 0.9 M/S
ECHO MV PEAK GRADIENT: 10 MMHG
ECHO MV REGURGITANT VELOCITY PISA: 5.2 M/S
ECHO MV REGURGITANT VTIA: 150.2 CM
ECHO MV VTI: 24.5 CM
ECHO PV MAX VELOCITY: 0.7 M/S
ECHO PV PEAK GRADIENT: 2 MMHG
ECHO RIGHT VENTRICULAR SYSTOLIC PRESSURE (RVSP): 32 MMHG
ECHO RV FREE WALL PEAK S': 10.7 CM/S
ECHO RV INTERNAL DIMENSION: 4.4 CM
ECHO RV TAPSE: 1.9 CM (ref 1.7–?)
ECHO RVOT PEAK GRADIENT: 2 MMHG
ECHO RVOT PEAK VELOCITY: 0.7 M/S
ECHO TV REGURGITANT MAX VELOCITY: 2.68 M/S
ECHO TV REGURGITANT PEAK GRADIENT: 29 MMHG

## 2025-04-05 PROCEDURE — 93306 TTE W/DOPPLER COMPLETE: CPT | Performed by: INTERNAL MEDICINE

## 2025-04-05 NOTE — TELEPHONE ENCOUNTER
The patient is at low cardiac risk to proceed with the procedure, and can interrupt blood thinners if necessary, for the following length of time individualized for each blood thinner, with the shorter and or longer end of the duration depending on surgical bleeding risk as per the proceduralist/surgeon:    Aspirin/Plavix/Effient/ Brilinta: 5 to 7 days  Eliquis/Xarelto/Pradaxa/Savaysa: 2 to 3 days    Please advise that whenever a blood thinner is temporarily stopped for the treatment of atrial fibrillation, there is always a tiny (less than 1%) chance of a stroke, and even less for people that are not always in atrial fibrillation.  Usually, the benefit of interrupting the blood thinner for the procedure exceeds the risk.      If able to continue the Eliquis if bleeding is minor and only a nuisance to help eliminate any tiny risk of a stroke that would be ideal.    Resume blood thinners as per the surgeon/proceduralist when felt to be safe from a surgical/procedural standpoint.

## 2025-04-06 ENCOUNTER — RESULTS FOLLOW-UP (OUTPATIENT)
Age: 89
End: 2025-04-06

## 2025-04-07 NOTE — TELEPHONE ENCOUNTER
----- Message from Dr. Matthew Almanzar MD sent at 4/6/2025  9:00 PM EDT -----  Please advise echo shows normal heart function with no significant valve problems.  Follow-up as scheduled.     My chart message sent.

## 2025-04-07 NOTE — RESULT ENCOUNTER NOTE
Please advise echo shows normal heart function with no significant valve problems.  Follow-up as scheduled.     Future Appointments  5/9/2025   2:00 PM    Matilde Sequeira, * [facility] DEP  7/1/2025   2:40 PM    Lianne Ibarra MD [facility]               AMB  7/15/2025  2:20 PM    Matilde Sequeira, * [facility] DEP  9/25/2025  1:40 PM    Roz Gracia APRN - [facility]               AMB  11/28/2025 2:00 PM    Matthew Almanzar MD    [facility]                BS AMB

## 2025-04-21 DIAGNOSIS — I48.91 NEW ONSET A-FIB (HCC): ICD-10-CM

## 2025-04-22 NOTE — TELEPHONE ENCOUNTER
Patient comment: Name brand only. I am down to 3 pills  so I will need to get 10 pills from [facility] while I am waiting for the prescription to be filled by [facility].     A 90 d/s pended to mailorder and a 14 d/s to local [facility] to hold pt until then.    Last appointment: 3/24/25  Next appointment: 5/9/25, 7/15/25  Previous refill encounter(s): 3/24/25 #60    Requested Prescriptions     Pending Prescriptions Disp Refills    apixaban (ELIQUIS) 2.5 MG TABS tablet 180 tablet 1     Sig: Take 1 tablet by mouth 2 times daily    apixaban (ELIQUIS) 2.5 MG TABS tablet 28 tablet 0     Sig: Take 1 tablet by mouth 2 times daily         For Pharmacy Admin Tracking Only    Program: Medication Refill  CPA in place:    Recommendation Provided To:   Intervention Detail: New Rx: 2, reason: Patient Preference  Intervention Accepted By:   Gap Closed?:    Time Spent (min): 5

## 2025-04-25 ENCOUNTER — OFFICE VISIT (OUTPATIENT)
Age: 89
End: 2025-04-25
Payer: MEDICARE

## 2025-04-25 VITALS
RESPIRATION RATE: 18 BRPM | DIASTOLIC BLOOD PRESSURE: 60 MMHG | SYSTOLIC BLOOD PRESSURE: 133 MMHG | WEIGHT: 131.4 LBS | HEART RATE: 60 BPM | BODY MASS INDEX: 24.83 KG/M2 | TEMPERATURE: 97.9 F | OXYGEN SATURATION: 97 %

## 2025-04-25 DIAGNOSIS — L03.012 ACUTE PARONYCHIA OF FINGER OF LEFT HAND: Primary | ICD-10-CM

## 2025-04-25 PROCEDURE — G8427 DOCREV CUR MEDS BY ELIG CLIN: HCPCS | Performed by: FAMILY MEDICINE

## 2025-04-25 PROCEDURE — 1159F MED LIST DOCD IN RCRD: CPT | Performed by: FAMILY MEDICINE

## 2025-04-25 PROCEDURE — 1036F TOBACCO NON-USER: CPT | Performed by: FAMILY MEDICINE

## 2025-04-25 PROCEDURE — 1160F RVW MEDS BY RX/DR IN RCRD: CPT | Performed by: FAMILY MEDICINE

## 2025-04-25 PROCEDURE — 1123F ACP DISCUSS/DSCN MKR DOCD: CPT | Performed by: FAMILY MEDICINE

## 2025-04-25 PROCEDURE — 1090F PRES/ABSN URINE INCON ASSESS: CPT | Performed by: FAMILY MEDICINE

## 2025-04-25 PROCEDURE — 99213 OFFICE O/P EST LOW 20 MIN: CPT | Performed by: FAMILY MEDICINE

## 2025-04-25 PROCEDURE — 1126F AMNT PAIN NOTED NONE PRSNT: CPT | Performed by: FAMILY MEDICINE

## 2025-04-25 PROCEDURE — G8420 CALC BMI NORM PARAMETERS: HCPCS | Performed by: FAMILY MEDICINE

## 2025-04-25 RX ORDER — CEPHALEXIN 500 MG/1
500 CAPSULE ORAL 3 TIMES DAILY
Qty: 30 CAPSULE | Refills: 0 | Status: SHIPPED | OUTPATIENT
Start: 2025-04-25 | End: 2025-04-25 | Stop reason: ALTCHOICE

## 2025-04-25 RX ORDER — CLINDAMYCIN HYDROCHLORIDE 300 MG/1
300 CAPSULE ORAL 3 TIMES DAILY
Qty: 30 CAPSULE | Refills: 0 | Status: SHIPPED | OUTPATIENT
Start: 2025-04-25 | End: 2025-05-05

## 2025-04-25 ASSESSMENT — PATIENT HEALTH QUESTIONNAIRE - PHQ9
SUM OF ALL RESPONSES TO PHQ QUESTIONS 1-9: 0
2. FEELING DOWN, DEPRESSED OR HOPELESS: NOT AT ALL
SUM OF ALL RESPONSES TO PHQ QUESTIONS 1-9: 0
1. LITTLE INTEREST OR PLEASURE IN DOING THINGS: NOT AT ALL

## 2025-04-25 NOTE — PROGRESS NOTES
Chief Complaint   Patient presents with    Pain     Patient has been having pain and inflammation to left index finger for 1 week.       \"Have you been to the ER, urgent care clinic since your last visit?  Hospitalized since your last visit?\"    NO    “Have you seen or consulted any other health care providers outside of [facility] since your last visit?”    NO            Click Here for Release of Records Request       There were no vitals filed for this visit.   Health Maintenance Due   Topic Date Due    DTaP/Tdap/Td vaccine (2 - Td or Tdap) 04/15/2025        The patient, Corina Dueñas, identity was verified by name and .

## 2025-04-25 NOTE — PROGRESS NOTES
Subjective:      Patient ID: Corina Dueñas is a 89 y.o. female.    HPI  Redness and swelling around the cuticle of the index finger started about 3 to 4 day ago.  Thinks that it got infected from her biting her nail.      Past Medical History:   Diagnosis Date    Arrhythmia     tachycardia; Dr. cohen    Autoimmune disease     fibromyalgia    Chronic ITP (idiopathic thrombocytopenia) (HCC)     Congestive heart failure (HCC)     Fibromyalgia     GERD (gastroesophageal reflux disease)     Headache(784.0)     migraine    Heart failure (HCC)     Hiatal hernia     HTN (hypertension) 4/2/2010    Hyperlipidemia     Hypothyroidism 4/2/2010    Ill-defined condition     intermittent brochitis    Murmur     OA (osteoarthritis) 4/2/2010    Seizures (HCC)     56 years ago with childbirth    Tachycardia 2013    Thromboembolus (HCC)     blood clot on brain 56 years ago w childbirth    Thyroid disease     Valvular heart disease      Past Surgical History:   Procedure Laterality Date    BREAST BIOPSY Right 1999    more than one yrs ago all benign    CARPAL TUNNEL RELEASE  9/28/2012    CATARACT REMOVAL  9/2014    bilateral     COLONOSCOPY  12/2010    Dr. Etienne Addison- repeat 5 yrs    COLONOSCOPY,REMV LESN,SNARE  2/5/2015         CT BIOPSY BONE MARROW  11/15/2017    CT BONE MARROW BIOPSY 11/15/2017 [facility] CT    HYSTERECTOMY (CERVIX STATUS UNKNOWN)  1977    OTHER SURGICAL HISTORY      bone biopsy    NE UNLISTED PROCEDURE ABDOMEN PERITONEUM & OMENTUM  11/11/2021    Cystoscopy, bilateral ureteral catheter insertion.    REFRACTIVE SURGERY  07/2018    UROLOGICAL SURGERY  11/11/2021    Cystoscopy, bilateral ureteral catheter insertion.     Current Outpatient Medications   Medication Sig Dispense Refill    clindamycin (CLEOCIN) 300 MG capsule Take 1 capsule by mouth 3 times daily for 10 days 30 capsule 0    amoxicillin-clavulanate (AUGMENTIN) 875-125 MG per tablet Take 1 tablet by mouth 2 times daily for 10 days 20 tablet 0

## 2025-05-09 ENCOUNTER — OFFICE VISIT (OUTPATIENT)
Age: 89
End: 2025-05-09
Payer: MEDICARE

## 2025-05-09 VITALS
DIASTOLIC BLOOD PRESSURE: 58 MMHG | OXYGEN SATURATION: 95 % | WEIGHT: 133.8 LBS | TEMPERATURE: 98.7 F | BODY MASS INDEX: 25.28 KG/M2 | HEART RATE: 83 BPM | RESPIRATION RATE: 18 BRPM | SYSTOLIC BLOOD PRESSURE: 106 MMHG

## 2025-05-09 DIAGNOSIS — Z86.69 HISTORY OF MIGRAINE HEADACHES: ICD-10-CM

## 2025-05-09 DIAGNOSIS — J98.4 INFLAMMATION OF LUNG: ICD-10-CM

## 2025-05-09 DIAGNOSIS — M79.7 FIBROMYALGIA: ICD-10-CM

## 2025-05-09 DIAGNOSIS — E03.9 HYPOTHYROIDISM, ACQUIRED: ICD-10-CM

## 2025-05-09 DIAGNOSIS — Z87.01 HISTORY OF PNEUMONIA: ICD-10-CM

## 2025-05-09 DIAGNOSIS — D69.3 IMMUNE THROMBOCYTOPENIC PURPURA (HCC): ICD-10-CM

## 2025-05-09 DIAGNOSIS — I10 ESSENTIAL (PRIMARY) HYPERTENSION: Primary | ICD-10-CM

## 2025-05-09 DIAGNOSIS — M15.0 PRIMARY OSTEOARTHRITIS INVOLVING MULTIPLE JOINTS: ICD-10-CM

## 2025-05-09 DIAGNOSIS — E78.2 MIXED HYPERLIPIDEMIA: ICD-10-CM

## 2025-05-09 DIAGNOSIS — F51.01 PRIMARY INSOMNIA: ICD-10-CM

## 2025-05-09 DIAGNOSIS — Z79.899 ENCOUNTER FOR LONG-TERM (CURRENT) USE OF MEDICATIONS: ICD-10-CM

## 2025-05-09 DIAGNOSIS — I48.19 ATRIAL FIBRILLATION, PERSISTENT (HCC): ICD-10-CM

## 2025-05-09 PROCEDURE — 1123F ACP DISCUSS/DSCN MKR DOCD: CPT | Performed by: FAMILY MEDICINE

## 2025-05-09 PROCEDURE — 99214 OFFICE O/P EST MOD 30 MIN: CPT | Performed by: FAMILY MEDICINE

## 2025-05-09 PROCEDURE — 1090F PRES/ABSN URINE INCON ASSESS: CPT | Performed by: FAMILY MEDICINE

## 2025-05-09 PROCEDURE — 1160F RVW MEDS BY RX/DR IN RCRD: CPT | Performed by: FAMILY MEDICINE

## 2025-05-09 PROCEDURE — G8427 DOCREV CUR MEDS BY ELIG CLIN: HCPCS | Performed by: FAMILY MEDICINE

## 2025-05-09 PROCEDURE — G8417 CALC BMI ABV UP PARAM F/U: HCPCS | Performed by: FAMILY MEDICINE

## 2025-05-09 PROCEDURE — 1159F MED LIST DOCD IN RCRD: CPT | Performed by: FAMILY MEDICINE

## 2025-05-09 PROCEDURE — 1036F TOBACCO NON-USER: CPT | Performed by: FAMILY MEDICINE

## 2025-05-09 PROCEDURE — G2211 COMPLEX E/M VISIT ADD ON: HCPCS | Performed by: FAMILY MEDICINE

## 2025-05-09 PROCEDURE — 1126F AMNT PAIN NOTED NONE PRSNT: CPT | Performed by: FAMILY MEDICINE

## 2025-05-09 RX ORDER — GUAIFENESIN 600 MG/1
600 TABLET, EXTENDED RELEASE ORAL 2 TIMES DAILY PRN
COMMUNITY
Start: 2025-05-09

## 2025-05-09 RX ORDER — IPRATROPIUM BROMIDE 21 UG/1
SPRAY, METERED NASAL 2 TIMES DAILY PRN
COMMUNITY
Start: 2025-05-09

## 2025-05-09 RX ORDER — TRAZODONE HYDROCHLORIDE 50 MG/1
25 TABLET ORAL NIGHTLY PRN
Qty: 30 TABLET | Refills: 1 | Status: SHIPPED | OUTPATIENT
Start: 2025-05-09

## 2025-05-09 ASSESSMENT — ENCOUNTER SYMPTOMS
CHEST TIGHTNESS: 0
RHINORRHEA: 0
BLOOD IN STOOL: 0
COUGH: 0
CONSTIPATION: 0
WHEEZING: 0

## 2025-05-09 ASSESSMENT — PATIENT HEALTH QUESTIONNAIRE - PHQ9
SUM OF ALL RESPONSES TO PHQ QUESTIONS 1-9: 0
SUM OF ALL RESPONSES TO PHQ QUESTIONS 1-9: 0
1. LITTLE INTEREST OR PLEASURE IN DOING THINGS: NOT AT ALL
2. FEELING DOWN, DEPRESSED OR HOPELESS: NOT AT ALL
SUM OF ALL RESPONSES TO PHQ QUESTIONS 1-9: 0
SUM OF ALL RESPONSES TO PHQ QUESTIONS 1-9: 0

## 2025-05-09 NOTE — PROGRESS NOTES
Chief Complaint   Patient presents with    1 Month Follow-Up     Patient is here today for 1 month follow up.       \"Have you been to the ER, urgent care clinic since your last visit?  Hospitalized since your last visit?\"    NO    “Have you seen or consulted any other health care providers outside of [facility] since your last visit?”    YES - When: approximately 1 days ago.  Where and Why: [facility] for Infusion.            Click Here for Release of Records Request       There were no vitals filed for this visit.   Health Maintenance Due   Topic Date Due    DTaP/Tdap/Td vaccine (2 - Td or Tdap) 04/15/2025        The patient, Corina Dueñas, identity was verified by name and .       
cough, chest tightness and wheezing.    Cardiovascular:  Negative for chest pain, palpitations and leg swelling.   Gastrointestinal:  Negative for abdominal pain, blood in stool and constipation.   Endocrine: Negative for polyuria.   Genitourinary:  Negative for difficulty urinating, frequency, hematuria and urgency.   Musculoskeletal:  Negative for joint swelling and myalgias.   Skin:  Negative for rash.   Allergic/Immunologic: Negative for environmental allergies.   Neurological:  Negative for dizziness, light-headedness and headaches.   Psychiatric/Behavioral:  Negative for dysphoric mood and sleep disturbance. The patient is not nervous/anxious.        Objective:   Physical Exam  Constitutional:       Appearance: Normal appearance.      Comments: BP (!) 106/58   Pulse 83   Temp 98.7 °F (37.1 °C) (Temporal)   Resp 18   Wt 60.7 kg (133 lb 12.8 oz)   SpO2 95%   BMI 25.28 kg/m²    HENT:      Right Ear: Tympanic membrane normal.      Left Ear: Tympanic membrane normal.      Nose: No rhinorrhea.      Mouth/Throat:      Mouth: Mucous membranes are moist.   Cardiovascular:      Rate and Rhythm: Normal rate. Rhythm irregularly irregular.   Pulmonary:      Effort: Pulmonary effort is normal.      Breath sounds: Normal breath sounds.   Abdominal:      General: Bowel sounds are normal.      Palpations: Abdomen is soft.      Tenderness: There is no abdominal tenderness.   Musculoskeletal:         General: Normal range of motion.      Cervical back: Normal range of motion and neck supple.      Right lower leg: No edema.      Left lower leg: No edema.   Lymphadenopathy:      Cervical: No cervical adenopathy.   Skin:     General: Skin is warm and dry.   Neurological:      General: No focal deficit present.      Mental Status: She is alert and oriented to person, place, and time.   Psychiatric:         Mood and Affect: Mood normal.           Assessment and Plan:   ASSESSMENT and PLAN  Corina was seen today for 1 month

## 2025-06-30 PROBLEM — I48.19 PERSISTENT ATRIAL FIBRILLATION (HCC): Status: ACTIVE | Noted: 2025-06-30

## 2025-07-01 ENCOUNTER — OFFICE VISIT (OUTPATIENT)
Age: 89
End: 2025-07-01
Payer: MEDICARE

## 2025-07-01 VITALS
SYSTOLIC BLOOD PRESSURE: 136 MMHG | WEIGHT: 125.6 LBS | HEART RATE: 88 BPM | BODY MASS INDEX: 23.71 KG/M2 | HEIGHT: 61 IN | OXYGEN SATURATION: 97 % | DIASTOLIC BLOOD PRESSURE: 78 MMHG

## 2025-07-01 DIAGNOSIS — D50.9 IRON DEFICIENCY ANEMIA, UNSPECIFIED IRON DEFICIENCY ANEMIA TYPE: ICD-10-CM

## 2025-07-01 DIAGNOSIS — E03.9 HYPOTHYROIDISM, UNSPECIFIED TYPE: ICD-10-CM

## 2025-07-01 DIAGNOSIS — I34.0 NONRHEUMATIC MITRAL VALVE REGURGITATION: ICD-10-CM

## 2025-07-01 DIAGNOSIS — E78.00 HYPERCHOLESTEROLEMIA: ICD-10-CM

## 2025-07-01 DIAGNOSIS — I10 PRIMARY HYPERTENSION: ICD-10-CM

## 2025-07-01 DIAGNOSIS — D69.3 CHRONIC ITP (IDIOPATHIC THROMBOCYTOPENIA) (HCC): ICD-10-CM

## 2025-07-01 DIAGNOSIS — Z86.2 HISTORY OF ITP: ICD-10-CM

## 2025-07-01 DIAGNOSIS — I48.19 PERSISTENT ATRIAL FIBRILLATION (HCC): Primary | ICD-10-CM

## 2025-07-01 PROCEDURE — 1159F MED LIST DOCD IN RCRD: CPT | Performed by: INTERNAL MEDICINE

## 2025-07-01 PROCEDURE — G8427 DOCREV CUR MEDS BY ELIG CLIN: HCPCS | Performed by: INTERNAL MEDICINE

## 2025-07-01 PROCEDURE — 93010 ELECTROCARDIOGRAM REPORT: CPT | Performed by: INTERNAL MEDICINE

## 2025-07-01 PROCEDURE — 1126F AMNT PAIN NOTED NONE PRSNT: CPT | Performed by: INTERNAL MEDICINE

## 2025-07-01 PROCEDURE — 1036F TOBACCO NON-USER: CPT | Performed by: INTERNAL MEDICINE

## 2025-07-01 PROCEDURE — 93005 ELECTROCARDIOGRAM TRACING: CPT | Performed by: INTERNAL MEDICINE

## 2025-07-01 PROCEDURE — 1160F RVW MEDS BY RX/DR IN RCRD: CPT | Performed by: INTERNAL MEDICINE

## 2025-07-01 PROCEDURE — 99205 OFFICE O/P NEW HI 60 MIN: CPT | Performed by: INTERNAL MEDICINE

## 2025-07-01 PROCEDURE — G8420 CALC BMI NORM PARAMETERS: HCPCS | Performed by: INTERNAL MEDICINE

## 2025-07-01 PROCEDURE — 1090F PRES/ABSN URINE INCON ASSESS: CPT | Performed by: INTERNAL MEDICINE

## 2025-07-01 PROCEDURE — 1123F ACP DISCUSS/DSCN MKR DOCD: CPT | Performed by: INTERNAL MEDICINE

## 2025-07-01 RX ORDER — AMLODIPINE BESYLATE 2.5 MG/1
TABLET ORAL
COMMUNITY
Start: 2025-06-23

## 2025-07-01 NOTE — PROGRESS NOTES
1. Have you been to the ER, urgent care clinic since your last visit?  Hospitalized since your last visit?No    2. Have you seen or consulted any other health care providers outside of the [facility] since your last visit?  Include any pap smears or colon screening. No

## 2025-07-01 NOTE — PROGRESS NOTES
Cardiac Electrophysiology OFFICE Consultation Note       Assessment/Plan:   1. Persistent atrial fibrillation (HCC)  -     EKG 12 Lead  2. Primary hypertension  -     EKG 12 Lead  3. Nonrheumatic mitral valve regurgitation  -     EKG 12 Lead  4. Hypothyroidism, unspecified type  -     EKG 12 Lead  5. Chronic ITP (idiopathic thrombocytopenia) (HCC)  -     EKG 12 Lead  6. History of ITP  -     EKG 12 Lead  7. Hypercholesterolemia  -     EKG 12 Lead  8. Iron deficiency anemia, unspecified iron deficiency anemia type  -     EKG 12 Lead       Primary Cardiologist: Yohan      Persistent atrial fibrillation  Newly diagnosed in March 2025.  Adequately rate control at this time with heart rate in the 80s  - On Bystolic 10 mg daily for blood pressure management is not have issues with rapid rates  - Echocardiogram demonstrated preserved LVEF  - In the setting of history of ITP and thrombocytopenia, she is at increased risk for bleeding long-term  - [id number] score of 4.  Spoke regarding the potential role of watchman implantation to facilitate CVA prevention while decreasing the risk of bleeding  - Reports having been under anesthesia recently without any issues including normal bone marrow biopsy  - She is an adequate candidate for watchman implantation  - Information regarding watchman implant was provided, we had an extensive discussion (45 mins)  - The patient has non-valvular AF with a CHADS2 score > 2 or [id number] > 3.  The patient is appropriate for short-term treatment with anticoagulation but unsuitable for long-term anticoagulation. We had a shared-decision making discussion about risk of thrombosis from AF and risk of bleeding from anticoagulation using a decision-making tool to calculate their risk and discussed appropriate rationale for Watchman left atrial appendage closure as an alternative. www.healthdecision.org/tool.html#/tool/afib.   - Patient can call us if she wants to proceed with the

## 2025-07-02 RX ORDER — TRAZODONE HYDROCHLORIDE 50 MG/1
25 TABLET ORAL NIGHTLY PRN
Qty: 45 TABLET | Refills: 2 | Status: SHIPPED | OUTPATIENT
Start: 2025-07-02

## 2025-07-02 NOTE — TELEPHONE ENCOUNTER
Pharmacy comment: REQUEST FOR 90 DAYS PRESCRIPTION.     Last appointment: 5/9/25  Next appointment: 7\/15/25  Previous refill encounter(s): 5/9/25     Requested Prescriptions     Pending Prescriptions Disp Refills    traZODone (DESYREL) 50 MG tablet [Pharmacy Med Name: TRAZODONE 50 MG TABLET] 45 tablet 2     Sig: TAKE 0.5 TABLETS BY MOUTH NIGHTLY AS NEEDED FOR SLEEP.         For Pharmacy Admin Tracking Only    Program: Medication Refill  CPA in place:    Recommendation Provided To:   Intervention Detail: New Rx: 1, reason: Patient Preference  Intervention Accepted By:   Gap Closed?:    Time Spent (min): 5

## 2025-07-15 ENCOUNTER — OFFICE VISIT (OUTPATIENT)
Age: 89
End: 2025-07-15
Payer: MEDICARE

## 2025-07-15 VITALS
DIASTOLIC BLOOD PRESSURE: 77 MMHG | WEIGHT: 125.6 LBS | HEIGHT: 62 IN | OXYGEN SATURATION: 96 % | TEMPERATURE: 98 F | BODY MASS INDEX: 23.11 KG/M2 | HEART RATE: 72 BPM | SYSTOLIC BLOOD PRESSURE: 128 MMHG | RESPIRATION RATE: 25 BRPM

## 2025-07-15 DIAGNOSIS — M15.0 PRIMARY OSTEOARTHRITIS INVOLVING MULTIPLE JOINTS: ICD-10-CM

## 2025-07-15 DIAGNOSIS — E78.2 MIXED HYPERLIPIDEMIA: ICD-10-CM

## 2025-07-15 DIAGNOSIS — Z86.69 HISTORY OF MIGRAINE HEADACHES: ICD-10-CM

## 2025-07-15 DIAGNOSIS — Z79.899 ENCOUNTER FOR LONG-TERM (CURRENT) USE OF MEDICATIONS: ICD-10-CM

## 2025-07-15 DIAGNOSIS — E03.9 HYPOTHYROIDISM, ACQUIRED: ICD-10-CM

## 2025-07-15 DIAGNOSIS — F51.01 PRIMARY INSOMNIA: ICD-10-CM

## 2025-07-15 DIAGNOSIS — I48.19 ATRIAL FIBRILLATION, PERSISTENT (HCC): ICD-10-CM

## 2025-07-15 DIAGNOSIS — D69.3 IMMUNE THROMBOCYTOPENIC PURPURA (HCC): ICD-10-CM

## 2025-07-15 DIAGNOSIS — I10 ESSENTIAL (PRIMARY) HYPERTENSION: Primary | ICD-10-CM

## 2025-07-15 DIAGNOSIS — M79.7 FIBROMYALGIA: ICD-10-CM

## 2025-07-15 LAB
COMMENT:: NORMAL
SPECIMEN HOLD: NORMAL

## 2025-07-15 PROCEDURE — G8427 DOCREV CUR MEDS BY ELIG CLIN: HCPCS | Performed by: FAMILY MEDICINE

## 2025-07-15 PROCEDURE — 1126F AMNT PAIN NOTED NONE PRSNT: CPT | Performed by: FAMILY MEDICINE

## 2025-07-15 PROCEDURE — 1159F MED LIST DOCD IN RCRD: CPT | Performed by: FAMILY MEDICINE

## 2025-07-15 PROCEDURE — 1036F TOBACCO NON-USER: CPT | Performed by: FAMILY MEDICINE

## 2025-07-15 PROCEDURE — 99214 OFFICE O/P EST MOD 30 MIN: CPT | Performed by: FAMILY MEDICINE

## 2025-07-15 PROCEDURE — 1123F ACP DISCUSS/DSCN MKR DOCD: CPT | Performed by: FAMILY MEDICINE

## 2025-07-15 PROCEDURE — G2211 COMPLEX E/M VISIT ADD ON: HCPCS | Performed by: FAMILY MEDICINE

## 2025-07-15 PROCEDURE — 1090F PRES/ABSN URINE INCON ASSESS: CPT | Performed by: FAMILY MEDICINE

## 2025-07-15 PROCEDURE — G8420 CALC BMI NORM PARAMETERS: HCPCS | Performed by: FAMILY MEDICINE

## 2025-07-15 PROCEDURE — 1160F RVW MEDS BY RX/DR IN RCRD: CPT | Performed by: FAMILY MEDICINE

## 2025-07-15 RX ORDER — TEMAZEPAM 15 MG/1
15 CAPSULE ORAL NIGHTLY PRN
Qty: 30 CAPSULE | Refills: 0 | Status: SHIPPED | OUTPATIENT
Start: 2025-07-15 | End: 2025-10-22

## 2025-07-15 SDOH — ECONOMIC STABILITY: FOOD INSECURITY: WITHIN THE PAST 12 MONTHS, THE FOOD YOU BOUGHT JUST DIDN'T LAST AND YOU DIDN'T HAVE MONEY TO GET MORE.: NEVER TRUE

## 2025-07-15 SDOH — ECONOMIC STABILITY: FOOD INSECURITY: WITHIN THE PAST 12 MONTHS, YOU WORRIED THAT YOUR FOOD WOULD RUN OUT BEFORE YOU GOT MONEY TO BUY MORE.: NEVER TRUE

## 2025-07-15 ASSESSMENT — ENCOUNTER SYMPTOMS
WHEEZING: 0
BLOOD IN STOOL: 0
CHEST TIGHTNESS: 0
CONSTIPATION: 0
ABDOMINAL PAIN: 0
COUGH: 0
RHINORRHEA: 0

## 2025-07-15 ASSESSMENT — PATIENT HEALTH QUESTIONNAIRE - PHQ9
2. FEELING DOWN, DEPRESSED OR HOPELESS: NOT AT ALL
SUM OF ALL RESPONSES TO PHQ QUESTIONS 1-9: 0
SUM OF ALL RESPONSES TO PHQ QUESTIONS 1-9: 0
1. LITTLE INTEREST OR PLEASURE IN DOING THINGS: NOT AT ALL
SUM OF ALL RESPONSES TO PHQ QUESTIONS 1-9: 0
SUM OF ALL RESPONSES TO PHQ QUESTIONS 1-9: 0

## 2025-07-15 ASSESSMENT — ANXIETY QUESTIONNAIRES: GAD7 TOTAL SCORE: 0

## 2025-07-15 NOTE — PROGRESS NOTES
Subjective:      Patient ID: Corina Dueñas is a 89 y.o. female.    HPI  Follow up on chronic medical problems.  C/o trouble sleeping and trazodone has not been helping.  Has tried sleepy time tal and melatonin which also has not been helpful  had been taking temazepam which did help with her sleep.  Wants to go back to taking it.  She lays awake most of the not not being able to sleep.    Cardiovascular Review:  The patient has hypertension, hyperlipidemia, and hx of atrial tachycardia.  New onset afib in March 2025.  Stable on eliquis and B- Blocker.  Echo stable with EF at 55-60%, dilated LA, mild to moderate mitral regurgitation, mild to moderate tricuspid regurgitation .  Seen by EP in July.  Planning watchman procedure.   Diet and Lifestyle: generally follows a low fat low cholesterol diet, generally follows a low sodium diet, exercises sporadically  Pertinent ROS: taking medications as instructed, no medication side effects noted, no TIA's, no swelling of ankles, no palpitations.   Denies chest pain.  No pressure or chest tightness.  Has upcoming cardiology appt for next month.    Thyroid Review:  Patient is seen for followup of hypothyroidism.  Followed by endo.  Currently not on thyroid medication.      Thyroid ROS: denies heat/cold intolerance, bowel/skin changes or CVS symptoms.  She has lost about 30 pound but attributes this to when she had COVID pna earlier this year.   Osteoarthritis and Chronic Pain:  Patient has osteoarthritis and fibromyalgia.  Has aches and pain that comes and goes.    Rheumatological ROS: Has burning and pain in the legs at feet that is worse at night but overall has been stable.  Controlled by taking tylenol PRN meds.   Response to treatment plan: stable.   Headache:  Has chronic headache almost every days.  She had follow up with neurology.  Ordered MRI but was not able to get tests done d/t increase anxiety in the MRI scanner.  Recently has been taking tylenol.  Does not

## 2025-07-15 NOTE — PROGRESS NOTES
Chief Complaint   Patient presents with    Routine Follow Up       Here for routine follow up.        \"Have you been to the ER, urgent care clinic since your last visit?  Hospitalized since your last visit?\"    NO    “Have you seen or consulted any other health care providers outside of [facility] since your last visit?”    NO            Click Here for Release of Records Request

## 2025-07-17 ENCOUNTER — RESULTS FOLLOW-UP (OUTPATIENT)
Age: 89
End: 2025-07-17

## 2025-07-17 LAB
ALBUMIN SERPL-MCNC: 4.3 G/DL (ref 3.5–5)
ALBUMIN/GLOB SERPL: 1.7 (ref 1.1–2.2)
ALP SERPL-CCNC: 91 U/L (ref 45–117)
ALT SERPL-CCNC: 17 U/L (ref 12–78)
ANION GAP SERPL CALC-SCNC: 8 MMOL/L (ref 2–12)
AST SERPL-CCNC: 16 U/L (ref 15–37)
BILIRUB SERPL-MCNC: 0.5 MG/DL (ref 0.2–1)
BUN SERPL-MCNC: 25 MG/DL (ref 6–20)
BUN/CREAT SERPL: 38 (ref 12–20)
CALCIUM SERPL-MCNC: 10.3 MG/DL (ref 8.5–10.1)
CHLORIDE SERPL-SCNC: 104 MMOL/L (ref 97–108)
CHOLEST SERPL-MCNC: 138 MG/DL
CO2 SERPL-SCNC: 28 MMOL/L (ref 21–32)
CREAT SERPL-MCNC: 0.66 MG/DL (ref 0.55–1.02)
GLOBULIN SER CALC-MCNC: 2.5 G/DL (ref 2–4)
GLUCOSE SERPL-MCNC: 101 MG/DL (ref 65–100)
HDLC SERPL-MCNC: 62 MG/DL
HDLC SERPL: 2.2 (ref 0–5)
LDLC SERPL CALC-MCNC: 64.4 MG/DL (ref 0–100)
POTASSIUM SERPL-SCNC: 4.8 MMOL/L (ref 3.5–5.1)
PROT SERPL-MCNC: 6.8 G/DL (ref 6.4–8.2)
SODIUM SERPL-SCNC: 140 MMOL/L (ref 136–145)
T4 FREE SERPL-MCNC: 0.9 NG/DL (ref 0.8–1.5)
TRIGL SERPL-MCNC: 58 MG/DL
TSH SERPL DL<=0.05 MIU/L-ACNC: 1.86 UIU/ML (ref 0.36–3.74)
VLDLC SERPL CALC-MCNC: 11.6 MG/DL

## (undated) DEVICE — BLADELESS OBTURATOR: Brand: WECK VISTA

## (undated) DEVICE — VESSEL SEALER XI EXTENDED DISP -- VESSEL

## (undated) DEVICE — FENESTRATED BIPOLAR FORCEPS: Brand: ENDOWRIST

## (undated) DEVICE — SUTURE MCRYL SZ 4-0 L27IN ABSRB UD L19MM PS-2 1/2 CIR PRIM Y426H

## (undated) DEVICE — SEAL UNIV 5-8MM DISP BX/10 -- DA VINCI XI - SNGL USE

## (undated) DEVICE — INFECTION CONTROL KIT SYS

## (undated) DEVICE — ARM DRAPE

## (undated) DEVICE — SOLUTION IRRIGATION H2O 0797305] ICU MEDICAL INC]

## (undated) DEVICE — MARYLAND BIPOLAR FORCEPS: Brand: ENDOWRIST

## (undated) DEVICE — STERILE POLYISOPRENE POWDER-FREE SURGICAL GLOVES: Brand: PROTEXIS

## (undated) DEVICE — GDWIRE URET STR STD .035X150 -- ZIPWIRE STD

## (undated) DEVICE — GOWN,SIRUS,NONRNF,SETINSLV,2XL,18/CS: Brand: MEDLINE

## (undated) DEVICE — UNIVERSAL FIXATION CANNULA: Brand: VERSAONE

## (undated) DEVICE — DERMABOND SKIN ADH 0.7ML -- DERMABOND ADVANCED 12/BX

## (undated) DEVICE — BAG SPEC REM 224ML W4XL6IN DIA10MM 1 HND GYN DISP ENDOPCH

## (undated) DEVICE — 3M™ IOBAN™ 2 ANTIMICROBIAL INCISE DRAPE 6650EZ: Brand: IOBAN™ 2

## (undated) DEVICE — MEDI-VAC NON-CONDUCTIVE SUCTION TUBING: Brand: CARDINAL HEALTH

## (undated) DEVICE — TOWEL SURG W17XL27IN STD BLU COT NONFENESTRATED PREWASHED

## (undated) DEVICE — GLOVE SURG SZ 8 CRM LTX FREE POLYISOPRENE POLYMER BEAD ANTI

## (undated) DEVICE — TUBING IRRIG L77IN DIA0.241IN L BOR FOR CYSTO W/ NVENT

## (undated) DEVICE — SURGICAL PROCEDURE KIT GEN LAPAROSCOPY LF

## (undated) DEVICE — DEVON™ KNEE AND BODY STRAP 60" X 3" (1.5 M X 7.6 CM): Brand: DEVON

## (undated) DEVICE — Device

## (undated) DEVICE — VISUALIZATION SYSTEM: Brand: CLEARIFY

## (undated) DEVICE — ELECTRO LUBE IS A SINGLE PATIENT USE DEVICE THAT IS INTENDED TO BE USED ON ELECTROSURGICAL ELECTRODES TO REDUCE STICKING.: Brand: KEY SURGICAL ELECTRO LUBE

## (undated) DEVICE — SOLUTION IRRIG 1000ML H2O STRL BLT

## (undated) DEVICE — COVER,MAYO STAND,STERILE: Brand: MEDLINE

## (undated) DEVICE — GENERAL LAPAROSCOPY-MRMC: Brand: MEDLINE INDUSTRIES, INC.

## (undated) DEVICE — TISSUE RETRIEVAL SYSTEM: Brand: INZII RETRIEVAL SYSTEM

## (undated) DEVICE — CYSTO MRMC: Brand: MEDLINE INDUSTRIES, INC.

## (undated) DEVICE — GOWN,SIRUS,NONRNF,SETINSLV,XL,20/CS: Brand: MEDLINE

## (undated) DEVICE — SPONGE HEMOSTAT CELLULS 4X8IN -- SURGICEL

## (undated) DEVICE — SOLUTION SCRB 4OZ 10% PVP I POVIDONE IOD TOP PAINT EXIDINE

## (undated) DEVICE — NEEDLE HYPO 22GA L1.5IN BLK S STL HUB POLYPR SHLD REG BVL

## (undated) DEVICE — SOLUTION SURG PREP 26 CC PURPREP

## (undated) DEVICE — OPEN-END URETERAL CATHETER: Brand: COOK

## (undated) DEVICE — GOWN,SIRUS,FABRNF,XL,20/CS: Brand: MEDLINE

## (undated) DEVICE — CYSTOSCOPY PACK: Brand: CONVERTORS

## (undated) DEVICE — JELLY,LUBE,STERILE,FLIP TOP,TUBE,4-OZ: Brand: MEDLINE

## (undated) DEVICE — SYRINGE MED 20ML STD CLR PLAS LUERLOCK TIP N CTRL DISP

## (undated) DEVICE — SKIN MARKER,REGULAR TIP WITH RULER AND LABELS: Brand: DEVON

## (undated) DEVICE — CATHETER URET L70CM DIA5FR POLYUR SPRL OLV TIP W/ ADPT

## (undated) DEVICE — COVER MPLR TIP CRV SCIS ACC DA VINCI

## (undated) DEVICE — GLOVE ORANGE PI 7 1/2   MSG9075

## (undated) DEVICE — TOWEL,OR,DSP,ST,BLUE,STD,2/PK,40PK/CS: Brand: MEDLINE

## (undated) DEVICE — CONNECTOR CATH URET SIDE PRT FOR FRIC CONN [UCA595] [GU TEK]

## (undated) DEVICE — MARKER,SKIN,WI/RULER AND LABELS: Brand: MEDLINE

## (undated) DEVICE — GLOVE SURG SZ 85 CRM LTX FREE POLYISOPRENE POLYMER BEAD ANTI

## (undated) DEVICE — DRAPE,REIN 53X77,STERILE: Brand: MEDLINE

## (undated) DEVICE — UNDER BUTTOCKS DRAPE: Brand: CONVERTORS

## (undated) DEVICE — GARMENT,MEDLINE,DVT,INT,CALF,MED, GEN2: Brand: MEDLINE

## (undated) DEVICE — HANDLE LT SNAP ON ULT DURABLE LENS FOR TRUMPF ALC DISPOSABLE

## (undated) DEVICE — TOTAL TRAY, 16FR 10ML SIL FOLEY, URN: Brand: MEDLINE

## (undated) DEVICE — TROCAR LAP L100MM DIA5MM BLDELSS W/ STBL SL ENDOPATH XCEL

## (undated) DEVICE — SOLUTION IRRIG 1000ML STRL H2O USP PLAS POUR BTL

## (undated) DEVICE — COLUMN DRAPE

## (undated) DEVICE — BLADELESS OPTICAL TROCAR WITH FIXATION CANNULA: Brand: VERSAPORT

## (undated) DEVICE — 1200 GUARD II KIT W/5MM TUBE W/O VAC TUBE: Brand: GUARDIAN

## (undated) DEVICE — (D)PREP SKN CHLRAPRP APPL 26ML -- CONVERT TO ITEM 371833

## (undated) DEVICE — SCISSORS SURG DIA8MM MPLR CRV ENDOWRIST

## (undated) DEVICE — TIP COVER ACCESSORY

## (undated) DEVICE — KENDALL SCD EXPRESS SLEEVES, KNEE LENGTH, MEDIUM: Brand: KENDALL SCD

## (undated) DEVICE — REM POLYHESIVE ADULT PATIENT RETURN ELECTRODE: Brand: VALLEYLAB